# Patient Record
Sex: MALE | Race: ASIAN | Employment: OTHER
[De-identification: names, ages, dates, MRNs, and addresses within clinical notes are randomized per-mention and may not be internally consistent; named-entity substitution may affect disease eponyms.]

---

## 2017-01-31 ENCOUNTER — OFFICE VISIT (OUTPATIENT)
Dept: INTERNAL MEDICINE | Facility: CLINIC | Age: 72
End: 2017-01-31

## 2017-01-31 VITALS
HEIGHT: 66 IN | HEART RATE: 64 BPM | TEMPERATURE: 98.5 F | WEIGHT: 235 LBS | BODY MASS INDEX: 37.77 KG/M2 | DIASTOLIC BLOOD PRESSURE: 70 MMHG | SYSTOLIC BLOOD PRESSURE: 140 MMHG | RESPIRATION RATE: 16 BRPM

## 2017-01-31 DIAGNOSIS — D64.9 ANEMIA, UNSPECIFIED TYPE: ICD-10-CM

## 2017-01-31 DIAGNOSIS — E11.9 TYPE 2 DIABETES MELLITUS WITHOUT COMPLICATION, WITH LONG-TERM CURRENT USE OF INSULIN (HCC): Primary | ICD-10-CM

## 2017-01-31 DIAGNOSIS — K21.9 GASTROESOPHAGEAL REFLUX DISEASE WITHOUT ESOPHAGITIS: ICD-10-CM

## 2017-01-31 DIAGNOSIS — Z79.4 TYPE 2 DIABETES MELLITUS WITHOUT COMPLICATION, WITH LONG-TERM CURRENT USE OF INSULIN (HCC): Primary | ICD-10-CM

## 2017-01-31 DIAGNOSIS — I10 ESSENTIAL HYPERTENSION: ICD-10-CM

## 2017-01-31 PROCEDURE — 99214 OFFICE O/P EST MOD 30 MIN: CPT | Performed by: INTERNAL MEDICINE

## 2017-01-31 PROCEDURE — 3017F COLORECTAL CA SCREEN DOC REV: CPT | Performed by: INTERNAL MEDICINE

## 2017-01-31 PROCEDURE — G8484 FLU IMMUNIZE NO ADMIN: HCPCS | Performed by: INTERNAL MEDICINE

## 2017-01-31 PROCEDURE — 1036F TOBACCO NON-USER: CPT | Performed by: INTERNAL MEDICINE

## 2017-01-31 PROCEDURE — 1123F ACP DISCUSS/DSCN MKR DOCD: CPT | Performed by: INTERNAL MEDICINE

## 2017-01-31 PROCEDURE — 3044F HG A1C LEVEL LT 7.0%: CPT | Performed by: INTERNAL MEDICINE

## 2017-01-31 PROCEDURE — 4040F PNEUMOC VAC/ADMIN/RCVD: CPT | Performed by: INTERNAL MEDICINE

## 2017-01-31 PROCEDURE — G8427 DOCREV CUR MEDS BY ELIG CLIN: HCPCS | Performed by: INTERNAL MEDICINE

## 2017-01-31 PROCEDURE — G8419 CALC BMI OUT NRM PARAM NOF/U: HCPCS | Performed by: INTERNAL MEDICINE

## 2017-01-31 RX ORDER — OMEPRAZOLE 40 MG/1
40 CAPSULE, DELAYED RELEASE ORAL DAILY
Qty: 30 CAPSULE | Refills: 0 | Status: SHIPPED | OUTPATIENT
Start: 2017-01-31 | End: 2017-04-21 | Stop reason: SDUPTHER

## 2017-01-31 RX ORDER — FERROUS SULFATE 325(65) MG
325 TABLET ORAL
Qty: 270 TABLET | Refills: 1 | Status: SHIPPED | OUTPATIENT
Start: 2017-01-31 | End: 2017-05-30 | Stop reason: ALTCHOICE

## 2017-01-31 RX ORDER — DICLOFENAC SODIUM 75 MG/1
75 TABLET, DELAYED RELEASE ORAL 2 TIMES DAILY
Qty: 60 TABLET | Refills: 0 | Status: SHIPPED | OUTPATIENT
Start: 2017-01-31 | End: 2017-04-21 | Stop reason: SDUPTHER

## 2017-01-31 RX ORDER — FERROUS SULFATE 325(65) MG
325 TABLET ORAL
COMMUNITY
End: 2017-01-31 | Stop reason: SDUPTHER

## 2017-01-31 RX ORDER — RANITIDINE 150 MG/1
150 TABLET ORAL NIGHTLY
Qty: 30 TABLET | Refills: 0 | Status: SHIPPED | OUTPATIENT
Start: 2017-01-31 | End: 2018-01-29 | Stop reason: CLARIF

## 2017-02-01 RX ORDER — ATORVASTATIN CALCIUM 40 MG/1
TABLET, FILM COATED ORAL
Qty: 90 TABLET | Refills: 1 | Status: SHIPPED | OUTPATIENT
Start: 2017-02-01 | End: 2017-08-28 | Stop reason: SDUPTHER

## 2017-02-14 ENCOUNTER — HOSPITAL ENCOUNTER (OUTPATIENT)
Age: 72
Setting detail: SPECIMEN
Discharge: HOME | End: 2017-02-14
Payer: MEDICARE

## 2017-02-14 DIAGNOSIS — K21.9 GASTROESOPHAGEAL REFLUX DISEASE WITHOUT ESOPHAGITIS: ICD-10-CM

## 2017-02-14 DIAGNOSIS — Z79.4 TYPE 2 DIABETES MELLITUS WITHOUT COMPLICATION, WITH LONG-TERM CURRENT USE OF INSULIN (HCC): ICD-10-CM

## 2017-02-14 DIAGNOSIS — E11.9 TYPE 2 DIABETES MELLITUS WITHOUT COMPLICATION, WITH LONG-TERM CURRENT USE OF INSULIN (HCC): ICD-10-CM

## 2017-02-14 DIAGNOSIS — I10 ESSENTIAL HYPERTENSION: ICD-10-CM

## 2017-02-14 DIAGNOSIS — D64.9 ANEMIA, UNSPECIFIED TYPE: ICD-10-CM

## 2017-02-14 LAB
ANION GAP SERPL CALCULATED.3IONS-SCNC: 17 MMOL/L (ref 9–17)
BUN BLDV-MCNC: 30 MG/DL (ref 8–23)
BUN/CREAT BLD: ABNORMAL (ref 9–20)
CALCIUM SERPL-MCNC: 9.4 MG/DL (ref 8.6–10.4)
CHLORIDE BLD-SCNC: 108 MMOL/L (ref 98–107)
CO2: 19 MMOL/L (ref 20–31)
CREAT SERPL-MCNC: 1.56 MG/DL (ref 0.7–1.2)
FERRITIN: 166 UG/L (ref 30–400)
GFR AFRICAN AMERICAN: 53 ML/MIN
GFR NON-AFRICAN AMERICAN: 44 ML/MIN
GFR SERPL CREATININE-BSD FRML MDRD: ABNORMAL ML/MIN/{1.73_M2}
GFR SERPL CREATININE-BSD FRML MDRD: ABNORMAL ML/MIN/{1.73_M2}
GLUCOSE BLD-MCNC: 52 MG/DL (ref 70–99)
HCT VFR BLD CALC: 36.9 % (ref 41–53)
HEMOGLOBIN: 12.5 G/DL (ref 13.5–17.5)
IRON SATURATION: 35 % (ref 20–55)
IRON: 99 UG/DL (ref 59–158)
MCH RBC QN AUTO: 30.6 PG (ref 26–34)
MCHC RBC AUTO-ENTMCNC: 33.9 G/DL (ref 31–37)
MCV RBC AUTO: 90.3 FL (ref 80–100)
PDW BLD-RTO: 14.8 % (ref 12.5–15.4)
PLATELET # BLD: 177 K/UL (ref 140–450)
PMV BLD AUTO: 9.1 FL (ref 6–12)
POTASSIUM SERPL-SCNC: 4.8 MMOL/L (ref 3.7–5.3)
RBC # BLD: 4.09 M/UL (ref 4.5–5.9)
SODIUM BLD-SCNC: 144 MMOL/L (ref 135–144)
TOTAL IRON BINDING CAPACITY: 282 UG/DL (ref 250–450)
UNSATURATED IRON BINDING CAPACITY: 183 UG/DL (ref 112–347)
WBC # BLD: 8.7 K/UL (ref 3.5–11)

## 2017-02-28 RX ORDER — MONTELUKAST SODIUM 10 MG/1
TABLET ORAL
Qty: 90 TABLET | Refills: 1 | Status: SHIPPED | OUTPATIENT
Start: 2017-02-28 | End: 2017-08-28 | Stop reason: SDUPTHER

## 2017-04-21 RX ORDER — DICLOFENAC SODIUM 75 MG/1
75 TABLET, DELAYED RELEASE ORAL 2 TIMES DAILY
Qty: 180 TABLET | Refills: 0 | Status: SHIPPED | OUTPATIENT
Start: 2017-04-21 | End: 2017-05-30 | Stop reason: ALTCHOICE

## 2017-04-21 RX ORDER — OMEPRAZOLE 40 MG/1
40 CAPSULE, DELAYED RELEASE ORAL DAILY
Qty: 90 CAPSULE | Refills: 0 | Status: SHIPPED | OUTPATIENT
Start: 2017-04-21 | End: 2017-05-30 | Stop reason: ALTCHOICE

## 2017-05-30 ENCOUNTER — OFFICE VISIT (OUTPATIENT)
Dept: INTERNAL MEDICINE CLINIC | Age: 72
End: 2017-05-30
Payer: MEDICARE

## 2017-05-30 VITALS
HEIGHT: 66 IN | TEMPERATURE: 97.5 F | WEIGHT: 232 LBS | HEART RATE: 72 BPM | SYSTOLIC BLOOD PRESSURE: 130 MMHG | BODY MASS INDEX: 37.28 KG/M2 | DIASTOLIC BLOOD PRESSURE: 60 MMHG | RESPIRATION RATE: 16 BRPM

## 2017-05-30 DIAGNOSIS — G89.29 CHRONIC BILATERAL LOW BACK PAIN WITHOUT SCIATICA: ICD-10-CM

## 2017-05-30 DIAGNOSIS — E11.9 TYPE 2 DIABETES MELLITUS WITHOUT COMPLICATION, WITH LONG-TERM CURRENT USE OF INSULIN (HCC): Primary | ICD-10-CM

## 2017-05-30 DIAGNOSIS — K21.9 GASTROESOPHAGEAL REFLUX DISEASE WITHOUT ESOPHAGITIS: ICD-10-CM

## 2017-05-30 DIAGNOSIS — M54.50 CHRONIC BILATERAL LOW BACK PAIN WITHOUT SCIATICA: ICD-10-CM

## 2017-05-30 DIAGNOSIS — Z79.4 TYPE 2 DIABETES MELLITUS WITHOUT COMPLICATION, WITH LONG-TERM CURRENT USE OF INSULIN (HCC): Primary | ICD-10-CM

## 2017-05-30 DIAGNOSIS — I10 ESSENTIAL HYPERTENSION: ICD-10-CM

## 2017-05-30 PROCEDURE — 3017F COLORECTAL CA SCREEN DOC REV: CPT | Performed by: INTERNAL MEDICINE

## 2017-05-30 PROCEDURE — G8427 DOCREV CUR MEDS BY ELIG CLIN: HCPCS | Performed by: INTERNAL MEDICINE

## 2017-05-30 PROCEDURE — 1123F ACP DISCUSS/DSCN MKR DOCD: CPT | Performed by: INTERNAL MEDICINE

## 2017-05-30 PROCEDURE — 99214 OFFICE O/P EST MOD 30 MIN: CPT | Performed by: INTERNAL MEDICINE

## 2017-05-30 PROCEDURE — 1036F TOBACCO NON-USER: CPT | Performed by: INTERNAL MEDICINE

## 2017-05-30 PROCEDURE — 4040F PNEUMOC VAC/ADMIN/RCVD: CPT | Performed by: INTERNAL MEDICINE

## 2017-05-30 PROCEDURE — G8417 CALC BMI ABV UP PARAM F/U: HCPCS | Performed by: INTERNAL MEDICINE

## 2017-05-30 PROCEDURE — 3046F HEMOGLOBIN A1C LEVEL >9.0%: CPT | Performed by: INTERNAL MEDICINE

## 2017-05-30 RX ORDER — METOPROLOL SUCCINATE 50 MG/1
1 TABLET, EXTENDED RELEASE ORAL DAILY
COMMUNITY
Start: 2017-04-19 | End: 2018-01-29 | Stop reason: SDUPTHER

## 2017-05-30 ASSESSMENT — PATIENT HEALTH QUESTIONNAIRE - PHQ9
1. LITTLE INTEREST OR PLEASURE IN DOING THINGS: 0
SUM OF ALL RESPONSES TO PHQ QUESTIONS 1-9: 0
2. FEELING DOWN, DEPRESSED OR HOPELESS: 0
SUM OF ALL RESPONSES TO PHQ9 QUESTIONS 1 & 2: 0

## 2017-05-31 RX ORDER — SILDENAFIL 100 MG/1
100 TABLET, FILM COATED ORAL PRN
Qty: 2 TABLET | Refills: 0 | COMMUNITY
Start: 2017-05-31 | End: 2018-10-09

## 2017-06-01 PROCEDURE — G0009 ADMIN PNEUMOCOCCAL VACCINE: HCPCS | Performed by: INTERNAL MEDICINE

## 2017-06-01 PROCEDURE — 90732 PPSV23 VACC 2 YRS+ SUBQ/IM: CPT | Performed by: INTERNAL MEDICINE

## 2017-06-19 ENCOUNTER — OFFICE VISIT (OUTPATIENT)
Dept: INTERNAL MEDICINE CLINIC | Age: 72
End: 2017-06-19
Payer: MEDICARE

## 2017-06-19 VITALS
HEART RATE: 68 BPM | SYSTOLIC BLOOD PRESSURE: 118 MMHG | DIASTOLIC BLOOD PRESSURE: 70 MMHG | RESPIRATION RATE: 24 BRPM | BODY MASS INDEX: 36.96 KG/M2 | HEIGHT: 66 IN | TEMPERATURE: 97.9 F | WEIGHT: 230 LBS

## 2017-06-19 DIAGNOSIS — B30.9 ACUTE VIRAL CONJUNCTIVITIS OF BOTH EYES: Primary | ICD-10-CM

## 2017-06-19 PROCEDURE — 3017F COLORECTAL CA SCREEN DOC REV: CPT | Performed by: INTERNAL MEDICINE

## 2017-06-19 PROCEDURE — 1123F ACP DISCUSS/DSCN MKR DOCD: CPT | Performed by: INTERNAL MEDICINE

## 2017-06-19 PROCEDURE — 4040F PNEUMOC VAC/ADMIN/RCVD: CPT | Performed by: INTERNAL MEDICINE

## 2017-06-19 PROCEDURE — 99213 OFFICE O/P EST LOW 20 MIN: CPT | Performed by: INTERNAL MEDICINE

## 2017-06-19 PROCEDURE — G8427 DOCREV CUR MEDS BY ELIG CLIN: HCPCS | Performed by: INTERNAL MEDICINE

## 2017-06-19 PROCEDURE — 1036F TOBACCO NON-USER: CPT | Performed by: INTERNAL MEDICINE

## 2017-06-19 PROCEDURE — G8417 CALC BMI ABV UP PARAM F/U: HCPCS | Performed by: INTERNAL MEDICINE

## 2017-06-19 RX ORDER — TOBRAMYCIN 3 MG/ML
1 SOLUTION/ DROPS OPHTHALMIC EVERY 4 HOURS
Qty: 1 BOTTLE | Refills: 0 | Status: SHIPPED | OUTPATIENT
Start: 2017-06-19 | End: 2017-06-29

## 2017-06-19 RX ORDER — FERROUS SULFATE 325(65) MG
325 TABLET ORAL
COMMUNITY
End: 2018-01-29 | Stop reason: CLARIF

## 2017-06-19 RX ORDER — OMEPRAZOLE 40 MG/1
40 CAPSULE, DELAYED RELEASE ORAL 2 TIMES DAILY
COMMUNITY
End: 2017-09-29 | Stop reason: SDUPTHER

## 2017-06-19 RX ORDER — LORATADINE 10 MG/1
10 TABLET ORAL DAILY
Qty: 30 TABLET | Refills: 0 | Status: SHIPPED | OUTPATIENT
Start: 2017-06-19 | End: 2018-01-29 | Stop reason: CLARIF

## 2017-06-22 ENCOUNTER — TELEPHONE (OUTPATIENT)
Dept: INTERNAL MEDICINE CLINIC | Age: 72
End: 2017-06-22

## 2017-06-22 DIAGNOSIS — H57.89 EYE REDNESS: Primary | ICD-10-CM

## 2017-06-22 RX ORDER — PREDNISONE 10 MG/1
10 TABLET ORAL 2 TIMES DAILY
Qty: 6 TABLET | Refills: 0 | Status: SHIPPED | OUTPATIENT
Start: 2017-06-22 | End: 2017-07-02

## 2017-08-28 RX ORDER — OMEPRAZOLE 40 MG/1
CAPSULE, DELAYED RELEASE ORAL
Qty: 90 CAPSULE | Refills: 1 | Status: SHIPPED | OUTPATIENT
Start: 2017-08-28 | End: 2018-01-29 | Stop reason: SDUPTHER

## 2017-08-28 RX ORDER — ATORVASTATIN CALCIUM 40 MG/1
TABLET, FILM COATED ORAL
Qty: 90 TABLET | Refills: 1 | Status: SHIPPED | OUTPATIENT
Start: 2017-08-28 | End: 2018-01-29 | Stop reason: SDUPTHER

## 2017-08-28 RX ORDER — DICLOFENAC SODIUM 75 MG/1
TABLET, DELAYED RELEASE ORAL
Qty: 180 TABLET | Refills: 1 | Status: SHIPPED | OUTPATIENT
Start: 2017-08-28 | End: 2018-01-29 | Stop reason: SDUPTHER

## 2017-08-28 RX ORDER — MONTELUKAST SODIUM 10 MG/1
TABLET ORAL
Qty: 90 TABLET | Refills: 1 | Status: SHIPPED | OUTPATIENT
Start: 2017-08-28 | End: 2018-01-29 | Stop reason: SDUPTHER

## 2017-09-29 ENCOUNTER — OFFICE VISIT (OUTPATIENT)
Dept: INTERNAL MEDICINE CLINIC | Age: 72
End: 2017-09-29
Payer: MEDICARE

## 2017-09-29 VITALS
HEIGHT: 66 IN | HEART RATE: 66 BPM | WEIGHT: 233.4 LBS | OXYGEN SATURATION: 98 % | DIASTOLIC BLOOD PRESSURE: 74 MMHG | SYSTOLIC BLOOD PRESSURE: 158 MMHG | BODY MASS INDEX: 37.51 KG/M2 | TEMPERATURE: 97.9 F

## 2017-09-29 DIAGNOSIS — G89.29 CHRONIC BILATERAL LOW BACK PAIN WITHOUT SCIATICA: ICD-10-CM

## 2017-09-29 DIAGNOSIS — M54.5 CHRONIC MIDLINE LOW BACK PAIN, WITH SCIATICA PRESENCE UNSPECIFIED: ICD-10-CM

## 2017-09-29 DIAGNOSIS — L60.0 INGROWN NAIL OF GREAT TOE OF RIGHT FOOT: ICD-10-CM

## 2017-09-29 DIAGNOSIS — M79.605 LEFT LEG PAIN: ICD-10-CM

## 2017-09-29 DIAGNOSIS — I10 ESSENTIAL HYPERTENSION: ICD-10-CM

## 2017-09-29 DIAGNOSIS — M54.50 CHRONIC BILATERAL LOW BACK PAIN WITHOUT SCIATICA: ICD-10-CM

## 2017-09-29 DIAGNOSIS — E11.9 TYPE 2 DIABETES MELLITUS WITHOUT COMPLICATION, WITH LONG-TERM CURRENT USE OF INSULIN (HCC): Primary | ICD-10-CM

## 2017-09-29 DIAGNOSIS — L81.9 DISCOLORATION OF SKIN: ICD-10-CM

## 2017-09-29 DIAGNOSIS — L98.9 ARM LESION: ICD-10-CM

## 2017-09-29 DIAGNOSIS — M33.90 DERMATOMYOSITIS (HCC): ICD-10-CM

## 2017-09-29 DIAGNOSIS — Z79.4 TYPE 2 DIABETES MELLITUS WITHOUT COMPLICATION, WITH LONG-TERM CURRENT USE OF INSULIN (HCC): Primary | ICD-10-CM

## 2017-09-29 DIAGNOSIS — G89.29 CHRONIC MIDLINE LOW BACK PAIN, WITH SCIATICA PRESENCE UNSPECIFIED: ICD-10-CM

## 2017-09-29 PROCEDURE — G0008 ADMIN INFLUENZA VIRUS VAC: HCPCS | Performed by: INTERNAL MEDICINE

## 2017-09-29 PROCEDURE — 1123F ACP DISCUSS/DSCN MKR DOCD: CPT | Performed by: INTERNAL MEDICINE

## 2017-09-29 PROCEDURE — G8427 DOCREV CUR MEDS BY ELIG CLIN: HCPCS | Performed by: INTERNAL MEDICINE

## 2017-09-29 PROCEDURE — 4040F PNEUMOC VAC/ADMIN/RCVD: CPT | Performed by: INTERNAL MEDICINE

## 2017-09-29 PROCEDURE — 3046F HEMOGLOBIN A1C LEVEL >9.0%: CPT | Performed by: INTERNAL MEDICINE

## 2017-09-29 PROCEDURE — 1036F TOBACCO NON-USER: CPT | Performed by: INTERNAL MEDICINE

## 2017-09-29 PROCEDURE — 99214 OFFICE O/P EST MOD 30 MIN: CPT | Performed by: INTERNAL MEDICINE

## 2017-09-29 PROCEDURE — 90688 IIV4 VACCINE SPLT 0.5 ML IM: CPT | Performed by: INTERNAL MEDICINE

## 2017-09-29 PROCEDURE — G8417 CALC BMI ABV UP PARAM F/U: HCPCS | Performed by: INTERNAL MEDICINE

## 2017-09-29 PROCEDURE — 3017F COLORECTAL CA SCREEN DOC REV: CPT | Performed by: INTERNAL MEDICINE

## 2017-09-29 RX ORDER — GABAPENTIN 300 MG/1
300 CAPSULE ORAL 3 TIMES DAILY
Qty: 90 CAPSULE | Refills: 1 | Status: SHIPPED | OUTPATIENT
Start: 2017-09-29 | End: 2018-01-29 | Stop reason: CLARIF

## 2017-09-29 RX ORDER — SILDENAFIL 100 MG/1
100 TABLET, FILM COATED ORAL PRN
Qty: 1 TABLET | Refills: 0 | COMMUNITY
Start: 2017-09-29 | End: 2018-01-29 | Stop reason: CLARIF

## 2017-09-29 NOTE — MR AVS SNAPSHOT
After Visit Summary             [address]   2017 10:15 AM   Office Visit    Description:  Male : 1945   Provider:  Zeynep Jacobo MD   Department:  [facility] [id number] [place] [address] and Future Appointments         Below is a list of your follow-up and future appointments. This may not be a complete list as you may have made appointments directly with providers that we are not aware of or your providers may have made some for you. Please call your providers to confirm appointments. It is important to keep your appointments. Please bring your current insurance card, photo ID, co-pay, and all medication bottles to your appointment. If self-pay, payment is expected at the time of service. Your To-Do List     Future Appointments Provider Department Dept Phone    2018 10:00 AM Zeynep aJcobo MD [facility] [phone]    Please arrive 15 minutes prior to appointment, bring photo ID and insurance card. Future Orders Complete By Expires    Comprehensive Metabolic Panel [NPQ37 Custom]  2017    Hemoglobin A1C [LAB90 Custom]  2017    Microalbumin / Creatinine Urine Ratio [XVF735 Custom]  2017    Lipid Panel [LAB18 Custom]  10/10/2017 2018    Follow-Up    Return in about 4 months (around 2018).          Information from Your Visit        Department     Name Address Phone Fax    [facility] [address] [facility] [phone]      You Were Seen for:         Comments    Type 2 diabetes mellitus without complication, with long-term current use of insulin ([address]   [7490023]         Vital Signs     Blood Pressure Pulse Temperature Height Weight Oxygen Saturation    158/74 (Site: Right Arm, Position: Sitting, Cuff Size: Large Adult) 66 97.9 °F (36.6 °C) (Oral) 5' 6.14\" (1.68 m) 233 lb 6.4 oz (105.9 kg) 98%    Body Mass Index Smoking Status * Notice: This list has 2 medication(s) that are the same as other medications prescribed for you. Read the directions carefully, and ask your doctor or other care provider to review them with you. Where to Get Your Medications      These medications were sent to [facility] [address] [place] [address] [address], [facility] [place] [address]     Phone:  [phone]     gabapentin 300 MG capsule         Information about where to get these medications is not yet available     !  Ask your nurse or doctor about these medications     sildenafil 100 MG tablet               Your Current Medications Are              gabapentin (NEURONTIN) 300 MG capsule Take 1 capsule by mouth 3 times daily    sildenafil (VIAGRA) 100 MG tablet Take 1 tablet by mouth as needed for Erectile Dysfunction    omeprazole (PRILOSEC) 40 MG delayed release capsule Take 1 capsule by mouth  daily    atorvastatin (LIPITOR) 40 MG tablet Take 1 tablet by mouth  daily    diclofenac (VOLTAREN) 75 MG EC tablet Take 1 tablet by mouth two  times daily    montelukast (SINGULAIR) 10 MG tablet Take 1 tablet by mouth once daily    DICLOFENAC PO Take 1 tablet by mouth daily    sildenafil (VIAGRA) 100 MG tablet Take 1 tablet by mouth as needed for Erectile Dysfunction    metFORMIN (GLUCOPHAGE) 500 MG tablet Take 2 tablets by mouth 2 times daily    metoprolol succinate (TOPROL XL) 50 MG extended release tablet Take 1 tablet by mouth daily    ranitidine (ZANTAC) 150 MG tablet Take 1 tablet by mouth nightly    losartan (COZAAR) 100 MG tablet Take 1 tablet by mouth daily    amLODIPine (NORVASC) 5 MG tablet Take 5 mg by mouth daily    spironolactone (ALDACTONE) 25 MG tablet Take 25 mg by mouth daily    acetaminophen (TYLENOL) 650 MG CR tablet Take 650 mg by mouth every 8 hours as needed for Pain    HUMALOG MIX 50/50 KWIKPEN (50-50) 100 UNIT/ML SUPN injection pen INJECT 90 UNITS UNDER THE SKIN TWICE A DAY WITH MEALS    aspirin 81 MG tablet Take 81 mg by mouth daily    ferrous sulfate 325 (65 Fe) MG tablet Take 325 mg by mouth 3 times daily (with meals)    loratadine (CLARITIN) 10 MG tablet Take 1 tablet by mouth daily      Allergies           No Known Allergies      We Ordered/Performed the following           RUBINA Johnston MD     Scheduling Instructions:    RUBINA Johnston MD   [address], [address] [place] [address]   [phone]    Comments: The patient can be scheduled with any member of the group, including the provider with the first available appointments.      INFLUENZA, QUADV, 3 YRS AND OLDER, IM, MDV, 0.5ML ([address])          Additional Information        Basic Information     Date Of Birth Sex Race Ethnicity Preferred Language    1945 Male Other Non-/Non  English      Problem List as of 9/29/2017  Date Reviewed: 6/3/2017                Epidural hematoma (HCC)    Dermatomyositis (HCC) (Chronic)    HIGH CHOLESTEROL    HTN (hypertension)    Obesity    GUTIERREZ (obstructive sleep apnea)    Hay fever    Lumbar herniated disc    Colon cancer ([facility] [address]      Immunizations as of 9/29/2017     Name Date    Influenza Virus Vaccine 10/15/2015, 10/24/2014, 10/28/2013, 2/12/2013, 9/20/2011, 9/22/2009, 12/9/2005    Influenza, Cailin Au, 3 Years and older, IM 9/29/2016    Pneumococcal 13-valent Conjugate ([id number]) 2/25/2016    Pneumococcal Conjugate 7-valent 9/20/2011    Pneumococcal Polysaccharide ([id number]) 6/1/2017    Tetanus 5/5/2006      Preventive Care        Date Due    Eye Exam By An Eye Doctor 2/20/2017    Hemoglobin A1C (Test For Long-Term Glucose Control) 5/23/2017    Urine Check For Kidney Problems 5/23/2017    Yearly Flu Vaccine (1) 9/1/2017    Tetanus Combination Vaccine (1 - Tdap) 12/12/2017 (Originally 5/6/2006)    Zoster Vaccine 5/30/2018 (Originally 6/25/2004) Cholesterol Screening 10/25/2017    Diabetic Foot Exam 5/31/2018    Colonoscopy 10/20/2026            MyChart Signup           Our records indicate that you have an active [employer] account. You can view your After Visit Summary by going to [email or web address] and logging in with your [employer] username and password. If you don't have a [employer] username and password but a parent or guardian has access to your record, the parent or guardian should login with their own [employer] username and password and access your record to view the After Visit Summary. Additional Information  If you have questions, please contact the physician practice where you receive care. Remember, [employer] is NOT to be used for urgent needs. For medical emergencies, dial 911. For questions regarding your [employer] account call [phone]. If you have a clinical question, please call your doctor's office.

## 2017-09-29 NOTE — PROGRESS NOTES
 Heart Disease Maternal Grandfather      ROS   Constitutional:  Negative for fatigue, loss of appetite and unexpected weight change   HEENT            : Negative for neck stiffness and pain, no congestion or sinus pressure   Eyes                : No visual disturbance or pain   Cardiovascular: No chest pain or palpitations or leg swelling   Respiratory      : Negative for cough, shortness of breath or wheezing   Gastrointestinal: Negative for abdominal pain, constipation or diarrhea and bloating No nausea or vomiting   Genitourinary:     No urgency or frequency, no burning or hematuria   Musculoskeletal: No arthralgias, back pain or myalgias   Skin                  : Negative for rash or erythema   Neurological    : Negative for dizziness, weakness, tremors ,light headedness or syncope   Psychiatric       : Negative for dysphoric mood, sleep disturbances, nervous or anxious, or decreased concentration   All other review of systems was negative    Objective  Physical Examination:    Nursing note reviewed    BP (!) 158/74 (Site: Right Arm, Position: Sitting, Cuff Size: Large Adult)  Pulse 66  Temp 97.9 °F (36.6 °C) (Oral)   Ht 5' 6.14\" (1.68 m)  Wt 233 lb 6.4 oz (105.9 kg)  SpO2 98%  BMI 37.51 kg/m2  BP Readings from Last 3 Encounters:   09/29/17 (!) 158/74   06/19/17 118/70   05/30/17 130/60         Constitutional:  Leeann Castellanos is oriented to place, person and time ,appears well-developed and well-nourished  HEENT:  Atraumatic and normocephalic, external ears normal bilaterally, nose normal no oropharyngeal exudate and is clear and moist  Eyes:  EOCM normal; conjunctivae normal; PERRLA bilaterally  Neck:  Normal range of motion, neck supple, no JVD and no thyromegaly  Cardiovascular:  RRR, normal heart sounds and intact distal pulses  Pulmonary:  effort normal and breath sounds normal bilaterally,no wheezes or rales, no respiratory distress  Abdominal:  Soft, non-tender; normal bowel sounds, no masses  Musculoskeletal:  Normal range of motion and no edema or tenderness bilaterally  No lymphadenopathy  Neurological:  alert, oriented, and normal reflexes bilaterally  Skin: warm and dry  Psychiatric:  normal mood and effect; behavior normal.    Labs:   Lab Results   Component Value Date    LABA1C 6.0 05/23/2016     Lab Results   Component Value Date    CHOL 114 10/25/2016     Lab Results   Component Value Date    HDL 37 (L) 10/25/2016     No results found for: [facility]  Lab Results   Component Value Date    TRIG 218 (H) 10/25/2016     No components found for: [place], [place]  Lab Results   Component Value Date    WBC 8.7 02/14/2017    HGB 12.5 (L) 02/14/2017    HCT 36.9 (L) 02/14/2017    MCV 90.3 02/14/2017     02/14/2017     Lab Results   Component Value Date    INR 1.0 09/04/2015    PROTIME 10.0 09/04/2015     Lab Results   Component Value Date    GLUCOSE 52 (L) 02/14/2017    CREATININE 1.56 (H) 02/14/2017    BUN 30 (H) 02/14/2017     02/14/2017    K 4.8 02/14/2017     (H) 02/14/2017    CO2 19 (L) 02/14/2017     Lab Results   Component Value Date    ALT 25 10/25/2016    AST 18 10/25/2016    ALKPHOS 120 05/23/2016    BILITOT 0.35 05/23/2016     Lab Results   Component Value Date    LABPROT 7.0 02/08/2013    LABALBU 4.1 05/23/2016     Lab Results   Component Value Date    TSH 3.67 10/06/2011     Assessment:  1. Type 2 diabetes mellitus without complication, with long-term current use of insulin ([address]    2. Essential hypertension    3. Chronic bilateral low back pain without sciatica    4. Ingrown nail of great toe of right foot    5. Arm lesion    6. Left leg pain    7.  Discoloration of skin        Plan:  Patient's hemoglobin A1c was 6.2 and endocrine the office last week and office notes reviewed and continue current medications including 50/50 insulin 90 units in the morning and 30 at noon and 60 at night and other medications as per endocrinology   Blood pressure is stable on current Prescriptions     Signed Prescriptions Disp Refills    gabapentin (NEURONTIN) 300 MG capsule 90 capsule 1     Sig: Take 1 capsule by mouth 3 times daily    sildenafil (VIAGRA) 100 MG tablet 1 tablet 0     Sig: Take 1 tablet by mouth as needed for Erectile Dysfunction     4. Patient given educational materials - see patient instructions    5. Was a self-tracking handout given in paper form or via MyChart? NO    Orders Placed This Encounter   Procedures    INFLUENZA, QUADV, 3 YRS AND OLDER, IM, MDV, 0.5ML (FLUZONE QUADV)    Microalbumin / Creatinine Urine Ratio     Standing Status:   Future     Standing Expiration Date:   9/29/2018    Comprehensive Metabolic Panel     Standing Status:   Future     Standing Expiration Date:   9/29/2018    Lipid Panel     Standing Status:   Future     Standing Expiration Date:   9/29/2018     Order Specific Question:   Is Patient Fasting?/# of Hours     Answer:   12    Hemoglobin A1C     Standing Status:   Future     Standing Expiration Date:   9/29/2018   RUBINA Avilez MD     Referral Priority:   Routine     Referral Type:   Consult for Advice and Opinion     Referral Reason:   Specialty Services Required     Referred to Provider:   Ronnell Rasheed MD     Requested Specialty:   Dermatology     Number of Visits Requested:   1     Return in about 4 months (around 1/29/2018). Patient voiced understanding and agreed to treatment plan. Electronically signed by Dyllan Bullock MD on 9/29/2017 at 11:17 AM    This note is created with a voice recognition program and while intend to generate a document that accurately reflects the content of the visit, no guarantee can be provided that every mistake has been identified and corrected by editing.

## 2017-09-29 NOTE — PROGRESS NOTES
Chronic Disease Visit Information    BP Readings from Last 3 Encounters:   06/19/17 118/70   05/30/17 130/60   01/31/17 140/70          Hemoglobin A1C (%)   Date Value   05/23/2016 6.0   02/20/2015 6.9 (H)   03/02/2014 7.5 (H)     Microalb/Crt. Ratio (mcg/mg creat)   Date Value   05/23/2016 340 (H)     LDL Cholesterol (mg/dL)   Date Value   10/25/2016 33     HDL (mg/dL)   Date Value   10/25/2016 37 (L)     BUN (mg/dL)   Date Value   02/14/2017 30 (H)     CREATININE (mg/dL)   Date Value   02/14/2017 1.56 (H)     Glucose (mg/dL)   Date Value   02/14/2017 52 (L)   11/20/2011 208 (H)            Have you changed or started any medications since your last visit including any over-the-counter medicines, vitamins, or herbal medicines? no   Are you having any side effects from any of your medications? -  no  Have you stopped taking any of your medications? Is so, why? -  no    Have you seen any other physician or provider since your last visit? No  Have you had any other diagnostic tests since your last visit? Yes - Records Obtained  Have you been seen in the emergency room and/or had an admission to a hospital since we last saw you? No  Have you had your annual diabetic retinal (eye) exam? No  Have you had your routine dental cleaning in the past 6 months? no    Have you activated your [employer] account? If not, what are your barriers?  Yes     Patient Care Team:  Tapan Beyer MD as PCP - Светлана Christensen MD as PCP - [facility] Attributed Provider  Nori Hickman (Endocrinology)         Medical History Review  Past Medical, Family, and Social History reviewed and does contribute to the patient presenting condition    Health Maintenance   Topic Date Due    Diabetic retinal exam  02/20/2017    Diabetic hemoglobin A1C test  05/23/2017    Diabetic microalbuminuria test  05/23/2017    Flu vaccine (1) 09/01/2017    DTaP/Tdap/Td vaccine (1 - Tdap) 12/12/2017 (Originally 5/6/2006)    Zostavax vaccine 05/30/2018 (Originally 6/25/2004)    Lipid screen  10/25/2017    Diabetic foot exam  05/31/2018    Colon cancer screen colonoscopy  10/20/2026    Pneumococcal low/med risk  Completed

## 2017-10-03 ENCOUNTER — HOSPITAL ENCOUNTER (OUTPATIENT)
Age: 72
Setting detail: SPECIMEN
Discharge: HOME | End: 2017-10-03
Payer: MEDICARE

## 2017-10-05 ENCOUNTER — TELEPHONE (OUTPATIENT)
Dept: INTERNAL MEDICINE CLINIC | Age: 72
End: 2017-10-05

## 2017-10-05 LAB — DERMATOLOGY PATHOLOGY REPORT: NORMAL

## 2017-10-17 LAB
ALBUMIN SERPL-MCNC: 4.2 G/DL
ALP BLD-CCNC: 113 U/L
ALT SERPL-CCNC: 20 U/L
ANION GAP SERPL CALCULATED.3IONS-SCNC: NORMAL MMOL/L
AST SERPL-CCNC: 21 U/L
AVERAGE GLUCOSE: 134
BILIRUB SERPL-MCNC: 0.5 MG/DL (ref 0.1–1.4)
BUN BLDV-MCNC: 34 MG/DL
CALCIUM SERPL-MCNC: 9.7 MG/DL
CHLORIDE BLD-SCNC: 111 MMOL/L
CHOLESTEROL, TOTAL: 102 MG/DL
CHOLESTEROL/HDL RATIO: 3.5
CO2: 24 MMOL/L
CREAT SERPL-MCNC: 1.72 MG/DL
CREATININE, URINE: 164.06
GFR CALCULATED: NORMAL
GLUCOSE BLD-MCNC: 50 MG/DL
HBA1C MFR BLD: 6.3 %
HDLC SERPL-MCNC: 29 MG/DL (ref 35–70)
LDL CHOLESTEROL CALCULATED: 35 MG/DL (ref 0–160)
MICROALBUMIN/CREAT 24H UR: 40.1 MG/G{CREAT}
MICROALBUMIN/CREAT UR-RTO: 244.4
POTASSIUM SERPL-SCNC: 4.6 MMOL/L
SODIUM BLD-SCNC: 144 MMOL/L
TOTAL PROTEIN: 7.2
TRIGL SERPL-MCNC: 191 MG/DL
VLDLC SERPL CALC-MCNC: ABNORMAL MG/DL

## 2017-10-18 ENCOUNTER — HOSPITAL ENCOUNTER (OUTPATIENT)
Dept: VASCULAR LAB | Age: 72
Discharge: HOME | End: 2017-10-18
Payer: MEDICARE

## 2017-10-18 PROCEDURE — 93924 LWR XTR VASC STDY BILAT: CPT

## 2017-11-08 DIAGNOSIS — M54.50 CHRONIC BILATERAL LOW BACK PAIN WITHOUT SCIATICA: ICD-10-CM

## 2017-11-08 DIAGNOSIS — G89.29 CHRONIC BILATERAL LOW BACK PAIN WITHOUT SCIATICA: ICD-10-CM

## 2017-11-08 DIAGNOSIS — L60.0 INGROWN NAIL OF GREAT TOE OF RIGHT FOOT: ICD-10-CM

## 2017-11-08 DIAGNOSIS — E11.9 TYPE 2 DIABETES MELLITUS WITHOUT COMPLICATION, WITH LONG-TERM CURRENT USE OF INSULIN (HCC): ICD-10-CM

## 2017-11-08 DIAGNOSIS — I10 ESSENTIAL HYPERTENSION: ICD-10-CM

## 2017-11-08 DIAGNOSIS — Z79.4 TYPE 2 DIABETES MELLITUS WITHOUT COMPLICATION, WITH LONG-TERM CURRENT USE OF INSULIN (HCC): ICD-10-CM

## 2017-11-10 ENCOUNTER — TELEPHONE (OUTPATIENT)
Dept: INTERNAL MEDICINE CLINIC | Age: 72
End: 2017-11-10

## 2017-11-10 NOTE — TELEPHONE ENCOUNTER
Cardiology, Dr Kaushik Hurt, stopped Ibuprofen in response to this lab test.  Pt due to repeat bun cr in a week or so

## 2017-11-10 NOTE — TELEPHONE ENCOUNTER
----- Message from Tapan Beyer MD sent at 11/9/2017  9:40 AM EST -----  Check with patient if he is taking the diclofenac every day if so I ask him to stop it and repeat the BUN/creatinine in 3 weeks

## 2018-01-29 ENCOUNTER — OFFICE VISIT (OUTPATIENT)
Dept: INTERNAL MEDICINE CLINIC | Age: 73
End: 2018-01-29
Payer: MEDICARE

## 2018-01-29 VITALS
RESPIRATION RATE: 16 BRPM | HEART RATE: 64 BPM | TEMPERATURE: 97.9 F | DIASTOLIC BLOOD PRESSURE: 66 MMHG | SYSTOLIC BLOOD PRESSURE: 134 MMHG | BODY MASS INDEX: 37.8 KG/M2 | WEIGHT: 235.2 LBS | HEIGHT: 66 IN

## 2018-01-29 DIAGNOSIS — Z79.4 TYPE 2 DIABETES MELLITUS WITHOUT COMPLICATION, WITH LONG-TERM CURRENT USE OF INSULIN (HCC): Primary | ICD-10-CM

## 2018-01-29 DIAGNOSIS — E11.9 TYPE 2 DIABETES MELLITUS WITHOUT COMPLICATION, WITH LONG-TERM CURRENT USE OF INSULIN (HCC): Primary | ICD-10-CM

## 2018-01-29 DIAGNOSIS — I10 ESSENTIAL HYPERTENSION: ICD-10-CM

## 2018-01-29 DIAGNOSIS — G47.30 SLEEP APNEA, UNSPECIFIED TYPE: ICD-10-CM

## 2018-01-29 DIAGNOSIS — G89.29 CHRONIC BILATERAL LOW BACK PAIN WITHOUT SCIATICA: ICD-10-CM

## 2018-01-29 DIAGNOSIS — M54.50 CHRONIC BILATERAL LOW BACK PAIN WITHOUT SCIATICA: ICD-10-CM

## 2018-01-29 DIAGNOSIS — G56.03 BILATERAL CARPAL TUNNEL SYNDROME: ICD-10-CM

## 2018-01-29 LAB — HBA1C MFR BLD: 6.4 %

## 2018-01-29 PROCEDURE — 3044F HG A1C LEVEL LT 7.0%: CPT | Performed by: INTERNAL MEDICINE

## 2018-01-29 PROCEDURE — 1123F ACP DISCUSS/DSCN MKR DOCD: CPT | Performed by: INTERNAL MEDICINE

## 2018-01-29 PROCEDURE — 83036 HEMOGLOBIN GLYCOSYLATED A1C: CPT | Performed by: INTERNAL MEDICINE

## 2018-01-29 PROCEDURE — 3017F COLORECTAL CA SCREEN DOC REV: CPT | Performed by: INTERNAL MEDICINE

## 2018-01-29 PROCEDURE — 4040F PNEUMOC VAC/ADMIN/RCVD: CPT | Performed by: INTERNAL MEDICINE

## 2018-01-29 PROCEDURE — 1036F TOBACCO NON-USER: CPT | Performed by: INTERNAL MEDICINE

## 2018-01-29 PROCEDURE — G8427 DOCREV CUR MEDS BY ELIG CLIN: HCPCS | Performed by: INTERNAL MEDICINE

## 2018-01-29 PROCEDURE — G8484 FLU IMMUNIZE NO ADMIN: HCPCS | Performed by: INTERNAL MEDICINE

## 2018-01-29 PROCEDURE — 99214 OFFICE O/P EST MOD 30 MIN: CPT | Performed by: INTERNAL MEDICINE

## 2018-01-29 PROCEDURE — G8417 CALC BMI ABV UP PARAM F/U: HCPCS | Performed by: INTERNAL MEDICINE

## 2018-01-29 RX ORDER — MONTELUKAST SODIUM 10 MG/1
TABLET ORAL
Qty: 90 TABLET | Refills: 1 | Status: SHIPPED | OUTPATIENT
Start: 2018-01-29 | End: 2018-07-21 | Stop reason: SDUPTHER

## 2018-01-29 RX ORDER — METOPROLOL SUCCINATE 50 MG/1
50 TABLET, EXTENDED RELEASE ORAL DAILY
Qty: 90 TABLET | Refills: 1 | Status: SHIPPED | OUTPATIENT
Start: 2018-01-29 | End: 2018-07-21 | Stop reason: SDUPTHER

## 2018-01-29 RX ORDER — ATORVASTATIN CALCIUM 40 MG/1
TABLET, FILM COATED ORAL
Qty: 90 TABLET | Refills: 1 | Status: SHIPPED | OUTPATIENT
Start: 2018-01-29 | End: 2018-07-21 | Stop reason: SDUPTHER

## 2018-01-29 RX ORDER — SPIRONOLACTONE 25 MG/1
25 TABLET ORAL DAILY
Qty: 90 TABLET | Refills: 1 | Status: SHIPPED | OUTPATIENT
Start: 2018-01-29 | End: 2018-07-21 | Stop reason: SDUPTHER

## 2018-01-29 RX ORDER — DICLOFENAC SODIUM 75 MG/1
TABLET, DELAYED RELEASE ORAL
Qty: 180 TABLET | Refills: 1 | Status: SHIPPED | OUTPATIENT
Start: 2018-01-29 | End: 2018-07-21 | Stop reason: SDUPTHER

## 2018-01-29 RX ORDER — LOSARTAN POTASSIUM 100 MG/1
100 TABLET ORAL DAILY
Qty: 90 TABLET | Refills: 1 | Status: SHIPPED | OUTPATIENT
Start: 2018-01-29 | End: 2018-07-21 | Stop reason: SDUPTHER

## 2018-01-29 RX ORDER — AMLODIPINE BESYLATE 5 MG/1
5 TABLET ORAL DAILY
Qty: 90 TABLET | Refills: 1 | Status: SHIPPED | OUTPATIENT
Start: 2018-01-29 | End: 2018-07-21 | Stop reason: SDUPTHER

## 2018-01-29 RX ORDER — OMEPRAZOLE 40 MG/1
CAPSULE, DELAYED RELEASE ORAL
Qty: 90 CAPSULE | Refills: 1 | Status: SHIPPED | OUTPATIENT
Start: 2018-01-29 | End: 2018-07-21 | Stop reason: SDUPTHER

## 2018-01-29 NOTE — PROGRESS NOTES
frequency, no burning or hematuria   Musculoskeletal: Positive for arthralgias, back pain or myalgias   Skin                  : Negative for rash or erythema   Neurological    : Negative for dizziness, weakness, tremors ,light headedness or syncope   Psychiatric       : Negative for dysphoric mood, sleep disturbances, nervous or anxious, or decreased concentration   All other review of systems was negative    Objective  Physical Examination:    Nursing note reviewed    /66 (Site: Right Arm, Position: Sitting, Cuff Size: Large Adult)   Pulse 64   Temp 97.9 °F (36.6 °C) (Oral)   Resp 16   Ht 5' 6\" (1.676 m)   Wt 235 lb 3.2 oz (106.7 kg)   BMI 37.96 kg/m²   BP Readings from Last 3 Encounters:   01/29/18 134/66   09/29/17 (!) 158/74   06/19/17 118/70         Constitutional:  Rodriguez Gong is oriented to place, person and time ,appears well-developed and well-nourished  HEENT:  Atraumatic and normocephalic, external ears normal bilaterally, nose normal no oropharyngeal exudate and is clear and moist  Eyes:  EOCM normal; conjunctivae normal; PERRLA bilaterally  Neck:  Normal range of motion, neck supple, no JVD and no thyromegaly  Cardiovascular:  RRR, normal heart sounds and intact distal pulses  Pulmonary:  effort normal and breath sounds normal bilaterally,no wheezes or rales, no respiratory distress  Abdominal:  Soft, non-tender; normal bowel sounds, no masses  Musculoskeletal:  Very limited range of motion and no edema or tenderness bilaterally  No lymphadenopathy  Neurological:  alert, oriented, and normal reflexes bilaterally  Skin: warm and dry  Psychiatric:  normal mood and effect; behavior normal.    Labs:   Lab Results   Component Value Date    LABA1C 6.3 10/17/2017     Lab Results   Component Value Date    CHOL 102 10/17/2017     Lab Results   Component Value Date    HDL 29 (A) 10/17/2017     Lab Results   Component Value Date    LDLCALC 35 10/17/2017     Lab Results   Component Value Date    TRIG morning but resolves afterwards and so prescription was given for bilateral wrist braces and advised patient to call me back in 2-3 weeks if no improvement will refer to orthopedics  Patient is seeing podiatrist for ingrown toenails and is doing his feet examination and patient had an arterial Doppler done recently which was negative  Review in 4 months           1. Felix Yarbrough received counseling on the following healthy behaviors: nutrition and exercise    2. Prior labs and health maintenance reviewed. 3.  Discussed use, benefit, and side effects of prescribed medications. Barriers to medication compliance addressed. All his questions were answered. Pt voiced understanding. Felix Yarbrough will continue current medications, diet and exercise.               Orders Placed This Encounter   Medications    atorvastatin (LIPITOR) 40 MG tablet     Sig: Take 1 tablet by mouth  daily     Dispense:  90 tablet     Refill:  1    diclofenac (VOLTAREN) 75 MG EC tablet     Sig: Take 1 tablet by mouth two  times daily     Dispense:  180 tablet     Refill:  1    montelukast (SINGULAIR) 10 MG tablet     Sig: Take 1 tablet by mouth once daily     Dispense:  90 tablet     Refill:  1    omeprazole (PRILOSEC) 40 MG delayed release capsule     Sig: Take 1 capsule by mouth  daily     Dispense:  90 capsule     Refill:  1    amLODIPine (NORVASC) 5 MG tablet     Sig: Take 1 tablet by mouth daily     Dispense:  90 tablet     Refill:  1    spironolactone (ALDACTONE) 25 MG tablet     Sig: Take 1 tablet by mouth daily     Dispense:  90 tablet     Refill:  1    metFORMIN (GLUCOPHAGE) 500 MG tablet     Sig: Take 2 tablets by mouth 2 times daily     Dispense:  360 tablet     Refill:  1    losartan (COZAAR) 100 MG tablet     Sig: Take 1 tablet by mouth daily     Dispense:  90 tablet     Refill:  1    insulin lispro prot & lispro (HUMALOG MIX 50/50 KWIKPEN) (50-50) 100 UNIT per ML SUPN injection pen     Si u in am,60 at 6pm, 30 night Dispense:  11 pen     Refill:  1    metoprolol succinate (TOPROL XL) 50 MG extended release tablet     Sig: Take 1 tablet by mouth daily     Dispense:  90 tablet     Refill:  1    Respiratory Therapy Supplies MISC     Sig: Cpap supplies- tubing, mask, filter     Dispense:  1 each     Refill:  0    DISCONTD: Elastic Bandages & Supports (WRIST BRACE ULTRA-LITE) MISC     Sig: Use as directed     Dispense:  1 each     Refill:  1    Elastic Bandages & Supports (WRIST BRACE ULTRA-LITE) MISC     Sig: Use as directed     Dispense:  1 each     Refill:  1          Completed Refills               Requested Prescriptions     Signed Prescriptions Disp Refills    atorvastatin (LIPITOR) 40 MG tablet 90 tablet 1     Sig: Take 1 tablet by mouth  daily    diclofenac (VOLTAREN) 75 MG EC tablet 180 tablet 1     Sig: Take 1 tablet by mouth two  times daily    montelukast (SINGULAIR) 10 MG tablet 90 tablet 1     Sig: Take 1 tablet by mouth once daily    omeprazole (PRILOSEC) 40 MG delayed release capsule 90 capsule 1     Sig: Take 1 capsule by mouth  daily    amLODIPine (NORVASC) 5 MG tablet 90 tablet 1     Sig: Take 1 tablet by mouth daily    spironolactone (ALDACTONE) 25 MG tablet 90 tablet 1     Sig: Take 1 tablet by mouth daily    metFORMIN (GLUCOPHAGE) 500 MG tablet 360 tablet 1     Sig: Take 2 tablets by mouth 2 times daily    losartan (COZAAR) 100 MG tablet 90 tablet 1     Sig: Take 1 tablet by mouth daily    insulin lispro prot & lispro (HUMALOG MIX 50/50 KWIKPEN) (50-50) 100 UNIT per ML SUPN injection pen 11 pen 1     Si u in am,60 at 6pm, 30 night    metoprolol succinate (TOPROL XL) 50 MG extended release tablet 90 tablet 1     Sig: Take 1 tablet by mouth daily    Respiratory Therapy Supplies MISC 1 each 0     Sig: Cpap supplies- tubing, mask, filter    Elastic Bandages & Supports (WRIST BRACE ULTRA-LITE) MISC 1 each 1     Sig: Use as directed     4.  Patient given educational materials - see patient

## 2018-01-29 NOTE — PROGRESS NOTES
Chronic Disease Visit Information    BP Readings from Last 3 Encounters:   09/29/17 (!) 158/74   06/19/17 118/70   05/30/17 130/60          Hemoglobin A1C (%)   Date Value   10/17/2017 6.3   05/23/2016 6.0   02/20/2015 6.9 (H)     Microalb/Crt. Ratio (mcg/mg creat)   Date Value   05/23/2016 340 (H)     LDL Cholesterol (mg/dL)   Date Value   10/25/2016 33     LDL Calculated (mg/dL)   Date Value   10/17/2017 35     HDL (mg/dL)   Date Value   10/17/2017 29 (A)     BUN (mg/dL)   Date Value   10/17/2017 34     CREATININE (no units)   Date Value   10/17/2017 1.72     Glucose (mg/dL)   Date Value   10/17/2017 50   11/20/2011 208 (H)            Have you changed or started any medications since your last visit including any over-the-counter medicines, vitamins, or herbal medicines? no   Are you having any side effects from any of your medications? -  no  Have you stopped taking any of your medications? Is so, why? -  no    Have you seen any other physician or provider since your last visit? Yes - Records Obtained  Have you had any other diagnostic tests since your last visit? Yes - Records Obtained  Have you been seen in the emergency room and/or had an admission to a hospital since we last saw you? No  Have you had your annual diabetic retinal (eye) exam? Yes - Records Obtained  Have you had your routine dental cleaning in the past 6 months? yes -     Have you activated your [employer] account? If not, what are your barriers?  Yes     Patient Care Team:  Domi Mendez MD as PCP - Sorin Reyes MD as PCP - [facility] Attributed Provider  Randy Butt (Endocrinology)         Medical History Review  Past Medical, Family, and Social History reviewed and does contribute to the patient presenting condition    Health Maintenance   Topic Date Due    DTaP/Tdap/Td vaccine (1 - Tdap) 05/06/2006    Diabetic retinal exam  02/20/2017    Zostavax vaccine  05/30/2018 (Originally 6/25/2004)    Diabetic foot exam

## 2018-05-25 ENCOUNTER — OFFICE VISIT (OUTPATIENT)
Dept: INTERNAL MEDICINE CLINIC | Age: 73
End: 2018-05-25
Payer: MEDICARE

## 2018-05-25 VITALS
OXYGEN SATURATION: 92 % | WEIGHT: 236.2 LBS | TEMPERATURE: 97.2 F | SYSTOLIC BLOOD PRESSURE: 130 MMHG | BODY MASS INDEX: 37.96 KG/M2 | HEIGHT: 66 IN | HEART RATE: 70 BPM | DIASTOLIC BLOOD PRESSURE: 78 MMHG

## 2018-05-25 DIAGNOSIS — I10 ESSENTIAL HYPERTENSION: ICD-10-CM

## 2018-05-25 DIAGNOSIS — E11.9 TYPE 2 DIABETES MELLITUS WITHOUT COMPLICATION, WITH LONG-TERM CURRENT USE OF INSULIN (HCC): Primary | ICD-10-CM

## 2018-05-25 DIAGNOSIS — Z79.4 TYPE 2 DIABETES MELLITUS WITHOUT COMPLICATION, WITH LONG-TERM CURRENT USE OF INSULIN (HCC): Primary | ICD-10-CM

## 2018-05-25 DIAGNOSIS — G89.29 CHRONIC BILATERAL LOW BACK PAIN WITHOUT SCIATICA: ICD-10-CM

## 2018-05-25 DIAGNOSIS — M54.50 CHRONIC BILATERAL LOW BACK PAIN WITHOUT SCIATICA: ICD-10-CM

## 2018-05-25 DIAGNOSIS — R82.998 DARK URINE: ICD-10-CM

## 2018-05-25 PROCEDURE — 99214 OFFICE O/P EST MOD 30 MIN: CPT | Performed by: INTERNAL MEDICINE

## 2018-05-25 PROCEDURE — 1036F TOBACCO NON-USER: CPT | Performed by: INTERNAL MEDICINE

## 2018-05-25 PROCEDURE — 3017F COLORECTAL CA SCREEN DOC REV: CPT | Performed by: INTERNAL MEDICINE

## 2018-05-25 PROCEDURE — 2022F DILAT RTA XM EVC RTNOPTHY: CPT | Performed by: INTERNAL MEDICINE

## 2018-05-25 PROCEDURE — G8417 CALC BMI ABV UP PARAM F/U: HCPCS | Performed by: INTERNAL MEDICINE

## 2018-05-25 PROCEDURE — 4040F PNEUMOC VAC/ADMIN/RCVD: CPT | Performed by: INTERNAL MEDICINE

## 2018-05-25 PROCEDURE — 1123F ACP DISCUSS/DSCN MKR DOCD: CPT | Performed by: INTERNAL MEDICINE

## 2018-05-25 PROCEDURE — G8427 DOCREV CUR MEDS BY ELIG CLIN: HCPCS | Performed by: INTERNAL MEDICINE

## 2018-05-25 PROCEDURE — 3044F HG A1C LEVEL LT 7.0%: CPT | Performed by: INTERNAL MEDICINE

## 2018-06-01 ENCOUNTER — HOSPITAL ENCOUNTER (OUTPATIENT)
Age: 73
Discharge: HOME | End: 2018-06-01

## 2018-06-01 PROCEDURE — 9900000065 HC CARDIAC REHAB PHASE 3 - 1 VISIT

## 2018-06-05 ENCOUNTER — HOSPITAL ENCOUNTER (OUTPATIENT)
Age: 73
Setting detail: SPECIMEN
Discharge: HOME | End: 2018-06-05
Payer: MEDICARE

## 2018-06-05 DIAGNOSIS — I10 ESSENTIAL HYPERTENSION: ICD-10-CM

## 2018-06-05 DIAGNOSIS — E11.9 TYPE 2 DIABETES MELLITUS WITHOUT COMPLICATION, WITH LONG-TERM CURRENT USE OF INSULIN (HCC): ICD-10-CM

## 2018-06-05 DIAGNOSIS — Z79.4 TYPE 2 DIABETES MELLITUS WITHOUT COMPLICATION, WITH LONG-TERM CURRENT USE OF INSULIN (HCC): ICD-10-CM

## 2018-06-05 LAB
-: ABNORMAL
ALBUMIN SERPL-MCNC: 4.1 G/DL (ref 3.5–5.2)
ALBUMIN/GLOBULIN RATIO: 1.2 (ref 1–2.5)
ALP BLD-CCNC: 144 U/L (ref 40–129)
ALT SERPL-CCNC: 34 U/L (ref 5–41)
AMORPHOUS: ABNORMAL
ANION GAP SERPL CALCULATED.3IONS-SCNC: 23 MMOL/L (ref 9–17)
AST SERPL-CCNC: 23 U/L
BACTERIA: ABNORMAL
BILIRUB SERPL-MCNC: 0.36 MG/DL (ref 0.3–1.2)
BILIRUBIN URINE: NEGATIVE
BUN BLDV-MCNC: 27 MG/DL (ref 8–23)
BUN/CREAT BLD: ABNORMAL (ref 9–20)
CALCIUM SERPL-MCNC: 9.4 MG/DL (ref 8.6–10.4)
CASTS UA: ABNORMAL /LPF (ref 0–2)
CHLORIDE BLD-SCNC: 118 MMOL/L (ref 98–107)
CO2: 13 MMOL/L (ref 20–31)
COLOR: ABNORMAL
COMMENT UA: ABNORMAL
CREAT SERPL-MCNC: 1.42 MG/DL (ref 0.7–1.2)
CRYSTALS, UA: ABNORMAL /HPF
EPITHELIAL CELLS UA: ABNORMAL /HPF (ref 0–5)
GFR AFRICAN AMERICAN: 59 ML/MIN
GFR NON-AFRICAN AMERICAN: 49 ML/MIN
GFR SERPL CREATININE-BSD FRML MDRD: ABNORMAL ML/MIN/{1.73_M2}
GFR SERPL CREATININE-BSD FRML MDRD: ABNORMAL ML/MIN/{1.73_M2}
GLUCOSE BLD-MCNC: 55 MG/DL (ref 70–99)
GLUCOSE URINE: NEGATIVE
HCT VFR BLD CALC: 39.8 % (ref 40.7–50.3)
HEMOGLOBIN: 12.4 G/DL (ref 13–17)
KETONES, URINE: NEGATIVE
LEUKOCYTE ESTERASE, URINE: ABNORMAL
MCH RBC QN AUTO: 29.8 PG (ref 25.2–33.5)
MCHC RBC AUTO-ENTMCNC: 31.2 G/DL (ref 28.4–34.8)
MCV RBC AUTO: 95.7 FL (ref 82.6–102.9)
MUCUS: ABNORMAL
NITRITE, URINE: NEGATIVE
NRBC AUTOMATED: 0 PER 100 WBC
OTHER OBSERVATIONS UA: ABNORMAL
PDW BLD-RTO: 13.8 % (ref 11.8–14.4)
PH UA: 5 (ref 5–8)
PLATELET # BLD: 176 K/UL (ref 138–453)
PMV BLD AUTO: 11.3 FL (ref 8.1–13.5)
POTASSIUM SERPL-SCNC: 5.1 MMOL/L (ref 3.7–5.3)
PROTEIN UA: ABNORMAL
RBC # BLD: 4.16 M/UL (ref 4.21–5.77)
RBC UA: ABNORMAL /HPF (ref 0–2)
RENAL EPITHELIAL, UA: ABNORMAL /HPF
SODIUM BLD-SCNC: 154 MMOL/L (ref 135–144)
SPECIFIC GRAVITY UA: 1.03 (ref 1–1.03)
TOTAL PROTEIN: 7.5 G/DL (ref 6.4–8.3)
TRICHOMONAS: ABNORMAL
TURBIDITY: ABNORMAL
URINE HGB: ABNORMAL
UROBILINOGEN, URINE: NORMAL
WBC # BLD: 8.2 K/UL (ref 3.5–11.3)
WBC UA: ABNORMAL /HPF (ref 0–5)
YEAST: ABNORMAL

## 2018-06-07 ENCOUNTER — TELEPHONE (OUTPATIENT)
Dept: INTERNAL MEDICINE CLINIC | Age: 73
End: 2018-06-07

## 2018-06-07 DIAGNOSIS — R31.9 HEMATURIA, UNSPECIFIED TYPE: Primary | ICD-10-CM

## 2018-06-12 ENCOUNTER — TELEPHONE (OUTPATIENT)
Dept: INTERNAL MEDICINE CLINIC | Age: 73
End: 2018-06-12

## 2018-06-12 DIAGNOSIS — M54.5 LOW BACK PAIN, UNSPECIFIED BACK PAIN LATERALITY, UNSPECIFIED CHRONICITY, WITH SCIATICA PRESENCE UNSPECIFIED: ICD-10-CM

## 2018-06-12 DIAGNOSIS — M51.26 LUMBAR HERNIATED DISC: Primary | ICD-10-CM

## 2018-06-15 ENCOUNTER — HOSPITAL ENCOUNTER (OUTPATIENT)
Dept: PHYSICAL THERAPY | Facility: CLINIC | Age: 73
Setting detail: THERAPIES SERIES
Discharge: HOME | End: 2018-06-15
Payer: MEDICARE

## 2018-06-15 PROCEDURE — 97163 PT EVAL HIGH COMPLEX 45 MIN: CPT

## 2018-06-15 PROCEDURE — G8979 MOBILITY GOAL STATUS: HCPCS

## 2018-06-15 PROCEDURE — 97162 PT EVAL MOD COMPLEX 30 MIN: CPT

## 2018-06-15 PROCEDURE — G8978 MOBILITY CURRENT STATUS: HCPCS

## 2018-06-19 ENCOUNTER — HOSPITAL ENCOUNTER (OUTPATIENT)
Dept: PHYSICAL THERAPY | Facility: CLINIC | Age: 73
Setting detail: THERAPIES SERIES
Discharge: HOME | End: 2018-06-19
Payer: MEDICARE

## 2018-06-19 PROCEDURE — 97110 THERAPEUTIC EXERCISES: CPT

## 2018-06-22 ENCOUNTER — HOSPITAL ENCOUNTER (OUTPATIENT)
Dept: PHYSICAL THERAPY | Facility: CLINIC | Age: 73
Setting detail: THERAPIES SERIES
Discharge: HOME | End: 2018-06-22
Payer: MEDICARE

## 2018-06-22 PROCEDURE — 97110 THERAPEUTIC EXERCISES: CPT

## 2018-06-26 ENCOUNTER — HOSPITAL ENCOUNTER (OUTPATIENT)
Dept: PHYSICAL THERAPY | Facility: CLINIC | Age: 73
Setting detail: THERAPIES SERIES
Discharge: HOME | End: 2018-06-26
Payer: MEDICARE

## 2018-06-26 PROCEDURE — 97110 THERAPEUTIC EXERCISES: CPT

## 2018-06-28 ENCOUNTER — HOSPITAL ENCOUNTER (OUTPATIENT)
Dept: PHYSICAL THERAPY | Facility: CLINIC | Age: 73
Setting detail: THERAPIES SERIES
Discharge: HOME | End: 2018-06-28
Payer: MEDICARE

## 2018-06-28 PROCEDURE — 97110 THERAPEUTIC EXERCISES: CPT

## 2018-07-03 ENCOUNTER — HOSPITAL ENCOUNTER (OUTPATIENT)
Dept: PHYSICAL THERAPY | Facility: CLINIC | Age: 73
Setting detail: THERAPIES SERIES
Discharge: HOME | End: 2018-07-03
Payer: MEDICARE

## 2018-07-03 PROCEDURE — 97110 THERAPEUTIC EXERCISES: CPT

## 2018-07-06 ENCOUNTER — HOSPITAL ENCOUNTER (OUTPATIENT)
Dept: PHYSICAL THERAPY | Facility: CLINIC | Age: 73
Setting detail: THERAPIES SERIES
Discharge: HOME | End: 2018-07-06
Payer: MEDICARE

## 2018-07-06 PROCEDURE — 97110 THERAPEUTIC EXERCISES: CPT

## 2018-07-06 NOTE — FLOWSHEET NOTE
promote improved energy conservation and decreased the risk of falls. 5. Independent with Home Exercise Programs  6. Demonstrate Knowledge of fall prevention  LTG: (to be met in 16 treatments)  1. Patient will demonstrate 50% improvement in forward head, rounded shoulders, thoracic kyphosis and anterior trunk lean in standing to decrease stress on spine and spinal musculature. 2. Patient will report score of 25% or less perceived deficit on the Oswestry to indicate improved ability to participate in ADL's.  3. Patient will demonstrate 5/5 hip and abdominal strength to promote improved standing and walking tolerance. 4. Patient will report a 75% decreased in the frequency, intensity, and duration of symptoms in LE's to promote increased functional mobility. 5. Patient will be able to sleep through the night, waking up 2 or less times due to low back pain to increase quality of sleep. 6. Patient will report low back pain at or below 2/10 pain when ambulating >300 ft to promote decreased pain with community ambulation.      Patient goals: [facility] able to walk without pain, lift arms and more flex\"     G-CODE     Functional Limitation: walking, mobility  Functional Assessment Used: Oswestry, 56% perceived functional deficit   Current Status Modifier: CK  Goal Status Modifier: CJ    Pt. Education:  [x] Yes Issued handout for ITB stretch [] No  [x] Reviewed Prior HEP/Ed  Method of Education: [x] Verbal  [x] Demo  [x] Written  6/26 - lumbar stab exercises  Comprehension of Education:  [x] Verbalizes understanding. [x] Demonstrates understanding. [x] Needs review. [x] Demonstrates/verbalizes HEP/Ed previously given. Plan: [x] Continue per plan of care.    [] Other:      Time In: 11:00 am            Time Out: 11:47am    Electronically signed by:  Flori Viramontes, PT

## 2018-07-10 ENCOUNTER — HOSPITAL ENCOUNTER (OUTPATIENT)
Dept: PHYSICAL THERAPY | Facility: CLINIC | Age: 73
Setting detail: THERAPIES SERIES
Discharge: HOME | End: 2018-07-10
Payer: MEDICARE

## 2018-07-10 PROCEDURE — 97110 THERAPEUTIC EXERCISES: CPT

## 2018-07-10 NOTE — FLOWSHEET NOTE
Added resistance 7/6   Clamshells  15x ea Red tband Increased resistance 7/3   Reverse clamshells  15x ea  Red tband Increased resistance 7/6 but unable to achieve good isolation of R hip IR's. Regressed to no resistance for R hip. PRONE   Difficult position for the pt   Alt hip ext    Unable   Alt UE   Unable   Alt UE/LE opposite    Unable   Prop on elbows  2 min   Requires hip flexion and pillow underneath stomach to complete; reports is tolerable with no peripheralization noted. Press ups   Unable          STANDING      3 way hip  20xea A With abduction, pt reports that L side feels \"tighter\" and can feel this more   Heel raises  20x     Toe raises  20x  Continued difficulty and asymmetry with L toe raise as compared to R   Calf stretch on slant board  3x20\"     Standing hip flexor stretch 3 x 30\"  Added 6/22   Standing wall ITB stretch 2 X 30\"  Added 7/6: tactile cueing needed for proper execution    Step ups  10 x ea  4 inch  Added 7/6   Step downs  10 x ea  4 inch  Added 7/6: tactile reinforcement at anterior knee to control eccentric lowering    Other: issued handout for IT band stretch, encouraged pt to add to HEP routine.     - Attempted prone positioning again to introduce extension into neutral to promote improved posture. Tolerates prone lying (one pillow underneath stomach), however pt unable to achieve full contact of hips or stomach on table when in this position. Therapist provided verbal and tactile cues to encourage bilateral hip contact with mat with no improvement/sucess.  7/6/18      Specific Instructions for next treatment:posture education and training, quad/hamstring and heel cord stretching, core stabilization/strengthening, ankle DF and great toe strengthening    Treatment Charges: Mins Units   []  Modalities     [x]  Ther Exercise 45 3   []  Manual Therapy     []  Ther Activities     []  Aquatics     []  Vasocompression     []  Other     Total Treatment time 45 3   (Estimated cost pm    Electronically signed by:  Steven Carson PTA

## 2018-07-12 ENCOUNTER — HOSPITAL ENCOUNTER (OUTPATIENT)
Dept: PHYSICAL THERAPY | Facility: CLINIC | Age: 73
Setting detail: THERAPIES SERIES
Discharge: HOME | End: 2018-07-12
Payer: MEDICARE

## 2018-07-12 PROCEDURE — 97110 THERAPEUTIC EXERCISES: CPT

## 2018-07-12 NOTE — FLOWSHEET NOTE
[] [facility]       Outpatient Physical        Therapy       [address]       Phone: [phone]       Fax: [phone] [x] [facility] at [address]       Phone: [phone]       Fax: [phone] [] Darryl. [employer]   Phone: [phone]   Fax:  [phone]     Physical Therapy Daily Treatment Note    Date:  2018  Patient Name:  Madeline De Leon    :  1945  MRN: [id number]  Physician: Vicky Alonso MD                   Insurance: [facility]   Medical Diagnosis: Lumbar herniated disc, low back pain laterally with sciatica  Rehab Codes: M51.26, M54.5, M79.651, R51, R26.89, Z91.81, M54.40  Onset Date: 2013                Next 's appt.: 18  Visit# / total visits:   Cancels/No Shows: 0/0    Subjective:    Pain:  [x] Yes  [] No Location: LB  Pain Rating: (0-10 scale) 1-2/10  Pain altered Tx:  [x] No  [] Yes  Action:    Comments: Pt states he is alright today and felt good after last therapy session.       Objective:  Modalities:    Precautions:   Exercises: bolded performed 7/10  Exercise Reps/Time Weight/Level Comments   Scifit/Nustep 5 min L4          SUPINE      SKTC 5x5\"ea  Manual today   LTR 5x5\"ea  With over pressure to increase stretch   HS stretch 3x20\"ea  Manual today   Piriformis stretch  3x15\"ea  Manual today   Hip flexor off table 3x20\"  Added 7/10 Manual over pressure   Ankle DF 15x Green Increased resistance 7/3          LUMBAR STAB   With abdominal bracing    Pelvic tilts 15x5\"     March  15xea     Alt UE/LE opposite  15x  Added    SLR  15xea     Bridge  15x     Hip add 20x2\" ball Added    Hip abd 15x  Added     hip flexor stretch 2x 30\" ea   Added : Supine, leg off table w/ passive overpressure          SIDE LYING      Hip abduction 15x ea  2 lbs Added resistance 7/6   Clamshells  15x ea Red tband Increased resistance head rounded shoulders and trunk flexed. 7/10   5. Independent with Home Exercise Programs Met 7/10  6. Demonstrate Knowledge of fall prevention Met 7/10  LTG: (to be met in 16 treatments)  1. Patient will demonstrate 50% improvement in forward head, rounded shoulders, thoracic kyphosis and anterior trunk lean in standing to decrease stress on spine and spinal musculature. 2. Patient will report score of 25% or less perceived deficit on the Oswestry to indicate improved ability to participate in ADL's.  3. Patient will demonstrate 5/5 hip and abdominal strength to promote improved standing and walking tolerance. 4. Patient will report a 75% decreased in the frequency, intensity, and duration of symptoms in LE's to promote increased functional mobility. 5. Patient will be able to sleep through the night, waking up 2 or less times due to low back pain to increase quality of sleep. 6. Patient will report low back pain at or below 2/10 pain when ambulating >300 ft to promote decreased pain with community ambulation.      Patient goals: Michel Escobedo able to walk without pain, lift arms and more flex\"     G-CODE     Functional Limitation: walking, mobility  Functional Assessment Used: Oswestry, 56% perceived functional deficit   Current Status Modifier: CK  Goal Status Modifier: CJ    Pt. Education:  [x] Yes [] No  [x] Reviewed Prior HEP/Ed  Method of Education: [x] Verbal  [x] Demo  [] Written  6/26 - lumbar stab exercises  7/10 - hip flexor stretches off bed. Comprehension of Education:  [x] Verbalizes understanding. [x] Demonstrates understanding. [x] Needs review. [x] Demonstrates/verbalizes HEP/Ed previously given. Plan: [x] Continue per plan of care.    [] Other:      Time In: 10:51 pm            Time Out: 11:50 pm    Electronically signed by:  Kerrie Adan PTA

## 2018-07-13 PROBLEM — R31.29 MICROHEMATURIA: Status: ACTIVE | Noted: 2018-07-13

## 2018-07-18 ENCOUNTER — HOSPITAL ENCOUNTER (OUTPATIENT)
Dept: PHYSICAL THERAPY | Facility: CLINIC | Age: 73
Setting detail: THERAPIES SERIES
Discharge: HOME | End: 2018-07-18
Payer: MEDICARE

## 2018-07-18 PROCEDURE — G8978 MOBILITY CURRENT STATUS: HCPCS

## 2018-07-18 PROCEDURE — 97110 THERAPEUTIC EXERCISES: CPT

## 2018-07-18 PROCEDURE — G8979 MOBILITY GOAL STATUS: HCPCS

## 2018-07-18 NOTE — FLOWSHEET NOTE
[] [facility]       Outpatient Physical        Therapy       [address]       Phone: [phone]       Fax: [phone] [x] [facility] at [address]       Phone: [phone]       Fax: [phone] [] Darryl. [address] [place] [address] [facility]  [address] [place] [address]   Phone: [phone]   Fax:  [phone]     Physical Therapy Daily Treatment Note    Date:  2018  Patient Name:  Rebeca Dhaliwal    :  1945  MRN: [id number]  Physician: Corinne Carrington MD                   Insurance: [facility]   Medical Diagnosis: Lumbar herniated disc, low back pain laterally with sciatica  Rehab Codes: M51.26, M54.5, M79.651, R51, R26.89, Z91.81, M54.40  Onset Date: 2013                 Next 's appt.: 18  Visit# / total visits: 10/16  Cancels/No Shows: 0/0    Subjective:    Pain:  [x] Yes  [] No Location: LB and hips Pain Rating: (0-10 scale) 2-3/10  Pain altered Tx:  [x] No  [] Yes  Action:    Comments: Pt states he is alright today. Patient reports he had CT scan since he was having blood in his urine. Patient continues to rate pain in LB as 8-9/10 when walking greater than 30 yards; pain resides in short time after sitting down.         Objective:  Modalities:    Precautions:   Exercises: bolded performed 7/10  Exercise Reps/Time Weight/Level Comments   Scifit/Nustep 5 min L4          SUPINE      SKTC 5x5\"ea  Manual today   LTR 5x5\"ea  With over pressure to increase stretch   HS stretch 3x20\"ea  Manual today   Piriformis stretch  3x15\"ea  Manual today   Hip flexor off table 3x20\"  Added 7/10 Manual over pressure   Ankle DF 15x AA Performed active-assisted with stretch at end range          LUMBAR STAB   With abdominal bracing    Pelvic tilts 15x5\"     Marches 15xea     Alt UE/LE opposite  15x  Added    SLR  15xea  Focused on TA contraction before lifts on    Bridge  15x     Hip add 20x2\" ball Added

## 2018-07-20 ENCOUNTER — HOSPITAL ENCOUNTER (OUTPATIENT)
Dept: PHYSICAL THERAPY | Facility: CLINIC | Age: 73
Setting detail: THERAPIES SERIES
Discharge: HOME | End: 2018-07-20
Payer: MEDICARE

## 2018-07-20 PROCEDURE — 97110 THERAPEUTIC EXERCISES: CPT

## 2018-07-20 NOTE — FLOWSHEET NOTE
pain with prolonged walking/standing 7/10/18  2. ? ROM: Patient will demonstrate improved heel cord flexibility by 5 degrees on each LE to promote normalized gait pattern. Ongoing Lacking 6 degrees 7/10  3. ? Strength: Patient will demonstrate 4/5 strength in erector spinae and abdominal musculature to promote increased core stabilization of lumbar spine. Ongoing 4-/5 7/10  4. ? Function: Patient will demonstrate normalized gait pattern with 50 ft ambulation to promote improved energy conservation and decreased the risk of falls. Ongoing Continues to ambulate with forward head rounded shoulders and trunk flexed. 7/10   5. Independent with Home Exercise Programs Met 7/10  6. Demonstrate Knowledge of fall prevention Met 7/10  LTG: (to be met in 16 treatments)  1. Patient will demonstrate 50% improvement in forward head, rounded shoulders, thoracic kyphosis and anterior trunk lean in standing to decrease stress on spine and spinal musculature. 2. Patient will report score of 25% or less perceived deficit on the Oswestry to indicate improved ability to participate in ADL's.  3. Patient will demonstrate 5/5 hip and abdominal strength to promote improved standing and walking tolerance. 4. Patient will report a 75% decreased in the frequency, intensity, and duration of symptoms in LE's to promote increased functional mobility. 5. Patient will be able to sleep through the night, waking up 2 or less times due to low back pain to increase quality of sleep. 6. Patient will report low back pain at or below 2/10 pain when ambulating >300 ft to promote decreased pain with community ambulation.      Patient goals: Lucas Zhao able to walk without pain, lift arms and more flex\"     G-CODEs, updated 7/18/18     Functional Limitation: walking, mobility  Functional Assessment Used: Oswestry, 48% perceived functional deficit   Current Status Modifier: CK  Goal Status Modifier: CJ    Pt.  Education:  [x] Yes [] No  [x] Reviewed Prior HEP/Ed  Method of Education: [x] Verbal  [x] Demo  [] Written:   6/26 - lumbar stab exercises  7/10 - hip flexor stretches off bed.   7/18 - shoulder rolls, scapular retraction, standing hip flexor stretch, standing trunk extension  Comprehension of Education:  [x] Verbalizes understanding. [x] Demonstrates understanding. [x] Needs review. [x] Demonstrates/verbalizes HEP/Ed previously given. Plan: [x] Continue per plan of care.    [] Other:      Time In:  12:53 pm            Time Out: 1:45 pm    Electronically signed by:  Syed Conn PT

## 2018-07-24 ENCOUNTER — HOSPITAL ENCOUNTER (OUTPATIENT)
Dept: PHYSICAL THERAPY | Facility: CLINIC | Age: 73
Setting detail: THERAPIES SERIES
Discharge: HOME | End: 2018-07-24
Payer: MEDICARE

## 2018-07-24 PROCEDURE — 97110 THERAPEUTIC EXERCISES: CPT

## 2018-07-24 RX ORDER — ATORVASTATIN CALCIUM 40 MG/1
TABLET, FILM COATED ORAL
Qty: 90 TABLET | Refills: 1 | Status: SHIPPED | OUTPATIENT
Start: 2018-07-24 | End: 2018-12-28 | Stop reason: SDUPTHER

## 2018-07-24 RX ORDER — DICLOFENAC SODIUM 75 MG/1
TABLET, DELAYED RELEASE ORAL
Qty: 180 TABLET | Refills: 1 | Status: ON HOLD | OUTPATIENT
Start: 2018-07-24 | End: 2018-08-25 | Stop reason: HOSPADM

## 2018-07-24 RX ORDER — SPIRONOLACTONE 25 MG/1
TABLET ORAL
Qty: 90 TABLET | Refills: 1 | Status: ON HOLD | OUTPATIENT
Start: 2018-07-24 | End: 2018-08-25 | Stop reason: HOSPADM

## 2018-07-24 RX ORDER — OMEPRAZOLE 40 MG/1
CAPSULE, DELAYED RELEASE ORAL
Qty: 90 CAPSULE | Refills: 1 | Status: SHIPPED | OUTPATIENT
Start: 2018-07-24 | End: 2018-12-28 | Stop reason: SDUPTHER

## 2018-07-24 RX ORDER — LOSARTAN POTASSIUM 100 MG/1
TABLET ORAL
Qty: 90 TABLET | Refills: 1 | Status: ON HOLD | OUTPATIENT
Start: 2018-07-24 | End: 2018-08-25 | Stop reason: HOSPADM

## 2018-07-24 RX ORDER — AMLODIPINE BESYLATE 5 MG/1
TABLET ORAL
Qty: 90 TABLET | Refills: 1 | Status: SHIPPED | OUTPATIENT
Start: 2018-07-24 | End: 2018-12-28 | Stop reason: SDUPTHER

## 2018-07-24 RX ORDER — MONTELUKAST SODIUM 10 MG/1
TABLET ORAL
Qty: 90 TABLET | Refills: 1 | Status: SHIPPED | OUTPATIENT
Start: 2018-07-24 | End: 2018-12-28 | Stop reason: SDUPTHER

## 2018-07-24 RX ORDER — METOPROLOL SUCCINATE 50 MG/1
TABLET, EXTENDED RELEASE ORAL
Qty: 90 TABLET | Refills: 1 | Status: SHIPPED | OUTPATIENT
Start: 2018-07-24 | End: 2018-12-28 | Stop reason: SDUPTHER

## 2018-07-24 NOTE — FLOWSHEET NOTE
Manual Therapy     []  Ther Activities     []  Aquatics     []  Vasocompression     []  Other     Total Treatment time 40 3   (Estimated cost 7/24/18: $943.33)  3 JUAN JOSE = $75.47    Assessment: [x] Progressing toward goals: pt did well with exercises, difficulty with standing hip flexor stretch and standing back ext. Pt did not feel a stretch in the hip flexor only pain in the LB, also discomfort in the LB with standing back ext. Continued with the Alter G today as charted. Pt reports slight pain in the LB at 60% WB, but did not want to go back down to 50%. [] No change. [] Other:    Problems:    [x] ? Back Pain: 2-6/10 pain in low back and anterior thighs                               [x] ? ROM: decreased hamstring, hip flexor, and heel cord flexibility bilaterally. [x] ? Strength: mild hip flexor/abductor weakness, 3+/5 strength in L DF, 2-/5 strength in great toe extension; generalized core and erector spinae weakness   [x] ? Function: 56% perceived deficit per Oswestry; unable to walk greater than >30 yards at a time due to pain  [x] Postural Deviations: forward head, rounded shoulder, thoracic kyphosis, lacks terminal knee extension bilaterally in standing  [x] Gait Deviations: moderate forward trunk lean, decreased arm swing on L, decreased step length bilaterally, mild drop foot on LLE but able to clear floor with LE, decreased heel strike bilaterally; decreased eccentric control when descending stairs                            STG: (to be met in 8 treatments)  1. ? Pain: Patient will report low back pain at or below 4/10 when walking 150 ft with minimal symptoms into bilateral thighs to promote increased functional mobility. Ongoing 8/10 pain with prolonged walking/standing 7/10/18  2. ? ROM: Patient will demonstrate improved heel cord flexibility by 5 degrees on each LE to promote normalized gait pattern.  Ongoing Lacking 6 degrees 7/10  3. ? Strength: Patient will demonstrate 4/5 strength in erector spinae and abdominal musculature to promote increased core stabilization of lumbar spine. Ongoing 4-/5 7/10  4. ? Function: Patient will demonstrate normalized gait pattern with 50 ft ambulation to promote improved energy conservation and decreased the risk of falls. Ongoing Continues to ambulate with forward head rounded shoulders and trunk flexed. 7/10   5. Independent with Home Exercise Programs Met 7/10  6. Demonstrate Knowledge of fall prevention Met 7/10  LTG: (to be met in 16 treatments)  1. Patient will demonstrate 50% improvement in forward head, rounded shoulders, thoracic kyphosis and anterior trunk lean in standing to decrease stress on spine and spinal musculature. 2. Patient will report score of 25% or less perceived deficit on the Oswestry to indicate improved ability to participate in ADL's.  3. Patient will demonstrate 5/5 hip and abdominal strength to promote improved standing and walking tolerance. 4. Patient will report a 75% decreased in the frequency, intensity, and duration of symptoms in LE's to promote increased functional mobility. 5. Patient will be able to sleep through the night, waking up 2 or less times due to low back pain to increase quality of sleep. 6. Patient will report low back pain at or below 2/10 pain when ambulating >300 ft to promote decreased pain with community ambulation.      Patient goals: Orvil Patella able to walk without pain, lift arms and more flex\"     G-CODEs, updated 7/18/18     Functional Limitation: walking, mobility  Functional Assessment Used: Oswestry, 48% perceived functional deficit   Current Status Modifier: CK  Goal Status Modifier: CJ    Pt. Education:  [x] Yes [] No  [] Reviewed Prior HEP/Ed  Method of Education: [x] Verbal for charted exercises.  [] Demo  [] Written:   6/26 - lumbar stab exercises  7/10 - hip flexor stretches off bed.   7/18 - shoulder rolls, scapular retraction, standing hip flexor stretch, standing trunk extension  Comprehension of Education:  [x] Verbalizes understanding. [x] Demonstrates understanding. [x] Needs review. [x] Demonstrates/verbalizes HEP/Ed previously given. Plan: [x] Continue per plan of care.    [] Other:      Time In:  11:00 am            Time Out: 11:50 am    Electronically signed by:  Zahraa Pak PTA

## 2018-07-26 ENCOUNTER — HOSPITAL ENCOUNTER (OUTPATIENT)
Dept: PHYSICAL THERAPY | Facility: CLINIC | Age: 73
Setting detail: THERAPIES SERIES
Discharge: HOME | End: 2018-07-26
Payer: MEDICARE

## 2018-07-26 PROCEDURE — 97110 THERAPEUTIC EXERCISES: CPT

## 2018-07-26 NOTE — FLOWSHEET NOTE
[] [facility]       Outpatient Physical        Therapy       [address]       Phone: [phone]       Fax: [phone] [x] [facility] at [address]       Phone: [phone]       Fax: [phone] [] Darryl. [address] [place] [address] Health Promotion  [address] [place] [address]   Phone: [phone]   Fax:  [phone]     Physical Therapy Daily Treatment Note    Date:  2018  Patient Name:  Yesenia Baca    :  1945  MRN: [id number]  Physician: Rey Murguia MD                   Insurance: THE [facility]   Medical Diagnosis: Lumbar herniated disc, low back pain laterally with sciatica  Rehab Codes: M51.26, M54.5, M79.651, R51, R26.89, Z91.81, M54.40  Onset Date: 2013                 Next 's appt.: 18  Visit# / total visits:   Cancels/No Shows: 0/0  Update G-codes every 10 visits    Subjective:    Pain:  [x] Yes  [] No Location: LB and hips Pain Rating: (0-10 scale) 2/10  Pain altered Tx:  [x] No  [] Yes  Action:    Comments: Patient states he felt good after using the alter G last session.  No new complaints       Objective:  Modalities:    Precautions:   Exercises: bolded performed 7/10  Exercise Reps/Time Weight/Level Comments   Scifit/Nustep 5 min L4          SUPINE      SKTC 5x5\"ea  Manual today   LTR 5x5\"ea  With over pressure to increase stretch   HS stretch 3x20\"ea  Manual today   Piriformis stretch  3x15\"ea  Manual today   Hip flexor off table 3x20\"  Performed CR with improved stretch bilaterally    ER stretch 3x20\" ea  Added and performed CR    Ankle DF 15x AA Performed active-assisted with stretch at end range          Seated      Lateral trunk flexion 3\" x10 ea  Added           LUMBAR STAB   With abdominal bracing    Pelvic tilts 15x5\"     Marches 15xea     Alt UE/LE opposite  15x  Added    SLR  15xea  Focused on TA contraction before lifts on    Bridge  15x     Hip add 20x2\" ball Added 6/28   Hip abd 15 x2 Red TB Added resistance 7/20   Hip flexor stretch 4x 30\" ea   Verbal cueing for correct technique  7/18               SIDE LYING      Hip abduction 15x ea  2 lbs Added resistance 7/6   Clamshells  15x ea Red tband Increased resistance 7/3   Reverse clamshells  15x ea  Red tband Increased resistance 7/6 but unable to achieve good isolation of R hip IR's. Regressed to no resistance for R hip. PRONE   Difficult position for the pt   Alt hip ext    Unable   Alt UE   Unable   Alt UE/LE opposite    Unable   Prop on elbows  2 min   Requires hip flexion and pillow underneath stomach to complete; reports is tolerable with no peripheralization noted. Press ups   Unable          STANDING      3 way hip  15x ea A With abduction, pt reports that L side feels \"tighter\" and can feel this more   Heel raises  20x     Toe raises  20x  Continued difficulty and asymmetry with L toe raise as compared to R   Calf stretch on slant board  2x30\"     Standing hip flexor stretch 3 x 30\"  Added 6/22   Standing wall ITB stretch 2 X 30\"  Added 7/6: tactile cueing needed for proper execution    Step ups  10x ea  4 inch  Added 7/6   Step downs  10x ea  4 inch  VC for posture 7/18         Backwards shoulder rolls 15x  Initiated 7/18    Scapular retraction  3\" 10 x2  \"   Trunk stretch over 1/2 foam roll  5\"   \"  Patient required 4 pillows under head in order to be able to relax upper body with stretch         BWSTT (Alter G) 10'    (7' set-up)  Initiated 7/20: size XL pants; 60% WB (increased 7/24) at 1.2 mph with 0% incline    Some forward lean though fixed with verbal cues. Vcs for Pelvic neutral and to \"tuck glutes under\". Other:     Specific Instructions for next treatment:posture education and training, quad and heel cord stretching, core stabilization/strengthening, ankle DF and great toe strengthening ER/IR stretching, MFR? Muscle rolling for tight musculature.      Treatment Charges: Mins Units   []  Modalities     [x]  Ther Exercise 40 3   []  Manual Therapy     []  Ther Activities     []  Aquatics     []  Vasocompression     []  Other     Total Treatment time 40 3   (Estimated cost 7/26/18: $1018.08)  3 JUAN JOSE = $75.47    Assessment: [x] Progressing toward goals: Initiated treatment by continuing with the merrill BARNES Patient was able to complete at a higher speed this date. Patient consistently favors his right side and takes longer steps with his RLE. [] No change. [] Other:    Problems:    [x] ? Back Pain: 2-6/10 pain in low back and anterior thighs                               [x] ? ROM: decreased hamstring, hip flexor, and heel cord flexibility bilaterally. [x] ? Strength: mild hip flexor/abductor weakness, 3+/5 strength in L DF, 2-/5 strength in great toe extension; generalized core and erector spinae weakness   [x] ? Function: 56% perceived deficit per Oswestry; unable to walk greater than >30 yards at a time due to pain  [x] Postural Deviations: forward head, rounded shoulder, thoracic kyphosis, lacks terminal knee extension bilaterally in standing  [x] Gait Deviations: moderate forward trunk lean, decreased arm swing on L, decreased step length bilaterally, mild drop foot on LLE but able to clear floor with LE, decreased heel strike bilaterally; decreased eccentric control when descending stairs                            STG: (to be met in 8 treatments)  1. ? Pain: Patient will report low back pain at or below 4/10 when walking 150 ft with minimal symptoms into bilateral thighs to promote increased functional mobility. Ongoing 8/10 pain with prolonged walking/standing 7/10/18  2. ? ROM: Patient will demonstrate improved heel cord flexibility by 5 degrees on each LE to promote normalized gait pattern.  Ongoing Lacking 6 degrees 7/10  3. ? Strength: Patient will demonstrate 4/5 strength in erector spinae and abdominal musculature to promote increased core stabilization of lumbar spine. Ongoing 4-/5 7/10  4. ? Function: Patient will demonstrate normalized gait pattern with 50 ft ambulation to promote improved energy conservation and decreased the risk of falls. Ongoing Continues to ambulate with forward head rounded shoulders and trunk flexed. 7/10   5. Independent with Home Exercise Programs Met 7/10  6. Demonstrate Knowledge of fall prevention Met 7/10  LTG: (to be met in 16 treatments)  1. Patient will demonstrate 50% improvement in forward head, rounded shoulders, thoracic kyphosis and anterior trunk lean in standing to decrease stress on spine and spinal musculature. 2. Patient will report score of 25% or less perceived deficit on the Oswestry to indicate improved ability to participate in ADL's.  3. Patient will demonstrate 5/5 hip and abdominal strength to promote improved standing and walking tolerance. 4. Patient will report a 75% decreased in the frequency, intensity, and duration of symptoms in LE's to promote increased functional mobility. 5. Patient will be able to sleep through the night, waking up 2 or less times due to low back pain to increase quality of sleep. 6. Patient will report low back pain at or below 2/10 pain when ambulating >300 ft to promote decreased pain with community ambulation.      Patient goals: Orie Anger able to walk without pain, lift arms and more flex\"     G-CODEs, updated 7/18/18     Functional Limitation: walking, mobility  Functional Assessment Used: Oswestry, 48% perceived functional deficit   Current Status Modifier: CK  Goal Status Modifier: CJ    Pt. Education:  [x] Yes [] No  [] Reviewed Prior HEP/Ed  Method of Education: [x] Verbal for charted exercises. [] Demo  [] Written:   6/26 - lumbar stab exercises  7/10 - hip flexor stretches off bed.   7/18 - shoulder rolls, scapular retraction, standing hip flexor stretch, standing trunk extension  Comprehension of Education:  [x] Verbalizes understanding.   [x] Demonstrates

## 2018-07-31 ENCOUNTER — HOSPITAL ENCOUNTER (OUTPATIENT)
Dept: PHYSICAL THERAPY | Facility: CLINIC | Age: 73
Setting detail: THERAPIES SERIES
Discharge: HOME | End: 2018-07-31
Payer: MEDICARE

## 2018-07-31 PROCEDURE — 97110 THERAPEUTIC EXERCISES: CPT

## 2018-07-31 NOTE — FLOWSHEET NOTE
stabilization of lumbar spine. Ongoing 4-/5 7/10  4. ? Function: Patient will demonstrate normalized gait pattern with 50 ft ambulation to promote improved energy conservation and decreased the risk of falls. Ongoing Continues to ambulate with forward head rounded shoulders and trunk flexed. 7/10   5. Independent with Home Exercise Programs Met 7/10  6. Demonstrate Knowledge of fall prevention Met 7/10  LTG: (to be met in 16 treatments)  1. Patient will demonstrate 50% improvement in forward head, rounded shoulders, thoracic kyphosis and anterior trunk lean in standing to decrease stress on spine and spinal musculature. 2. Patient will report score of 25% or less perceived deficit on the Oswestry to indicate improved ability to participate in ADL's.  3. Patient will demonstrate 5/5 hip and abdominal strength to promote improved standing and walking tolerance. 4. Patient will report a 75% decreased in the frequency, intensity, and duration of symptoms in LE's to promote increased functional mobility. 5. Patient will be able to sleep through the night, waking up 2 or less times due to low back pain to increase quality of sleep. 6. Patient will report low back pain at or below 2/10 pain when ambulating >300 ft to promote decreased pain with community ambulation.      Patient goals: Leellen Books able to walk without pain, lift arms and more flex\"     G-CODEs, updated 7/18/18     Functional Limitation: walking, mobility  Functional Assessment Used: Oswestry, 48% perceived functional deficit   Current Status Modifier: CK  Goal Status Modifier: CJ    Pt. Education:  [x] Yes [] No  [] Reviewed Prior HEP/Ed  Method of Education: [x] Verbal for charted exercises. [] Demo  [] Written:   6/26 - lumbar stab exercises  7/10 - hip flexor stretches off bed.   7/18 - shoulder rolls, scapular retraction, standing hip flexor stretch, standing trunk extension  Comprehension of Education:  [x] Verbalizes understanding.   [x] Demonstrates

## 2018-08-01 PROBLEM — N20.0 RENAL CALCULUS, RIGHT: Status: ACTIVE | Noted: 2018-08-01

## 2018-08-03 ENCOUNTER — HOSPITAL ENCOUNTER (OUTPATIENT)
Dept: PHYSICAL THERAPY | Facility: CLINIC | Age: 73
Setting detail: THERAPIES SERIES
Discharge: HOME | End: 2018-08-03
Payer: MEDICARE

## 2018-08-03 PROCEDURE — 97110 THERAPEUTIC EXERCISES: CPT

## 2018-08-07 ENCOUNTER — APPOINTMENT (OUTPATIENT)
Dept: PHYSICAL THERAPY | Facility: CLINIC | Age: 73
End: 2018-08-07
Payer: MEDICARE

## 2018-08-08 ENCOUNTER — HOSPITAL ENCOUNTER (OUTPATIENT)
Dept: PHYSICAL THERAPY | Facility: CLINIC | Age: 73
Setting detail: THERAPIES SERIES
Discharge: HOME | End: 2018-08-08
Payer: MEDICARE

## 2018-08-08 PROCEDURE — 97110 THERAPEUTIC EXERCISES: CPT

## 2018-08-08 NOTE — FLOWSHEET NOTE
upper body with stretch         Unable to do 7/31 due to machine being down. BWSTT (Alter G) 10'    (7' set-up)  Initiated 7/20: size XL pants; 60% WB (increased 7/24) at 1.2 mph with 0% incline    Some forward lean though fixed with verbal cues. Vcs for Pelvic neutral and to \"tuck glutes under\". Other:     Re-assessment on 8/8/18    STRENGTH   ROM     Left Right Cervical     L1-2 Hip Flex 4+ 4+ Flexion     Hip Abd 4+ 4+ Extension     L3-4 Knee Ext/Flex 5/5 5/5 Rotation L R   L4 Ankle DF 5 3+ Sidebend L R   L5 EHL 5 2- Retraction     S1 Plant. Flex 5 5 Lumbar     Abdominals     Flexion 55 deg   Erector Spinae     Extension Lacks 20 deg to neutral   Hip ext * 4 4 Rotation L ~5-8 deg R ~5-8 deg         Sidebend L10 deg R 8 deg    Hamstrings Lacks 29 deg Lacks 15 deg  UE/LE       Patient rests in 35 degrees of trunk flexion and continues to lack terminal knee extension in all positions. *Hip extension tested in side-lying. Patient demonstrated minimal lumbar rotation in standing, with most rotation coming from thoracic spine at this date. Gait analysis: increased pain in lower lumbar spine and lateral hips, likely related to bilateral hip OA when patient in WB positions. Patient able to ambulate ~200 feet in gym at this date but reported increase in pain as noted above. Patient continues to demonstrate anterior trunk flexion during stance and ambulation, with slight improvement since initial evaluation. Patient demonstrated more fluid gait pattern with less stiff appearance and improved bilateral heel strike, but still with greater difficulty with LLE vs RLE. Patient reported increased concentration required to maintain balance during second half of ambulation, thought no balance difficulties directly observed and mild to moderate SOB.     Manual: Performed muscle rolling over bilateral quads and hip flexors for 10' with patient in supine with wedge underneath LE's since patient with difficulty relaxing

## 2018-08-08 NOTE — PROGRESS NOTES
[] Kelli Brunson       Outpatient Physical                Therapy         [address]       Phone: [phone]       Fax: [phone] [x] [facility] at [address] [facility]       Phone: [phone]       Fax: [phone] [] [facility]. [address] [place] [address] Promotion     [address] [facility]     Phone: [phone]     Fax:  [phone]     Physical Therapy Progress Note    Date: 2018      Patient: Whitney Zarate  : 1945  MRN: [id number]    Physician: Angelica Shaw MD                   Insurance: THE [facility]   Medical Diagnosis: Lumbar herniated disc, low back pain laterally with sciatica  Rehab Codes: M51.26, M54.5, M79.651, R51, R26.89, Z91.81, M54.40  Onset Date: 2013                                Next 's appt.: 18  Visit# / total visits: ; asking for 10 additional visits                 Cancels/No Shows: 0/0  Date of initial visit: 6/15/18              Date of final visit: 18    Subjective:    Pain:  [x] Yes  [] No          Location: LB and lateral hips  Pain Rating: (0-10 scale) 3-4/10 when standing or walking   Pain altered Tx:  [x] No  [] Yes  Action:     Comments: Patient reports improved sleeping and is waking up at night due to pain less frequently. Reports less stiffness in hips and low back with walking, but still unable to walk greater than 30 yards before experiencing onset of pain and needed to sit down. Pain dissipates when patient sits is not in WB position. Patient is scheduled for outpatient kidney stone removal on 18.     Objective:  Test Measurements:  Re-assessment on 18            STRENGTH   ROM     Left Right Cervical     L1-2 Hip Flex 4+ 4+ Flexion     Hip Abd 4+ 4+ Extension     L3-4 Knee Ext/Flex 5/5 5/5 Rotation L R   L4 Ankle DF 5 3+ Sidebend L R   L5 EHL 5 2- Retraction     S1 Plant.  Flex 5 5 Lumbar     Abdominals     Flexion 55 deg   Erector Spinae   Vasocompression/   [] Other:  [] Dry Needling                                           Game Ready      New plan of care to include dry needling to low back to assist in pain control if patient in agreement and consents to treatment. Patient Status:     [] Continue per initial plan of care. [x] Additional visits necessary. [] Other:     Requested Frequency/Duration: 2 times per week for 10 treatments. Electronically signed by Tomasa Franco PT on 8/8/2018 at 2:03 PM      If you have any questions or concerns, please don't hesitate to call. Thank you for your referral.    Physician Signature:________________________________Date:__________________  By signing above or cosigning this note, I have reviewed this plan of care and certify a need for medically necessary rehabilitation services.      *PLEASE SIGN ABOVE AND FAX BACK ALL PAGES*

## 2018-08-14 ENCOUNTER — HOSPITAL ENCOUNTER (OUTPATIENT)
Age: 73
Discharge: HOME | End: 2018-08-14

## 2018-08-14 ENCOUNTER — HOSPITAL ENCOUNTER (OUTPATIENT)
Dept: PREADMISSION TESTING | Age: 73
Discharge: HOME | End: 2018-08-18
Payer: MEDICARE

## 2018-08-14 VITALS
TEMPERATURE: 98.1 F | HEIGHT: 67 IN | OXYGEN SATURATION: 96 % | DIASTOLIC BLOOD PRESSURE: 74 MMHG | BODY MASS INDEX: 37.37 KG/M2 | SYSTOLIC BLOOD PRESSURE: 153 MMHG | HEART RATE: 61 BPM | WEIGHT: 238.1 LBS | RESPIRATION RATE: 16 BRPM

## 2018-08-14 LAB
ANION GAP SERPL CALCULATED.3IONS-SCNC: 15 MMOL/L (ref 9–17)
BUN BLDV-MCNC: 26 MG/DL (ref 8–23)
CHLORIDE BLD-SCNC: 110 MMOL/L (ref 98–107)
CO2: 19 MMOL/L (ref 20–31)
CREAT SERPL-MCNC: 1.46 MG/DL (ref 0.7–1.2)
EKG ATRIAL RATE: 58 BPM
EKG P AXIS: 34 DEGREES
EKG P-R INTERVAL: 186 MS
EKG Q-T INTERVAL: 446 MS
EKG QRS DURATION: 110 MS
EKG QTC CALCULATION (BAZETT): 437 MS
EKG R AXIS: -44 DEGREES
EKG T AXIS: 53 DEGREES
EKG VENTRICULAR RATE: 58 BPM
GFR AFRICAN AMERICAN: 57 ML/MIN
GFR NON-AFRICAN AMERICAN: 47 ML/MIN
GFR SERPL CREATININE-BSD FRML MDRD: ABNORMAL ML/MIN/{1.73_M2}
GFR SERPL CREATININE-BSD FRML MDRD: ABNORMAL ML/MIN/{1.73_M2}
GLUCOSE BLD-MCNC: 131 MG/DL (ref 70–99)
HCT VFR BLD CALC: 40.2 % (ref 40.7–50.3)
HEMOGLOBIN: 12.4 G/DL (ref 13–17)
INR BLD: 0.9
MCH RBC QN AUTO: 29.9 PG (ref 25.2–33.5)
MCHC RBC AUTO-ENTMCNC: 30.8 G/DL (ref 28.4–34.8)
MCV RBC AUTO: 96.9 FL (ref 82.6–102.9)
NRBC AUTOMATED: 0 PER 100 WBC
PARTIAL THROMBOPLASTIN TIME: 24.2 SEC (ref 20.5–30.5)
PDW BLD-RTO: 14.2 % (ref 11.8–14.4)
PLATELET # BLD: 185 K/UL (ref 138–453)
PMV BLD AUTO: 10.8 FL (ref 8.1–13.5)
POTASSIUM SERPL-SCNC: 5.5 MMOL/L (ref 3.7–5.3)
PROTHROMBIN TIME: 10.2 SEC (ref 9–12)
RBC # BLD: 4.15 M/UL (ref 4.21–5.77)
SODIUM BLD-SCNC: 144 MMOL/L (ref 135–144)
WBC # BLD: 9.4 K/UL (ref 3.5–11.3)

## 2018-08-14 PROCEDURE — 85610 PROTHROMBIN TIME: CPT

## 2018-08-14 PROCEDURE — 85027 COMPLETE CBC AUTOMATED: CPT

## 2018-08-14 PROCEDURE — 93005 ELECTROCARDIOGRAM TRACING: CPT

## 2018-08-14 PROCEDURE — 36415 COLL VENOUS BLD VENIPUNCTURE: CPT

## 2018-08-14 PROCEDURE — 80051 ELECTROLYTE PANEL: CPT

## 2018-08-14 PROCEDURE — 85730 THROMBOPLASTIN TIME PARTIAL: CPT

## 2018-08-14 PROCEDURE — 84520 ASSAY OF UREA NITROGEN: CPT

## 2018-08-14 PROCEDURE — 9900000065 HC CARDIAC REHAB PHASE 3 - 1 VISIT

## 2018-08-14 PROCEDURE — 82565 ASSAY OF CREATININE: CPT

## 2018-08-14 PROCEDURE — 82947 ASSAY GLUCOSE BLOOD QUANT: CPT

## 2018-08-14 RX ORDER — SODIUM CHLORIDE, SODIUM LACTATE, POTASSIUM CHLORIDE, CALCIUM CHLORIDE 600; 310; 30; 20 MG/100ML; MG/100ML; MG/100ML; MG/100ML
1000 INJECTION, SOLUTION INTRAVENOUS CONTINUOUS
Status: CANCELLED | OUTPATIENT
Start: 2018-08-14

## 2018-08-15 ENCOUNTER — HOSPITAL ENCOUNTER (OUTPATIENT)
Dept: PHYSICAL THERAPY | Facility: CLINIC | Age: 73
Setting detail: THERAPIES SERIES
Discharge: HOME | End: 2018-08-15
Payer: MEDICARE

## 2018-08-15 PROCEDURE — 97110 THERAPEUTIC EXERCISES: CPT

## 2018-08-15 RX ORDER — CIPROFLOXACIN 2 MG/ML
400 INJECTION, SOLUTION INTRAVENOUS ONCE
Status: CANCELLED | OUTPATIENT
Start: 2018-08-15

## 2018-08-15 RX ORDER — SODIUM CHLORIDE 9 MG/ML
INJECTION, SOLUTION INTRAVENOUS CONTINUOUS
Status: CANCELLED | OUTPATIENT
Start: 2018-08-15

## 2018-08-15 NOTE — FLOWSHEET NOTE
[] [facility]       Outpatient Physical        Therapy       [address]       Phone: [phone]       Fax: [phone] [x] [facility] at [address]       Phone: [phone]       Fax: [phone] [] Darryl. [address] [place] [address] Health Promotion  [address] [place] [address]   Phone: [phone]   Fax:  [phone]     Physical Therapy Daily Treatment Note    Date:  8/15/2018  Patient Name:  Tc Lopez    :  1945  MRN: [id number]  Physician: Carol Bautista MD                   Insurance: [facility]   Medical Diagnosis: Lumbar herniated disc, low back pain laterally with sciatica  Rehab Codes: M51.26, M54.5, M79.651, R51, R26.89, Z91.81, M54.40  Onset Date: 2013                  Next 's appt.: 18  Visit# / total visits:    Cancels/No Shows: 0/0  Update G-codes at visit #20    Subjective:    Pain:  [x] Yes  [] No Location: LB and lateral hips Pain Rating: (0-10 scale) 3-4/10 when standing or walking   Pain altered Tx:  [x] No  [] Yes  Action:    Comments: Patient states he is feeling a little better, some more ease in walking. Still not able to walk far without pain.      Objective:  Modalities:    Precautions:   Exercises: Bolded exercises performed today  Exercise Reps/Time Weight/Level Comments   Scifit/Nustep 5 min L4          SUPINE      SKTC 5x 5\"     LTR 8x8\"ea  With manual overpressure; less motion with L vs R LTR   HS stretch 3x20\"ea     Piriformis stretch  3x20\"ea     Hip flexor stretch 3x20\"  Performed in side-lying with good tolerance and greater stretch felt   IR/ER stretch 3x20\" ea CR    Hip abd stretch 3x20\" ea CR Added 8/8   ITB stretch 3x20\" ea CR \"         Ankle DF 15x AA Performed active-assisted with stretch at end range          Seated      Lateral trunk flexion 3\" x10 ea  Added           LUMBAR STAB   With abdominal bracing    Pelvic tilts 20x     Marches 15xea 3# Added # 8/3   Alt UE/LE opposite  15x  Added 6/22   SLR  15xea  Focused on TA contraction before lifts on 7/18   Bridge  15x  Unable to clear LB from mat table    Hip add 10x10\" Towel Added 6/28   Hip abd 20x Green TB Increased resistance 8/3   Hip flexor stretch 4x 30\" ea   Verbal cueing for correct technique  7/18         SIDE LYING      Hip abduction 15x ea Green  Added resistance 7/6   Clamshells  15x ea Green  Increased resistance 7/3   Reverse clamshells  15x ea  Green  Increased resistance 7/6 but unable to achieve good isolation of R hip IR's. Regressed to no resistance for R hip. PRONE   Difficult position for the pt   Hip ext  x2 ea  8/8 Increased difficulty to perform   Alt UE   Unable   Alt UE/LE opposite    Unable   Prop on elbows  2 min   Requires hip flexion and pillow underneath stomach to complete; reports is tolerable with no peripheralization noted. Press ups   Unable          STANDING      3 way hip  15x ea A With abduction, pt reports that L side feels \"tighter\" and can feel this more   Heel raises  20x     Toe raises  20x  Continued difficulty and asymmetry with L toe raise as compared to R   Calf stretch on slant board  2x30\"     Standing hip flexor stretch 3 x 30\"  Added 6/22   Standing wall ITB stretch 2 X 30\"  Added 7/6: tactile cueing needed for proper execution    Step ups  15x ea  4 inch  Added 7/6   Lateral step ups 10x ea 4 inch Added 8/3   Step downs  10x ea  4 inch  VC for posture 7/18         Backwards shoulder rolls 15x  Initiated 7/18    Scapular retraction  3\" 10 x2  \"   Trunk stretch over 1/2 foam roll  3\"   \"  Patient required 4 pillows under head in order to be able to relax upper body with stretch         BWSTT (Alter G) 10'    (7' set-up)  Initiated 7/20: size XL pants; 60% WB (increased 7/24) at 1.6 mph with 0% incline    Some forward lean though fixed with verbal cues. Vcs for Pelvic neutral and to \"tuck glutes under\".     Other:     Re-assessment on 8/8/18    STRENGTH   ROM     Left Right Cervical     L1-2 Hip Flex 4+ 4+ Flexion     Hip Abd 4+ 4+ Extension     L3-4 Knee Ext/Flex 5/5 5/5 Rotation L R   L4 Ankle DF 5 3+ Sidebend L R   L5 EHL 5 2- Retraction     S1 Plant. Flex 5 5 Lumbar     Abdominals     Flexion 55 deg   Erector Spinae     Extension Lacks 20 deg to neutral   Hip ext * 4 4 Rotation L ~5-8 deg R ~5-8 deg         Sidebend L10 deg R 8 deg    Hamstrings Lacks 29 deg Lacks 15 deg  UE/LE       Patient rests in 35 degrees of trunk flexion and continues to lack terminal knee extension in all positions. *Hip extension tested in side-lying. Patient demonstrated minimal lumbar rotation in standing, with most rotation coming from thoracic spine at this date. Gait analysis: increased pain in lower lumbar spine and lateral hips, likely related to bilateral hip OA when patient in WB positions. Patient able to ambulate ~200 feet in gym at this date but reported increase in pain as noted above. Patient continues to demonstrate anterior trunk flexion during stance and ambulation, with slight improvement since initial evaluation. Patient demonstrated more fluid gait pattern with less stiff appearance and improved bilateral heel strike, but still with greater difficulty with LLE vs RLE. Patient reported increased concentration required to maintain balance during second half of ambulation, thought no balance difficulties directly observed and mild to moderate SOB. Manual: Performed muscle rolling over bilateral quads and hip flexors for 10' with patient in supine with wedge underneath LE's since patient with difficulty relaxing musculature at this date. 8/8/18: 46% perceived deficit per Oswestry    Specific Instructions for next treatment: posture education and training, quad and heel cord stretching, core stabilization/strengthening, ankle DF and great toe strengthening ER/IR stretching, MFR? Muscle rolling for tight musculature.      Treatment Charges: Mins Units   [] Modalities     [x]  Ther Exercise 45 3   []  Manual Therapy     []  Ther Activities     []  Aquatics     []  Vasocompression     []  Other     Total Treatment time 45 3   Estimated cost 8/8/18: $1319.96  Add KX modifier at $2010  3 JUAN JOSE = $75.47    Assessment: [x] Progressing toward goals: pt did well with all charted exercises. Continued with the alter g treadmill with increased speed today. He did start to feel the LB and L hip at about 6 min in, he states the pain did not increase after that. [] No change. [] Other:    Problems:    [x] ? Back Pain: 2-6/10 pain in low back and anterior thighs                               [x] ? ROM: decreased hamstring, hip flexor, and heel cord flexibility bilaterally. [x] ? Strength: mild hip flexor/abductor weakness, 3+/5 strength in L DF, 2-/5 strength in great toe extension; generalized core and erector spinae weakness   [x] ? Function: 56% perceived deficit per Oswestry; unable to walk greater than >30 yards at a time due to pain  [x] Postural Deviations: forward head, rounded shoulder, thoracic kyphosis, lacks terminal knee extension bilaterally in standing  [x] Gait Deviations: moderate forward trunk lean, decreased arm swing on L, decreased step length bilaterally, mild drop foot on LLE but able to clear floor with LE, decreased heel strike bilaterally; decreased eccentric control when descending stairs                            STG: (to be met in 8 treatments) 4/6 MET, 2/6 ONGOING  1. ? Pain: Patient will report low back pain at or below 4/10 when walking 150 ft with minimal symptoms into bilateral thighs to promote increased functional mobility. 8/8/18: ONGOING: Pt reports 8-9/10 pain when walking greater than 30 yards with SOB   2. ? ROM: Patient will demonstrate improved heel cord flexibility by 5 degrees on each LE to promote normalized gait pattern.  8/8/18: MET  3. ? Strength: Patient will demonstrate 4/5 strength in erector spinae

## 2018-08-17 ENCOUNTER — HOSPITAL ENCOUNTER (OUTPATIENT)
Dept: PHYSICAL THERAPY | Facility: CLINIC | Age: 73
Setting detail: THERAPIES SERIES
Discharge: HOME | End: 2018-08-17
Payer: MEDICARE

## 2018-08-17 PROCEDURE — 97110 THERAPEUTIC EXERCISES: CPT

## 2018-08-17 NOTE — FLOWSHEET NOTE
[] [facility]       Outpatient Physical        Therapy       [address]       Phone: [phone]       Fax: [phone] [x] [facility] at [address] [facility]       Phone: [phone]       Fax: [phone] [] Darryl. [address] [place] [address] [facility]  [address] [place] [address]   Phone: [phone]   Fax:  [phone]     Physical Therapy Daily Treatment Note    Date:  2018  Patient Name:  Rebeca Dhaliwal    :  1945  MRN: [id number]  Physician: Corinne Carrington MD                   Insurance: [facility]   Medical Diagnosis: Lumbar herniated disc, low back pain laterally with sciatica  Rehab Codes: M51.26, M54.5, M79.651, R51, R26.89, Z91.81, M54.40  Onset Date: 2013                  Next 's appt.: 18  Visit# / total visits:    Cancels/No Shows: 0/0  Update G-codes at visit #20    Subjective:    Pain:  [x] Yes  [] No Location: LB and lateral hips Pain Rating: (0-10 scale) 3/10 when standing or walking   Pain altered Tx:  [x] No  [] Yes  Action:    Comments: Patient states he has been slightly more active and is feeling a little more sore today. Reports disturbed sleeping last night due to pain in lateral L hip, in addition to LB, that began bothering him yesterday.      Objective:  Modalities:    Precautions:   Exercises: Bolded exercises performed today  Exercise Reps/Time Weight/Level Comments   Scifit/Nustep 5 min L4          SUPINE      SKTC 5x 5\"     LTR 15x5\"ea  With manual overpressure   HS stretch 3x20\"ea     Piriformis stretch  3x20\"ea     Hip flexor stretch 3x20\"  Performed in side-lying with good tolerance and greater stretch felt   IR/ER stretch 3x20\" ea CR    Hip abd stretch 3x20\" ea CR Added 8/8   ITB stretch 3x20\" ea CR \"         Ankle DF 15x AA Performed active-assisted with stretch at end range          Seated      Lateral trunk flexion 3\" x10 ea  Added           LUMBAR STAB   With

## 2018-08-20 ENCOUNTER — HOSPITAL ENCOUNTER (OUTPATIENT)
Dept: PHYSICAL THERAPY | Facility: CLINIC | Age: 73
Setting detail: THERAPIES SERIES
Discharge: HOME | End: 2018-08-20
Payer: MEDICARE

## 2018-08-20 PROCEDURE — 97110 THERAPEUTIC EXERCISES: CPT

## 2018-08-20 NOTE — FLOWSHEET NOTE
Added 6/22   SLR  15xea 4# Added # 8/17   Bridges 15x  Unable to clear LB from mat table    Hip add 10x10\" Ball Added 6/28   Hip abd 20x Blue TB Increased resistance 8/17         SIDE LYING      Hip abduction 15x ea Green  Added resistance 7/6   Clamshells  15x ea Green  Increased resistance 7/3   Reverse clamshells  15x ea  Green  Increased resistance 7/6 but unable to achieve good isolation of R hip IR's. Regressed to no resistance for R hip. PRONE   Difficult position for the pt   Hip ext  x2 ea  8/8 Increased difficulty to perform   Alt UE   Unable   Alt UE/LE opposite    Unable   Prop on elbows  2 min   Requires hip flexion and pillow underneath stomach to complete; reports is tolerable with no peripheralization noted.    Press ups   Unable          STANDING      3 way hip  15x ea A With abduction, pt reports that L side feels \"tighter\" and can feel this more   Heel raises  20x     Toe raises  20x  Continued difficulty and asymmetry with L toe raise as compared to R   Calf stretch on slant board  2 x30\"      Standing hip flexor stretch 3 x30\" ea  Added 6/22   Standing wall ITB stretch 2 X 30\"  Added 7/6: tactile cueing needed for proper execution    Step ups  12x ea  6 inch     Lateral step ups 12x ea 6 inch    Step downs  12x ea  6 inch           Backwards shoulder rolls 15x  Initiated 7/18    Scapular retraction  3\" 10 x2  \"   Trunk stretch over 1/2 foam roll  3\"   \"  Patient required 4 pillows under head in order to be able to relax upper body with stretch         BWSTT (Alter G) 10'    (7' set-up)  Initiated 7/20: size XL pants; 60% WB Walked 5 mins at 1.4 mph with 0% incline, then 5 mins at 1.0 mph with 2% incline     Patient demo'd shorted step length and less WB through LLE throughout ambulation 8/17   2 way hip (abd/ext) 10x ea  Initiated at 60% WB in Alter G 8/17 Marches 12x ea  \"   Toe raises 10x4  10x at ever 10% increase in WB   Heel raises 10x4  \"         BALANCE       Normal GLORIA on foam standing  [x] Gait Deviations: moderate forward trunk lean, decreased arm swing on L, decreased step length bilaterally, mild drop foot on LLE but able to clear floor with LE, decreased heel strike bilaterally; decreased eccentric control when descending stairs                            STG: (to be met in 8 treatments) 4/6 MET, 2/6 ONGOING  1. ? Pain: Patient will report low back pain at or below 4/10 when walking 150 ft with minimal symptoms into bilateral thighs to promote increased functional mobility. 8/8/18: ONGOING: Pt reports 8-9/10 pain when walking greater than 30 yards with SOB   2. ? ROM: Patient will demonstrate improved heel cord flexibility by 5 degrees on each LE to promote normalized gait pattern. 8/8/18: MET  3. ? Strength: Patient will demonstrate 4/5 strength in erector spinae and abdominal musculature to promote increased core stabilization of lumbar spine. 7/10/18: MET  4. ? Function: Patient will demonstrate normalized gait pattern with 50 ft ambulation to promote improved energy conservation and decreased the risk of falls. 8/8/18: ONGOING, see gait analysis above  5. Independent with Home Exercise Programs 7/10/18: MET  6. Demonstrate Knowledge of fall prevention 7/10/18: MET  7. LTG: (to be met in 16 treatments)  1/6 MET, 3/6 ONGOING, 2/6 NOT MET  1. Patient will demonstrate 50% improvement in forward head, rounded shoulders, thoracic kyphosis and anterior trunk lean in standing to decrease stress on spine and spinal musculature. 8/18/18: ONGOING: 10% improvement  2. Patient will report score of 25% or less perceived deficit on the Oswestry to indicate improved ability to participate in ADL's. 8/8/18: NOT MET: 46% perceived deficit per Oswestry  3. Patient will demonstrate 5/5 hip and abdominal strength to promote improved standing and walking tolerance. 8/8/18: ONGOING: see measurements above  4.  Patient will report a 75% decreased in the frequency, intensity, and duration of symptoms in

## 2018-08-22 ENCOUNTER — HOSPITAL ENCOUNTER (OUTPATIENT)
Dept: PHYSICAL THERAPY | Facility: CLINIC | Age: 73
Setting detail: THERAPIES SERIES
Discharge: HOME | End: 2018-08-22
Payer: MEDICARE

## 2018-08-22 PROCEDURE — G8979 MOBILITY GOAL STATUS: HCPCS

## 2018-08-22 PROCEDURE — 97110 THERAPEUTIC EXERCISES: CPT

## 2018-08-22 PROCEDURE — G8978 MOBILITY CURRENT STATUS: HCPCS

## 2018-08-22 NOTE — FLOWSHEET NOTE
forward head, rounded shoulder, thoracic kyphosis, lacks terminal knee extension bilaterally in standing  [x] Gait Deviations: moderate forward trunk lean, decreased arm swing on L, decreased step length bilaterally, mild drop foot on LLE but able to clear floor with LE, decreased heel strike bilaterally; decreased eccentric control when descending stairs                            STG: (to be met in 8 treatments) 4/6 MET, 2/6 ONGOING  1. ? Pain: Patient will report low back pain at or below 4/10 when walking 150 ft with minimal symptoms into bilateral thighs to promote increased functional mobility. 8/8/18: ONGOING: Pt reports 8-9/10 pain when walking greater than 30 yards with SOB   2. ? ROM: Patient will demonstrate improved heel cord flexibility by 5 degrees on each LE to promote normalized gait pattern. 8/8/18: MET  3. ? Strength: Patient will demonstrate 4/5 strength in erector spinae and abdominal musculature to promote increased core stabilization of lumbar spine. 7/10/18: MET  4. ? Function: Patient will demonstrate normalized gait pattern with 50 ft ambulation to promote improved energy conservation and decreased the risk of falls. 8/8/18: ONGOING, see gait analysis above  5. Independent with Home Exercise Programs 7/10/18: MET  6. Demonstrate Knowledge of fall prevention 7/10/18: MET  7. LTG: (to be met in 16 treatments)  1/6 MET, 3/6 ONGOING, 2/6 NOT MET  1. Patient will demonstrate 50% improvement in forward head, rounded shoulders, thoracic kyphosis and anterior trunk lean in standing to decrease stress on spine and spinal musculature. 8/18/18: ONGOING: 10% improvement  2. Patient will report score of 25% or less perceived deficit on the Oswestry to indicate improved ability to participate in ADL's. 8/8/18: NOT MET: 46% perceived deficit per Oswestry  3. Patient will demonstrate 5/5 hip and abdominal strength to promote improved standing and walking tolerance.  8/8/18: ONGOING: see measurements

## 2018-08-23 ENCOUNTER — ANESTHESIA (OUTPATIENT)
Dept: OPERATING ROOM | Age: 73
DRG: 660 | End: 2018-08-23
Payer: MEDICARE

## 2018-08-23 ENCOUNTER — APPOINTMENT (OUTPATIENT)
Dept: GENERAL RADIOLOGY | Age: 73
DRG: 660 | End: 2018-08-23
Attending: SPECIALIST
Payer: MEDICARE

## 2018-08-23 ENCOUNTER — HOSPITAL ENCOUNTER (INPATIENT)
Age: 73
LOS: 1 days | Discharge: HOME | DRG: 660 | End: 2018-08-25
Attending: SPECIALIST | Admitting: SPECIALIST
Payer: MEDICARE

## 2018-08-23 ENCOUNTER — ANESTHESIA EVENT (OUTPATIENT)
Dept: OPERATING ROOM | Age: 73
DRG: 660 | End: 2018-08-23
Payer: MEDICARE

## 2018-08-23 ENCOUNTER — HOSPITAL ENCOUNTER (OUTPATIENT)
Dept: INTERVENTIONAL RADIOLOGY/VASCULAR | Age: 73
Discharge: HOME | DRG: 660 | End: 2018-08-25
Payer: MEDICARE

## 2018-08-23 ENCOUNTER — ANESTHESIA EVENT (OUTPATIENT)
Dept: INTERVENTIONAL RADIOLOGY/VASCULAR | Age: 73
DRG: 660 | End: 2018-08-23
Payer: MEDICARE

## 2018-08-23 ENCOUNTER — ANESTHESIA (OUTPATIENT)
Dept: INTERVENTIONAL RADIOLOGY/VASCULAR | Age: 73
DRG: 660 | End: 2018-08-23
Payer: MEDICARE

## 2018-08-23 VITALS — OXYGEN SATURATION: 100 % | DIASTOLIC BLOOD PRESSURE: 61 MMHG | SYSTOLIC BLOOD PRESSURE: 125 MMHG

## 2018-08-23 VITALS — SYSTOLIC BLOOD PRESSURE: 129 MMHG | DIASTOLIC BLOOD PRESSURE: 84 MMHG | OXYGEN SATURATION: 100 % | TEMPERATURE: 96.4 F

## 2018-08-23 VITALS
SYSTOLIC BLOOD PRESSURE: 164 MMHG | DIASTOLIC BLOOD PRESSURE: 72 MMHG | OXYGEN SATURATION: 97 % | RESPIRATION RATE: 15 BRPM

## 2018-08-23 DIAGNOSIS — N18.2 CKD (CHRONIC KIDNEY DISEASE), STAGE II: Primary | ICD-10-CM

## 2018-08-23 DIAGNOSIS — N20.0 RENAL CALCULUS, RIGHT: ICD-10-CM

## 2018-08-23 PROBLEM — N20.9 UROLITHIASIS: Status: ACTIVE | Noted: 2018-08-23

## 2018-08-23 LAB
ANION GAP SERPL CALCULATED.3IONS-SCNC: 13 MMOL/L (ref 9–17)
BUN BLDV-MCNC: 28 MG/DL (ref 8–23)
BUN/CREAT BLD: ABNORMAL (ref 9–20)
CALCIUM SERPL-MCNC: 8.7 MG/DL (ref 8.6–10.4)
CHLORIDE BLD-SCNC: 104 MMOL/L (ref 98–107)
CO2: 18 MMOL/L (ref 20–31)
CREAT SERPL-MCNC: 1.35 MG/DL (ref 0.7–1.2)
GFR AFRICAN AMERICAN: >60 ML/MIN
GFR NON-AFRICAN AMERICAN: 52 ML/MIN
GFR SERPL CREATININE-BSD FRML MDRD: ABNORMAL ML/MIN/{1.73_M2}
GFR SERPL CREATININE-BSD FRML MDRD: ABNORMAL ML/MIN/{1.73_M2}
GLUCOSE BLD-MCNC: 109 MG/DL (ref 74–100)
GLUCOSE BLD-MCNC: 134 MG/DL (ref 75–110)
GLUCOSE BLD-MCNC: 170 MG/DL (ref 75–110)
GLUCOSE BLD-MCNC: 191 MG/DL (ref 75–110)
GLUCOSE BLD-MCNC: 224 MG/DL (ref 70–99)
GLUCOSE BLD-MCNC: 228 MG/DL (ref 75–110)
GLUCOSE BLD-MCNC: 269 MG/DL (ref 75–110)
HCT VFR BLD CALC: 41.9 % (ref 40.7–50.3)
HEMOGLOBIN: 12.8 G/DL (ref 13–17)
MCH RBC QN AUTO: 29.9 PG (ref 25.2–33.5)
MCHC RBC AUTO-ENTMCNC: 30.5 G/DL (ref 28.4–34.8)
MCV RBC AUTO: 97.9 FL (ref 82.6–102.9)
NRBC AUTOMATED: 0 PER 100 WBC
PDW BLD-RTO: 13.7 % (ref 11.8–14.4)
PLATELET # BLD: 173 K/UL (ref 138–453)
PMV BLD AUTO: 10.7 FL (ref 8.1–13.5)
POC POTASSIUM: 4.5 MMOL/L (ref 3.5–4.5)
POTASSIUM SERPL-SCNC: 5.4 MMOL/L (ref 3.7–5.3)
RBC # BLD: 4.28 M/UL (ref 4.21–5.77)
SODIUM BLD-SCNC: 135 MMOL/L (ref 135–144)
WBC # BLD: 15.9 K/UL (ref 3.5–11.3)

## 2018-08-23 PROCEDURE — 6360000004 HC RX CONTRAST MEDICATION: Performed by: SPECIALIST

## 2018-08-23 PROCEDURE — 6360000004 HC RX CONTRAST MEDICATION: Performed by: RADIOLOGY

## 2018-08-23 PROCEDURE — 3700000001 HC ADD 15 MINUTES (ANESTHESIA): Performed by: SPECIALIST

## 2018-08-23 PROCEDURE — 2720000010 HC SURG SUPPLY STERILE

## 2018-08-23 PROCEDURE — 7100000041 HC SPAR PHASE II RECOVERY - ADDTL 15 MIN: Performed by: SPECIALIST

## 2018-08-23 PROCEDURE — C1725 CATH, TRANSLUMIN NON-LASER: HCPCS

## 2018-08-23 PROCEDURE — G0378 HOSPITAL OBSERVATION PER HR: HCPCS

## 2018-08-23 PROCEDURE — C1769 GUIDE WIRE: HCPCS | Performed by: SPECIALIST

## 2018-08-23 PROCEDURE — 2580000003 HC RX 258: Performed by: SPECIALIST

## 2018-08-23 PROCEDURE — 6370000000 HC RX 637 (ALT 250 FOR IP): Performed by: SPECIALIST

## 2018-08-23 PROCEDURE — BT111ZZ FLUOROSCOPY OF RIGHT KIDNEY USING LOW OSMOLAR CONTRAST: ICD-10-PCS | Performed by: SPECIALIST

## 2018-08-23 PROCEDURE — 3700000000 HC ANESTHESIA ATTENDED CARE

## 2018-08-23 PROCEDURE — 6360000002 HC RX W HCPCS: Performed by: NURSE ANESTHETIST, CERTIFIED REGISTERED

## 2018-08-23 PROCEDURE — 2580000003 HC RX 258: Performed by: NURSE ANESTHETIST, CERTIFIED REGISTERED

## 2018-08-23 PROCEDURE — 3700000000 HC ANESTHESIA ATTENDED CARE: Performed by: SPECIALIST

## 2018-08-23 PROCEDURE — 50430 NJX PX NFROSGRM &/URTRGRM: CPT

## 2018-08-23 PROCEDURE — 2580000003 HC RX 258: Performed by: STUDENT IN AN ORGANIZED HEALTH CARE EDUCATION/TRAINING PROGRAM

## 2018-08-23 PROCEDURE — 6360000002 HC RX W HCPCS: Performed by: STUDENT IN AN ORGANIZED HEALTH CARE EDUCATION/TRAINING PROGRAM

## 2018-08-23 PROCEDURE — 3600000012 HC SURGERY LEVEL 2 ADDTL 15MIN: Performed by: SPECIALIST

## 2018-08-23 PROCEDURE — 3600000002 HC SURGERY LEVEL 2 BASE: Performed by: SPECIALIST

## 2018-08-23 PROCEDURE — C1894 INTRO/SHEATH, NON-LASER: HCPCS | Performed by: SPECIALIST

## 2018-08-23 PROCEDURE — 74420 UROGRAPHY RTRGR +-KUB: CPT

## 2018-08-23 PROCEDURE — 74018 RADEX ABDOMEN 1 VIEW: CPT

## 2018-08-23 PROCEDURE — 6360000002 HC RX W HCPCS: Performed by: SPECIALIST

## 2018-08-23 PROCEDURE — 2709999900 HC NON-CHARGEABLE SUPPLY: Performed by: SPECIALIST

## 2018-08-23 PROCEDURE — 0TC03ZZ EXTIRPATION OF MATTER FROM RIGHT KIDNEY, PERCUTANEOUS APPROACH: ICD-10-PCS | Performed by: SPECIALIST

## 2018-08-23 PROCEDURE — C1726 CATH, BAL DIL, NON-VASCULAR: HCPCS | Performed by: SPECIALIST

## 2018-08-23 PROCEDURE — 50433 PLMT NEPHROURETERAL CATHETER: CPT

## 2018-08-23 PROCEDURE — 7100000000 HC PACU RECOVERY - FIRST 15 MIN: Performed by: SPECIALIST

## 2018-08-23 PROCEDURE — 82947 ASSAY GLUCOSE BLOOD QUANT: CPT

## 2018-08-23 PROCEDURE — 0TJB8ZZ INSPECTION OF BLADDER, VIA NATURAL OR ARTIFICIAL OPENING ENDOSCOPIC: ICD-10-PCS | Performed by: SPECIALIST

## 2018-08-23 PROCEDURE — 2500000003 HC RX 250 WO HCPCS: Performed by: SPECIALIST

## 2018-08-23 PROCEDURE — 2500000003 HC RX 250 WO HCPCS: Performed by: NURSE ANESTHETIST, CERTIFIED REGISTERED

## 2018-08-23 PROCEDURE — 36415 COLL VENOUS BLD VENIPUNCTURE: CPT

## 2018-08-23 PROCEDURE — 2709999900 HC NON-CHARGEABLE SUPPLY

## 2018-08-23 PROCEDURE — 7100000041 HC SPAR PHASE II RECOVERY - ADDTL 15 MIN

## 2018-08-23 PROCEDURE — 0TC08ZZ EXTIRPATION OF MATTER FROM RIGHT KIDNEY, VIA NATURAL OR ARTIFICIAL OPENING ENDOSCOPIC: ICD-10-PCS | Performed by: SPECIALIST

## 2018-08-23 PROCEDURE — C1769 GUIDE WIRE: HCPCS

## 2018-08-23 PROCEDURE — C1758 CATHETER, URETERAL: HCPCS | Performed by: SPECIALIST

## 2018-08-23 PROCEDURE — 85027 COMPLETE CBC AUTOMATED: CPT

## 2018-08-23 PROCEDURE — 84132 ASSAY OF SERUM POTASSIUM: CPT

## 2018-08-23 PROCEDURE — 0T9030Z DRAINAGE OF RIGHT KIDNEY WITH DRAINAGE DEVICE, PERCUTANEOUS APPROACH: ICD-10-PCS | Performed by: RADIOLOGY

## 2018-08-23 PROCEDURE — 3700000001 HC ADD 15 MINUTES (ANESTHESIA)

## 2018-08-23 PROCEDURE — C1887 CATHETER, GUIDING: HCPCS

## 2018-08-23 PROCEDURE — 6370000000 HC RX 637 (ALT 250 FOR IP): Performed by: STUDENT IN AN ORGANIZED HEALTH CARE EDUCATION/TRAINING PROGRAM

## 2018-08-23 PROCEDURE — C2628 CATHETER, OCCLUSION: HCPCS | Performed by: SPECIALIST

## 2018-08-23 PROCEDURE — 7100000040 HC SPAR PHASE II RECOVERY - FIRST 15 MIN

## 2018-08-23 PROCEDURE — 80048 BASIC METABOLIC PNL TOTAL CA: CPT

## 2018-08-23 PROCEDURE — 2580000003 HC RX 258: Performed by: ANESTHESIOLOGY

## 2018-08-23 PROCEDURE — 7100000001 HC PACU RECOVERY - ADDTL 15 MIN: Performed by: SPECIALIST

## 2018-08-23 PROCEDURE — 7100000040 HC SPAR PHASE II RECOVERY - FIRST 15 MIN: Performed by: SPECIALIST

## 2018-08-23 PROCEDURE — 87086 URINE CULTURE/COLONY COUNT: CPT

## 2018-08-23 PROCEDURE — 2720000010 HC SURG SUPPLY STERILE: Performed by: SPECIALIST

## 2018-08-23 PROCEDURE — 82365 CALCULUS SPECTROSCOPY: CPT

## 2018-08-23 PROCEDURE — 3600000014 HC SURGERY LEVEL 4 ADDTL 15MIN: Performed by: SPECIALIST

## 2018-08-23 PROCEDURE — C1773 RET DEV, INSERTABLE: HCPCS | Performed by: SPECIALIST

## 2018-08-23 PROCEDURE — 3600000004 HC SURGERY LEVEL 4 BASE: Performed by: SPECIALIST

## 2018-08-23 DEVICE — STENT URET 6FR L26CM PERCFLX HYDR+ TAPR TIP GRAD
Type: IMPLANTABLE DEVICE | Status: NON-FUNCTIONAL
Removed: 2018-09-04

## 2018-08-23 RX ORDER — OXYCODONE HYDROCHLORIDE AND ACETAMINOPHEN 5; 325 MG/1; MG/1
2 TABLET ORAL PRN
Status: DISCONTINUED | OUTPATIENT
Start: 2018-08-23 | End: 2018-08-23 | Stop reason: HOSPADM

## 2018-08-23 RX ORDER — DIPHENHYDRAMINE HYDROCHLORIDE 50 MG/ML
12.5 INJECTION INTRAMUSCULAR; INTRAVENOUS
Status: DISCONTINUED | OUTPATIENT
Start: 2018-08-23 | End: 2018-08-23 | Stop reason: HOSPADM

## 2018-08-23 RX ORDER — MEPERIDINE HYDROCHLORIDE 50 MG/ML
12.5 INJECTION INTRAMUSCULAR; INTRAVENOUS; SUBCUTANEOUS EVERY 5 MIN PRN
Status: DISCONTINUED | OUTPATIENT
Start: 2018-08-23 | End: 2018-08-26 | Stop reason: HOSPADM

## 2018-08-23 RX ORDER — PROPOFOL 10 MG/ML
INJECTION, EMULSION INTRAVENOUS PRN
Status: DISCONTINUED | OUTPATIENT
Start: 2018-08-23 | End: 2018-08-23 | Stop reason: SDUPTHER

## 2018-08-23 RX ORDER — DEXTROSE MONOHYDRATE 25 G/50ML
12.5 INJECTION, SOLUTION INTRAVENOUS PRN
Status: DISCONTINUED | OUTPATIENT
Start: 2018-08-23 | End: 2018-08-25 | Stop reason: HOSPADM

## 2018-08-23 RX ORDER — SODIUM CHLORIDE 0.9 % (FLUSH) 0.9 %
10 SYRINGE (ML) INJECTION EVERY 12 HOURS SCHEDULED
Status: DISCONTINUED | OUTPATIENT
Start: 2018-08-23 | End: 2018-08-25 | Stop reason: HOSPADM

## 2018-08-23 RX ORDER — CIPROFLOXACIN 2 MG/ML
INJECTION, SOLUTION INTRAVENOUS PRN
Status: DISCONTINUED | OUTPATIENT
Start: 2018-08-23 | End: 2018-08-23 | Stop reason: SDUPTHER

## 2018-08-23 RX ORDER — SPIRONOLACTONE 25 MG/1
25 TABLET ORAL DAILY
Status: DISCONTINUED | OUTPATIENT
Start: 2018-08-23 | End: 2018-08-24

## 2018-08-23 RX ORDER — WATER 1000 ML/1000ML
INJECTION, SOLUTION INTRAVENOUS PRN
Status: DISCONTINUED | OUTPATIENT
Start: 2018-08-23 | End: 2018-08-23 | Stop reason: HOSPADM

## 2018-08-23 RX ORDER — CIPROFLOXACIN 2 MG/ML
400 INJECTION, SOLUTION INTRAVENOUS
Status: DISCONTINUED | OUTPATIENT
Start: 2018-08-23 | End: 2018-08-23 | Stop reason: HOSPADM

## 2018-08-23 RX ORDER — MIDAZOLAM HYDROCHLORIDE 1 MG/ML
INJECTION INTRAMUSCULAR; INTRAVENOUS PRN
Status: DISCONTINUED | OUTPATIENT
Start: 2018-08-23 | End: 2018-08-23 | Stop reason: SDUPTHER

## 2018-08-23 RX ORDER — LIDOCAINE HYDROCHLORIDE 10 MG/ML
INJECTION, SOLUTION EPIDURAL; INFILTRATION; INTRACAUDAL; PERINEURAL PRN
Status: DISCONTINUED | OUTPATIENT
Start: 2018-08-23 | End: 2018-08-23 | Stop reason: SDUPTHER

## 2018-08-23 RX ORDER — MORPHINE SULFATE 2 MG/ML
2 INJECTION, SOLUTION INTRAMUSCULAR; INTRAVENOUS EVERY 5 MIN PRN
Status: DISCONTINUED | OUTPATIENT
Start: 2018-08-23 | End: 2018-08-23 | Stop reason: HOSPADM

## 2018-08-23 RX ORDER — ONDANSETRON 2 MG/ML
4 INJECTION INTRAMUSCULAR; INTRAVENOUS EVERY 6 HOURS PRN
Status: DISCONTINUED | OUTPATIENT
Start: 2018-08-23 | End: 2018-08-25 | Stop reason: HOSPADM

## 2018-08-23 RX ORDER — MONTELUKAST SODIUM 10 MG/1
10 TABLET ORAL DAILY
Status: DISCONTINUED | OUTPATIENT
Start: 2018-08-23 | End: 2018-08-25 | Stop reason: HOSPADM

## 2018-08-23 RX ORDER — SODIUM CHLORIDE 9 MG/ML
INJECTION, SOLUTION INTRAVENOUS CONTINUOUS
Status: DISCONTINUED | OUTPATIENT
Start: 2018-08-23 | End: 2018-08-25

## 2018-08-23 RX ORDER — ROCURONIUM BROMIDE 10 MG/ML
INJECTION, SOLUTION INTRAVENOUS PRN
Status: DISCONTINUED | OUTPATIENT
Start: 2018-08-23 | End: 2018-08-23 | Stop reason: SDUPTHER

## 2018-08-23 RX ORDER — DOCUSATE SODIUM 100 MG/1
100 CAPSULE, LIQUID FILLED ORAL 2 TIMES DAILY
Status: DISCONTINUED | OUTPATIENT
Start: 2018-08-23 | End: 2018-08-25 | Stop reason: HOSPADM

## 2018-08-23 RX ORDER — FENTANYL CITRATE 50 UG/ML
25 INJECTION, SOLUTION INTRAMUSCULAR; INTRAVENOUS EVERY 5 MIN PRN
Status: DISCONTINUED | OUTPATIENT
Start: 2018-08-23 | End: 2018-08-23 | Stop reason: HOSPADM

## 2018-08-23 RX ORDER — 0.9 % SODIUM CHLORIDE 0.9 %
VIAL (ML) INJECTION PRN
Status: DISCONTINUED | OUTPATIENT
Start: 2018-08-23 | End: 2018-08-23 | Stop reason: HOSPADM

## 2018-08-23 RX ORDER — OXYCODONE HYDROCHLORIDE AND ACETAMINOPHEN 5; 325 MG/1; MG/1
1 TABLET ORAL EVERY 4 HOURS PRN
Status: DISCONTINUED | OUTPATIENT
Start: 2018-08-23 | End: 2018-08-25 | Stop reason: HOSPADM

## 2018-08-23 RX ORDER — LIDOCAINE HYDROCHLORIDE 10 MG/ML
INJECTION, SOLUTION INFILTRATION; PERINEURAL PRN
Status: DISCONTINUED | OUTPATIENT
Start: 2018-08-23 | End: 2018-08-23 | Stop reason: SDUPTHER

## 2018-08-23 RX ORDER — METOPROLOL SUCCINATE 50 MG/1
50 TABLET, EXTENDED RELEASE ORAL DAILY
Status: DISCONTINUED | OUTPATIENT
Start: 2018-08-23 | End: 2018-08-25 | Stop reason: HOSPADM

## 2018-08-23 RX ORDER — CEFAZOLIN SODIUM 1 G/50ML
1 INJECTION, SOLUTION INTRAVENOUS EVERY 8 HOURS
Status: COMPLETED | OUTPATIENT
Start: 2018-08-23 | End: 2018-08-24

## 2018-08-23 RX ORDER — OXYCODONE HYDROCHLORIDE AND ACETAMINOPHEN 5; 325 MG/1; MG/1
1 TABLET ORAL PRN
Status: DISCONTINUED | OUTPATIENT
Start: 2018-08-23 | End: 2018-08-23 | Stop reason: HOSPADM

## 2018-08-23 RX ORDER — FENTANYL CITRATE 50 UG/ML
INJECTION, SOLUTION INTRAMUSCULAR; INTRAVENOUS PRN
Status: DISCONTINUED | OUTPATIENT
Start: 2018-08-23 | End: 2018-08-23 | Stop reason: SDUPTHER

## 2018-08-23 RX ORDER — MORPHINE SULFATE 2 MG/ML
2 INJECTION, SOLUTION INTRAMUSCULAR; INTRAVENOUS EVERY 4 HOURS PRN
Status: DISCONTINUED | OUTPATIENT
Start: 2018-08-23 | End: 2018-08-25 | Stop reason: HOSPADM

## 2018-08-23 RX ORDER — ONDANSETRON 2 MG/ML
4 INJECTION INTRAMUSCULAR; INTRAVENOUS
Status: DISCONTINUED | OUTPATIENT
Start: 2018-08-23 | End: 2018-08-23 | Stop reason: HOSPADM

## 2018-08-23 RX ORDER — DEXTROSE MONOHYDRATE 50 MG/ML
100 INJECTION, SOLUTION INTRAVENOUS PRN
Status: DISCONTINUED | OUTPATIENT
Start: 2018-08-23 | End: 2018-08-25 | Stop reason: HOSPADM

## 2018-08-23 RX ORDER — MAGNESIUM HYDROXIDE 1200 MG/15ML
LIQUID ORAL CONTINUOUS PRN
Status: COMPLETED | OUTPATIENT
Start: 2018-08-23 | End: 2018-08-23

## 2018-08-23 RX ORDER — DIPHENHYDRAMINE HYDROCHLORIDE 50 MG/ML
25 INJECTION INTRAMUSCULAR; INTRAVENOUS EVERY 6 HOURS PRN
Status: DISCONTINUED | OUTPATIENT
Start: 2018-08-23 | End: 2018-08-25 | Stop reason: HOSPADM

## 2018-08-23 RX ORDER — SODIUM CHLORIDE, SODIUM LACTATE, POTASSIUM CHLORIDE, CALCIUM CHLORIDE 600; 310; 30; 20 MG/100ML; MG/100ML; MG/100ML; MG/100ML
INJECTION, SOLUTION INTRAVENOUS CONTINUOUS PRN
Status: DISCONTINUED | OUTPATIENT
Start: 2018-08-23 | End: 2018-08-23 | Stop reason: SDUPTHER

## 2018-08-23 RX ORDER — SODIUM CHLORIDE, SODIUM LACTATE, POTASSIUM CHLORIDE, CALCIUM CHLORIDE 600; 310; 30; 20 MG/100ML; MG/100ML; MG/100ML; MG/100ML
1000 INJECTION, SOLUTION INTRAVENOUS CONTINUOUS
Status: DISCONTINUED | OUTPATIENT
Start: 2018-08-23 | End: 2018-08-25

## 2018-08-23 RX ORDER — FENTANYL CITRATE 50 UG/ML
50 INJECTION, SOLUTION INTRAMUSCULAR; INTRAVENOUS EVERY 5 MIN PRN
Status: DISCONTINUED | OUTPATIENT
Start: 2018-08-23 | End: 2018-08-26 | Stop reason: HOSPADM

## 2018-08-23 RX ORDER — ATORVASTATIN CALCIUM 40 MG/1
40 TABLET, FILM COATED ORAL DAILY
Status: DISCONTINUED | OUTPATIENT
Start: 2018-08-23 | End: 2018-08-25 | Stop reason: HOSPADM

## 2018-08-23 RX ORDER — LABETALOL HYDROCHLORIDE 5 MG/ML
5 INJECTION, SOLUTION INTRAVENOUS EVERY 10 MIN PRN
Status: DISCONTINUED | OUTPATIENT
Start: 2018-08-23 | End: 2018-08-23 | Stop reason: HOSPADM

## 2018-08-23 RX ORDER — FENTANYL CITRATE 50 UG/ML
25 INJECTION, SOLUTION INTRAMUSCULAR; INTRAVENOUS EVERY 5 MIN PRN
Status: DISCONTINUED | OUTPATIENT
Start: 2018-08-23 | End: 2018-08-26 | Stop reason: HOSPADM

## 2018-08-23 RX ORDER — SODIUM CHLORIDE 0.9 % (FLUSH) 0.9 %
10 SYRINGE (ML) INJECTION PRN
Status: DISCONTINUED | OUTPATIENT
Start: 2018-08-23 | End: 2018-08-25 | Stop reason: HOSPADM

## 2018-08-23 RX ORDER — PANTOPRAZOLE SODIUM 40 MG/1
40 TABLET, DELAYED RELEASE ORAL
Status: DISCONTINUED | OUTPATIENT
Start: 2018-08-24 | End: 2018-08-25 | Stop reason: HOSPADM

## 2018-08-23 RX ORDER — AMLODIPINE BESYLATE 5 MG/1
5 TABLET ORAL DAILY
Status: DISCONTINUED | OUTPATIENT
Start: 2018-08-23 | End: 2018-08-25 | Stop reason: HOSPADM

## 2018-08-23 RX ORDER — LOSARTAN POTASSIUM 50 MG/1
100 TABLET ORAL DAILY
Status: DISCONTINUED | OUTPATIENT
Start: 2018-08-23 | End: 2018-08-24

## 2018-08-23 RX ORDER — GLYCOPYRROLATE 1 MG/5 ML
SYRINGE (ML) INTRAVENOUS PRN
Status: DISCONTINUED | OUTPATIENT
Start: 2018-08-23 | End: 2018-08-23 | Stop reason: SDUPTHER

## 2018-08-23 RX ORDER — ONDANSETRON 2 MG/ML
4 INJECTION INTRAMUSCULAR; INTRAVENOUS
Status: ACTIVE | OUTPATIENT
Start: 2018-08-23 | End: 2018-08-23

## 2018-08-23 RX ORDER — NICOTINE POLACRILEX 4 MG
15 LOZENGE BUCCAL PRN
Status: DISCONTINUED | OUTPATIENT
Start: 2018-08-23 | End: 2018-08-25 | Stop reason: HOSPADM

## 2018-08-23 RX ADMIN — PHENYLEPHRINE HYDROCHLORIDE 100 MCG: 10 INJECTION INTRAVENOUS at 13:07

## 2018-08-23 RX ADMIN — FENTANYL CITRATE 50 MCG: 50 INJECTION, SOLUTION INTRAMUSCULAR; INTRAVENOUS at 10:05

## 2018-08-23 RX ADMIN — FENTANYL CITRATE 50 MCG: 50 INJECTION, SOLUTION INTRAMUSCULAR; INTRAVENOUS at 10:25

## 2018-08-23 RX ADMIN — INSULIN LISPRO 2 UNITS: 100 INJECTION, SOLUTION INTRAVENOUS; SUBCUTANEOUS at 17:27

## 2018-08-23 RX ADMIN — Medication 0.4 MG: at 13:02

## 2018-08-23 RX ADMIN — MIDAZOLAM HYDROCHLORIDE 2 MG: 1 INJECTION, SOLUTION INTRAMUSCULAR; INTRAVENOUS at 09:53

## 2018-08-23 RX ADMIN — OXYCODONE HYDROCHLORIDE AND ACETAMINOPHEN 1 TABLET: 5; 325 TABLET ORAL at 19:25

## 2018-08-23 RX ADMIN — IOTHALAMATE MEGLUMINE 30 ML: 430 INJECTION INTRAVASCULAR at 10:35

## 2018-08-23 RX ADMIN — CEFAZOLIN SODIUM 1 G: 1 INJECTION, SOLUTION INTRAVENOUS at 17:28

## 2018-08-23 RX ADMIN — PROPOFOL 200 MG: 10 INJECTION, EMULSION INTRAVENOUS at 07:43

## 2018-08-23 RX ADMIN — FENTANYL CITRATE 50 MCG: 50 INJECTION INTRAMUSCULAR; INTRAVENOUS at 07:40

## 2018-08-23 RX ADMIN — SODIUM CHLORIDE: 9 INJECTION, SOLUTION INTRAVENOUS at 22:44

## 2018-08-23 RX ADMIN — PROPOFOL 50 MG: 10 INJECTION, EMULSION INTRAVENOUS at 12:12

## 2018-08-23 RX ADMIN — ROCURONIUM BROMIDE 50 MG: 10 INJECTION INTRAVENOUS at 12:09

## 2018-08-23 RX ADMIN — CIPROFLOXACIN 400 MG: 2 INJECTION, SOLUTION INTRAVENOUS at 07:49

## 2018-08-23 RX ADMIN — SODIUM CHLORIDE: 9 INJECTION, SOLUTION INTRAVENOUS at 15:08

## 2018-08-23 RX ADMIN — PHENYLEPHRINE HYDROCHLORIDE 100 MCG: 10 INJECTION INTRAVENOUS at 13:15

## 2018-08-23 RX ADMIN — PHENYLEPHRINE HYDROCHLORIDE 100 MCG: 10 INJECTION INTRAVENOUS at 13:04

## 2018-08-23 RX ADMIN — PROPOFOL 150 MG: 10 INJECTION, EMULSION INTRAVENOUS at 12:07

## 2018-08-23 RX ADMIN — PHENYLEPHRINE HYDROCHLORIDE 100 MCG: 10 INJECTION INTRAVENOUS at 13:02

## 2018-08-23 RX ADMIN — INSULIN LISPRO 2 UNITS: 100 INJECTION, SOLUTION INTRAVENOUS; SUBCUTANEOUS at 22:43

## 2018-08-23 RX ADMIN — DOCUSATE SODIUM 100 MG: 100 CAPSULE ORAL at 22:14

## 2018-08-23 RX ADMIN — SODIUM CHLORIDE, POTASSIUM CHLORIDE, SODIUM LACTATE AND CALCIUM CHLORIDE: 600; 310; 30; 20 INJECTION, SOLUTION INTRAVENOUS at 12:01

## 2018-08-23 RX ADMIN — SODIUM CHLORIDE, POTASSIUM CHLORIDE, SODIUM LACTATE AND CALCIUM CHLORIDE 1000 ML: 600; 310; 30; 20 INJECTION, SOLUTION INTRAVENOUS at 07:35

## 2018-08-23 RX ADMIN — LIDOCAINE HYDROCHLORIDE 50 MG: 10 INJECTION, SOLUTION INFILTRATION; PERINEURAL at 12:06

## 2018-08-23 RX ADMIN — MONTELUKAST SODIUM 10 MG: 10 TABLET, FILM COATED ORAL at 17:27

## 2018-08-23 RX ADMIN — FENTANYL CITRATE 50 MCG: 50 INJECTION, SOLUTION INTRAMUSCULAR; INTRAVENOUS at 10:35

## 2018-08-23 RX ADMIN — FENTANYL CITRATE 50 MCG: 50 INJECTION, SOLUTION INTRAMUSCULAR; INTRAVENOUS at 10:40

## 2018-08-23 RX ADMIN — FENTANYL CITRATE 100 MCG: 50 INJECTION INTRAMUSCULAR; INTRAVENOUS at 12:06

## 2018-08-23 RX ADMIN — SODIUM CHLORIDE: 9 INJECTION, SOLUTION INTRAVENOUS at 15:18

## 2018-08-23 RX ADMIN — MORPHINE SULFATE 2 MG: 2 INJECTION, SOLUTION INTRAMUSCULAR; INTRAVENOUS at 15:18

## 2018-08-23 RX ADMIN — FENTANYL CITRATE 50 MCG: 50 INJECTION, SOLUTION INTRAMUSCULAR; INTRAVENOUS at 10:00

## 2018-08-23 RX ADMIN — LIDOCAINE HYDROCHLORIDE 50 MG: 10 INJECTION, SOLUTION EPIDURAL; INFILTRATION; INTRACAUDAL; PERINEURAL at 07:43

## 2018-08-23 ASSESSMENT — PULMONARY FUNCTION TESTS
PIF_VALUE: 0
PIF_VALUE: 0
PIF_VALUE: 25
PIF_VALUE: 25
PIF_VALUE: 0
PIF_VALUE: 26
PIF_VALUE: 24
PIF_VALUE: 18
PIF_VALUE: 25
PIF_VALUE: 22
PIF_VALUE: 0
PIF_VALUE: 25
PIF_VALUE: 0
PIF_VALUE: 25
PIF_VALUE: 25
PIF_VALUE: 0
PIF_VALUE: 25
PIF_VALUE: 0
PIF_VALUE: 24
PIF_VALUE: 24
PIF_VALUE: 25
PIF_VALUE: 23
PIF_VALUE: 25
PIF_VALUE: 25
PIF_VALUE: 24
PIF_VALUE: 25
PIF_VALUE: 0
PIF_VALUE: 0
PIF_VALUE: 25
PIF_VALUE: 0
PIF_VALUE: 21
PIF_VALUE: 27
PIF_VALUE: 0
PIF_VALUE: 25
PIF_VALUE: 0
PIF_VALUE: 24
PIF_VALUE: 26
PIF_VALUE: 25
PIF_VALUE: 25
PIF_VALUE: 21
PIF_VALUE: 19
PIF_VALUE: 0
PIF_VALUE: 24
PIF_VALUE: 0
PIF_VALUE: 25
PIF_VALUE: 0
PIF_VALUE: 27
PIF_VALUE: 0
PIF_VALUE: 25
PIF_VALUE: 23
PIF_VALUE: 24
PIF_VALUE: 7
PIF_VALUE: 1
PIF_VALUE: 6
PIF_VALUE: 0
PIF_VALUE: 24
PIF_VALUE: 0
PIF_VALUE: 25
PIF_VALUE: 26
PIF_VALUE: 29
PIF_VALUE: 0
PIF_VALUE: 24
PIF_VALUE: 25
PIF_VALUE: 0
PIF_VALUE: 26
PIF_VALUE: 2
PIF_VALUE: 24
PIF_VALUE: 0
PIF_VALUE: 20
PIF_VALUE: 0
PIF_VALUE: 25
PIF_VALUE: 1
PIF_VALUE: 25
PIF_VALUE: 0
PIF_VALUE: 0
PIF_VALUE: 25
PIF_VALUE: 25
PIF_VALUE: 0
PIF_VALUE: 25
PIF_VALUE: 0
PIF_VALUE: 27
PIF_VALUE: 26
PIF_VALUE: 1
PIF_VALUE: 28
PIF_VALUE: 25
PIF_VALUE: 0
PIF_VALUE: 0
PIF_VALUE: 25
PIF_VALUE: 0
PIF_VALUE: 24
PIF_VALUE: 24
PIF_VALUE: 0
PIF_VALUE: 1
PIF_VALUE: 0
PIF_VALUE: 22
PIF_VALUE: 25
PIF_VALUE: 0
PIF_VALUE: 5
PIF_VALUE: 26
PIF_VALUE: 20
PIF_VALUE: 24
PIF_VALUE: 25
PIF_VALUE: 0
PIF_VALUE: 24
PIF_VALUE: 0
PIF_VALUE: 2
PIF_VALUE: 0
PIF_VALUE: 0
PIF_VALUE: 19
PIF_VALUE: 0
PIF_VALUE: 0
PIF_VALUE: 1
PIF_VALUE: 3
PIF_VALUE: 0
PIF_VALUE: 0
PIF_VALUE: 24
PIF_VALUE: 0
PIF_VALUE: 24
PIF_VALUE: 0
PIF_VALUE: 0
PIF_VALUE: 7
PIF_VALUE: 0
PIF_VALUE: 25
PIF_VALUE: 0
PIF_VALUE: 0
PIF_VALUE: 26
PIF_VALUE: 0
PIF_VALUE: 24
PIF_VALUE: 0
PIF_VALUE: 25
PIF_VALUE: 24
PIF_VALUE: 27
PIF_VALUE: 19
PIF_VALUE: 0
PIF_VALUE: 24
PIF_VALUE: 0
PIF_VALUE: 2
PIF_VALUE: 0
PIF_VALUE: 26
PIF_VALUE: 0
PIF_VALUE: 0
PIF_VALUE: 28
PIF_VALUE: 0
PIF_VALUE: 0
PIF_VALUE: 25
PIF_VALUE: 0
PIF_VALUE: 0
PIF_VALUE: 25
PIF_VALUE: 0
PIF_VALUE: 1
PIF_VALUE: 24
PIF_VALUE: 0
PIF_VALUE: 0
PIF_VALUE: 24
PIF_VALUE: 0
PIF_VALUE: 29
PIF_VALUE: 0
PIF_VALUE: 0
PIF_VALUE: 26
PIF_VALUE: 0
PIF_VALUE: 0

## 2018-08-23 ASSESSMENT — PAIN SCALES - GENERAL
PAINLEVEL_OUTOF10: 0
PAINLEVEL_OUTOF10: 6
PAINLEVEL_OUTOF10: 0
PAINLEVEL_OUTOF10: 4
PAINLEVEL_OUTOF10: 7
PAINLEVEL_OUTOF10: 3
PAINLEVEL_OUTOF10: 3
PAINLEVEL_OUTOF10: 4
PAINLEVEL_OUTOF10: 7
PAINLEVEL_OUTOF10: 4
PAINLEVEL_OUTOF10: 0

## 2018-08-23 ASSESSMENT — PAIN DESCRIPTION - PAIN TYPE
TYPE: CHRONIC PAIN
TYPE: SURGICAL PAIN

## 2018-08-23 ASSESSMENT — PAIN DESCRIPTION - LOCATION
LOCATION: FLANK
LOCATION: BACK;SHOULDER
LOCATION: FLANK
LOCATION: FLANK

## 2018-08-23 ASSESSMENT — PAIN DESCRIPTION - DESCRIPTORS
DESCRIPTORS: ACHING
DESCRIPTORS: ACHING;BURNING
DESCRIPTORS: ACHING;BURNING

## 2018-08-23 ASSESSMENT — PAIN - FUNCTIONAL ASSESSMENT: PAIN_FUNCTIONAL_ASSESSMENT: 0-10

## 2018-08-23 ASSESSMENT — LIFESTYLE VARIABLES
SMOKING_STATUS: 0

## 2018-08-23 ASSESSMENT — PAIN DESCRIPTION - ORIENTATION
ORIENTATION: RIGHT
ORIENTATION: RIGHT;LEFT;LOWER
ORIENTATION: RIGHT
ORIENTATION: RIGHT

## 2018-08-23 NOTE — ANESTHESIA PRE PROCEDURE
at 08/23/18 0752    iothalamate (CONRAY) 60 % injection    PRN Modesto Chester MD   50 mL at 08/23/18 0752    ondansetron (ZOFRAN) injection 4 mg  4 mg Intravenous Once PRN Jacqui Roy MD        meperidine (DEMEROL) injection 12.5 mg  12.5 mg Intravenous Q5 Min PRN Jacqui Roy MD        fentaNYL (SUBLIMAZE) injection 25 mcg  25 mcg Intravenous Q5 Min PRN Jacqui Roy MD        fentaNYL (SUBLIMAZE) injection 50 mcg  50 mcg Intravenous Q5 Min PRN Jacqui Roy MD           No Known Allergies  Patient Active Problem List   Diagnosis    HIGH CHOLESTEROL    HTN (hypertension)    Obesity    GUTIERREZ (obstructive sleep apnea)    Colon cancer (HCC)    Hay fever    Lumbar herniated disc    Epidural hematoma (HCC)    Microscopic hematuria    Renal calculus, right     Past Medical History:   Diagnosis Date    Arthritis     BACK, BILATERAL SHOULDERS    Asthma     Back pain     CAD (coronary artery disease) 09/28/2015    CABG BYPASS X 3    Caffeine use     2 cans pop/day    Carpal tunnel syndrome     LEFT HAND    Cataracts, bilateral     Colon cancer ([address] 11/17/2011    A LYMPH NODE IN COLON WAS CANCEROUS    Cyst of pancreas     GETS THIS CHECKED ANNUALLY    Diabetes mellitus ([address]     Difficult intravenous access     PATIENT STATES USUALLY REQUIRES PICC TEAM.    GERD (gastroesophageal reflux disease)     Hay fever     HIGH CHOLESTEROL     History of blood transfusion     NO REACTION    History of chemotherapy 05/2013    Hodgkin's lymphoma ([address]     HTN (hypertension)     Hx of blood clots     AFTER BACK SURGERY, PATIENT HAD BLOOD CLOTS IN SURGICAL AREA AND NEEDED MORE SURGERY TO GET THEM REMOVED    Kidney stone     RIGHT KIDNEY    Lumbar herniated disc 1989    Microhematuria     Obesity     GUTIERREZ on CPAP     Pancreatitis     Pneumonia     Tinnitus     Use of cane as ambulatory aid     Uses brace     BACK BRACE AND LEFT HAND    Wears glasses     READING     Past Surgical History:   Procedure Laterality Date    ABDOMEN SURGERY  2011    RIGHT COLECTOMY    APPENDECTOMY      with colectomy    BACK SURGERY  2014    LUMBAR LAMINECTOMY    BACK SURGERY  2014    EVACUATION OF SPINAL HEMATOMA    CATARACT REMOVAL Bilateral 2018    CATARACTS WITH IOL PLACED    COLONOSCOPY      Removal of colon polyp    CORONARY ARTERY BYPASS GRAFT  2015    CABG BYPASS X 3    CYSTOSCOPY Right 2018    CYSTOSCOPY, INSERTION OCCLUSIVE BALLOON, RIGHT RETROGRADE PYELOGRAM     KNEE SURGERY Right     TENDON REPAIR    NERVE BLOCK      STEROID INJECTIONS STARTING  - 2018    TONSILLECTOMY AND ADENOIDECTOMY      AS A CHILD     Social History   Substance Use Topics    Smoking status: Never Smoker    Smokeless tobacco: Never Used    Alcohol use No         Vital Signs (Current)   There were no vitals filed for this visit. Vital Signs Statistics (for past 48 hrs)     Temp  Av.9 °F (36.1 °C)  Min: 96.8 °F (36 °C)   Min taken time: 18 1055  Max: 97 °F (36.1 °C)   Max taken time: 18 0811  Pulse  Av.1  Min: 62   Min taken time: 18 1055  Max: 61   Max taken time: 18 0716  Resp  Av  Min: 0   Min taken time: 18 0948  Max: 21   Max taken time: 18 0743  BP  Min: 116/54   Min taken time: 18 0753  Max: 181/76   Max taken time: 18 0716  SpO2  Av.7 %  Min: 95 %   Min taken time: 18 1044  Max: 100 %   Max taken time: 18 1040  BP Readings from Last 3 Encounters:   18 (!) 165/72   18 125/61   18 (!) 164/72       BMI  There is no height or weight on file to calculate BMI.     CBC   Lab Results   Component Value Date    WBC 9.4 2018    RBC 4.15 2018    RBC 2.43 2011    HGB 12.4 2018    HCT 40.2 2018    MCV 96.9 2018    RDW 14.2 2018     2018     2011       CMP    Lab Results   Component Value Date     2018 BP Readings from Last 3 Encounters:   08/23/18 (!) 165/72   08/23/18 125/61   08/23/18 (!) 164/72       NPO Status:                                                                                 BMI:   Wt Readings from Last 3 Encounters:   08/23/18 231 lb 7.7 oz (105 kg)   08/14/18 238 lb 1.6 oz (108 kg)   08/01/18 236 lb (107 kg)     There is no height or weight on file to calculate BMI.    CBC:   Lab Results   Component Value Date    WBC 9.4 08/14/2018    RBC 4.15 08/14/2018    RBC 2.43 11/20/2011    HGB 12.4 08/14/2018    HCT 40.2 08/14/2018    MCV 96.9 08/14/2018    RDW 14.2 08/14/2018     08/14/2018     11/20/2011       CMP:   Lab Results   Component Value Date     08/14/2018    K 5.5 08/14/2018     08/14/2018    CO2 19 08/14/2018    BUN 26 08/14/2018    CREATININE 1.46 08/14/2018    GFRAA 57 08/14/2018    LABGLOM 47 08/14/2018    GLUCOSE 131 08/14/2018    GLUCOSE 208 11/20/2011    PROT 7.5 06/05/2018    CALCIUM 9.4 06/05/2018    BILITOT 0.36 06/05/2018    ALKPHOS 144 06/05/2018    AST 23 06/05/2018    ALT 34 06/05/2018       POC Tests:   Recent Labs      08/23/18   0731   08/23/18   1117   POCGLU  109*   < >  191*   POCK  4.5   --    --     < > = values in this interval not displayed. Coags:   Lab Results   Component Value Date    PROTIME 10.2 08/14/2018    INR 0.9 08/14/2018    APTT 24.2 08/14/2018       HCG (If Applicable): No results found for: PREGTESTUR, PREGSERUM, HCG, HCGQUANT     ABGs: No results found for: PHART, PO2ART, YQY8NBX, VFI6WZK, BEART, M7GLYWKL     Type & Screen (If Applicable):  No results found for: LABABO, LABRH    Anesthesia Evaluation     history of anesthetic complications: difficult airway.   Airway: Mallampati: III     Neck ROM: full   Dental:          Pulmonary:   (+) sleep apnea: on CPAP,  asthma:     (-) recent URI and not a current smoker                           Cardiovascular:    (+) hypertension:, CAD:, CABG/stent:,                ROS comment: -cp,syn,pnd,palp     Neuro/Psych:   (+) neuromuscular disease:,    (-) seizures and CVA           GI/Hepatic/Renal:   (+) GERD:,      Morbid obesity: Hodgkins. Endo/Other:    (+) Diabetes, . Abdominal:           Vascular:                                          Anesthesia Plan      general and MAC     ASA 4     (Asa 4 cad)  Induction: intravenous.                           Neelima Monae MD   8/23/2018

## 2018-08-23 NOTE — INTERVAL H&P NOTE
Pt Name: Kristin Andrea  MRN: [id number]  YOB: 1945  Date of evaluation: 8/23/2018    I have reviewed the patient's history and physical examination completed in pre-admission testing.     Changes to history or on examination, if any, are as follows:  none    SETH ZIMMER PA-C  8/23/18  7:24 AM

## 2018-08-23 NOTE — BRIEF OP NOTE
Brief Postoperative Note    Jayme Romero  YOB: 1945  [id number]    Pre-operative Diagnosis: Right Renal calculus    Post-operative Diagnosis: Same    Procedure: Nephroureteral wire-catheter placement    Anesthesia: Moderate Sedation monitored by anesthesiology team    Surgeons/Assistants: Kuldip    Estimated Blood Loss: less than 50     Complications: None    Specimens: Was Not Obtained    Findings: Upper pole calyceal access obtained.     Electronically signed by James Burnham MD on 8/23/2018 at 2:09 PM

## 2018-08-23 NOTE — ANESTHESIA PRE PROCEDURE
Department of Anesthesiology  Preprocedure Note       Name:  Lashay Escamilla   Age:  75 y.o.  :  1945                                          MRN:  [id number]         Date:  2018      Surgeon: Darrion Santos):  Kirsten Raymond MD    Procedure: Procedure(s):  CYSTOSCOPY, INSERTION OCCLUSIVE BALLOON - PT TO 2 [facility]    Department of Anesthesiology  Pre-Anesthesia Evaluation/Consultation         Name:  Lashay Escamilla                                         Age:  75 y.o.   MRN:  [id number]             Medications  Current Facility-Administered Medications   Medication Dose Route Frequency Provider Last Rate Last Dose    lactated ringers infusion 1,000 mL  1,000 mL Intravenous Continuous Dulce Maria Wen MD           No Known Allergies  Patient Active Problem List   Diagnosis    HIGH CHOLESTEROL    HTN (hypertension)    Obesity    GUTIERREZ (obstructive sleep apnea)    Colon cancer (HCC)    Hay fever    Lumbar herniated disc    Epidural hematoma (HCC)    Microscopic hematuria    Renal calculus, right     Past Medical History:   Diagnosis Date    Arthritis     BACK, BILATERAL SHOULDERS    Asthma     Back pain     CAD (coronary artery disease) 2015    CABG BYPASS X 3    Caffeine use     2 cans pop/day    Carpal tunnel syndrome     LEFT HAND    Cataracts, bilateral     Colon cancer ([address] 2011    A LYMPH NODE IN COLON WAS CANCEROUS    Cyst of pancreas     GETS THIS CHECKED ANNUALLY    Diabetes mellitus ([address]     Difficult intravenous access     PATIENT STATES USUALLY REQUIRES PICC TEAM.    GERD (gastroesophageal reflux disease)     Hay fever     HIGH CHOLESTEROL     History of blood transfusion     NO REACTION    History of chemotherapy 2013    Hodgkin's lymphoma ([address]     HTN (hypertension)     Hx of blood clots     AFTER BACK SURGERY, PATIENT HAD BLOOD CLOTS IN SURGICAL AREA AND NEEDED MORE SURGERY TO GET THEM REMOVED    Kidney stone     RIGHT KIDNEY    40.2 08/14/2018    MCV 96.9 08/14/2018    RDW 14.2 08/14/2018     08/14/2018     11/20/2011       CMP    Lab Results   Component Value Date     08/14/2018    K 5.5 08/14/2018     08/14/2018    CO2 19 08/14/2018    BUN 26 08/14/2018    CREATININE 1.46 08/14/2018    GFRAA 57 08/14/2018    LABGLOM 47 08/14/2018    GLUCOSE 131 08/14/2018    GLUCOSE 208 11/20/2011    PROT 7.5 06/05/2018    CALCIUM 9.4 06/05/2018    BILITOT 0.36 06/05/2018    ALKPHOS 144 06/05/2018    AST 23 06/05/2018    ALT 34 06/05/2018       BMP    Lab Results   Component Value Date     08/14/2018    K 5.5 08/14/2018     08/14/2018    CO2 19 08/14/2018    BUN 26 08/14/2018    CREATININE 1.46 08/14/2018    CALCIUM 9.4 06/05/2018    GFRAA 57 08/14/2018    LABGLOM 47 08/14/2018    GLUCOSE 131 08/14/2018    GLUCOSE 208 11/20/2011       POC Testing  No results for input(s): POCGLU, POCNA, POCK, POCCL, POCBUN, POCHEMO, POCHCT in the last 72 hours. Coags    Lab Results   Component Value Date    PROTIME 10.2 08/14/2018    INR 0.9 08/14/2018    APTT 24.2 08/14/2018       HCG (If Applicable) No results found for: PREGTESTUR, PREGSERUM, HCG, HCGQUANT     ABGs No results found for: PHART, PO2ART, XGN0JLI, UAK7FSH, BEART, N4FCFAUM     Type & Screen (If Applicable)  No results found for: [facility]    Radiology (If Applicable)    Cardiac Testing (If Applicable) nl EF,intermediate risk    EKG (If Applicable) LAD          Medications prior to admission:   Prior to Admission medications    Medication Sig Start Date End Date Taking?  Authorizing Provider   amLODIPine (NORVASC) 5 MG tablet TAKE 1 TABLET DAILY 7/24/18  Yes Jessie Renteria MD   metoprolol succinate (TOPROL XL) 50 MG extended release tablet TAKE 1 TABLET DAILY 7/24/18  Yes Jessie Renteria MD   spironolactone (ALDACTONE) 25 MG tablet TAKE 1 TABLET DAILY 7/24/18  Yes Jessie Renteria MD   montelukast (SINGULAIR) 10 MG tablet TAKE 1 TABLET DAILY 7/24/18  Yes Renee Brown MD   atorvastatin (LIPITOR) 40 MG tablet TAKE 1 TABLET DAILY 7/24/18  Yes Renee Brown MD   omeprazole (PRILOSEC) 40 MG delayed release capsule TAKE 1 CAPSULE DAILY 7/24/18  Yes Renee Brown MD   losartan (COZAAR) 100 MG tablet TAKE 1 TABLET DAILY 7/24/18  Yes Renee Brown MD   metFORMIN (GLUCOPHAGE) 500 MG tablet Take 2 tablets by mouth 2 times daily 1/29/18  Yes Renee Brown MD   insulin lispro prot & lispro (HUMALOG MIX 50/50 KWIKPEN) (50-50) 100 UNIT per ML SUPN injection pen 90 u in am,60 at 6pm, 30 night  Patient taking differently: Inject into the skin 3 times daily 90 u in am,60u at 6pm, 30u night 1/29/18  Yes Renee Brown MD   Respiratory Therapy Supplies MISC Cpap supplies- tubing, mask, filter 1/29/18  Yes Renee Brown MD   Elastic Bandages & Supports (WRIST BRACE ULTRA-LITE) MISC Use as directed 1/29/18  Yes Renee Brown MD   sildenafil (VIAGRA) 100 MG tablet Take 1 tablet by mouth as needed for Erectile Dysfunction 5/31/17  Yes Renee Brown MD   diclofenac (VOLTAREN) 75 MG EC tablet TAKE 1 TABLET TWICE A DAY  Patient taking differently: TAKE 1 TABLET TWICE A DAY. PATIENT TO STOP TAKING 08/17/2018 FOR SURGERY. 7/24/18   Renee Brown MD   Naproxen Sodium (ALEVE) 220 MG CAPS Take 2 capsules by mouth daily PATIENT TO STOP TAKING 08/17/2018 FOR SURGERY. Historical Provider, MD   aspirin 81 MG tablet Take 81 mg by mouth daily PATIENT TO STOP TAKING 08/17/2018 FOR SURGERY.     Historical Provider, MD       Current medications:    Current Facility-Administered Medications   Medication Dose Route Frequency Provider Last Rate Last Dose    lactated ringers infusion 1,000 mL  1,000 mL Intravenous Continuous Dulce Maria Miner MD           Allergies:  No Known Allergies    Problem List:    Patient Active Problem List   Diagnosis Code    HIGH CHOLESTEROL     HTN (hypertension) I10    Obesity E66.9    GUTIERREZ (obstructive sleep apnea) G47.33    airway. Airway: Mallampati: III     Neck ROM: full   Dental:          Pulmonary:   (+) sleep apnea: on CPAP,  asthma:     (-) recent URI and not a current smoker                           Cardiovascular:    (+) hypertension:, CAD:, CABG/stent:,                ROS comment: -cp,syn,pnd,palp     Neuro/Psych:   (+) neuromuscular disease:,    (-) seizures and CVA           GI/Hepatic/Renal:   (+) GERD:,      Morbid obesity: Hodgkins. Endo/Other:    (+) Diabetes, . Abdominal:           Vascular:                                        Anesthesia Plan      general and MAC     ASA 4     (Asa 4 cad)  Induction: intravenous.                           Cade Amezcua MD   8/23/2018

## 2018-08-23 NOTE — ANESTHESIA POSTPROCEDURE EVALUATION
Department of Anesthesiology  Postprocedure Note    Patient: Shashi Childers  MRN: [id number]  YOB: 1945  Date of evaluation: 8/23/2018  Time:  10:31 AM     Procedure Summary     Date:  08/23/18 Room / Location:  [address] OR    Anesthesia Start:  3720 Anesthesia Stop:  2409    Procedure:  CYSTOSCOPY, INSERTION OCCLUSIVE BALLOON, RIGHT RETROGRADE PYELOGRAM (Right ) Diagnosis:  (RIGHT STAGHORN CALCULUS )    Surgeon:  Meghan Dong MD Responsible Provider:  Yahir Messer MD    Anesthesia Type:  general, MAC ASA Status:  4          Anesthesia Type: general, MAC    Primitivo Phase I:      Primitivo Phase II: Primitivo Score: 9    Last vitals: Reviewed and per EMR flowsheets.        Anesthesia Post Evaluation    Patient location during evaluation: PACU  Patient participation: complete - patient participated  Level of consciousness: awake and alert  Pain score: 4  Nausea & Vomiting: no nausea  Cardiovascular status: hemodynamically stable  Respiratory status: nasal cannula  Hydration status: euvolemic

## 2018-08-23 NOTE — PROGRESS NOTES
Smoking Cessation - topics covered   []  Health Risks  []  Benefits of Quitting   []  Smoking Cessation  [x]  Patient has no history of tobacco use  []  Patient is former smoker. [x]  No need for tobacco cessation education. []  Booklet given  []  Patient verbalizes understanding. []  Patient denies need for tobacco cessation education.   Tish Martinez  1:47 PM

## 2018-08-23 NOTE — LETTER
Michelle Myles MD [address] [address]  [employer], [address] [facility]  P: [phone] / F: [phone]    Brief Postoperative Note  Surgical Facility: [address] [place] [address]   8/23/18    Farrukh Loaiza  [id number]  1945    Dear Bonna Harada, MD,    I've enclosed a Brief Op note on a procedure performed on your patient, Farrukh Loaiza today. Pre-operative Diagnosis: Right renal calculi  Post-operative Diagnosis: Same    Procedure: Cystoscopy, Right ureteral occlusion balloon insertion, right retrograde, Right Percutaneous Nephrolithotomy (>2cm), Right nephrostomy tube insertion with nephrostogram    Anesthesia: General    Surgeon: Micha Colon; Resident: Karoline Free    Findings: All visible stone removed. Plan: Observe overnight for possible discharge tomorrow. Thank you for allowing me to participate in the care of this patient. I will keep you updated on this patient's follow up and I look forward to serving you and your patients again in the future.         Electronically signed by Nolberto Diaz MD, FACS

## 2018-08-23 NOTE — OP NOTE
FACILITY:  [address] [place] [address]    PATIENT:  Karin Maxwell  MRN: [id number]  DATE OF BIRTH: [birth year]   DATE: 8/23/2018     SURGEON: Yaw Duran MD, FACS  Asst: None     PREOPERATIVE DIAGNOSIS:  Right renal calculus/calculi.     POSTOPERATIVE DIAGNOSIS:  Right renal calculus/calculi.     PROCEDURE:  Cystoscopy with Right ureteral occlusion balloon catheter placement and retrograde pyelogram.     ANESTHESIA: Monitor Anesthesia Care     INDICATIONS:  This is a 75 y.o. male patient who has a large Right renal calculus/cakculi. Patient is scheduled for percutaneous nephrolithotomy. Patient is scheduled to have this procedure. The risks, benefits and alternatives were explained  and informed consent was signed. The patient was given Cipro 400 mg IV on call to OR for antibiotic prophylaxis. Patient had EPC cuffs placed for VTE prophylaxis.     OPERATIVE NARRATIVE:  The patient was brought to the operating room. Patient was properly  identified. The procedure was confirmed verbally. The patient was  administered an anesthetic. Patient was placed in modified dorsal  lithotomy position and prepped and draped in sterile fashion. A rigid  cystoscope was introduced into the bladder and a wire was passed into the  Right kidney, then the cystoscope was removed and a ureteral occlusion  balloon catheter up to the Right kidney. The wire was pulled out  and a Acevedo catheter was placed into the bladder and the ureteral occlusion  balloon catheter was secured to the Acevedo catheter with pink anesthesia tape. A  retrograde pyelogram was then shot, which did demonstrate opacification of  the stone and the collecting system/calyces. This was used to help us determine where to ask Interventional Radiology to place access. Then the procedure was  terminated.     The patient tolerated the procedure well. The patient was taken to recovery in good condition.   The plan is for the patient to  undergo percutaneous nephrostomy access by Interventional Radiology and then a percutaneous nephrolithotomy later today.     COMPLICATIONS: None

## 2018-08-23 NOTE — ANESTHESIA PRE PROCEDURE
Department of Anesthesiology  Preprocedure Note       Name:  Yesenia Baca   Age:  75 y.o.  :  1945                                          MRN:  [id number]         Date:  2018      Surgeon: * No surgeons listed *    Procedure: Procedure(s):  CYSTOSCOPY, INSERTION OCCLUSIVE BALLOON - PT TO [facility]    Department of Anesthesiology  Pre-Anesthesia Evaluation/Consultation         Name:  Yesenia Baca                                         Age:  75 y.o. MRN:  [id number]             Medications  No current facility-administered medications for this visit. No current outpatient prescriptions on file.      Facility-Administered Medications Ordered in Other Visits   Medication Dose Route Frequency Provider Last Rate Last Dose    lactated ringers infusion 1,000 mL  1,000 mL Intravenous Continuous Dulce Maria Hargrove MD   Stopped at 18 8433    fentaNYL (SUBLIMAZE) injection 25 mcg  25 mcg Intravenous Q5 Min PRN Jackie Feliz MD        morphine injection 2 mg  2 mg Intravenous Q5 Min PRN Jackie Feliz MD        oxyCODONE-acetaminophen (PERCOCET) 5-325 MG per tablet 1 tablet  1 tablet Oral PRN Jackie Feliz MD        Or    oxyCODONE-acetaminophen (PERCOCET) 5-325 MG per tablet 2 tablet  2 tablet Oral PRN Jackie Feliz MD        diphenhydrAMINE (BENADRYL) injection 12.5 mg  12.5 mg Intravenous Once PRN Jackie Feliz MD        on[facility]ansetron [facility] injection 4 mg  4 mg Intravenous Once PRN Jackie Feliz MD        labetalol (NORMODYNE;TRANDATE) injection 5 mg  5 mg Intravenous Q10 Min PRN Jackie Feliz MD        lidocaine (XYLOCAINE) 2% uro-jet    PRN Lilian Horton MD   200 mcg at 18 1405    sterile water injection    PRN Lilian Horton MD   1,300 mL at 18 7200       No Known Allergies  Patient Active Problem List   Diagnosis    HIGH CHOLESTEROL    HTN (hypertension)    Obesity    GUTIERREZ (obstructive sleep apnea)    Colon cancer (HCC)    Hay fever  Lumbar herniated disc    Epidural hematoma (HCC)    Microscopic hematuria    Renal calculus, right     Past Medical History:   Diagnosis Date    Arthritis     BACK, BILATERAL SHOULDERS    Asthma     Back pain     CAD (coronary artery disease) 09/28/2015    CABG BYPASS X 3    Caffeine use     2 cans pop/day    Carpal tunnel syndrome     LEFT HAND    Cataracts, bilateral     Colon cancer ([address] 11/17/2011    A LYMPH NODE IN COLON WAS CANCEROUS    Cyst of pancreas     GETS THIS CHECKED ANNUALLY    Diabetes mellitus ([address]     Difficult intravenous access     PATIENT STATES USUALLY REQUIRES PICC TEAM.    GERD (gastroesophageal reflux disease)     Hay fever     HIGH CHOLESTEROL     History of blood transfusion     NO REACTION    History of chemotherapy 05/2013    Hodgkin's lymphoma ([address]     HTN (hypertension)     Hx of blood clots     AFTER BACK SURGERY, PATIENT HAD BLOOD CLOTS IN SURGICAL AREA AND NEEDED MORE SURGERY TO GET THEM REMOVED    Kidney stone     RIGHT KIDNEY    Lumbar herniated disc 1989    Microhematuria     Obesity     GUTIERREZ on CPAP     Pancreatitis     Pneumonia     Tinnitus     Use of cane as ambulatory aid     Uses brace     BACK BRACE AND LEFT HAND    Wears glasses     READING     Past Surgical History:   Procedure Laterality Date    ABDOMEN SURGERY  11/18/2011    RIGHT COLECTOMY    APPENDECTOMY  2011    with colectomy    BACK SURGERY  2014    LUMBAR LAMINECTOMY    BACK SURGERY  03/01/2014    EVACUATION OF SPINAL HEMATOMA    CATARACT REMOVAL Bilateral 01/2018    CATARACTS WITH IOL PLACED    COLONOSCOPY      Removal of colon polyp    CORONARY ARTERY BYPASS GRAFT  09/28/2015    CABG BYPASS X 3    KNEE SURGERY Right 2007    TENDON REPAIR    NERVE BLOCK      STEROID INJECTIONS STARTING 2015 - 04/2018    TONSILLECTOMY AND ADENOIDECTOMY      AS A CHILD     Social History   Substance Use Topics    Smoking status: Never Smoker    Smokeless tobacco: Never Used   [place] Alcohol use No         Vital Signs (Current)   There were no vitals filed for this visit. Vital Signs Statistics (for past 48 hrs)     Temp  Av.8 °F (36 °C)  Min: 96.8 °F (36 °C)   Min taken time: 18 0716  Max: 96.8 °F (36 °C)   Max taken time: 18 0716  Pulse  Av  Min: 61   Min taken time: 18 0716  Max: 61   Max taken time: 18 0716  Resp  Av.2  Min: 0   Min taken time: 18 0807  Max: 21   Max taken time: 18 0743  BP  Min: 116/54   Min taken time: 18 0753  Max: 181/76   Max taken time: 18 0716  SpO2  Av.5 %  Min: 96 %   Min taken time: 18 0745  Max: 100 %   Max taken time: 18 0806  BP Readings from Last 3 Encounters:   18 (!) 181/76   18 125/61   18 (!) 153/74       BMI  There is no height or weight on file to calculate BMI.     CBC   Lab Results   Component Value Date    WBC 9.4 2018    RBC 4.15 2018    RBC 2.43 2011    HGB 12.4 2018    HCT 40.2 2018    MCV 96.9 2018    RDW 14.2 2018     2018     2011       CMP    Lab Results   Component Value Date     2018    K 5.5 2018     2018    CO2 19 2018    BUN 26 2018    CREATININE 1.46 2018    GFRAA 57 2018    LABGLOM 47 2018    GLUCOSE 131 2018    GLUCOSE 208 2011    PROT 7.5 2018    CALCIUM 9.4 2018    BILITOT 0.36 2018    ALKPHOS 144 2018    AST 23 2018    ALT 34 2018       BMP    Lab Results   Component Value Date     2018    K 5.5 2018     2018    CO2 19 2018    BUN 26 2018    CREATININE 1.46 2018    CALCIUM 9.4 2018    GFRAA 57 2018    LABGLOM 47 2018    GLUCOSE 131 2018    GLUCOSE 208 2011       POC Testing  Recent Labs      18   0731   POCGLU  109*   POCK  4.5       Coags    Lab Results   Component Value Date PROTIME 10.2 08/14/2018    INR 0.9 08/14/2018    APTT 24.2 08/14/2018       HCG (If Applicable) No results found for: PREGTESTUR, PREGSERUM, HCG, HCGQUANT     ABGs No results found for: PHART, PO2ART, BJH1IDV, LZK0DJB, BEART, Q4YNMMCI     Type & Screen (If Applicable)  No results found for: LABABO, [address]    Radiology (If Applicable)    Cardiac Testing (If Applicable) nl EF,intermediate risk    EKG (If Applicable) LAD          Medications prior to admission:   Prior to Admission medications    Medication Sig Start Date End Date Taking? Authorizing Provider   amLODIPine (NORVASC) 5 MG tablet TAKE 1 TABLET DAILY 7/24/18   Fredy Eaton MD   metoprolol succinate (TOPROL XL) 50 MG extended release tablet TAKE 1 TABLET DAILY 7/24/18   Fredy Eaton MD   spironolactone (ALDACTONE) 25 MG tablet TAKE 1 TABLET DAILY 7/24/18   Fredy Eaton MD   montelukast (SINGULAIR) 10 MG tablet TAKE 1 TABLET DAILY 7/24/18   Fredy Eaton MD   atorvastatin (LIPITOR) 40 MG tablet TAKE 1 TABLET DAILY 7/24/18   Fredy Eaton MD   omeprazole (PRILOSEC) 40 MG delayed release capsule TAKE 1 CAPSULE DAILY 7/24/18   Fredy Eaton MD   diclofenac (VOLTAREN) 75 MG EC tablet TAKE 1 TABLET TWICE A DAY  Patient taking differently: TAKE 1 TABLET TWICE A DAY. PATIENT TO STOP TAKING 08/17/2018 FOR SURGERY. 7/24/18   Fredy Eaton MD   losartan (COZAAR) 100 MG tablet TAKE 1 TABLET DAILY 7/24/18   Fredy Eaton MD   Naproxen Sodium (ALEVE) 220 MG CAPS Take 2 capsules by mouth daily PATIENT TO STOP TAKING 08/17/2018 FOR SURGERY.     Historical Provider, MD   metFORMIN (GLUCOPHAGE) 500 MG tablet Take 2 tablets by mouth 2 times daily 1/29/18   Fredy Eaton MD   insulin lispro prot & lispro (HUMALOG MIX 50/50 KWIKPEN) (50-50) 100 UNIT per ML SUPN injection pen 90 u in am,60 at 6pm, 30 night  Patient taking differently: Inject into the skin 3 times daily 90 u in am,60u at 6pm, 30u night 1/29/18   Fredy Eaton MD

## 2018-08-24 ENCOUNTER — APPOINTMENT (OUTPATIENT)
Dept: INTERVENTIONAL RADIOLOGY/VASCULAR | Age: 73
DRG: 660 | End: 2018-08-24
Attending: SPECIALIST
Payer: MEDICARE

## 2018-08-24 ENCOUNTER — APPOINTMENT (OUTPATIENT)
Dept: GENERAL RADIOLOGY | Age: 73
DRG: 660 | End: 2018-08-24
Attending: SPECIALIST
Payer: MEDICARE

## 2018-08-24 LAB
ANION GAP SERPL CALCULATED.3IONS-SCNC: 13 MMOL/L (ref 9–17)
BUN BLDV-MCNC: 33 MG/DL (ref 8–23)
BUN/CREAT BLD: ABNORMAL (ref 9–20)
CALCIUM SERPL-MCNC: 8.4 MG/DL (ref 8.6–10.4)
CHLORIDE BLD-SCNC: 103 MMOL/L (ref 98–107)
CO2: 17 MMOL/L (ref 20–31)
CREAT SERPL-MCNC: 1.48 MG/DL (ref 0.7–1.2)
CULTURE: NO GROWTH
EKG ATRIAL RATE: 73 BPM
EKG P AXIS: 27 DEGREES
EKG P-R INTERVAL: 184 MS
EKG Q-T INTERVAL: 418 MS
EKG QRS DURATION: 126 MS
EKG QTC CALCULATION (BAZETT): 460 MS
EKG R AXIS: -38 DEGREES
EKG T AXIS: 65 DEGREES
EKG VENTRICULAR RATE: 73 BPM
GFR AFRICAN AMERICAN: 56 ML/MIN
GFR NON-AFRICAN AMERICAN: 46 ML/MIN
GFR SERPL CREATININE-BSD FRML MDRD: ABNORMAL ML/MIN/{1.73_M2}
GFR SERPL CREATININE-BSD FRML MDRD: ABNORMAL ML/MIN/{1.73_M2}
GLUCOSE BLD-MCNC: 289 MG/DL (ref 75–110)
GLUCOSE BLD-MCNC: 291 MG/DL (ref 75–110)
GLUCOSE BLD-MCNC: 298 MG/DL (ref 75–110)
GLUCOSE BLD-MCNC: 300 MG/DL (ref 70–99)
GLUCOSE BLD-MCNC: 310 MG/DL (ref 75–110)
GLUCOSE BLD-MCNC: 318 MG/DL (ref 75–110)
HCT VFR BLD CALC: 37.2 % (ref 40.7–50.3)
HEMOGLOBIN: 11.8 G/DL (ref 13–17)
Lab: NORMAL
MCH RBC QN AUTO: 29.9 PG (ref 25.2–33.5)
MCHC RBC AUTO-ENTMCNC: 31.7 G/DL (ref 28.4–34.8)
MCV RBC AUTO: 94.2 FL (ref 82.6–102.9)
NRBC AUTOMATED: 0 PER 100 WBC
PDW BLD-RTO: 13.7 % (ref 11.8–14.4)
PLATELET # BLD: 177 K/UL (ref 138–453)
PMV BLD AUTO: 10.6 FL (ref 8.1–13.5)
POTASSIUM SERPL-SCNC: 5.7 MMOL/L (ref 3.7–5.3)
POTASSIUM SERPL-SCNC: 5.7 MMOL/L (ref 3.7–5.3)
RBC # BLD: 3.95 M/UL (ref 4.21–5.77)
SODIUM BLD-SCNC: 133 MMOL/L (ref 135–144)
SPECIMEN DESCRIPTION: NORMAL
STATUS: NORMAL
TROPONIN INTERP: NORMAL
TROPONIN T: <0.03 NG/ML
WBC # BLD: 11.5 K/UL (ref 3.5–11.3)

## 2018-08-24 PROCEDURE — 50693 PLMT URETERAL STENT PRQ: CPT

## 2018-08-24 PROCEDURE — 6360000002 HC RX W HCPCS: Performed by: STUDENT IN AN ORGANIZED HEALTH CARE EDUCATION/TRAINING PROGRAM

## 2018-08-24 PROCEDURE — 85027 COMPLETE CBC AUTOMATED: CPT

## 2018-08-24 PROCEDURE — 71045 X-RAY EXAM CHEST 1 VIEW: CPT

## 2018-08-24 PROCEDURE — 1200000000 HC SEMI PRIVATE

## 2018-08-24 PROCEDURE — 96376 TX/PRO/DX INJ SAME DRUG ADON: CPT

## 2018-08-24 PROCEDURE — 51702 INSERT TEMP BLADDER CATH: CPT

## 2018-08-24 PROCEDURE — 93005 ELECTROCARDIOGRAM TRACING: CPT

## 2018-08-24 PROCEDURE — 6370000000 HC RX 637 (ALT 250 FOR IP): Performed by: INTERNAL MEDICINE

## 2018-08-24 PROCEDURE — 84132 ASSAY OF SERUM POTASSIUM: CPT

## 2018-08-24 PROCEDURE — 2709999900 HC NON-CHARGEABLE SUPPLY

## 2018-08-24 PROCEDURE — 6360000002 HC RX W HCPCS: Performed by: RADIOLOGY

## 2018-08-24 PROCEDURE — C1887 CATHETER, GUIDING: HCPCS

## 2018-08-24 PROCEDURE — 6360000004 HC RX CONTRAST MEDICATION: Performed by: SPECIALIST

## 2018-08-24 PROCEDURE — 36415 COLL VENOUS BLD VENIPUNCTURE: CPT

## 2018-08-24 PROCEDURE — 2580000003 HC RX 258: Performed by: STUDENT IN AN ORGANIZED HEALTH CARE EDUCATION/TRAINING PROGRAM

## 2018-08-24 PROCEDURE — C1725 CATH, TRANSLUMIN NON-LASER: HCPCS

## 2018-08-24 PROCEDURE — 6370000000 HC RX 637 (ALT 250 FOR IP): Performed by: STUDENT IN AN ORGANIZED HEALTH CARE EDUCATION/TRAINING PROGRAM

## 2018-08-24 PROCEDURE — 80048 BASIC METABOLIC PNL TOTAL CA: CPT

## 2018-08-24 PROCEDURE — 82947 ASSAY GLUCOSE BLOOD QUANT: CPT

## 2018-08-24 PROCEDURE — C1769 GUIDE WIRE: HCPCS

## 2018-08-24 PROCEDURE — 94762 N-INVAS EAR/PLS OXIMTRY CONT: CPT

## 2018-08-24 PROCEDURE — 96374 THER/PROPH/DIAG INJ IV PUSH: CPT

## 2018-08-24 PROCEDURE — 84484 ASSAY OF TROPONIN QUANT: CPT

## 2018-08-24 PROCEDURE — C2617 STENT, NON-COR, TEM W/O DEL: HCPCS

## 2018-08-24 PROCEDURE — 96375 TX/PRO/DX INJ NEW DRUG ADDON: CPT

## 2018-08-24 PROCEDURE — C1894 INTRO/SHEATH, NON-LASER: HCPCS

## 2018-08-24 PROCEDURE — 0T29XYZ CHANGE OTHER DEVICE IN URETER, EXTERNAL APPROACH: ICD-10-PCS | Performed by: RADIOLOGY

## 2018-08-24 RX ORDER — FENTANYL CITRATE 50 UG/ML
INJECTION, SOLUTION INTRAMUSCULAR; INTRAVENOUS
Status: COMPLETED | OUTPATIENT
Start: 2018-08-24 | End: 2018-08-24

## 2018-08-24 RX ORDER — MIDAZOLAM HYDROCHLORIDE 1 MG/ML
INJECTION INTRAMUSCULAR; INTRAVENOUS
Status: COMPLETED | OUTPATIENT
Start: 2018-08-24 | End: 2018-08-24

## 2018-08-24 RX ORDER — INSULIN GLARGINE 100 [IU]/ML
60 INJECTION, SOLUTION SUBCUTANEOUS ONCE
Status: COMPLETED | OUTPATIENT
Start: 2018-08-24 | End: 2018-08-25

## 2018-08-24 RX ADMIN — DOCUSATE SODIUM 100 MG: 100 CAPSULE ORAL at 10:31

## 2018-08-24 RX ADMIN — IOTHALAMATE MEGLUMINE 36 ML: 430 INJECTION INTRAVASCULAR at 09:19

## 2018-08-24 RX ADMIN — INSULIN LISPRO 3 UNITS: 100 INJECTION, SOLUTION INTRAVENOUS; SUBCUTANEOUS at 12:36

## 2018-08-24 RX ADMIN — MORPHINE SULFATE 2 MG: 2 INJECTION, SOLUTION INTRAMUSCULAR; INTRAVENOUS at 21:19

## 2018-08-24 RX ADMIN — MIDAZOLAM HYDROCHLORIDE 1 MG: 1 INJECTION, SOLUTION INTRAMUSCULAR; INTRAVENOUS at 08:39

## 2018-08-24 RX ADMIN — LOSARTAN POTASSIUM 100 MG: 50 TABLET, FILM COATED ORAL at 10:31

## 2018-08-24 RX ADMIN — FENTANYL CITRATE 50 MCG: 50 INJECTION INTRAMUSCULAR; INTRAVENOUS at 08:39

## 2018-08-24 RX ADMIN — CEFAZOLIN SODIUM 1 G: 1 INJECTION, SOLUTION INTRAVENOUS at 00:23

## 2018-08-24 RX ADMIN — MONTELUKAST SODIUM 10 MG: 10 TABLET, FILM COATED ORAL at 10:31

## 2018-08-24 RX ADMIN — SODIUM CHLORIDE: 9 INJECTION, SOLUTION INTRAVENOUS at 06:22

## 2018-08-24 RX ADMIN — METOPROLOL SUCCINATE 50 MG: 50 TABLET, FILM COATED, EXTENDED RELEASE ORAL at 10:31

## 2018-08-24 RX ADMIN — MORPHINE SULFATE 2 MG: 2 INJECTION, SOLUTION INTRAMUSCULAR; INTRAVENOUS at 17:13

## 2018-08-24 RX ADMIN — CEFAZOLIN SODIUM 1 G: 1 INJECTION, SOLUTION INTRAVENOUS at 10:34

## 2018-08-24 RX ADMIN — AMLODIPINE BESYLATE 5 MG: 5 TABLET ORAL at 10:34

## 2018-08-24 RX ADMIN — INSULIN LISPRO 4 UNITS: 100 INJECTION, SOLUTION INTRAVENOUS; SUBCUTANEOUS at 10:39

## 2018-08-24 RX ADMIN — SPIRONOLACTONE 25 MG: 25 TABLET ORAL at 10:31

## 2018-08-24 RX ADMIN — FENTANYL CITRATE 50 MCG: 50 INJECTION INTRAMUSCULAR; INTRAVENOUS at 08:31

## 2018-08-24 RX ADMIN — DIPHENHYDRAMINE HYDROCHLORIDE 25 MG: 50 INJECTION, SOLUTION INTRAMUSCULAR; INTRAVENOUS at 00:41

## 2018-08-24 RX ADMIN — PANTOPRAZOLE SODIUM 40 MG: 40 TABLET, DELAYED RELEASE ORAL at 07:02

## 2018-08-24 RX ADMIN — INSULIN LISPRO 6 UNITS: 100 INJECTION, SOLUTION INTRAVENOUS; SUBCUTANEOUS at 19:10

## 2018-08-24 RX ADMIN — INSULIN HUMAN 10 UNITS: 100 INJECTION, SOLUTION PARENTERAL at 22:30

## 2018-08-24 RX ADMIN — OXYCODONE HYDROCHLORIDE AND ACETAMINOPHEN 1 TABLET: 5; 325 TABLET ORAL at 00:23

## 2018-08-24 RX ADMIN — OXYCODONE HYDROCHLORIDE AND ACETAMINOPHEN 1 TABLET: 5; 325 TABLET ORAL at 06:25

## 2018-08-24 RX ADMIN — OXYCODONE HYDROCHLORIDE AND ACETAMINOPHEN 1 TABLET: 5; 325 TABLET ORAL at 19:12

## 2018-08-24 RX ADMIN — MIDAZOLAM HYDROCHLORIDE 1 MG: 1 INJECTION, SOLUTION INTRAMUSCULAR; INTRAVENOUS at 08:31

## 2018-08-24 RX ADMIN — OXYCODONE HYDROCHLORIDE AND ACETAMINOPHEN 1 TABLET: 5; 325 TABLET ORAL at 14:54

## 2018-08-24 ASSESSMENT — PAIN DESCRIPTION - LOCATION
LOCATION: BACK
LOCATION: BACK;FLANK;CHEST
LOCATION: BACK
LOCATION: CHEST
LOCATION: BACK
LOCATION: BACK
LOCATION: BACK;SHOULDER
LOCATION: BACK

## 2018-08-24 ASSESSMENT — PAIN DESCRIPTION - ORIENTATION
ORIENTATION: RIGHT;LEFT;LOWER
ORIENTATION: RIGHT
ORIENTATION: RIGHT

## 2018-08-24 ASSESSMENT — PAIN SCALES - GENERAL
PAINLEVEL_OUTOF10: 5
PAINLEVEL_OUTOF10: 3
PAINLEVEL_OUTOF10: 6
PAINLEVEL_OUTOF10: 6
PAINLEVEL_OUTOF10: 5
PAINLEVEL_OUTOF10: 6
PAINLEVEL_OUTOF10: 3
PAINLEVEL_OUTOF10: 4
PAINLEVEL_OUTOF10: 2
PAINLEVEL_OUTOF10: 7
PAINLEVEL_OUTOF10: 0
PAINLEVEL_OUTOF10: 2
PAINLEVEL_OUTOF10: 6
PAINLEVEL_OUTOF10: 5
PAINLEVEL_OUTOF10: 3
PAINLEVEL_OUTOF10: 4
PAINLEVEL_OUTOF10: 3
PAINLEVEL_OUTOF10: 10

## 2018-08-24 ASSESSMENT — PAIN DESCRIPTION - DESCRIPTORS
DESCRIPTORS: ACHING;DULL
DESCRIPTORS: ACHING;DISCOMFORT

## 2018-08-24 ASSESSMENT — PAIN DESCRIPTION - PROGRESSION
CLINICAL_PROGRESSION: GRADUALLY WORSENING

## 2018-08-24 ASSESSMENT — PAIN DESCRIPTION - PAIN TYPE
TYPE: ACUTE PAIN
TYPE: ACUTE PAIN
TYPE: CHRONIC PAIN

## 2018-08-24 ASSESSMENT — PAIN DESCRIPTION - ONSET: ONSET: ON-GOING

## 2018-08-24 ASSESSMENT — PAIN DESCRIPTION - FREQUENCY: FREQUENCY: CONTINUOUS

## 2018-08-24 NOTE — SEDATION DOCUMENTATION
[place] scientific Percufles ureter stent placed 8 fr x 24 cm Tube pulled and area sutured.  Lot [id number] exp 2021-03-26

## 2018-08-24 NOTE — POST SEDATION
Sedation Post Procedure Note    Patient Name: Adriana Saxena   YOB: 1945  Room/Bed: 5383/7479-46  Medical Record Number: [id number]  Date: 8/24/2018   Time: 9:43 AM         Physicians/Assistants: Loulou Iniguez MD    Procedure Performed:  Right antegrade nephrostogram and antegrade placement of right ureteral stent     Post-Sedation Vital Signs:  Vitals:    08/24/18 0910   BP: (!) 208/91   Pulse: 79   Resp: 25   Temp:    SpO2: 93%      Vital signs were reviewed and were stable after the procedure (see flow sheet for vitals)            Post-Sedation Exam: Stable            Complications: none    Electronically signed by Loulou Iniguez MD on 8/24/2018 at 9:43 AM

## 2018-08-24 NOTE — CARE COORDINATION
Case Management Initial Discharge Plan  Cecilia Ayala,         Readmission Risk              Risk of Unplanned Readmission:        10               Met with:patient to discuss discharge plans.    Information verified: address, contacts, phone number, , insurance Yes  PCP: Britton Good MD  Date of last visit: past year     Insurance Provider: Medicare medical [place]    Discharge Planning    Living Arrangements:  Spouse/Significant Other   Support Systems:  Spouse/Significant Other    Home has 2 stories  few stairs to climb to get into front door,  Flight stairs to climb to reach second floor  Location of bedroom/bathroom in home second story    Patient able to perform ADL's:Independent    Current Services (outpatient & in home) none   DME equipment:  None   DME provider: n/a    Pharmacy: [facility]   Potential Assistance Purchasing Medications:  No  Does patient want to participate in local refill/ meds to beds program?  No    Potential Assistance Needed:  N/A    Patient agreeable to home care: No  [place] of choice provided:  n/a    Prior SNF/Rehab Placement and Facility:   Agreeable to SNF/Rehab: No  [place] of choice provided: n/a   Evaluation: no    Expected Discharge date:     Patient expects to be discharged to:  home  Follow Up Appointment: Best Day/ Time:      Transportation provider: self/family  Transportation arrangements needed for discharge: Yes    Discharge Plan: home independently        Electronically signed by Davidson RN on 18 at 4:18 PM

## 2018-08-24 NOTE — PRE SEDATION
 sodium chloride flush  10 mL Intravenous 2 times per day    ceFAZolin  1 g Intravenous Q8H    docusate sodium  100 mg Oral BID    insulin lispro  0-6 Units Subcutaneous TID WC    insulin lispro  0-3 Units Subcutaneous Nightly     Continuous Infusions:    lactated ringers 0 mL/hr at 08/23/18 0808    sodium chloride 125 mL/hr at 08/24/18 0622    dextrose       PRN Meds: sodium chloride flush, ondansetron, oxyCODONE-acetaminophen, morphine, glucose, dextrose, glucagon (rDNA), dextrose, diphenhydrAMINE  Home Meds:   Prior to Admission medications    Medication Sig Start Date End Date Taking?  Authorizing Provider   amLODIPine (NORVASC) 5 MG tablet TAKE 1 TABLET DAILY 7/24/18  Yes Zuleika Ford MD   metoprolol succinate (TOPROL XL) 50 MG extended release tablet TAKE 1 TABLET DAILY 7/24/18  Yes Zuleika Ford MD   spironolactone (ALDACTONE) 25 MG tablet TAKE 1 TABLET DAILY 7/24/18  Yes Zuleika Ford MD   montelukast (SINGULAIR) 10 MG tablet TAKE 1 TABLET DAILY 7/24/18  Yes Zuleika Ford MD   atorvastatin (LIPITOR) 40 MG tablet TAKE 1 TABLET DAILY 7/24/18  Yes Zuleika Ford MD   omeprazole (PRILOSEC) 40 MG delayed release capsule TAKE 1 CAPSULE DAILY 7/24/18  Yes Zuleika Ford MD   losartan (COZAAR) 100 MG tablet TAKE 1 TABLET DAILY 7/24/18  Yes Zuleika Ford MD   metFORMIN (GLUCOPHAGE) 500 MG tablet Take 2 tablets by mouth 2 times daily 1/29/18  Yes Zuleika Ford MD   insulin lispro prot & lispro (HUMALOG MIX 50/50 KWIKPEN) (50-50) 100 UNIT per ML SUPN injection pen 90 u in am,60 at 6pm, 30 night  Patient taking differently: Inject into the skin 3 times daily 90 u in am,60u at 6pm, 30u night 1/29/18  Yes Zuleika Ford MD   Respiratory Therapy Supplies MISC Cpap supplies- tubing, mask, filter 1/29/18  Yes Zuleika Ford MD   Elastic Bandages & Supports (WRIST BRACE ULTRA-LITE) MISC Use as directed 1/29/18  Yes Zuleika Ford MD   sildenafil (VIAGRA) 100 MG tablet Take 1 tablet by mouth as needed for Erectile Dysfunction 5/31/17  Yes Roberto Rae MD   diclofenac (VOLTAREN) 75 MG EC tablet TAKE 1 TABLET TWICE A DAY  Patient taking differently: TAKE 1 TABLET TWICE A DAY. PATIENT TO STOP TAKING 08/17/2018 FOR SURGERY. 7/24/18   Roberto Rae MD   Naproxen Sodium (ALEVE) 220 MG CAPS Take 2 capsules by mouth daily PATIENT TO STOP TAKING 08/17/2018 FOR SURGERY. Historical Provider, MD   aspirin 81 MG tablet Take 81 mg by mouth daily PATIENT TO STOP TAKING 08/17/2018 FOR SURGERY. Historical Provider, MD     Coumadin Use Last 7 Days:  no  Antiplatelet drug therapy use last 7 days: no  Other anticoagulant use last 7 days: no  Additional Medication Information:        Pre-Sedation Documentation and Exam:   I have reviewed the patient's history and review of systems.     Mallampati Airway Assessment:  Mallampati Class III - (soft palate & base of uvula are visible)    Prior History of Anesthesia Complications:   none    ASA Classification:  Class 2 - A normal healthy patient with mild systemic disease    Sedation/ Anesthesia Plan:   intravenous sedation    Medications Planned:   midazolam (Versed) intravenously and fentanyl intravenously    Patient is an appropriate candidate for plan of sedation: yes    Electronically signed by Karma Dandy, MD on 8/24/2018 at 9:39 AM

## 2018-08-25 VITALS
RESPIRATION RATE: 14 BRPM | OXYGEN SATURATION: 95 % | DIASTOLIC BLOOD PRESSURE: 68 MMHG | HEART RATE: 72 BPM | HEIGHT: 67 IN | SYSTOLIC BLOOD PRESSURE: 148 MMHG | WEIGHT: 231.48 LBS | TEMPERATURE: 98.1 F | BODY MASS INDEX: 36.33 KG/M2

## 2018-08-25 PROBLEM — E87.5 HYPERKALEMIA: Status: ACTIVE | Noted: 2018-08-25

## 2018-08-25 PROBLEM — C83.33 DIFFUSE LARGE B-CELL LYMPHOMA OF INTRA-ABDOMINAL LYMPH NODES (HCC): Status: ACTIVE | Noted: 2018-08-25

## 2018-08-25 PROBLEM — E87.5 HYPERKALEMIA: Status: RESOLVED | Noted: 2018-08-25 | Resolved: 2018-08-25

## 2018-08-25 PROBLEM — N18.2 CKD (CHRONIC KIDNEY DISEASE), STAGE II: Status: ACTIVE | Noted: 2018-08-25

## 2018-08-25 LAB
ANION GAP SERPL CALCULATED.3IONS-SCNC: 15 MMOL/L (ref 9–17)
BUN BLDV-MCNC: 37 MG/DL (ref 8–23)
BUN/CREAT BLD: ABNORMAL (ref 9–20)
CALCIUM SERPL-MCNC: 8.3 MG/DL (ref 8.6–10.4)
CHLORIDE BLD-SCNC: 101 MMOL/L (ref 98–107)
CO2: 15 MMOL/L (ref 20–31)
CREAT SERPL-MCNC: 1.64 MG/DL (ref 0.7–1.2)
GFR AFRICAN AMERICAN: 50 ML/MIN
GFR NON-AFRICAN AMERICAN: 41 ML/MIN
GFR SERPL CREATININE-BSD FRML MDRD: ABNORMAL ML/MIN/{1.73_M2}
GFR SERPL CREATININE-BSD FRML MDRD: ABNORMAL ML/MIN/{1.73_M2}
GLUCOSE BLD-MCNC: 291 MG/DL (ref 75–110)
GLUCOSE BLD-MCNC: 332 MG/DL (ref 75–110)
GLUCOSE BLD-MCNC: 354 MG/DL (ref 75–110)
GLUCOSE BLD-MCNC: 363 MG/DL (ref 70–99)
HCT VFR BLD CALC: 38 % (ref 40.7–50.3)
HEMOGLOBIN: 11.9 G/DL (ref 13–17)
MCH RBC QN AUTO: 29.9 PG (ref 25.2–33.5)
MCHC RBC AUTO-ENTMCNC: 31.3 G/DL (ref 28.4–34.8)
MCV RBC AUTO: 95.5 FL (ref 82.6–102.9)
NRBC AUTOMATED: 0 PER 100 WBC
PDW BLD-RTO: 14 % (ref 11.8–14.4)
PLATELET # BLD: 196 K/UL (ref 138–453)
PMV BLD AUTO: 10.8 FL (ref 8.1–13.5)
POTASSIUM SERPL-SCNC: 4.8 MMOL/L (ref 3.7–5.3)
POTASSIUM SERPL-SCNC: 5.6 MMOL/L (ref 3.7–5.3)
POTASSIUM SERPL-SCNC: 5.8 MMOL/L (ref 3.7–5.3)
RBC # BLD: 3.98 M/UL (ref 4.21–5.77)
SODIUM BLD-SCNC: 131 MMOL/L (ref 135–144)
WBC # BLD: 14.5 K/UL (ref 3.5–11.3)

## 2018-08-25 PROCEDURE — 85027 COMPLETE CBC AUTOMATED: CPT

## 2018-08-25 PROCEDURE — 99221 1ST HOSP IP/OBS SF/LOW 40: CPT | Performed by: INTERNAL MEDICINE

## 2018-08-25 PROCEDURE — 84132 ASSAY OF SERUM POTASSIUM: CPT

## 2018-08-25 PROCEDURE — 6370000000 HC RX 637 (ALT 250 FOR IP): Performed by: STUDENT IN AN ORGANIZED HEALTH CARE EDUCATION/TRAINING PROGRAM

## 2018-08-25 PROCEDURE — 94762 N-INVAS EAR/PLS OXIMTRY CONT: CPT

## 2018-08-25 PROCEDURE — 6360000002 HC RX W HCPCS: Performed by: STUDENT IN AN ORGANIZED HEALTH CARE EDUCATION/TRAINING PROGRAM

## 2018-08-25 PROCEDURE — 6370000000 HC RX 637 (ALT 250 FOR IP): Performed by: INTERNAL MEDICINE

## 2018-08-25 PROCEDURE — 80048 BASIC METABOLIC PNL TOTAL CA: CPT

## 2018-08-25 PROCEDURE — 6370000000 HC RX 637 (ALT 250 FOR IP): Performed by: NURSE PRACTITIONER

## 2018-08-25 PROCEDURE — 82947 ASSAY GLUCOSE BLOOD QUANT: CPT

## 2018-08-25 PROCEDURE — 36415 COLL VENOUS BLD VENIPUNCTURE: CPT

## 2018-08-25 RX ORDER — INSULIN GLARGINE 100 [IU]/ML
80 INJECTION, SOLUTION SUBCUTANEOUS ONCE
Status: DISCONTINUED | OUTPATIENT
Start: 2018-08-25 | End: 2018-08-25 | Stop reason: HOSPADM

## 2018-08-25 RX ORDER — OXYCODONE HYDROCHLORIDE AND ACETAMINOPHEN 5; 325 MG/1; MG/1
1-2 TABLET ORAL EVERY 4 HOURS PRN
Qty: 30 TABLET | Refills: 0 | Status: SHIPPED | OUTPATIENT
Start: 2018-08-25 | End: 2018-09-01

## 2018-08-25 RX ORDER — SODIUM POLYSTYRENE SULFONATE 15 G/60ML
15 SUSPENSION ORAL; RECTAL ONCE
Status: COMPLETED | OUTPATIENT
Start: 2018-08-25 | End: 2018-08-25

## 2018-08-25 RX ORDER — DOCUSATE SODIUM 100 MG/1
100 CAPSULE, LIQUID FILLED ORAL 2 TIMES DAILY
Qty: 30 CAPSULE | Refills: 1 | Status: SHIPPED | OUTPATIENT
Start: 2018-08-25 | End: 2019-12-09 | Stop reason: ALTCHOICE

## 2018-08-25 RX ADMIN — OXYCODONE HYDROCHLORIDE AND ACETAMINOPHEN 1 TABLET: 5; 325 TABLET ORAL at 10:10

## 2018-08-25 RX ADMIN — AMLODIPINE BESYLATE 5 MG: 5 TABLET ORAL at 09:28

## 2018-08-25 RX ADMIN — INSULIN GLARGINE 60 UNITS: 100 INJECTION, SOLUTION SUBCUTANEOUS at 00:12

## 2018-08-25 RX ADMIN — MORPHINE SULFATE 2 MG: 2 INJECTION, SOLUTION INTRAMUSCULAR; INTRAVENOUS at 02:50

## 2018-08-25 RX ADMIN — PANTOPRAZOLE SODIUM 40 MG: 40 TABLET, DELAYED RELEASE ORAL at 06:52

## 2018-08-25 RX ADMIN — MONTELUKAST SODIUM 10 MG: 10 TABLET, FILM COATED ORAL at 09:27

## 2018-08-25 RX ADMIN — OXYCODONE HYDROCHLORIDE AND ACETAMINOPHEN 1 TABLET: 5; 325 TABLET ORAL at 04:41

## 2018-08-25 RX ADMIN — DESMOPRESSIN ACETATE 40 MG: 0.2 TABLET ORAL at 00:10

## 2018-08-25 RX ADMIN — DOCUSATE SODIUM 100 MG: 100 CAPSULE ORAL at 00:10

## 2018-08-25 RX ADMIN — INSULIN HUMAN 10 UNITS: 100 INJECTION, SOLUTION PARENTERAL at 09:30

## 2018-08-25 RX ADMIN — OXYCODONE HYDROCHLORIDE AND ACETAMINOPHEN 1 TABLET: 5; 325 TABLET ORAL at 00:33

## 2018-08-25 RX ADMIN — METOPROLOL SUCCINATE 50 MG: 50 TABLET, FILM COATED, EXTENDED RELEASE ORAL at 09:27

## 2018-08-25 RX ADMIN — INSULIN LISPRO 8 UNITS: 100 INJECTION, SOLUTION INTRAVENOUS; SUBCUTANEOUS at 10:07

## 2018-08-25 RX ADMIN — DOCUSATE SODIUM 100 MG: 100 CAPSULE ORAL at 09:27

## 2018-08-25 RX ADMIN — DIPHENHYDRAMINE HYDROCHLORIDE 25 MG: 50 INJECTION, SOLUTION INTRAMUSCULAR; INTRAVENOUS at 00:33

## 2018-08-25 RX ADMIN — SODIUM POLYSTYRENE SULFONATE 15 G: 15 SUSPENSION ORAL; RECTAL at 04:40

## 2018-08-25 RX ADMIN — MORPHINE SULFATE 2 MG: 2 INJECTION, SOLUTION INTRAMUSCULAR; INTRAVENOUS at 06:52

## 2018-08-25 RX ADMIN — INSULIN LISPRO 30 UNITS: 100 INJECTION, SOLUTION INTRAVENOUS; SUBCUTANEOUS at 13:46

## 2018-08-25 RX ADMIN — OXYCODONE HYDROCHLORIDE AND ACETAMINOPHEN 1 TABLET: 5; 325 TABLET ORAL at 14:04

## 2018-08-25 ASSESSMENT — PAIN SCALES - GENERAL
PAINLEVEL_OUTOF10: 3
PAINLEVEL_OUTOF10: 7
PAINLEVEL_OUTOF10: 3
PAINLEVEL_OUTOF10: 7
PAINLEVEL_OUTOF10: 7

## 2018-08-25 ASSESSMENT — PAIN DESCRIPTION - LOCATION: LOCATION: ABDOMEN

## 2018-08-25 ASSESSMENT — PAIN DESCRIPTION - PROGRESSION
CLINICAL_PROGRESSION: GRADUALLY WORSENING

## 2018-08-25 ASSESSMENT — PAIN DESCRIPTION - ORIENTATION: ORIENTATION: RIGHT

## 2018-08-25 NOTE — PROGRESS NOTES
Urology Progress Note    Subjective: Alejandro Severin is a 75 y.o. male. His/Her current Diet is: DIET CARB CONTROL; Carb Control: 4 carb choices (60 gms)/meal.  The patient is 2 Days Post-Op from Procedure(s):  HOLMIUM - NEPHROLITHOTOMY PERCUTANEOUS, C-ARM, LITHOCLAST,  PT COMING FROM INTERV RADIOLOGY  *SHORT STAY    No acute events overnight. Chest pain resolved, however still having appropriate right flank pain with movements  No chest pain, shortness of breath, nausea, vomiting, fevers, chills  Received Kayexalate 15g.    Spironolactone stopped due to hyperkalemia potential    Patient Vitals for the past 24 hrs:   BP Temp Temp src Pulse Resp SpO2   08/25/18 0431 (!) 154/74 - - - - -   08/25/18 0430 (!) 154/74 98 °F (36.7 °C) Axillary 70 18 96 %   08/25/18 0020 (!) 153/76 98.6 °F (37 °C) Axillary 72 18 95 %   08/24/18 2116 (!) 161/85 98.4 °F (36.9 °C) Oral 80 18 96 %   08/24/18 1808 (!) 181/93 98.4 °F (36.9 °C) - 74 20 98 %   08/24/18 1715 (!) 169/78 98 °F (36.7 °C) Oral 68 18 98 %   08/24/18 1007 (!) 155/74 - - - - -   08/24/18 0910 (!) 208/91 - - 79 25 93 %   08/24/18 0905 (!) 190/70 - - 82 21 99 %   08/24/18 0900 (!) 195/73 - - 72 21 99 %   08/24/18 0855 (!) 190/72 - - 73 17 99 %   08/24/18 0850 (!) 197/73 - - 80 14 96 %   08/24/18 0845 (!) 223/85 - - 71 17 96 %   08/24/18 0840 (!) 171/76 - - 74 21 98 %   08/24/18 0835 (!) 178/76 - - 71 25 97 %   08/24/18 0831 (!) 196/77 - - 71 20 98 %   08/24/18 0830 (!) 196/77 - - 70 25 98 %   08/24/18 0751 135/68 97.8 °F (36.6 °C) Oral 60 18 98 %       Intake/Output Summary (Last 24 hours) at 08/25/18 0622  Last data filed at 08/25/18 0431   Gross per 24 hour   Intake             2686 ml   Output             1350 ml   Net             1336 ml       Recent Labs      08/23/18   1430  08/24/18   0732   WBC  15.9*  11.5*   HGB  12.8*  11.8*   HCT  41.9  37.2*   MCV  97.9  94.2   PLT  173  177     Recent Labs      08/23/18   1834  08/24/18   0732  08/24/18   1857  08/25/18   0151 NA  135  133*   --    --    K  5.4*  5.7*  5.7*  5.6*   CL  104  103   --    --    CO2  18*  17*   --    --    BUN  28*  33*   --    --    CREATININE  1.35*  1.48*   --    --        No results for input(s): COLORU, PHUR, LABCAST, WBCUA, RBCUA, MUCUS, TRICHOMONAS, YEAST, BACTERIA, CLARITYU, SPECGRAV, LEUKOCYTESUR, UROBILINOGEN, BILIRUBINUR, BLOODU in the last 72 hours. Invalid input(s): NITRATE, GLUCOSEUKETONESUAMORPHOUS    Physical Exam:     NAD, AOx3  RRR. Peripheral pulses palpable  Respirations nonlabored, symmetric chest rise bilaterally  Abdomen: soft, nontender, nondistended  Lower extremities: No edema of calf tenderness bilaterally  R CVA tenderness      Impression:      75year old male POD 2 from right PCNL, POD 1 from right antegrade stent and removal of PCNT and Acevedo catheter. Had chest pain with negative troponins. Also has Hyperkalemia. Plan:     - Hyperkalemia: Received insulin. Kayexalate 15g. May need lasix. Will discharge when ok. - Chest pain: Troponins negative. EKG normal sinus with left axis deviations. Appears pleuritic in nature. No pneumothorax on CXR  - Continue IVF at 75 cc/hr. Wean off today  - Pain controlled  - Acevedo removed. Right antegrade stent placed due to poor contrast drainage in distal ureter.  May need ureteroscopy as outpatient to evaluate this area    [facility]  Urology Resident, PGY3  6:22 AM 8/25/2018

## 2018-08-25 NOTE — PLAN OF CARE
Problem: Pain:  Goal: Pain level will decrease  Pain level will decrease   Outcome: Ongoing  Pain assessed per protocol. PRN medication administered per scale. Reassessment of pain completed per protocol. Problem: Falls - Risk of:  Goal: Will remain free from falls  Will remain free from falls   Outcome: Met This Shift  Bed in lowest position. Wheels locked. Call light within reach. Hourly rounding. Safety maintained.

## 2018-08-26 LAB
STONE COMPOSITION: NORMAL
STONE DESCRIPTION: NORMAL
STONE MASS: NORMAL MG
STONE NUMBER: NORMAL
STONE SIZE: NORMAL MM

## 2018-08-29 ENCOUNTER — HOSPITAL ENCOUNTER (OUTPATIENT)
Dept: PHYSICAL THERAPY | Facility: CLINIC | Age: 73
Setting detail: THERAPIES SERIES
Discharge: HOME | End: 2018-08-29
Payer: MEDICARE

## 2018-08-29 NOTE — FLOWSHEET NOTE
[] Melisa Sanchez        Outpatient Physical                Therapy       [address]       Phone: [phone]       Fax: [phone] [x] [facility]       Promotion at [address] [place] [address]       Phone: [phone]       Fax: [phone] [] [place]      for [facility] Promotion     [address] [facility]      Phone: [phone]      Fax:  [phone] [address] [facility] [address]  Phone [phone]  Fax  [phone]     Physical Therapy Cancel/No Show note    Date: 2018  Patient: Louana Eisenmenger  : 1945  MRN: [id number]    Cancels/No Shows to date:     For today's appointment patient:  [x]  Cancelled  []  Rescheduled appointment  []  No-show     Reason given by patient:  []  Patient ill  []  Conflicting appointment  []  No transportation    []  Conflict with work  [x]  No reason given  []  Weather related  [x]  Other:      Comments:  Patient left voicemail cancelling today's appointment.      []  Next appointment was confirmed    Electronically signed by: Bernard Ernst PT

## 2018-08-30 ENCOUNTER — HOSPITAL ENCOUNTER (OUTPATIENT)
Age: 73
Setting detail: SPECIMEN
Discharge: HOME | End: 2018-08-30
Payer: MEDICARE

## 2018-08-30 DIAGNOSIS — N18.2 CKD (CHRONIC KIDNEY DISEASE), STAGE II: ICD-10-CM

## 2018-08-30 LAB
ANION GAP SERPL CALCULATED.3IONS-SCNC: 13 MMOL/L (ref 9–17)
BUN BLDV-MCNC: 29 MG/DL (ref 8–23)
BUN/CREAT BLD: ABNORMAL (ref 9–20)
CALCIUM SERPL-MCNC: 9 MG/DL (ref 8.6–10.4)
CHLORIDE BLD-SCNC: 109 MMOL/L (ref 98–107)
CO2: 18 MMOL/L (ref 20–31)
CREAT SERPL-MCNC: 1.75 MG/DL (ref 0.7–1.2)
GFR AFRICAN AMERICAN: 46 ML/MIN
GFR NON-AFRICAN AMERICAN: 38 ML/MIN
GFR SERPL CREATININE-BSD FRML MDRD: ABNORMAL ML/MIN/{1.73_M2}
GFR SERPL CREATININE-BSD FRML MDRD: ABNORMAL ML/MIN/{1.73_M2}
GLUCOSE BLD-MCNC: 70 MG/DL (ref 70–99)
POTASSIUM SERPL-SCNC: 4.3 MMOL/L (ref 3.7–5.3)
SODIUM BLD-SCNC: 140 MMOL/L (ref 135–144)

## 2018-08-31 ENCOUNTER — HOSPITAL ENCOUNTER (OUTPATIENT)
Dept: PHYSICAL THERAPY | Facility: CLINIC | Age: 73
Setting detail: THERAPIES SERIES
Discharge: HOME | End: 2018-08-31
Payer: MEDICARE

## 2018-08-31 PROCEDURE — 97110 THERAPEUTIC EXERCISES: CPT

## 2018-08-31 NOTE — FLOWSHEET NOTE
[] Kirk Riggins       Outpatient Physical        Therapy       [address]       Phone: [phone]       Fax: [phone] [x] [facility] at [address] [facility]       Phone: [phone]       Fax: [phone] [] [employer]  [address] [facility]   Phone: [phone]   Fax:  [phone]     Physical Therapy Daily Treatment Note    Date:  2018  Patient Name:  Suhas Neves    :  1945  MRN: [id number]  Physician: Becky Paredes MD                   Insurance: [facility]   Medical Diagnosis: Lumbar herniated disc, low back pain laterally with sciatica  Rehab Codes: M51.26, M54.5, M79.651, R51, R26.89, Z91.81, M54.40  Onset Date: 2013                  Next 's appt.: 18  Visit# / total visits:    Cancels/No Shows: 0/0  Update G-codes at visit #20    Subjective:    Pain:  [x] Yes  [] No Location: LB and lateral hips Pain Rating: (0-10 scale) 5/10 when standing or walking   Pain altered Tx:  [x] No  [] Yes  Action:     Comments: Patient he is having a 5/10 pain today. Pt reports having to stay at the hospital for 2 nights. Pt states he was having reflux problems however at the time they were not sure if it was his heart at the time. Pt reports having testing done to rule out his heart. Pt states he feels stiff as he was not able to stretch much over the last couple days.     Objective:  Modalities:    Precautions:   Exercises: Bolded exercises performed today  Exercise Reps/Time Weight/Level Comments   Scifit/Nustep 5 min L4          SUPINE      SKTC 5x 5\"     LTR 15x5\"ea  With manual overpressure   HS stretch 3x20\"ea     Piriformis stretch  3x20\"ea     Hip flexor stretch 3x20\"  Performed in side-lying with good tolerance and greater stretch felt   IR/ER stretch 3x20\" ea CR    Hip abd stretch 3x20\" ea CR Added 8/8   ITB stretch 3x20\" ea CR \"         Ankle DF 15x AA Performed 1.0 mph with 2% incline     Patient demo'd shortened step length and less WB through LLE throughout ambulation 8/31   2 way hip (abd/ext) 10x ea  Initiated at 60% WB in Alter G 8/17 Marches 12x ea  \"   Toe raises 10x4  10x at ever 10% increase in WB   Heel raises 10x4  \"         BALANCE       Normal GLORIA on foam 30\" x1  Added 8/17   Tandem stance on foam 30\" x2 ea  \"         Other:     Re-assessment on 8/8/18    STRENGTH   ROM     Left Right Cervical     L1-2 Hip Flex 4+ 4+ Flexion     Hip Abd 4+ 4+ Extension     L3-4 Knee Ext/Flex 5/5 5/5 Rotation L R   L4 Ankle DF 5 3+ Sidebend L R   L5 EHL 5 2- Retraction     S1 Plant. Flex 5 5 Lumbar     Abdominals     Flexion 55 deg   Erector Spinae     Extension Lacks 20 deg to neutral   Hip ext * 4 4 Rotation L ~5-8 deg R ~5-8 deg         Sidebend L10 deg R 8 deg    Hamstrings Lacks 29 deg Lacks 15 deg  UE/LE         Specific Instructions for next treatment: posture education and training, quad and heel cord stretching, core stabilization/strengthening, ankle DF and great toe strengthening ER/IR stretching, MFR? Muscle rolling for tight musculature. Treatment Charges: Mins Units   []  Modalities     [x]  Ther Exercise 40 3   []  Manual Therapy     []  Ther Activities     []  Aquatics     []  Vasocompression     []  Other     Total Treatment time 40 3   Estimated cost 8/23/18: $4699.27  Add KX modifier at $2010  3 JUAN JOSE = $75.47    Assessment: [] Progressing toward goals:      [x] No change. Primary focus on stretching this date. Pt notes a decrease in stiffness upon completion however noted \"pain \" remains 5/10. Continued with AlterG TM walking. Pt continues to demonstrate increased WB on R LE compared to L LE. Pt had significant difference in WB when initially walking on TM however after period of time patient demonstrated more equal WB through bilateral LE.      [] Other:   Problems:    [x] ?  Back Pain: 2-6/10 pain in low back and anterior thighs [x] ? ROM: decreased hamstring, hip flexor, and heel cord flexibility bilaterally. [x] ? Strength: mild hip flexor/abductor weakness, 3+/5 strength in L DF, 2-/5 strength in great toe extension; generalized core and erector spinae weakness   [x] ? Function: 56% perceived deficit per Oswestry; unable to walk greater than >30 yards at a time due to pain  [x] Postural Deviations: forward head, rounded shoulder, thoracic kyphosis, lacks terminal knee extension bilaterally in standing  [x] Gait Deviations: moderate forward trunk lean, decreased arm swing on L, decreased step length bilaterally, mild drop foot on LLE but able to clear floor with LE, decreased heel strike bilaterally; decreased eccentric control when descending stairs                            STG: (to be met in 8 treatments) 4/6 MET, 2/6 ONGOING  1. ? Pain: Patient will report low back pain at or below 4/10 when walking 150 ft with minimal symptoms into bilateral thighs to promote increased functional mobility. 8/8/18: ONGOING: Pt reports 8-9/10 pain when walking greater than 30 yards with SOB   2. ? ROM: Patient will demonstrate improved heel cord flexibility by 5 degrees on each LE to promote normalized gait pattern. 8/8/18: MET  3. ? Strength: Patient will demonstrate 4/5 strength in erector spinae and abdominal musculature to promote increased core stabilization of lumbar spine. 7/10/18: MET  4. ? Function: Patient will demonstrate normalized gait pattern with 50 ft ambulation to promote improved energy conservation and decreased the risk of falls. 8/8/18: ONGOING, see gait analysis above  5. Independent with Home Exercise Programs 7/10/18: MET  6. Demonstrate Knowledge of fall prevention 7/10/18: MET  7. LTG: (to be met in 16 treatments)  1/6 MET, 3/6 ONGOING, 2/6 NOT MET  1.  Patient will demonstrate 50% improvement in forward head, rounded shoulders, thoracic kyphosis and anterior trunk lean in standing to decrease stress on spine and spinal musculature. 8/18/18: ONGOING: 10% improvement  2. Patient will report score of 25% or less perceived deficit on the Oswestry to indicate improved ability to participate in ADL's. 8/8/18: NOT MET: 46% perceived deficit per Oswestry  3. Patient will demonstrate 5/5 hip and abdominal strength to promote improved standing and walking tolerance. 8/8/18: ONGOING: see measurements above  4. Patient will report a 75% decreased in the frequency, intensity, and duration of symptoms in LE's to promote increased functional mobility. 8/8/18: ONGOING: Patient experiences lateral hip pain with ambulation likely related to hip OA  5. Patient will be able to sleep through the night, waking up 2 or less times due to low back pain to increase quality of sleep. 8/18/18: MET per patient report  6. Patient will report low back pain at or below 2/10 pain when ambulating >300 ft to promote decreased pain with community ambulation. 8/18/18: NOT MET  Patient goals: Aroldo Yo able to walk without pain, lift arms and more flex\"     G-CODEs, updated 8/22/18 at visit 20  Patient reporting no functional changes from Oswestry performed on 8/8/18 at visit 16    Functional Limitation: walking, mobility  Functional Assessment Used: Oswestry, 46% perceived functional deficit   Current Status Modifier: CK  Goal Status Modifier: CJ    Pt. Education:  [x] Yes [] No  [x] Reviewed Prior HEP/Ed  Method of Education: [x] Verbal for charted exercises. [] Demo  [] Written:   6/26 - lumbar stab exercises  7/10 - hip flexor stretches off bed.   7/18 - shoulder rolls, scapular retraction, standing hip flexor stretch, standing trunk extension  Comprehension of Education:  [x] Verbalizes understanding. [x] Demonstrates understanding. [] Needs review. [x] Demonstrates/verbalizes HEP/Ed previously given. Plan: [x] Continue per plan of care.    [] Other:       Time In: 12:28 pm            Time Out: 1:20 pm    Electronically signed by: [place], [place]

## 2018-09-04 ENCOUNTER — HOSPITAL ENCOUNTER (OUTPATIENT)
Age: 73
Setting detail: OUTPATIENT SURGERY
Discharge: HOME | End: 2018-09-04
Attending: SPECIALIST | Admitting: SPECIALIST
Payer: MEDICARE

## 2018-09-04 ENCOUNTER — ANESTHESIA EVENT (OUTPATIENT)
Dept: OPERATING ROOM | Age: 73
End: 2018-09-04
Payer: MEDICARE

## 2018-09-04 ENCOUNTER — ANESTHESIA (OUTPATIENT)
Dept: OPERATING ROOM | Age: 73
End: 2018-09-04
Payer: MEDICARE

## 2018-09-04 VITALS
OXYGEN SATURATION: 97 % | TEMPERATURE: 97 F | RESPIRATION RATE: 16 BRPM | HEIGHT: 67 IN | HEART RATE: 55 BPM | DIASTOLIC BLOOD PRESSURE: 58 MMHG | SYSTOLIC BLOOD PRESSURE: 133 MMHG | BODY MASS INDEX: 36.26 KG/M2

## 2018-09-04 VITALS — SYSTOLIC BLOOD PRESSURE: 109 MMHG | DIASTOLIC BLOOD PRESSURE: 45 MMHG | OXYGEN SATURATION: 100 %

## 2018-09-04 LAB
GLUCOSE BLD-MCNC: 172 MG/DL (ref 75–110)
GLUCOSE BLD-MCNC: 85 MG/DL (ref 75–110)

## 2018-09-04 PROCEDURE — 3700000000 HC ANESTHESIA ATTENDED CARE: Performed by: SPECIALIST

## 2018-09-04 PROCEDURE — 2580000003 HC RX 258: Performed by: ANESTHESIOLOGY

## 2018-09-04 PROCEDURE — 7100000040 HC SPAR PHASE II RECOVERY - FIRST 15 MIN: Performed by: SPECIALIST

## 2018-09-04 PROCEDURE — 7100000041 HC SPAR PHASE II RECOVERY - ADDTL 15 MIN: Performed by: SPECIALIST

## 2018-09-04 PROCEDURE — 6370000000 HC RX 637 (ALT 250 FOR IP): Performed by: SPECIALIST

## 2018-09-04 PROCEDURE — 3600000003 HC SURGERY LEVEL 3 BASE: Performed by: SPECIALIST

## 2018-09-04 PROCEDURE — 6360000002 HC RX W HCPCS: Performed by: NURSE ANESTHETIST, CERTIFIED REGISTERED

## 2018-09-04 PROCEDURE — 6360000002 HC RX W HCPCS: Performed by: SPECIALIST

## 2018-09-04 PROCEDURE — 3700000001 HC ADD 15 MINUTES (ANESTHESIA): Performed by: SPECIALIST

## 2018-09-04 PROCEDURE — 82947 ASSAY GLUCOSE BLOOD QUANT: CPT

## 2018-09-04 PROCEDURE — 2580000003 HC RX 258: Performed by: NURSE ANESTHETIST, CERTIFIED REGISTERED

## 2018-09-04 PROCEDURE — 2580000003 HC RX 258: Performed by: SPECIALIST

## 2018-09-04 PROCEDURE — 2500000003 HC RX 250 WO HCPCS: Performed by: NURSE ANESTHETIST, CERTIFIED REGISTERED

## 2018-09-04 PROCEDURE — 3600000013 HC SURGERY LEVEL 3 ADDTL 15MIN: Performed by: SPECIALIST

## 2018-09-04 PROCEDURE — 2709999900 HC NON-CHARGEABLE SUPPLY: Performed by: SPECIALIST

## 2018-09-04 RX ORDER — SODIUM CHLORIDE, SODIUM LACTATE, POTASSIUM CHLORIDE, CALCIUM CHLORIDE 600; 310; 30; 20 MG/100ML; MG/100ML; MG/100ML; MG/100ML
INJECTION, SOLUTION INTRAVENOUS CONTINUOUS PRN
Status: DISCONTINUED | OUTPATIENT
Start: 2018-09-04 | End: 2018-09-04 | Stop reason: SDUPTHER

## 2018-09-04 RX ORDER — CIPROFLOXACIN 2 MG/ML
400 INJECTION, SOLUTION INTRAVENOUS ONCE
Status: COMPLETED | OUTPATIENT
Start: 2018-09-04 | End: 2018-09-04

## 2018-09-04 RX ORDER — LABETALOL HYDROCHLORIDE 5 MG/ML
5 INJECTION, SOLUTION INTRAVENOUS EVERY 10 MIN PRN
Status: DISCONTINUED | OUTPATIENT
Start: 2018-09-04 | End: 2018-09-04 | Stop reason: HOSPADM

## 2018-09-04 RX ORDER — ULTRASOUND COUPLING MEDIUM
GEL (GRAM) TOPICAL PRN
Status: DISCONTINUED | OUTPATIENT
Start: 2018-09-04 | End: 2018-09-04 | Stop reason: HOSPADM

## 2018-09-04 RX ORDER — DIPHENHYDRAMINE HYDROCHLORIDE 50 MG/ML
12.5 INJECTION INTRAMUSCULAR; INTRAVENOUS
Status: DISCONTINUED | OUTPATIENT
Start: 2018-09-04 | End: 2018-09-04 | Stop reason: HOSPADM

## 2018-09-04 RX ORDER — ONDANSETRON 2 MG/ML
4 INJECTION INTRAMUSCULAR; INTRAVENOUS
Status: DISCONTINUED | OUTPATIENT
Start: 2018-09-04 | End: 2018-09-04 | Stop reason: HOSPADM

## 2018-09-04 RX ORDER — DEXTROSE MONOHYDRATE 25 G/50ML
12.5 INJECTION, SOLUTION INTRAVENOUS PRN
Status: COMPLETED | OUTPATIENT
Start: 2018-09-04 | End: 2018-09-04

## 2018-09-04 RX ORDER — OXYCODONE HYDROCHLORIDE AND ACETAMINOPHEN 5; 325 MG/1; MG/1
2 TABLET ORAL PRN
Status: DISCONTINUED | OUTPATIENT
Start: 2018-09-04 | End: 2018-09-04 | Stop reason: HOSPADM

## 2018-09-04 RX ORDER — LIDOCAINE HYDROCHLORIDE 10 MG/ML
INJECTION, SOLUTION EPIDURAL; INFILTRATION; INTRACAUDAL; PERINEURAL PRN
Status: DISCONTINUED | OUTPATIENT
Start: 2018-09-04 | End: 2018-09-04 | Stop reason: SDUPTHER

## 2018-09-04 RX ORDER — MAGNESIUM HYDROXIDE 1200 MG/15ML
LIQUID ORAL CONTINUOUS PRN
Status: DISCONTINUED | OUTPATIENT
Start: 2018-09-04 | End: 2018-09-04 | Stop reason: HOSPADM

## 2018-09-04 RX ORDER — FENTANYL CITRATE 50 UG/ML
25 INJECTION, SOLUTION INTRAMUSCULAR; INTRAVENOUS EVERY 5 MIN PRN
Status: DISCONTINUED | OUTPATIENT
Start: 2018-09-04 | End: 2018-09-04 | Stop reason: HOSPADM

## 2018-09-04 RX ORDER — MORPHINE SULFATE 2 MG/ML
2 INJECTION, SOLUTION INTRAMUSCULAR; INTRAVENOUS EVERY 5 MIN PRN
Status: DISCONTINUED | OUTPATIENT
Start: 2018-09-04 | End: 2018-09-04 | Stop reason: HOSPADM

## 2018-09-04 RX ORDER — ONDANSETRON 2 MG/ML
INJECTION INTRAMUSCULAR; INTRAVENOUS PRN
Status: DISCONTINUED | OUTPATIENT
Start: 2018-09-04 | End: 2018-09-04 | Stop reason: SDUPTHER

## 2018-09-04 RX ORDER — MAGNESIUM HYDROXIDE 1200 MG/15ML
LIQUID ORAL PRN
Status: DISCONTINUED | OUTPATIENT
Start: 2018-09-04 | End: 2018-09-04 | Stop reason: HOSPADM

## 2018-09-04 RX ORDER — FENTANYL CITRATE 50 UG/ML
INJECTION, SOLUTION INTRAMUSCULAR; INTRAVENOUS PRN
Status: DISCONTINUED | OUTPATIENT
Start: 2018-09-04 | End: 2018-09-04 | Stop reason: SDUPTHER

## 2018-09-04 RX ORDER — OXYCODONE HYDROCHLORIDE AND ACETAMINOPHEN 5; 325 MG/1; MG/1
1 TABLET ORAL PRN
Status: DISCONTINUED | OUTPATIENT
Start: 2018-09-04 | End: 2018-09-04 | Stop reason: HOSPADM

## 2018-09-04 RX ORDER — PROPOFOL 10 MG/ML
INJECTION, EMULSION INTRAVENOUS CONTINUOUS PRN
Status: DISCONTINUED | OUTPATIENT
Start: 2018-09-04 | End: 2018-09-04 | Stop reason: SDUPTHER

## 2018-09-04 RX ADMIN — FENTANYL CITRATE 50 MCG: 50 INJECTION INTRAMUSCULAR; INTRAVENOUS at 10:22

## 2018-09-04 RX ADMIN — PROPOFOL 50 MCG/KG/MIN: 10 INJECTION, EMULSION INTRAVENOUS at 10:26

## 2018-09-04 RX ADMIN — CIPROFLOXACIN 400 MG: 2 INJECTION, SOLUTION INTRAVENOUS at 10:25

## 2018-09-04 RX ADMIN — LIDOCAINE HYDROCHLORIDE 50 MG: 10 INJECTION, SOLUTION EPIDURAL; INFILTRATION; INTRACAUDAL; PERINEURAL at 10:24

## 2018-09-04 RX ADMIN — FENTANYL CITRATE 25 MCG: 50 INJECTION INTRAMUSCULAR; INTRAVENOUS at 10:27

## 2018-09-04 RX ADMIN — FENTANYL CITRATE 25 MCG: 50 INJECTION INTRAMUSCULAR; INTRAVENOUS at 10:31

## 2018-09-04 RX ADMIN — DEXTROSE MONOHYDRATE 12.5 G: 25 INJECTION, SOLUTION INTRAVENOUS at 10:13

## 2018-09-04 RX ADMIN — SODIUM CHLORIDE, POTASSIUM CHLORIDE, SODIUM LACTATE AND CALCIUM CHLORIDE: 600; 310; 30; 20 INJECTION, SOLUTION INTRAVENOUS at 10:00

## 2018-09-04 RX ADMIN — ONDANSETRON 4 MG: 2 INJECTION INTRAMUSCULAR; INTRAVENOUS at 10:38

## 2018-09-04 ASSESSMENT — PULMONARY FUNCTION TESTS
PIF_VALUE: 1
PIF_VALUE: 0
PIF_VALUE: 1
PIF_VALUE: 0
PIF_VALUE: 1
PIF_VALUE: 3
PIF_VALUE: 1

## 2018-09-04 ASSESSMENT — PAIN - FUNCTIONAL ASSESSMENT: PAIN_FUNCTIONAL_ASSESSMENT: 0-10

## 2018-09-04 ASSESSMENT — PAIN SCALES - GENERAL
PAINLEVEL_OUTOF10: 0

## 2018-09-04 NOTE — OP NOTE
Operative Report    Location: [address]    Date: 9/4/2018     Jose C Landin  [id number]  1945     Surgeon: Sofya Perkins MD     Assistant: Jasper Templeton MD     Pre-operative diagnosis: right renal calculus     Post-operative diagnosis: right renal calculus     Procedure performed: Cytoscopy, right stent removal     Anesthesia: MAC     Estimated blood loss: Minimal     Complications: none     Specimens: None     Drains: None     Indications: Jose C Landin is a 75 y.o. male who underwent a right percutaneous nephrolithotomy on August 23. On postoperative day 1, and an antegrade nephrostogram was performed which showed poor flow of contrast through the ureter down to the bladder, likely secondary to ureteral edema and possibly a blood clot. An antegrade stent was placed while allowing inflammation to resolve. The patient now presents for cystoscopy with right ureteral stent removal.     Operative summary:   The risks and benefits of the procedure explained to the patient in the preoperative area and after informed consent was obtained the patient was taken back to the operative room. The patient transferred to the operating table and placed in the supine position. Monitored anesthesia care was induced and the patient was placed in the dorsal lithotomy position. He was prepped and draped in a sterile fashion and a timeout was performed to confirm patient identity, procedure, and laterality. The patient received 400 mg of intravenous ciprofloxacin. A 16 Guyanese flexible cystoscope was inserted through the patient's urethra and into the bladder. Upon entering the bladder, the right ureteral stent was identified, grasped with stent graspers, and removed. The patient tolerated the procedure well. He was then transferred to PACU.     Disposition: The patient was transferred to PACU in good position.      Follow-up: The patient will follow-up with Dr. Jacqueline Mott in 3 months. He will undergo a litholink 24 hour urine study in 2-3 weeks.     Jose Cruz Barlow MD  9/4/2018  10:48 AM

## 2018-09-04 NOTE — PLAN OF CARE
Patients blood sugar  Is 85. He states his blood sugar was 156 2 hours prior. Dr. Ai Lui notified. New order for iv dextrose.

## 2018-09-04 NOTE — ANESTHESIA PRE PROCEDURE
PATIENT HAD BLOOD CLOTS IN SURGICAL AREA AND NEEDED MORE SURGERY TO GET THEM REMOVED    Kidney stone     RIGHT KIDNEY    Lumbar herniated disc     Microhematuria     Obesity     GUTIERREZ on CPAP     Pancreatitis     Pneumonia     Tinnitus     Use of cane as ambulatory aid     Uses brace     BACK BRACE AND LEFT HAND    Wears glasses     READING     Past Surgical History:   Procedure Laterality Date    ABDOMEN SURGERY  2011    RIGHT COLECTOMY    APPENDECTOMY  2011    with colectomy    BACK SURGERY  2014    LUMBAR LAMINECTOMY    BACK SURGERY  2014    EVACUATION OF SPINAL HEMATOMA    CATARACT REMOVAL Bilateral 2018    CATARACTS WITH IOL PLACED    COLONOSCOPY      Removal of colon polyp    CORONARY ARTERY BYPASS GRAFT  2015    CABG BYPASS X 3    CYSTOSCOPY Right 2018    CYSTOSCOPY, INSERTION OCCLUSIVE BALLOON, RIGHT RETROGRADE PYELOGRAM     KNEE SURGERY Right     TENDON REPAIR    NEPHROSTOMY Right 2018    HOLMIUM - NEPHROLITHOTOMY PERCUTANEOUS    NERVE BLOCK      STEROID INJECTIONS STARTING  - 2018    MN CYSTOURETHROSCOPY Right 2018    CYSTOSCOPY, INSERTION OCCLUSIVE BALLOON, RIGHT RETROGRADE PYELOGRAM performed by Shae Loomis MD at [address] [place] [address] TO 2 CM Right 2018    HOLMIUM - NEPHROLITHOTOMY PERCUTANEOUS, C-ARM, LITHOCLAST,  PT COMING FROM INTERV RADIOLOGY  *SHORT STAY performed by Shae Loomis MD at [address] [place] [address] History   Substance Use Topics    Smoking status: Never Smoker    Smokeless tobacco: Never Used    Alcohol use No         Vital Signs (Current)   Vitals:    18   BP: (!) 160/68   Pulse: 59   Resp: 16   Temp: 96.8 °F (36 °C)   SpO2: 98%     Vital Signs Statistics (for past 48 hrs)     Temp  Av.8 °F (36 °C)  Min: 96.8 °F (36 °C)   Min taken time: 18  Max: 96.8 °F (36 °C)   Max taken time: 18  Pulse Av  Min: 61   Min taken time: 18  Max: 61   Max taken time: 18  Resp  Av  Min: 12   Min taken time: 18  Max: 16   Max taken time: 18  BP  Min: 160/68   Min taken time: 18  Max: 160/68   Max taken time: 18  SpO2  Av %  Min: 98 %   Min taken time: 18  Max: 98 %   Max taken time: 18  BP Readings from Last 3 Encounters:   18 (!) 160/68   18 (!) 148/68   18 129/84       BMI  Body mass index is 36.26 kg/m². CBC   Lab Results   Component Value Date    WBC 14.5 2018    RBC 3.98 2018    RBC 2.43 2011    HGB 11.9 2018    HCT 38.0 2018    MCV 95.5 2018    RDW 14.0 2018     2018     2011       CMP    Lab Results   Component Value Date     2018    K 4.3 2018     2018    CO2 18 2018    BUN 29 2018    CREATININE 1.75 2018    GFRAA 46 2018    LABGLOM 38 2018    GLUCOSE 70 2018    GLUCOSE 208 2011    PROT 7.5 2018    CALCIUM 9.0 2018    BILITOT 0.36 2018    ALKPHOS 144 2018    AST 23 2018    ALT 34 2018       BMP    Lab Results   Component Value Date     2018    K 4.3 2018     2018    CO2 18 2018    BUN 29 2018    CREATININE 1.75 2018    CALCIUM 9.0 2018    GFRAA 46 2018    LABGLOM 38 2018    GLUCOSE 70 2018    GLUCOSE 208 2011       POC Testing  No results for input(s): POCGLU, POCNA, POCK, POCCL, POCBUN, POCHEMO, POCHCT in the last 72 hours.     [facility]    Lab Results   Component Value Date    PROTIME 10.2 2018    INR 0.9 2018    APTT 24.2 2018       HCG (If Applicable) No results found for: PREGTESTUR, PREGSERUM, HCG, HCGQUANT     ABGs No results found for: PHART, PO2ART, GKL7OHW, GOM3NJA, BEART, I4UGMWOI     Type & Screen (If Applicable)  No results found for: LABABO, [address]    Radiology (If Applicable)    Cardiac Testing (If Applicable) nl EF    EKG (If Applicable) IVCD,LAD          Medications prior to admission:   Prior to Admission medications    Medication Sig Start Date End Date Taking? Authorizing Provider   sodium bicarbonate 650 MG tablet Take 1 tablet by mouth 3 times daily 8/27/18 8/27/19 Yes Terry Virgen MD   docusate sodium (COLACE) 100 MG capsule Take 1 capsule by mouth 2 times daily 8/25/18  Yes Abhay Santizo MD   amLODIPine (NORVASC) 5 MG tablet TAKE 1 TABLET DAILY 7/24/18  Yes Isela Abdullahi MD   metoprolol succinate (TOPROL XL) 50 MG extended release tablet TAKE 1 TABLET DAILY 7/24/18  Yes Isela Abdullahi MD   montelukast (SINGULAIR) 10 MG tablet TAKE 1 TABLET DAILY 7/24/18  Yes Isela Abdullahi MD   atorvastatin (LIPITOR) 40 MG tablet TAKE 1 TABLET DAILY 7/24/18  Yes Isela Abdullahi MD   omeprazole (PRILOSEC) 40 MG delayed release capsule TAKE 1 CAPSULE DAILY 7/24/18  Yes Isela Abdullahi MD   insulin lispro prot & lispro (HUMALOG MIX 50/50 KWIKPEN) (50-50) 100 UNIT per ML SUPN injection pen 90 u in am,60 at 6pm, 30 night  Patient taking differently: Inject into the skin 3 times daily 90 u in am,60u at 6pm, 30u night 1/29/18  Yes Isela Abdullahi MD   Respiratory Therapy Supplies MISC Cpap supplies- tubing, mask, filter 1/29/18  Yes Isela Abdullahi MD   Elastic Bandages & Supports (WRIST BRACE ULTRA-LITE) MISC Use as directed 1/29/18  Yes Isela Abdullahi MD   sildenafil (VIAGRA) 100 MG tablet Take 1 tablet by mouth as needed for Erectile Dysfunction 5/31/17  Yes Isela Abdullahi MD   aspirin 81 MG tablet Take 81 mg by mouth daily PATIENT TO STOP TAKING 08/17/2018 FOR SURGERY.    Yes Historical Provider, MD       Current medications:    Current Facility-Administered Medications   Medication Dose Route Frequency Provider Last Rate Last Dose    ciprofloxacin (CIPRO) IVPB 400 mg  400 mg Intravenous Once Kirsten Raymond MD           Allergies:  No Known Allergies    Problem List:    Patient Active Problem List   Diagnosis Code    HIGH CHOLESTEROL     HTN (hypertension) I10    Obesity E66.9    GUTIERREZ (obstructive sleep apnea) G47.33    Colon cancer (HCC) C18.9    Hay fever J30.1    Lumbar herniated disc M51.26    Epidural hematoma (HCC) S06. 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, with long-term current use of insulin (HCC) E11.610, Z79.4, E11.65    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 C83.33       Past Medical History:        Diagnosis Date    Arthritis     BACK, BILATERAL SHOULDERS    Asthma     Back pain     CAD (coronary artery disease) 09/28/2015    CABG BYPASS X 3    Caffeine use     2 cans pop/day    Carpal tunnel syndrome     LEFT HAND    Cataracts, bilateral     Colon cancer ([address] 11/17/2011    A LYMPH NODE IN COLON WAS CANCEROUS    Cyst of pancreas     GETS THIS CHECKED ANNUALLY    Diabetes mellitus ([address]     Difficult intravenous access     PATIENT STATES USUALLY REQUIRES PICC TEAM.    GERD (gastroesophageal reflux disease)     Hay fever     HIGH CHOLESTEROL     History of blood transfusion     NO REACTION    History of chemotherapy 05/2013    Hodgkin's lymphoma ([address]     HTN (hypertension)     Hx of blood clots     AFTER BACK SURGERY, PATIENT HAD BLOOD CLOTS IN SURGICAL AREA AND NEEDED MORE SURGERY TO GET THEM REMOVED    Kidney stone     RIGHT KIDNEY    Lumbar herniated disc 1989    Microhematuria     Obesity     GUTIERREZ on CPAP     Pancreatitis     Pneumonia     Tinnitus     Use of cane as ambulatory aid     Uses brace     BACK BRACE AND LEFT HAND    Wears glasses     READING       Past Surgical History:        Procedure Laterality Date    ABDOMEN SURGERY Results   Component Value Date    WBC 14.5 08/25/2018    RBC 3.98 08/25/2018    RBC 2.43 11/20/2011    HGB 11.9 08/25/2018    HCT 38.0 08/25/2018    MCV 95.5 08/25/2018    RDW 14.0 08/25/2018     08/25/2018     11/20/2011       CMP:   Lab Results   Component Value Date     08/30/2018    K 4.3 08/30/2018     08/30/2018    CO2 18 08/30/2018    BUN 29 08/30/2018    CREATININE 1.75 08/30/2018    GFRAA 46 08/30/2018    LABGLOM 38 08/30/2018    GLUCOSE 70 08/30/2018    GLUCOSE 208 11/20/2011    PROT 7.5 06/05/2018    CALCIUM 9.0 08/30/2018    BILITOT 0.36 06/05/2018    ALKPHOS 144 06/05/2018    AST 23 06/05/2018    ALT 34 06/05/2018       POC Tests: No results for input(s): POCGLU, POCNA, POCK, POCCL, POCBUN, POCHEMO, POCHCT in the last 72 hours. Coags:   Lab Results   Component Value Date    PROTIME 10.2 08/14/2018    INR 0.9 08/14/2018    APTT 24.2 08/14/2018       HCG (If Applicable): No results found for: PREGTESTUR, PREGSERUM, HCG, HCGQUANT     ABGs: No results found for: PHART, PO2ART, AZU2YJQ, AVC2YGU, BEART, N0NMTEWE     Type & Screen (If Applicable):  No results found for: LABABO, LABRH    Anesthesia Evaluation    Airway: Mallampati: III     Neck ROM: full   Dental:          Pulmonary:   (+) sleep apnea:  asthma:                            Cardiovascular:    (+) hypertension:, CAD:, CABG/stent:,     Valvular problems/murmurs: B cell lymphoma. Neuro/Psych:   (+) neuromuscular disease:,              ROS comment: neuropathy GI/Hepatic/Renal:   (+) GERD:,          ROS comment: Renal stones  CKD. Endo/Other:    (+) DiabetesType I DM, poorly controlled, , .                 Abdominal:           Vascular:                                      Anesthesia Plan      MAC     ASA 4     (Asa 4 CAD)  Induction: intravenous.                           Ana Gilbert MD   9/4/2018

## 2018-09-04 NOTE — H&P
Ruben [facility]ann[facility]     Urology Office Progress Note     Patient:  Carly Macario  YOB: 1945  Date: 9/4/2018      HISTORY OF PRESENT ILLNESS:   The patient is a 75 y.o. male who was last seen on 07/13/18. MicroHematuria:  Patient is here today with a history microscopic hematuria which was first noted several month(s) ago. On today's Urinalysis the blood is unchanged. Lower urinary tract symptoms: frequency and nocturia, 2 times per night     Associated Symptoms: No dysuria, gross hematuria. Pain Severity: Pain Score:   0 - No pain/10     Summary of old records:   Microhematuria wo smoking history; on baby ASA, 7/19/18 CT, 7/30/18 KUB: very large R renal calculus (lower pole/renal pelvis), 8/1/18 cysto: neg     Additional History: Patient has persisting large microhematuria on today's UA. Today's cystoscopy was negative for any intravesical pathology. His 7/19/18 CT and 7/30/18 KUB shows a large, partial staghorn kidney stone on the right. Patient had a right Percutaneous Nephrolithotomy on 8/23/18. He is here today for a cysto, right stent removal.     Procedures Today: Cystoscopy Operative Note (8/1/18)  Surgeon: Eliel Tse. Dairus Flannery MD, [facility]  Anesthesia: Urethral 2% Xylocaine   Indications: microhematuria   Position: Dorsal Lithotomy     Findings:   The patient was prepped and draped in the usual sterile fashion. The flexible cystoscope was advanced through the urethra and into the bladder. The bladder was thoroughly inspected and the following was noted:  Residual Urine: mild  Urethra: normal appearing urethra with no masses, tenderness or lesions  Prostate: partially obstructing lateral lobes of prostate; median lobe present? no   Bladder: No tumors or CIS noted. No bladder diverticulum. There was mild trabeculation noted. Ureters: Clear efflux from both ureters. Orifices with normal configuration and location. The cystoscope was removed.   The patient tolerated the calculus described on the previous CT examination.  No other pathologic ossifications are seen.  Multilevel disc disease is seen within the lumbar spine. IMPRESSION:  Stable right renal calculus.        CT abd / Pelvis W/W/Out Contrast 07/19/18 -   IMPRESSION:  Large nonobstructive right renal calculus. Small left renal cortical cysts. No other urinary tract abnormalities displayed. Fatty infiltration of the liver. Anterior abdominal wall hernia with the sac containing only fat.      (results were independently reviewed by physician and radiology report verified)     PAST MEDICAL, FAMILY AND SOCIAL HISTORY UPDATE:  Past Medical History        Past Medical History:   Diagnosis Date    Arthritis      Back pain      CAD (coronary artery disease)      Caffeine use       2 cans pop/day    Cataracts, bilateral      Colon cancer ([address] 11/17/2011    Diabetes ([address]      Diabetes mellitus ([address]      Hay fever      HIGH CHOLESTEROL      Hodgkin's lymphoma ([address]      HTN (hypertension)      Lumbar herniated disc 1989    Microhematuria      Obesity      GUTIERREZ (obstructive sleep apnea)       SEVERE    Pancreatitis      Pneumonia      Tinnitus      Type II or unspecified type diabetes mellitus without mention of complication, not stated as uncontrolled           Past Surgical History         Past Surgical History:   Procedure Laterality Date    APPENDECTOMY         with colectomy    BACK SURGERY   03/01 2014     EVACUATION OF SPINAL HEMATOMA    CATARACT REMOVAL        COLONOSCOPY         Removal of colon polyp    CORONARY ARTERY BYPASS GRAFT   9-28-15     3 vessel    KNEE SURGERY Right 2007    LUMBAR LAMINECTOMY   2-10-14    RIGHT COLECTOMY   11/18/2011    TONSILLECTOMY AND ADENOIDECTOMY             Family History         Family History   Problem Relation Age of Onset    Diabetes Father      Stroke Mother      Heart Failure Mother      Other Mother           pacemaker    Heart Disease Maternal Grandmother      Heart Disease Maternal Grandfather      Heart Disease Maternal Uncle           Current Facility-Administered Medications          Current Outpatient Prescriptions   Medication Sig Dispense Refill    amLODIPine (NORVASC) 5 MG tablet TAKE 1 TABLET DAILY 90 tablet 1    metoprolol succinate (TOPROL XL) 50 MG extended release tablet TAKE 1 TABLET DAILY 90 tablet 1    spironolactone (ALDACTONE) 25 MG tablet TAKE 1 TABLET DAILY 90 tablet 1    montelukast (SINGULAIR) 10 MG tablet TAKE 1 TABLET DAILY 90 tablet 1    atorvastatin (LIPITOR) 40 MG tablet TAKE 1 TABLET DAILY 90 tablet 1    omeprazole (PRILOSEC) 40 MG delayed release capsule TAKE 1 CAPSULE DAILY 90 capsule 1    diclofenac (VOLTAREN) 75 MG EC tablet TAKE 1 TABLET TWICE A  tablet 1    losartan (COZAAR) 100 MG tablet TAKE 1 TABLET DAILY 90 tablet 1    Naproxen Sodium (ALEVE) 220 MG CAPS Take by mouth        metFORMIN (GLUCOPHAGE) 500 MG tablet Take 2 tablets by mouth 2 times daily 360 tablet 1    insulin lispro prot & lispro (HUMALOG MIX 50/50 KWIKPEN) (50-50) 100 UNIT per ML SUPN injection pen 90 u in am,60 at 6pm, 30 night 11 pen 1    Respiratory Therapy Supplies MISC Cpap supplies- tubing, mask, filter 1 each 0    Elastic Bandages & Supports (WRIST BRACE ULTRA-LITE) MISC Use as directed 1 each 1    sildenafil (VIAGRA) 100 MG tablet Take 1 tablet by mouth as needed for Erectile Dysfunction 2 tablet 0    aspirin 81 MG tablet Take 81 mg by mouth daily          No current facility-administered medications for this visit. Patient has no known allergies.       History   Smoking Status    Never Smoker   Smokeless Tobacco    Never Used      (If patient a smoker, smoking cessation counseling offered)        History   Alcohol Use No         REVIEW OF SYSTEMS:  Constitutional: negative  Eyes: negative  Neurological: negative  Endocrine: negative  Gastrointestinal: negative  Cardiovascular: negative  Skin: negative

## 2018-09-05 ENCOUNTER — HOSPITAL ENCOUNTER (OUTPATIENT)
Dept: PHYSICAL THERAPY | Facility: CLINIC | Age: 73
Setting detail: THERAPIES SERIES
Discharge: HOME | End: 2018-09-05
Payer: MEDICARE

## 2018-09-05 PROCEDURE — 97110 THERAPEUTIC EXERCISES: CPT

## 2018-09-12 ENCOUNTER — HOSPITAL ENCOUNTER (OUTPATIENT)
Dept: PHYSICAL THERAPY | Facility: CLINIC | Age: 73
Setting detail: THERAPIES SERIES
Discharge: HOME | End: 2018-09-12
Payer: MEDICARE

## 2018-09-12 PROCEDURE — 97110 THERAPEUTIC EXERCISES: CPT

## 2018-09-12 NOTE — FLOWSHEET NOTE
Added 6/22   SLR  15xea  Added weight 9/12   Bridges 15x  Unable to clear LB from mat table    Hip add 10x10\" Ball Added 6/28   Hip abd 20x Blue TB Increased resistance 8/17         SIDE LYING      Hip abduction 15x ea Green  Added resistance 7/6   Clamshells  15x ea Green  Increased resistance 7/3   Reverse clamshells  15x ea  Green  Increased resistance 7/6 but unable to achieve good isolation of R hip IR's. Regressed to no resistance for R hip. PRONE   Difficult position for the pt   Hip ext  x2 ea  8/8 Increased difficulty to perform   Alt UE   Unable   Alt UE/LE opposite    Unable   Prop on elbows  2 min   Requires hip flexion and pillow underneath stomach to complete; reports is tolerable with no peripheralization noted.    Press ups   Unable          SEATED      Sciatic nerve glides  15xea           STANDING      3 way hip  15x ea A With abduction, pt reports that L side feels \"tighter\" and can feel this more   Heel raises  20x     Toe raises  20x  Continued difficulty and asymmetry with L toe raise as compared to R   Calf stretch on slant board  2 x30\"      Standing hip flexor stretch 3 x30\" ea  Added 6/22   Standing wall ITB stretch 2 X 30\"  Added 7/6: tactile cueing needed for proper execution    Step ups  12x ea  6 inch     Lateral step ups 12x ea 6 inch    Step downs  12x ea  6 inch           Backwards shoulder rolls 15x  Initiated 7/18    Scapular retraction  3\" 10 x2  \"   Trunk stretch over 1/2 foam roll  3\"   \"  Patient required 4 pillows under head in order to be able to relax upper body with stretch         BWSTT (Alter G) 10'    (7' set-up)  Initiated 7/20: size 2XL pants; 65% WB Walked 10 mins at 1.4 mph with 0% incline    Patient demo'd more equal step length, but continues to put 5-10% more WB through RLE vs LLE, most prominent as patient fatigues 9/12   2 way hip (abd/ext) 12x ea  Performed at 75% WB in Alter G 9/12 Marches 12x ea     Hamstring curls 12x ea  Added 9/12   Toe raises 20x weakness, 3+/5 strength in L DF, 2-/5 strength in great toe extension; generalized core and erector spinae weakness   [x] ? Function: 56% perceived deficit per Oswestry; unable to walk greater than >30 yards at a time due to pain  [x] Postural Deviations: forward head, rounded shoulder, thoracic kyphosis, lacks terminal knee extension bilaterally in standing  [x] Gait Deviations: moderate forward trunk lean, decreased arm swing on L, decreased step length bilaterally, mild drop foot on LLE but able to clear floor with LE, decreased heel strike bilaterally; decreased eccentric control when descending stairs                            STG: (to be met in 8 treatments) 4/6 MET, 2/6 ONGOING  1. ? Pain: Patient will report low back pain at or below 4/10 when walking 150 ft with minimal symptoms into bilateral thighs to promote increased functional mobility. 8/8/18: ONGOING: Pt reports 8-9/10 pain when walking greater than 30 yards with SOB   2. ? ROM: Patient will demonstrate improved heel cord flexibility by 5 degrees on each LE to promote normalized gait pattern. 8/8/18: MET  3. ? Strength: Patient will demonstrate 4/5 strength in erector spinae and abdominal musculature to promote increased core stabilization of lumbar spine. 7/10/18: MET  4. ? Function: Patient will demonstrate normalized gait pattern with 50 ft ambulation to promote improved energy conservation and decreased the risk of falls. 8/8/18: ONGOING, see gait analysis above  5. Independent with Home Exercise Programs 7/10/18: MET  6. Demonstrate Knowledge of fall prevention 7/10/18: MET  7. LTG: (to be met in 16 treatments)  1/6 MET, 3/6 ONGOING, 2/6 NOT MET  1. Patient will demonstrate 50% improvement in forward head, rounded shoulders, thoracic kyphosis and anterior trunk lean in standing to decrease stress on spine and spinal musculature. 8/18/18: ONGOING: 10% improvement  2.  Patient will report score of 25% or less perceived deficit on the Oswestry to indicate improved ability to participate in ADL's. 8/8/18: NOT MET: 46% perceived deficit per Oswestry  3. Patient will demonstrate 5/5 hip and abdominal strength to promote improved standing and walking tolerance. 8/8/18: ONGOING: see measurements above  4. Patient will report a 75% decreased in the frequency, intensity, and duration of symptoms in LE's to promote increased functional mobility. 8/8/18: ONGOING: Patient experiences lateral hip pain with ambulation likely related to hip OA  5. Patient will be able to sleep through the night, waking up 2 or less times due to low back pain to increase quality of sleep. 8/18/18: MET per patient report  6. Patient will report low back pain at or below 2/10 pain when ambulating >300 ft to promote decreased pain with community ambulation. 8/18/18: NOT MET  Patient goals: Mari Wesley able to walk without pain, lift arms and more flex\"     G-CODEs, updated 8/22/18 at visit 20  Patient reporting no functional changes from Oswestry performed on 8/8/18 at visit 16    Functional Limitation: walking, mobility  Functional Assessment Used: Oswestry, 46% perceived functional deficit   Current Status Modifier: CK  Goal Status Modifier: CJ    Pt. Education:  [x] Yes [] No  [x] Reviewed Prior HEP/Ed  Method of Education: [x] Verbal for charted exercises. [] Demo  [] Written:   6/26 - lumbar stab exercises  7/10 - hip flexor stretches off bed.   7/18 - shoulder rolls, scapular retraction, standing hip flexor stretch, standing trunk extension  Comprehension of Education:  [x] Verbalizes understanding. [x] Demonstrates understanding. [] Needs review. [x] Demonstrates/verbalizes HEP/Ed previously given. Plan: [x] Continue per plan of care.    [] Other:       Time In: 12:15m            Time Out: 1:05 pm    Electronically signed by:  Teresita Pierce, PT

## 2018-09-14 ENCOUNTER — HOSPITAL ENCOUNTER (OUTPATIENT)
Dept: PHYSICAL THERAPY | Facility: CLINIC | Age: 73
Setting detail: THERAPIES SERIES
Discharge: HOME | End: 2018-09-14
Payer: MEDICARE

## 2018-09-14 PROCEDURE — 97110 THERAPEUTIC EXERCISES: CPT

## 2018-09-14 NOTE — FLOWSHEET NOTE
7/18 - shoulder rolls, scapular retraction, standing hip flexor stretch, standing trunk extension  Comprehension of Education:  [] Verbalizes understanding. [] Demonstrates understanding. [] Needs review. [] Demonstrates/verbalizes HEP/Ed previously given. Plan: [x] Continue per plan of care.    [] Other:       Time In: 11:00 am           Time Out: 11:50 pm    Electronically signed by:  Christina Mcgee PT

## 2018-09-19 ENCOUNTER — HOSPITAL ENCOUNTER (OUTPATIENT)
Dept: PHYSICAL THERAPY | Facility: CLINIC | Age: 73
Setting detail: THERAPIES SERIES
Discharge: HOME | End: 2018-09-19
Payer: MEDICARE

## 2018-09-19 PROCEDURE — 97110 THERAPEUTIC EXERCISES: CPT

## 2018-09-19 PROCEDURE — 97140 MANUAL THERAPY 1/> REGIONS: CPT

## 2018-09-19 NOTE — FLOWSHEET NOTE
[] [facility]       Outpatient Physical        Therapy       [address]       Phone: [phone]       Fax: [phone] [x] [facility] at [address] [facility]       Phone: [phone]       Fax: [phone] [] Darryl. [address] [place] [address] [employer]  [address] [place] [address]   Phone: [phone]   Fax:  [phone]     Physical Therapy Daily Treatment Note    Date:  2018  Patient Name:  Rebeca Dhaliwal    :  1945  MRN: [id number]  Physician: Corinne Carrington MD                   Insurance: [email or web address]   Medical Diagnosis: Lumbar herniated disc, low back pain laterally with sciatica  Rehab Codes: M51.26, M54.5, M79.651, R51, R26.89, Z91.81, M54.40  Onset Date: 2013                  Next 's appt.: 18  Visit# / total visits:    Cancels/No Shows: 0/0  Update G-codes at last visit or #30    Subjective:    Pain:  [x] Yes  [] No Location: LB Pain Rating: (0-10 scale) 3/10 pain   Pain altered Tx:  [x] No  [] Yes  Action:     Comments: Patient reporting only pain in LB and intermittently in L hip. Patient has returned to working out at [facility] without focus on cardio and light strength training. Patient reported mild soreness but overall good tolerance to exercising. Patient also reporting greater walking tolerance stating he is able to walk farther distance without having to take a seated rest break to relieve symptoms.      Objective:  Modalities:    Precautions:   Exercises: Bolded exercises performed today  Exercise Reps/Time Weight/Level Comments   Scifit/Nustep 5 min L4          SUPINE      SKTC 5x 5\"     LTR 10x5\"ea  Performed manually over stability ball today   HS stretch 3x20\"ea     Piriformis stretch  3x20\"ea     Hip flexor stretch 3x20\"  Performed in side-lying with good tolerance and greater stretch felt   IR/ER stretch 3x20\" ea     Hip abd stretch 3x20\" ea CR Added    ITB stretch 3x20\" ea  \" Manual lumbar traction with LE's on ball 15\" x 5, 3 sets  Added 9/14               Ankle DF 15x AA Performed active-assisted with stretch at end range 7/18         Seated      Lateral trunk flexion 3\" x10 ea  Added 7/20    LAQ 10x  3# Added 9/12          LUMBAR STAB   With abdominal bracing    Pelvic tilts 20x     Marches 15xea  Added weight 9/12   Alt UE/LE opposite  15x  Added 6/22   SLR  15xea  Added weight 9/12   Bridges 15x  Unable to clear LB from mat table    Hip add 10x10\" Ball Added 6/28   Hip abd 20x Blue TB Increased resistance 8/17         SIDE LYING      Hip abduction 15x ea Green  Added resistance 7/6   Clamshells  15x ea Green  Increased resistance 7/3   Reverse clamshells  15x ea  Green  Increased resistance 7/6 but unable to achieve good isolation of R hip IR's. Regressed to no resistance for R hip. PRONE   Difficult position for the pt   Hip ext  x2 ea  8/8 Increased difficulty to perform   Alt UE   Unable   Alt UE/LE opposite    Unable   Prop on elbows  2 min   Requires hip flexion and pillow underneath stomach to complete; reports is tolerable with no peripheralization noted.    Press ups   Unable          SEATED      Sciatic nerve glides  15xea     Thoracic extension over 1/2 foam roll 20x   Added 9/14   Thoracic rotation over 1/2 foam foll 10x ea  \"         STANDING      3 way hip  15x ea A    Heel raises  20x     Hamstring curls 10x ea  Added 9/19   Toe raises  20x  Continued difficulty and asymmetry with L toe raise as compared to R   Calf stretch on slant board  2 x30\"      Standing hip flexor stretch 3 x30\" ea  Added 6/22   Standing wall ITB stretch 2 X 30\"  Added 7/6: tactile cueing needed for proper execution    Step ups  12x ea  6 inch     Lateral step ups 12x ea 6 inch    Step downs  12x ea  6 inch           Backwards shoulder rolls 15x  Initiated 7/18    Scapular retraction  3\" 10 x2  \"   Trunk stretch over 1/2 foam roll  3\"   \"  Patient required 4 pillows under head in order to look over at home. Patient reporting that he feels he may want to give it a try, but will further discuss with therapist at next visit. PT to submit progress note to PCP to ask for additional visits and add IDN to plan of care. [] No change. [] Other:   Problems:    [x] ? Back Pain: 2-6/10 pain in low back and anterior thighs                               [x] ? ROM: decreased hamstring, hip flexor, and heel cord flexibility bilaterally. [x] ? Strength: mild hip flexor/abductor weakness, 3+/5 strength in L DF, 2-/5 strength in great toe extension; generalized core and erector spinae weakness   [x] ? Function: 56% perceived deficit per Oswestry; unable to walk greater than >30 yards at a time due to pain  [x] Postural Deviations: forward head, rounded shoulder, thoracic kyphosis, lacks terminal knee extension bilaterally in standing  [x] Gait Deviations: moderate forward trunk lean, decreased arm swing on L, decreased step length bilaterally, mild drop foot on LLE but able to clear floor with LE, decreased heel strike bilaterally; decreased eccentric control when descending stairs                            STG: (to be met in 8 treatments) 4/6 MET, 2/6 ONGOING  1. ? Pain: Patient will report low back pain at or below 4/10 when walking 150 ft with minimal symptoms into bilateral thighs to promote increased functional mobility. 8/8/18: ONGOING: Pt reports 8-9/10 pain when walking greater than 30 yards with SOB   2. ? ROM: Patient will demonstrate improved heel cord flexibility by 5 degrees on each LE to promote normalized gait pattern. 8/8/18: MET  3. ? Strength: Patient will demonstrate 4/5 strength in erector spinae and abdominal musculature to promote increased core stabilization of lumbar spine. 7/10/18: MET  4. ? Function: Patient will demonstrate normalized gait pattern with 50 ft ambulation to promote improved energy conservation and decreased the risk of falls.  8/8/18: ONGOING, see gait analysis above  5. Independent with Home Exercise Programs 7/10/18: MET  6. Demonstrate Knowledge of fall prevention 7/10/18: MET  7. LTG: (to be met in 16 treatments)  1/6 MET, 3/6 ONGOING, 2/6 NOT MET  1. Patient will demonstrate 50% improvement in forward head, rounded shoulders, thoracic kyphosis and anterior trunk lean in standing to decrease stress on spine and spinal musculature. 8/18/18: ONGOING: 10% improvement  2. Patient will report score of 25% or less perceived deficit on the Oswestry to indicate improved ability to participate in ADL's. 8/8/18: NOT MET: 46% perceived deficit per Oswestry  3. Patient will demonstrate 5/5 hip and abdominal strength to promote improved standing and walking tolerance. 8/8/18: ONGOING: see measurements above  4. Patient will report a 75% decreased in the frequency, intensity, and duration of symptoms in LE's to promote increased functional mobility. 8/8/18: ONGOING: Patient experiences lateral hip pain with ambulation likely related to hip OA  5. Patient will be able to sleep through the night, waking up 2 or less times due to low back pain to increase quality of sleep. 8/18/18: MET per patient report  6. Patient will report low back pain at or below 2/10 pain when ambulating >300 ft to promote decreased pain with community ambulation. 8/18/18: NOT MET  Patient goals: Orie Anger able to walk without pain, lift arms and more flex\"     G-CODEs, updated 8/22/18 at visit 20  Patient reporting no functional changes from Oswestry performed on 8/8/18 at visit 16    Functional Limitation: walking, mobility  Functional Assessment Used: Oswestry, 46% perceived functional deficit   Current Status Modifier: CK  Goal Status Modifier: CJ    Pt. Education:  [x] Yes [] No  [] Reviewed Prior HEP/Ed  Method of Education: [x] Verbal for charted exercises.  [] Demo  [x] Written:   6/26 - lumbar stab exercises  7/10 - hip flexor stretches off bed.   7/18 - shoulder rolls, scapular retraction, standing hip flexor stretch, standing trunk extension  9/19- IDN consent form   Comprehension of Education:  [x] Verbalizes understanding. [x] Demonstrates understanding. [x] Needs review. [] Demonstrates/verbalizes HEP/Ed previously given. Plan: [x] Continue per plan of care.    [] Other:       Time In: 12:15 pm           Time Out: 1:05 pm    Electronically signed by:  Rita Luevano PT

## 2018-09-20 NOTE — PROGRESS NOTES
to make slow progress since beginning physical therapy but has noted improved flexibility in hips and lumbar spine with feelings of less stiffness with ambulation. Patient also has returned to performing light strength training and aerobic conditioning at local [facility] with moderate tolerance. He reports he is able to walk farther distances at home and within community before requiring a seated rest break due to increased LB pain caused from spinal stenosis. Low back pain resolves immediately upon sitting down. During most recent visit, patient reporting radicular symptoms into RLE>LLE elicited very briefly with bed mobility at home. Patient reporting intermittent LE pain during therapy, but often pain occurs in anterior or lateral thigh with patient reporting pain more as an \"ache\" vs sharp, stabbing pain as felt with bed mobility. Patient has demonstrated mild postural improvements and is much more aware of spinal posture both in static sitting and standing and when completing therapeutic exercises during therapy, requiring decreased verbal cueing to execute exercises. Alter G ambulation was initiated last POC and therapist has worked in building patient's walking tolerance, as well as tolerance to standing exercises including marches, 3-way hip, toe raises, etc. Patient also demonstrating increased strength in hip musculature since beginning PT as charted above. At last visit, patient agreed to trialing dry needling to assist in chronic pain control. Patient also had surgical procedure for kidney stone removal and was unable to take medication leading up to surgery, resulting in increased pain levels and decreased tolerance to therapeutic interventions during this time. Patient continues to report strict adherence to home exercise program and gradual progression of therapy likely related to significant posture deficits and chronicity of low back pain that began in 2013.  Patient would benefit from continued skilled and walking tolerance. 9/26/18: ONGOING: see measurements above  4. Patient will report a 75% decreased in the frequency, intensity, and duration of symptoms in LE's to promote increased functional mobility. 9/26/18: ONGOING: Patient experiencing increased radicular symptoms with bed mobility  5. Patient will be able to sleep through the night, waking up 2 or less times due to low back pain to increase quality of sleep. 9/26/18: MET per patient report  6. Patient will report low back pain at or below 2/10 pain when ambulating >300 ft to promote decreased pain with community ambulation. 9/26/18: NOT MET    Patient goals: See Campos able to walk without pain, lift arms and more flex\"     Treatment to Date:  [x] Therapeutic Exercise    [] Modalities:  [] Therapeutic Activity    [] Ultrasound  [] Electrical Stimulation  [] Gait Training     [] Massage       [] Lumbar/Cervical Traction  [] Neuromuscular Re-education [] Cold/hotpack [] Iontophoresis: 4 mg/mL  [x] Instruction in Home Exercise Program                     Dexamethasone Sodium  [x] Manual Therapy             Phosphate 40-80 mAmin  [] Aquatic Therapy                   [] Vasocompression/   [] Other:  [x] Dry Needling                                           Game Ready        Patient Status:     [] Continue per initial plan of care. [x] Additional visits necessary. [] Other:     Requested Frequency/Duration: 2 times per week for 12 treatments. Electronically signed by Dolly Tena PT on 9/19/2018 at 8:12 PM      If you have any questions or concerns, please don't hesitate to call. Thank you for your referral.    Physician Signature:________________________________Date:__________________  By signing above or cosigning this note, I have reviewed this plan of care and certify a need for medically necessary rehabilitation services.      *PLEASE SIGN ABOVE AND FAX BACK ALL PAGES*

## 2018-09-25 ENCOUNTER — OFFICE VISIT (OUTPATIENT)
Dept: INTERNAL MEDICINE CLINIC | Age: 73
End: 2018-09-25
Payer: MEDICARE

## 2018-09-25 VITALS
WEIGHT: 230 LBS | HEIGHT: 67 IN | BODY MASS INDEX: 36.1 KG/M2 | OXYGEN SATURATION: 97 % | RESPIRATION RATE: 16 BRPM | HEART RATE: 74 BPM | TEMPERATURE: 98.8 F | DIASTOLIC BLOOD PRESSURE: 62 MMHG | SYSTOLIC BLOOD PRESSURE: 134 MMHG

## 2018-09-25 DIAGNOSIS — Z15.01 BRCA2 POSITIVE: ICD-10-CM

## 2018-09-25 DIAGNOSIS — M54.5 LOW BACK PAIN, UNSPECIFIED BACK PAIN LATERALITY, UNSPECIFIED CHRONICITY, WITH SCIATICA PRESENCE UNSPECIFIED: ICD-10-CM

## 2018-09-25 DIAGNOSIS — Z15.09 BRCA2 POSITIVE: ICD-10-CM

## 2018-09-25 DIAGNOSIS — R31.9 HEMATURIA, UNSPECIFIED TYPE: ICD-10-CM

## 2018-09-25 DIAGNOSIS — G56.03 BILATERAL CARPAL TUNNEL SYNDROME: Primary | ICD-10-CM

## 2018-09-25 DIAGNOSIS — Z23 NEED FOR INFLUENZA VACCINATION: ICD-10-CM

## 2018-09-25 PROCEDURE — G8417 CALC BMI ABV UP PARAM F/U: HCPCS | Performed by: INTERNAL MEDICINE

## 2018-09-25 PROCEDURE — G8427 DOCREV CUR MEDS BY ELIG CLIN: HCPCS | Performed by: INTERNAL MEDICINE

## 2018-09-25 PROCEDURE — 1123F ACP DISCUSS/DSCN MKR DOCD: CPT | Performed by: INTERNAL MEDICINE

## 2018-09-25 PROCEDURE — 1036F TOBACCO NON-USER: CPT | Performed by: INTERNAL MEDICINE

## 2018-09-25 PROCEDURE — G0008 ADMIN INFLUENZA VIRUS VAC: HCPCS | Performed by: INTERNAL MEDICINE

## 2018-09-25 PROCEDURE — 1101F PT FALLS ASSESS-DOCD LE1/YR: CPT | Performed by: INTERNAL MEDICINE

## 2018-09-25 PROCEDURE — 99214 OFFICE O/P EST MOD 30 MIN: CPT | Performed by: INTERNAL MEDICINE

## 2018-09-25 PROCEDURE — 4040F PNEUMOC VAC/ADMIN/RCVD: CPT | Performed by: INTERNAL MEDICINE

## 2018-09-25 PROCEDURE — 90662 IIV NO PRSV INCREASED AG IM: CPT | Performed by: INTERNAL MEDICINE

## 2018-09-25 PROCEDURE — 3017F COLORECTAL CA SCREEN DOC REV: CPT | Performed by: INTERNAL MEDICINE

## 2018-09-25 RX ORDER — SPIRONOLACTONE 25 MG/1
25 TABLET ORAL
COMMUNITY
End: 2019-01-25 | Stop reason: CLARIF

## 2018-09-25 ASSESSMENT — PATIENT HEALTH QUESTIONNAIRE - PHQ9
2. FEELING DOWN, DEPRESSED OR HOPELESS: 0
SUM OF ALL RESPONSES TO PHQ QUESTIONS 1-9: 0
SUM OF ALL RESPONSES TO PHQ9 QUESTIONS 1 & 2: 0
SUM OF ALL RESPONSES TO PHQ QUESTIONS 1-9: 0
1. LITTLE INTEREST OR PLEASURE IN DOING THINGS: 0

## 2018-09-25 NOTE — PROGRESS NOTES
Gustabo Dejesus is a 75 y.o. male who presents for   Chief Complaint   Patient presents with   Jonn Babin     Pt is here for his 4 month DM check    Diabetes     see above    Discuss Labs     6/5/2018 in [facility] - Pt had kidney stones removed and discussed by Dr. Kirti Ordaz at [facility] said his fingers on left had is worse than the right hand with numbness and stiffness.  Health Maintenance     Pt would like his flu vaccine today    and follow up of chronic medical problems.   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     HPI  Here for follow-up on diabetes denies any new complaints other than having tingling and numbness more so in the left hand and brace is not helping    Current Outpatient Prescriptions   Medication Sig Dispense Refill    spironolactone (ALDACTONE) 25 MG tablet Take 25 mg by mouth      metFORMIN (GLUCOPHAGE) 500 MG tablet Take 1,000 mg by mouth       sodium bicarbonate 650 MG tablet Take 1 tablet by mouth 3 times daily 90 tablet 2    amLODIPine (NORVASC) 5 MG tablet TAKE 1 TABLET DAILY 90 tablet 1    metoprolol succinate (TOPROL XL) 50 MG extended release tablet TAKE 1 TABLET DAILY 90 tablet 1    montelukast (SINGULAIR) 10 MG tablet TAKE 1 TABLET DAILY 90 tablet 1    atorvastatin (LIPITOR) 40 MG tablet TAKE 1 TABLET DAILY 90 tablet 1    omeprazole (PRILOSEC) 40 MG delayed release capsule TAKE 1 CAPSULE DAILY 90 capsule 1    insulin lispro prot & lispro (HUMALOG MIX 50/50 KWIKPEN) (50-50) 11/18/2011    RIGHT COLECTOMY    APPENDECTOMY  2011    with colectomy    BACK SURGERY  2014    LUMBAR LAMINECTOMY    BACK SURGERY  03/01/2014    EVACUATION OF SPINAL HEMATOMA    CATARACT REMOVAL Bilateral 01/2018    CATARACTS WITH IOL PLACED    COLONOSCOPY      Removal of colon polyp    CORONARY ARTERY BYPASS GRAFT  09/28/2015    CABG BYPASS X 3    CYSTOSCOPY Right 08/23/2018    CYSTOSCOPY, INSERTION OCCLUSIVE BALLOON, RIGHT RETROGRADE PYELOGRAM     CYSTOSCOPY Right 09/04/2018    CYSTOSCOPY STENT REMOVAL (Right )    KNEE SURGERY Right 2007    TENDON REPAIR    NEPHROSTOMY Right 08/23/2018    HOLMIUM - NEPHROLITHOTOMY PERCUTANEOUS    NERVE BLOCK      STEROID INJECTIONS STARTING 2015 - 04/2018    MT CYSTOSCOPY,REMV CALCULUS,COMPLIC Right 4/9/1543    CYSTOSCOPY STENT REMOVAL performed by Nicky Isaac MD at [facility] Right 8/23/2018    CYSTOSCOPY, INSERTION OCCLUSIVE BALLOON, RIGHT RETROGRADE PYELOGRAM performed by Nicky Isaac MD at [address] [place] [address] TO 2 CM Right 8/23/2018    HOLMIUM - NEPHROLITHOTOMY PERCUTANEOUS, C-ARM, LITHOCLAST,  PT COMING FROM INTERV RADIOLOGY  *SHORT STAY performed by [facility] at [facility]      AS A CHILD       Family History   Problem Relation Age of Onset    Diabetes Father     High Blood Pressure Father     Stroke Mother     Heart Failure Mother     Other Mother         pacemaker    High Blood Pressure Mother     Heart Disease Maternal Grandmother     Heart Disease Maternal Grandfather     Heart Disease Maternal Uncle      ROS   Constitutional:  Negative for fatigue, loss of appetite and unexpected weight change   HEENT            : Negative for neck stiffness and pain, no congestion or sinus pressure   Eyes                : No visual disturbance or pain   Cardiovascular: No chest pain or palpitations or leg swelling   Respiratory      : Negative for cough, and make an appointment  Patient had hematuria for which she was referred to urology and found to have a kidney stone and patient had stent placed and subsequently was removed and also had a cystoscopy and everything cleared now and patient feeling better and urology notes reviewed  He had a diabetic foot exam today  Review in 4 months             1.  Bety Chou received counseling on the following healthy behaviors: nutrition and exercise    2. Prior labs and health maintenance reviewed. 3.  Discussed use, benefit, and side effects of prescribed medications. Barriers to medication compliance addressed. All his questions were answered. Pt voiced understanding. Bety Chou will continue current medications, diet and exercise. No orders of the defined types were placed in this encounter. Completed Refills               Requested Prescriptions      No prescriptions requested or ordered in this encounter     4. Patient given educational materials - see patient instructions    5. Was a self-tracking handout given in paper form or via [employer]? NO    Orders Placed This Encounter   Procedures    HM DIABETES FOOT EXAM    EMG     Standing Status:   Future     Standing Expiration Date:   11/24/2018     Order Specific Question:   Which body part? Answer:   Bilateral upper extremities     Return in about 4 months (around 1/25/2019). Patient voiced understanding and agreed to treatment plan. Electronically signed by Canelo Baca MD on 9/25/2018 at 11:22 AM    This note is created with a voice recognition program and while intend to generate a document that accurately reflects the content of the visit, no guarantee can be provided that every mistake has been identified and corrected by editing.

## 2018-09-26 ENCOUNTER — HOSPITAL ENCOUNTER (OUTPATIENT)
Dept: PHYSICAL THERAPY | Facility: CLINIC | Age: 73
Setting detail: THERAPIES SERIES
Discharge: HOME | End: 2018-09-26
Payer: MEDICARE

## 2018-09-26 PROCEDURE — 97110 THERAPEUTIC EXERCISES: CPT

## 2018-09-26 PROCEDURE — G8978 MOBILITY CURRENT STATUS: HCPCS

## 2018-09-26 PROCEDURE — G8979 MOBILITY GOAL STATUS: HCPCS

## 2018-09-26 PROCEDURE — 97140 MANUAL THERAPY 1/> REGIONS: CPT

## 2018-09-26 NOTE — FLOWSHEET NOTE
[] [facility]       Outpatient Physical        Therapy       [address]       Phone: [phone]       Fax: [phone] [x] [facility] at [address] [facility]       Phone: [phone]       Fax: [phone] [] Darryl. [address] [place] [address]   Phone: [phone]   Fax:  [phone]     Physical Therapy Daily Treatment Note    Date:  2018  Patient Name:  Gustabo Dejesus    :  1945  MRN: [id number]  Physician: Noe Ramirez MD                   Insurance: Medicare/[employer]   Medical Diagnosis: Lumbar herniated disc, low back pain laterally with sciatica  Rehab Codes: M51.26, M54.5, M79.651, R51, R26.89, Z91.81, M54.40  Onset Date: 2013                  Next 's appt.: 18  Visit# / total visits:  (asking for 12 additional visits)  Cancels/No Shows: 0/0  Update G-codes at #36    Subjective:    Pain:  [x] Yes  [] No Location: LB Pain Rating: (0-10 scale) 4/10 pain   Pain altered Tx:  [x] No  [] Yes  Action:     Comments: Patient reporting that he gets a sharp pain mostly in LLE and occasionally in RLE with bed mobility that only lasts briefly, radiating from back to knees. Patient reports he wants to trial dry needling at this date. Patient signed consent form and copy of form was placed in patient's soft chart. Objective:  18 STRENGTH     Left Right   L1-2 Hip Flex 5 5   Hip Abd 5 5   L3-4 Knee Ext/Flex 5/5 5/5   L4 Ankle DF 5 3+   L5 EHL 5 2-   S1 Plant. Flex 5 5   Hip ext * 4+ 4+    *Hip extension tested in side-lying.     Modalities:    Precautions:   Exercises: Bolded exercises performed today  Exercise Reps/Time Weight/Level Comments   Scifit/Nustep 5 min L4          SUPINE      SKTC 5x 5\"     LTR 10x5\"ea  Performed manually over stability ball today   HS stretch 3x20\"ea     Piriformis stretch  3x20\"ea     Hip flexor stretch 3x20\" CR    IR/ER stretch 3x20\" ea and decreased the risk of falls. 9/26/18: ONGOING: Pt continues to demonstrate moderate anterior trunk lean, but demonstrating more symmetrical step length with less stiffness  5. Independent with Home Exercise Programs 7/10/18: MET  6. Demonstrate Knowledge of fall prevention 7/10/18: MET  7. LTG: (to be met in 16 treatments)  1/6 MET, 3/6 ONGOING, 2/6 NOT MET  1. Patient will demonstrate 50% improvement in forward head, rounded shoulders, thoracic kyphosis and anterior trunk lean in standing to decrease stress on spine and spinal musculature. 9/26/18: ONGOING: 15% improvement per therapist discretion; patient demonstrating greater awareness of posture with therapeutic exercises with less VC required for corretion  2. Patient will report score of 25% or less perceived deficit on the Oswestry to indicate improved ability to participate in ADL's. 8/8/18: NOT MET: 46% perceived deficit per Oswestry  3. Patient will demonstrate 5/5 hip and abdominal strength to promote improved standing and walking tolerance. 9/26/18: ONGOING: see measurements above  4. Patient will report a 75% decreased in the frequency, intensity, and duration of symptoms in LE's to promote increased functional mobility. 9/26/18: ONGOING: Patient experiencing increased radicular symptoms with bed mobility  5. Patient will be able to sleep through the night, waking up 2 or less times due to low back pain to increase quality of sleep. 9/26/18: MET per patient report  6. Patient will report low back pain at or below 2/10 pain when ambulating >300 ft to promote decreased pain with community ambulation. 9/26/18: NOT MET  7. Patient goals: Orie Anger able to walk without pain, lift arms and more flex\"     G-CODEs, updated 9/26/18 at visit 26    Functional Limitation: walking, mobility  Functional Assessment Used: Oswestry, 44% perceived functional deficit   Current Status Modifier: CK  Goal Status Modifier: CJ    Pt.  Education:  [x] Yes [] No  [] Reviewed

## 2018-10-03 ENCOUNTER — HOSPITAL ENCOUNTER (OUTPATIENT)
Dept: PHYSICAL THERAPY | Facility: CLINIC | Age: 73
Setting detail: THERAPIES SERIES
Discharge: HOME | End: 2018-10-03
Payer: MEDICARE

## 2018-10-03 PROCEDURE — 97140 MANUAL THERAPY 1/> REGIONS: CPT

## 2018-10-03 PROCEDURE — 97110 THERAPEUTIC EXERCISES: CPT

## 2018-10-03 NOTE — FLOWSHEET NOTE
sets  Added 9/14               Ankle DF 15x AA Performed active-assisted with stretch at end range 7/18         Seated      Lateral trunk flexion 3\" x10 ea  Added 7/20    LAQ 10x  3# Added 9/12          LUMBAR STAB   With abdominal bracing    Pelvic tilts 20x     Marches 15xea  Added weight 9/12   Alt UE/LE opposite  15x  Added 6/22   SLR  15xea  Added weight 9/12   Bridges 15x  Unable to clear LB from mat table    Hip add 10x10\" Ball Added 6/28   Hip abd 20x Blue TB Increased resistance 8/17         SIDE LYING      Hip abduction 15x ea Green  Added resistance 7/6   Clamshells  15x ea Green  Increased resistance 7/3   Reverse clamshells  15x ea  Green  Increased resistance 7/6 but unable to achieve good isolation of R hip IR's. Regressed to no resistance for R hip. PRONE   Difficult position for the pt   Hip ext  x2 ea  8/8 Increased difficulty to perform   Alt UE   Unable   Alt UE/LE opposite    Unable   Prop on elbows  2 min   Requires hip flexion and pillow underneath stomach to complete; reports is tolerable with no peripheralization noted.    Press ups   Unable          SEATED      Sciatic nerve glides  15xea     Thoracic extension over 1/2 foam roll 20x   Added 9/14   Thoracic rotation over 1/2 foam foll 10x ea  \"         STANDING      3 way hip  15x ea A    Heel raises  20x     Hamstring curls 10x ea  Added 9/19   Toe raises  20x  Continued difficulty and asymmetry with L toe raise as compared to R   Calf stretch on slant board  2 x30\"      Standing hip flexor stretch 3 x30\" ea  Added 6/22   Standing wall ITB stretch 2 X 30\"  Added 7/6: tactile cueing needed for proper execution    Step ups  12x ea  6 inch     Lateral step ups 12x ea 6 inch    Step downs  12x ea  6 inch           Backwards shoulder rolls 15x  Initiated 7/18    Scapular retraction  3\" 10 x2  \"   Trunk stretch over 1/2 foam roll  3\"   Patient required 4 pillows under head in order to be able to relax upper body with stretch         BWSTT (Alter G) 10'    (7' set-up)  Initiated 7/20: size 2XL pants; 70% WB Walked 10 mins at 1.0 mph with 0% incline (increased WB, decreased speed 9/14)    Patient again demonstrated improved ability to perform equal step length and WB through LE's for >50% of ambulation 9/14   2 way hip (abd/ext) 12x ea  Performed at 75% WB in Alter G    Marches 12x ea     Hamstring curls 12x ea  Added 9/12   Toe raises 20x  Performed individually 9/14   Heel raises 20x      Squatting 10x  Added 9/14         BALANCE       Normal GLORIA on foam 30\" x1 EO; x1 EC  Added EC 9/12   Tandem stance on foam 30\" x1 ea EO; x1 ea EC  \"         Other:     IDN with 8 1\" needles to lumbar paravertebral musculature at L2-L5 with patient in prone and LE's elevated with pillow. Needles left in place for 15 minutes with good tolerance. Patient reporting increased L thigh pain during last 5 minutes which believed to be related to large hip flexor stretch from lying in prone due to patient's resting posture, trial with pillow under waist or in side-lying next visit. NOT TODAY: MFR x15 minutes to lumbar paraspinals and QL bilaterally with moderate pressure to tolerance with patient in prone with 3 pillows under chest and patient demonstrating limited trunk extension in this positioning due to hip flexor tightness. Specific Instructions for next treatment: posture education and training, quad and heel cord stretching, core stabilization/strengthening, ankle DF and great toe strengthening ER/IR stretching, MFR? Muscle rolling for tight musculature.      Treatment Charges: Mins Units   []  Modalities     [x]  Ther Exercise 25 2   [x]  Manual Therapy 30 2   []  Ther Activities     []  Aquatics     []  Vasocompression     []  Other     Total Treatment time 55 4   Estimated cost 10/3/18: $2092.04  Add KX modifier at $2010  3 JUAN JOSE = $75.47  2 JUAN JOSE, 2 man = $73.60    Assessment: [x] Progressing toward goals: did well with exercises and DN today, no complaints

## 2018-10-04 RX ORDER — INSULIN LISPRO 100 [IU]/ML
INJECTION, SUSPENSION SUBCUTANEOUS
Qty: 165 ML | Refills: 1 | Status: SHIPPED | OUTPATIENT
Start: 2018-10-04 | End: 2019-03-28 | Stop reason: SDUPTHER

## 2018-10-08 ENCOUNTER — HOSPITAL ENCOUNTER (OUTPATIENT)
Dept: PHYSICAL THERAPY | Facility: CLINIC | Age: 73
Setting detail: THERAPIES SERIES
Discharge: HOME | End: 2018-10-08
Payer: MEDICARE

## 2018-10-08 PROCEDURE — 97110 THERAPEUTIC EXERCISES: CPT

## 2018-10-09 ENCOUNTER — OFFICE VISIT (OUTPATIENT)
Dept: INTERNAL MEDICINE CLINIC | Age: 73
End: 2018-10-09
Payer: MEDICARE

## 2018-10-09 ENCOUNTER — HOSPITAL ENCOUNTER (OUTPATIENT)
Age: 73
Discharge: HOME | End: 2018-10-09

## 2018-10-09 VITALS
HEIGHT: 67 IN | OXYGEN SATURATION: 98 % | HEART RATE: 72 BPM | DIASTOLIC BLOOD PRESSURE: 68 MMHG | RESPIRATION RATE: 16 BRPM | TEMPERATURE: 98.7 F | WEIGHT: 234.2 LBS | BODY MASS INDEX: 36.76 KG/M2 | SYSTOLIC BLOOD PRESSURE: 136 MMHG

## 2018-10-09 DIAGNOSIS — G56.03 BILATERAL CARPAL TUNNEL SYNDROME: ICD-10-CM

## 2018-10-09 DIAGNOSIS — S81.801A OPEN WOUND OF RIGHT LOWER LEG, INITIAL ENCOUNTER: Primary | ICD-10-CM

## 2018-10-09 PROCEDURE — 1101F PT FALLS ASSESS-DOCD LE1/YR: CPT | Performed by: INTERNAL MEDICINE

## 2018-10-09 PROCEDURE — 4040F PNEUMOC VAC/ADMIN/RCVD: CPT | Performed by: INTERNAL MEDICINE

## 2018-10-09 PROCEDURE — 9900000065 HC CARDIAC REHAB PHASE 3 - 1 VISIT

## 2018-10-09 PROCEDURE — G8417 CALC BMI ABV UP PARAM F/U: HCPCS | Performed by: INTERNAL MEDICINE

## 2018-10-09 PROCEDURE — G8427 DOCREV CUR MEDS BY ELIG CLIN: HCPCS | Performed by: INTERNAL MEDICINE

## 2018-10-09 PROCEDURE — 3017F COLORECTAL CA SCREEN DOC REV: CPT | Performed by: INTERNAL MEDICINE

## 2018-10-09 PROCEDURE — 1123F ACP DISCUSS/DSCN MKR DOCD: CPT | Performed by: INTERNAL MEDICINE

## 2018-10-09 PROCEDURE — 1036F TOBACCO NON-USER: CPT | Performed by: INTERNAL MEDICINE

## 2018-10-09 PROCEDURE — G8482 FLU IMMUNIZE ORDER/ADMIN: HCPCS | Performed by: INTERNAL MEDICINE

## 2018-10-09 PROCEDURE — 99213 OFFICE O/P EST LOW 20 MIN: CPT | Performed by: INTERNAL MEDICINE

## 2018-10-09 RX ORDER — CEPHALEXIN 500 MG/1
500 CAPSULE ORAL 3 TIMES DAILY
Qty: 21 CAPSULE | Refills: 0 | Status: SHIPPED | OUTPATIENT
Start: 2018-10-09 | End: 2018-10-16

## 2018-10-09 RX ORDER — LOSARTAN POTASSIUM 100 MG/1
TABLET ORAL
COMMUNITY
Start: 2018-07-24 | End: 2019-01-09 | Stop reason: SDUPTHER

## 2018-10-09 NOTE — PROGRESS NOTES
Chronic Disease Visit Information    BP Readings from Last 3 Encounters:   09/25/18 134/62   09/04/18 (!) 133/58   09/04/18 (!) 109/45          Hemoglobin A1C (%)   Date Value   01/29/2018 6.4   10/17/2017 6.3   05/23/2016 6.0     Microalb/Crt. Ratio (mcg/mg creat)   Date Value   05/23/2016 340 (H)     LDL Cholesterol (mg/dL)   Date Value   10/25/2016 33     LDL Calculated (mg/dL)   Date Value   10/17/2017 35     HDL (mg/dL)   Date Value   10/17/2017 29 (A)     BUN (mg/dL)   Date Value   08/30/2018 29 (H)     CREATININE (mg/dL)   Date Value   08/30/2018 1.75 (H)     Glucose (mg/dL)   Date Value   08/30/2018 70   11/20/2011 208 (H)            Have you changed or started any medications since your last visit including any over-the-counter medicines, vitamins, or herbal medicines? no   Are you having any side effects from any of your medications? -  no  Have you stopped taking any of your medications? Is so, why? -  no    Have you seen any other physician or provider since your last visit? Yes - Records Requested  Have you had any other diagnostic tests since your last visit? Yes - Records Requested  Have you been seen in the emergency room and/or had an admission to a hospital since we last saw you? No  Have you had your annual diabetic retinal (eye) exam? No  Have you had your routine dental cleaning in the past 6 months? no    Have you activated your [employer] account? If not, what are your barriers?  Yes     Patient Care Team:  Marina Russell MD as PCP - General  Marina Russell MD as PCP - [facility] Attributed Provider  Enriqueta Clifton (Endocrinology)  Tyrone Martinez MD as Consulting Physician (Urology)         Medical History Review  Past Medical, Family, and Social History reviewed and does contribute to the patient presenting condition    Health Maintenance   Topic Date Due    Diabetic microalbuminuria test  10/17/2018    Lipid screen  10/17/2018    Diabetic retinal exam  09/13/2019

## 2018-10-09 NOTE — PROGRESS NOTES
nausea or vomiting   Genitourinary:     No urgency or frequency, no burning or hematuria   Musculoskeletal: No arthralgias, back pain or myalgias   Skin                  : Negative for rash or erythema   Neurological    : Negative for dizziness, weakness, tremors ,light headedness or syncope   Psychiatric       : Negative for dysphoric mood, sleep disturbances, nervous or anxious, or decreased concentration   All other review of systems was negative    Objective  Physical Examination:    Nursing note reviewed    /68 (Site: Left Upper Arm, Position: Sitting, Cuff Size: Large Adult)   Pulse 72   Temp 98.7 °F (37.1 °C) (Oral)   Resp 16   Ht 5' 7\" (1.702 m)   Wt 234 lb 3.2 oz (106.2 kg)   SpO2 98%   BMI 36.68 kg/m²   BP Readings from Last 3 Encounters:   10/09/18 136/68   09/25/18 134/62   09/04/18 (!) 133/58         Constitutional:  Jaylene Lax is oriented to place, person and time ,appears well-developed and well-nourished  HEENT:  Atraumatic and normocephalic, external ears normal bilaterally, nose normal no oropharyngeal exudate and is clear and moist  Eyes:  EOCM normal; conjunctivae normal; PERRLA bilaterally  Neck:  Normal range of motion, neck supple, no JVD and no thyromegaly  Cardiovascular:  RRR, normal heart sounds and intact distal pulses  Pulmonary:  effort normal and breath sounds normal bilaterally,no wheezes or rales, no respiratory distress  Abdominal:  Soft, non-tender; normal bowel sounds, no masses  Musculoskeletal:  Normal range of motion and no edema or tenderness bilaterally  No lymphadenopathy  Neurological:  alert, oriented, and normal reflexes bilaterally  Skin: warm and dry superficial open blister on the inner aspect of the right leg and no drainage present  Psychiatric:  normal mood and effect; behavior normal.    Labs:   Lab Results   Component Value Date    LABA1C 6.4 01/29/2018     Lab Results   Component Value Date    CHOL 102 10/17/2017     Lab Results   Component Value Date    HDL 29 (A) 10/17/2017     Lab Results   Component Value Date    LDLCALC 35 10/17/2017     Lab Results   Component Value Date    TRIG 191 10/17/2017     No components found for: [place], [place]  Lab Results   Component Value Date    WBC 14.5 (H) 08/25/2018    HGB 11.9 (L) 08/25/2018    HCT 38.0 (L) 08/25/2018    MCV 95.5 08/25/2018     08/25/2018     Lab Results   Component Value Date    INR 0.9 08/14/2018    PROTIME 10.2 08/14/2018     Lab Results   Component Value Date    GLUCOSE 70 08/30/2018    CREATININE 1.75 (H) 08/30/2018    BUN 29 (H) 08/30/2018     08/30/2018    K 4.3 08/30/2018     (H) 08/30/2018    CO2 18 (L) 08/30/2018     Lab Results   Component Value Date    ALT 34 06/05/2018    AST 23 06/05/2018    ALKPHOS 144 (H) 06/05/2018    BILITOT 0.36 06/05/2018     Lab Results   Component Value Date    LABPROT 7.0 02/08/2013    LABALBU 4.1 06/05/2018     Lab Results   Component Value Date    TSH 3.67 10/06/2011     Assessment:  1. Open wound of right lower leg, initial encounter    2. Bilateral carpal tunnel syndrome        Plan:  Keflex 500 mg 3 times a day for 7 days  Wound dressing with Neosporin  Patient to call me back if no improvement by next week  Patient had EMG done and according to the patient he was told that there is carpal tunnel bilaterally and also diabetic neuropathy and will get a copy of the report  Review as scheduled           1. Jean Robins received counseling on the following healthy behaviors: nutrition and exercise    2. Prior labs and health maintenance reviewed. 3.  Discussed use, benefit, and side effects of prescribed medications. Barriers to medication compliance addressed. All his questions were answered. Pt voiced understanding. Jean Robins will continue current medications, diet and exercise.               Orders Placed This Encounter   Medications    cephALEXin (KEFLEX) 500 MG capsule     Sig: Take 1 capsule by mouth 3 times daily for 7 days     Dispense:  21

## 2018-10-10 ENCOUNTER — HOSPITAL ENCOUNTER (OUTPATIENT)
Dept: PHYSICAL THERAPY | Facility: CLINIC | Age: 73
Setting detail: THERAPIES SERIES
Discharge: HOME | End: 2018-10-10
Payer: MEDICARE

## 2018-10-10 PROCEDURE — 97110 THERAPEUTIC EXERCISES: CPT

## 2018-10-10 PROCEDURE — 97140 MANUAL THERAPY 1/> REGIONS: CPT

## 2018-10-10 NOTE — FLOWSHEET NOTE
[] Rick Meo       Outpatient Physical        Therapy       [address]       Phone: [phone]       Fax: [phone] [x] [facility] at [address] [facility]       Phone: [phone]       Fax: [phone] [] Darryl. [address] [place] [address] [facility]  [address] [place] [address]   Phone: [phone]   Fax:  [phone]     Physical Therapy Daily Treatment Note    Date:  10/10/2018  Patient Name:  Lesley Haines    :  1945  MRN: [id number]  Physician: Donita Aponte MD                   Insurance: Medicare/[employer]   Medical Diagnosis: Lumbar herniated disc, low back pain laterally with sciatica  Rehab Codes: M51.26, M54.5, M79.651, R51, R26.89, Z91.81, M54.40  Onset Date: 2013                  Next 's appt.: 18  Visit# / total visits:  (12 additional visits added 10/3)  Cancels/No Shows: 0/0  Update G-codes at #36    Subjective:    Pain:  [x] Yes  [] No Location: LB Pain Rating: (0-10 scale) 3-4/10 pain   Pain altered Tx:  [x] No  [] Yes  Action:     Comments: Patient reports that he did not notice as large of difference with IDN last session, as he did before which he believes may be related to patient positioning. Patient reports that he has been working out at [employer] with no new complaints.      Objective:  Modalities:    Precautions:   Exercises: Bolded exercises performed today  Exercise Reps/Time Weight/Level Comments   Scifit/Nustep 5 min L4          SUPINE      SKTC 5x 5\"     LTR 10x5\"ea  Performed manually over stability ball today   HS stretch 3x20\"ea     Piriformis stretch  3x20\"ea     Hip flexor stretch 3x20\"     IR/ER stretch 3x20\" ea     Hip abd stretch 3x20\" ea CR    ITB stretch 3x20\" ea  \"   Manual lumbar traction with LE's on ball 15\" x 5, 3 sets  Added                Ankle DF 15x AA Performed active-assisted with stretch at end range          Seated      Lateral trunk flexion 3\" x10 ea under head in order to be able to relax upper body with stretch         BWSTT (Alter G) 10'    (7' set-up)  Initiated 7/20: size 2XL pants; 70% WB Walked 10 mins at 1.0 mph with 0% incline (increased WB, decreased speed 9/14)    Patient again demonstrated improved ability to perform equal step length and WB through LE's for >50% of ambulation 9/14   2 way hip (abd/ext) 15x ea  Performed at 75% WB in Alter G    Marches 15x ea     Hamstring curls 15x ea  Added 9/12   Toe raises 20x  Performed individually 9/14   Heel raises 20x      Squatting 10x  Added 9/14         BALANCE       Normal GLORIA on foam 30\" x1 EO; x1 EC  Added EC 9/12   Tandem stance on foam 30\" x1 ea EO; x1 ea EC  \"         Other:     IDN with 8 1\" needles to lumbar paravertebral musculature at L2-L5 with patient in sitting; also, 3 2\" in B superior cluneals with patient seated in anterior trunk flexion and resting head on mat table; needles left in for 10 minutes    NOT TODAY: MFR x15 minutes to lumbar paraspinals and QL bilaterally with moderate pressure to tolerance with patient in prone with 3 pillows under chest and patient demonstrating limited trunk extension in this positioning due to hip flexor tightness. Specific Instructions for next treatment: posture education and training, quad and heel cord stretching, core stabilization/strengthening, ankle DF and great toe strengthening ER/IR stretching, MFR? Muscle rolling for tight musculature. Treatment Charges: Mins Units   []  Modalities     [x]  Ther Exercise 35 2   [x]  Manual Therapy 20 2   []  Ther Activities     []  Aquatics     []  Vasocompression     []  Other     Total Treatment time 55 4   Estimated cost 10/8/18: $2241.11  Add KX modifier at $2010  3 JUAN JOSE = $75.47  2 JUAN JOSE, 2 man = $73.60    Assessment: [x] Progressing toward goals: Performed IDN to bilat superior cluneals at this date in addition to paravertrberal musculature. Trial IDN in prone again at next session.  Patient unable to perform bridges at this date due to increased pain through lateral thighs bilaterally when putting pressure through LE's when in prone. Initiated lumbar stabilization exercises on stability ball at this date with patient demonstrating mild fatigue and lateral hip pain with marches and alt. UE/LE's. Luke Ardon held at this date to focus on core stability exercises. [] No change. [] Other:   Problems:    [x] ? Back Pain: 2-6/10 pain in low back and anterior thighs                               [x] ? ROM: decreased hamstring, hip flexor, and heel cord flexibility bilaterally. [x] ? Strength: mild hip flexor/abductor weakness, 3+/5 strength in L DF, 2-/5 strength in great toe extension; generalized core and erector spinae weakness   [x] ? Function: 56% perceived deficit per Oswestry; unable to walk greater than >30 yards at a time due to pain  [x] Postural Deviations: forward head, rounded shoulder, thoracic kyphosis, lacks terminal knee extension bilaterally in standing  [x] Gait Deviations: moderate forward trunk lean, decreased arm swing on L, decreased step length bilaterally, mild drop foot on LLE but able to clear floor with LE, decreased heel strike bilaterally; decreased eccentric control when descending stairs                            STG: (to be met in 8 treatments) 4/6 MET, 2/6 ONGOING  1. ? Pain: Patient will report low back pain at or below 4/10 when walking 150 ft with minimal symptoms into bilateral thighs to promote increased functional mobility. 9/26/18: ONGOING: Pt reports 8-9/10 pain when walking greater than 40 yards with mildSOB   2. ? ROM: Patient will demonstrate improved heel cord flexibility by 5 degrees on each LE to promote normalized gait pattern. 8/8/18: MET  3. ? Strength: Patient will demonstrate 4/5 strength in erector spinae and abdominal musculature to promote increased core stabilization of lumbar spine.  9/26/18: MET  4. ? Function: Patient will

## 2018-10-15 ENCOUNTER — APPOINTMENT (OUTPATIENT)
Dept: PHYSICAL THERAPY | Facility: CLINIC | Age: 73
End: 2018-10-15
Payer: MEDICARE

## 2018-10-17 ENCOUNTER — HOSPITAL ENCOUNTER (OUTPATIENT)
Dept: PHYSICAL THERAPY | Facility: CLINIC | Age: 73
Setting detail: THERAPIES SERIES
Discharge: HOME | End: 2018-10-17
Payer: MEDICARE

## 2018-10-17 PROCEDURE — 97110 THERAPEUTIC EXERCISES: CPT

## 2018-10-17 PROCEDURE — 97140 MANUAL THERAPY 1/> REGIONS: CPT

## 2018-10-17 NOTE — FLOWSHEET NOTE
[] Tamar Choe       Outpatient Physical        Therapy       [address]       Phone: [phone]       Fax: [phone] [x] [facility] at [address] [facility]       Phone: [phone]       Fax: [phone] [] Darryl. [address] [place] [address] [facility]  [address] [place] [address]   Phone: [phone]   Fax:  [phone]     Physical Therapy Daily Treatment Note    Date:  10/17/2018  Patient Name:  Sd Cassidy    :  1945  MRN: [id number]  Physician: Alicia Bailey MD                   Insurance: THE [facility]   Medical Diagnosis: Lumbar herniated disc, low back pain laterally with sciatica  Rehab Codes: M51.26, M54.5, M79.651, R51, R26.89, Z91.81, M54.40  Onset Date: 2013                  Next 's appt.: 18  Visit# / total visits:  (12 additional visits added 10/3)  Cancels/No Shows: 0/0  Update G-codes at #36    Subjective:    Pain:  [x] Yes  [] No Location: LB Pain Rating: (0-10 scale) 3-4/10 pain   Pain altered Tx:  [x] No  [] Yes  Action:     Comments: Patient states he is doing good today, still with pain present in the LB. He would like to try the needling supine today due to having better results.       Objective:  Modalities:    Precautions:   Exercises: Bolded exercises performed today  Exercise Reps/Time Weight/Level Comments   Scifit/Nustep 5 min L4          SUPINE      SKTC 5x 5\"     LTR 10x5\"ea  Performed manually over stability ball today   HS stretch 3x20\"ea     Piriformis stretch  3x20\"ea     Hip flexor stretch 3x20\"     IR/ER stretch 3x20\" ea     Hip abd stretch 3x20\" ea CR    ITB stretch 3x20\" ea  \"   Manual lumbar traction with LE's on ball 15\" x 5, 3 sets  Added                Ankle DF 15x AA Performed active-assisted with stretch at end range          Seated      Lateral trunk flexion 3\" x10 ea  Added     LAQ 10x  3# Added           LUMBAR STAB   With abdominal bracing [x] ? ROM: decreased hamstring, hip flexor, and heel cord flexibility bilaterally. [x] ? Strength: mild hip flexor/abductor weakness, 3+/5 strength in L DF, 2-/5 strength in great toe extension; generalized core and erector spinae weakness   [x] ? Function: 56% perceived deficit per Oswestry; unable to walk greater than >30 yards at a time due to pain  [x] Postural Deviations: forward head, rounded shoulder, thoracic kyphosis, lacks terminal knee extension bilaterally in standing  [x] Gait Deviations: moderate forward trunk lean, decreased arm swing on L, decreased step length bilaterally, mild drop foot on LLE but able to clear floor with LE, decreased heel strike bilaterally; decreased eccentric control when descending stairs                            STG: (to be met in 8 treatments) 4/6 MET, 2/6 ONGOING  1. ? Pain: Patient will report low back pain at or below 4/10 when walking 150 ft with minimal symptoms into bilateral thighs to promote increased functional mobility. 9/26/18: ONGOING: Pt reports 8-9/10 pain when walking greater than 40 yards with mildSOB   2. ? ROM: Patient will demonstrate improved heel cord flexibility by 5 degrees on each LE to promote normalized gait pattern. 8/8/18: MET  3. ? Strength: Patient will demonstrate 4/5 strength in erector spinae and abdominal musculature to promote increased core stabilization of lumbar spine. 9/26/18: MET  4. ? Function: Patient will demonstrate normalized gait pattern with 50 ft ambulation to promote improved energy conservation and decreased the risk of falls. 9/26/18: ONGOING: Pt continues to demonstrate moderate anterior trunk lean, but demonstrating more symmetrical step length with less stiffness  5. Independent with Home Exercise Programs 7/10/18: MET  6. Demonstrate Knowledge of fall prevention 7/10/18: MET    LTG: (to be met in 16 treatments)  1/6 MET, 3/6 ONGOING, 2/6 NOT MET  1.  Patient will demonstrate 50% improvement in forward head, rounded shoulders, thoracic kyphosis and anterior trunk lean in standing to decrease stress on spine and spinal musculature. 9/26/18: ONGOING: 15% improvement per therapist discretion; patient demonstrating greater awareness of posture with therapeutic exercises with less VC required for corretion  2. Patient will report score of 25% or less perceived deficit on the Oswestry to indicate improved ability to participate in ADL's. 8/8/18: NOT MET: 46% perceived deficit per Oswestry  3. Patient will demonstrate 5/5 hip and abdominal strength to promote improved standing and walking tolerance. 9/26/18: ONGOING: see measurements above  4. Patient will report a 75% decreased in the frequency, intensity, and duration of symptoms in LE's to promote increased functional mobility. 9/26/18: ONGOING: Patient experiencing increased radicular symptoms with bed mobility  5. Patient will be able to sleep through the night, waking up 2 or less times due to low back pain to increase quality of sleep. 9/26/18: MET per patient report  6. Patient will report low back pain at or below 2/10 pain when ambulating >300 ft to promote decreased pain with community ambulation. 9/26/18: NOT MET    Patient goals: Eben Fabry able to walk without pain, lift arms and more flex\"     G-CODEs, updated 9/26/18 at visit 26    Functional Limitation: walking, mobility  Functional Assessment Used: Oswestry, 44% perceived functional deficit   Current Status Modifier: CK  Goal Status Modifier: CJ    Pt. Education:  [] Yes [x] No  [] Reviewed Prior HEP/Ed  Method of Education: [] Verbal  [] Demo  [] Written:   6/26 - lumbar stab exercises  7/10 - hip flexor stretches off bed.   7/18 - shoulder rolls, scapular retraction, standing hip flexor stretch, standing trunk extension  9/19- IDN consent form   9/26- Re-educated on contents of IDN consent form   Comprehension of Education:  [x] Verbalizes understanding. [x] Demonstrates understanding.   [x] Needs review. [] Demonstrates/verbalizes HEP/Ed previously given. Plan: [x] Continue per plan of care.    [] Other:       Time In: 10:50 pm           Time Out: 12:00 pm    Electronically signed by:  Florentino Silverman PTA

## 2018-10-22 ENCOUNTER — HOSPITAL ENCOUNTER (OUTPATIENT)
Dept: PHYSICAL THERAPY | Facility: CLINIC | Age: 73
Setting detail: THERAPIES SERIES
Discharge: HOME | End: 2018-10-22
Payer: MEDICARE

## 2018-10-22 PROCEDURE — G0283 ELEC STIM OTHER THAN WOUND: HCPCS

## 2018-10-22 PROCEDURE — 97140 MANUAL THERAPY 1/> REGIONS: CPT

## 2018-10-22 PROCEDURE — 97110 THERAPEUTIC EXERCISES: CPT

## 2018-10-22 NOTE — FLOWSHEET NOTE
[] Dane Prima       Outpatient Physical        Therapy       [address]       Phone: [phone]       Fax: [phone] [x] [facility] at [address]       Phone: [phone]       Fax: [phone] [] [facility]. [address] [place] [address] [facility]  [address] [facility]   Phone: [phone]   Fax:  [phone]     Physical Therapy Daily Treatment Note    Date:  10/22/2018  Patient Name:  Analy Medrano    :  1945  MRN: [id number]  Physician: Nav Talamantes MD                   Insurance: THE [facility]   Medical Diagnosis: Lumbar herniated disc, low back pain laterally with sciatica  Rehab Codes: M51.26, M54.5, M79.651, R51, R26.89, Z91.81, M54.40  Onset Date: 2013                  Next 's appt.: 18  Visit# / total visits:  (12 additional visits added 10/3)  Cancels/No Shows: 0/0  Update G-codes at #36    Subjective:    Pain:  [x] Yes  [] No Location: LB Pain Rating: (0-10 scale) 3-4/10 pain   Pain altered Tx:  [x] No  [] Yes  Action:     Comments: Pt reports still low back pain rating it a 4-5/10 and now is having radicular symptoms into bilateral lower legs. Pt notes mostly left however is now having pain into the right as well.      Objective:  Modalities:  STIM with DN as described below  Precautions:   Exercises: Bolded exercises performed today  Exercise Reps/Time Weight/Level Comments   Scifit/Nustep 5 min L4          SUPINE      SKTC 5x 5\"     LTR 10x5\"ea  Performed manually over stability ball today   HS stretch 3x20\"ea     Piriformis stretch  3x20\"ea     Hip flexor stretch 3x20\"     IR/ER stretch 3x20\" ea     Hip abd stretch 3x20\" ea CR    ITB stretch 3x20\" ea  \"   Manual lumbar traction with LE's on ball 15\" x 5, 3 sets  Added                Ankle DF 15x AA Performed active-assisted with stretch at end range          Seated      Lateral trunk flexion 3\" x10 ea  Added     LAQ 10x 3# Added 9/12          LUMBAR STAB   With abdominal bracing    Pelvic tilts 20x     Marches 15xea  With TrA contraction 10/10   Alt UE/LE opposite  16x  Added 6/22   SLR  15xea  With TrA contraction 10/10   Bridges 15x  Attempted but increased lateral thigh pain 10/10   Hip add 10x10\" Ball Added 6/28   Hip abd 20x Blue TB Increased resistance 8/17         SIDE LYING      Hip abduction 15x ea Green  Added resistance 7/6   Clamshells  15x ea Green  Increased resistance 7/3   Reverse clamshells  15x ea  Green  Increased resistance 7/6 but unable to achieve good isolation of R hip IR's. Regressed to no resistance for R hip. PRONE   Difficult position for the pt   Hip ext  x2 ea  8/8 Increased difficulty to perform   Alt UE   Unable   Alt UE/LE opposite    Unable   Prop on elbows  2 min   Requires hip flexion and pillow underneath stomach to complete; reports is tolerable with no peripheralization noted. Press ups   Unable          SEATED      Sciatic nerve glides  15xea     Thoracic extension over 1/2 foam roll 20x   Added 9/14   Thoracic rotation over 1/2 foam foll 10x ea  \"         STANDING      3 way hip  15x ea A    Heel raises  20x     Hamstring curls 10x ea  Added 9/19   Toe raises  20x  Continued difficulty and asymmetry with L toe raise as compared to R   Soleus stretch on slant board 2 x30\" ea  Added 10/10   Gastroc stretch on slant board  2 x30\"      Standing hip flexor stretch 3 x30\" ea  Added 6/22   Standing wall ITB stretch 2 X 30\"  Added 7/6: tactile cueing needed for proper execution    Step ups  12x ea  4 inch     Lateral step ups 12x ea 6 inch    Step downs  12x ea  4 inch           STABILITY BALL   Added 10/10; CGA   Pelvic tilts 20x     LAQ 15x ea     Marches 15x ea     Alt. UE's 10x ea     Alt.  UE/LE's 10x ea           Backwards shoulder rolls 15x  Initiated 7/18    Scapular retraction  3\" 10 x2  \"   Trunk stretch over 1/2 foam roll  3\"   Patient required 4 pillows under head in order to be able to relax upper body with stretch         BWSTT (Alter G) 10'    (7' set-up)  Initiated 7/20: size 2XL pants; 70% WB Walked 10 mins at 1.0 mph with 0% incline (increased WB, decreased speed 9/14)    Patient again demonstrated improved ability to perform equal step length and WB through LE's for >50% of ambulation 9/14   2 way hip (abd/ext) 15x ea  Performed at 75% WB in Alter G    Marches 15x ea     Hamstring curls 15x ea  Added 9/12   Toe raises 20x  Performed individually 9/14   Heel raises 20x      Squatting 10x  Added 9/14         BALANCE       Normal GLORIA on foam 30\" x1 EO; x1 EC  Added EC 9/12   Tandem stance on foam 30\" x1 ea EO; x1 ea EC  \"         Other:     DRY NEEDLING: consent obtained and skin prepped with alcohol; standard procedure followed:   patient in prone: 25 total: with 2\" needles to lumbar paravertebral musculature at L2-L5  And 1\" lower thoracic from T8; also, 3 3\" in B superior cluneals  And 3\" B inferior gluteals and 1\" B sural  needles left in situ 20 minutes with UES from L4/5 on R to inferior gluteal and L3/4 on L to inferior gluteal homeostatic points    NOT TODAY: MFR x15 minutes to lumbar paraspinals and QL bilaterally with moderate pressure to tolerance with patient in prone with 3 pillows under chest and patient demonstrating limited trunk extension in this positioning due to hip flexor tightness. Specific Instructions for next treatment: posture education and training, quad and heel cord stretching, core stabilization/strengthening, ankle DF and great toe strengthening ER/IR stretching, MFR? Muscle rolling for tight musculature.      Treatment Charges: Mins Units   [x]  Modalities- UES 10 1   [x]  Ther Exercise 35 2   [x]  Manual Therapy 20 1   []  Ther Activities     []  Aquatics     []  Vasocompression     []  Other     Total Treatment time 65 4   Estimated cost 10/22/18: $2400.47  Add KX modifier at $2010  3 JUAN JOSE = $75.47  2 JUAN JOSE, 2 man = $73.60    Assessment: [x] Progressing

## 2018-10-23 ENCOUNTER — HOSPITAL ENCOUNTER (OUTPATIENT)
Age: 73
Setting detail: SPECIMEN
Discharge: HOME | End: 2018-10-23
Payer: MEDICARE

## 2018-10-23 LAB
CHOLESTEROL/HDL RATIO: 3.5
CHOLESTEROL: 104 MG/DL
HDLC SERPL-MCNC: 30 MG/DL
LDL CHOLESTEROL: 36 MG/DL (ref 0–130)
TRIGL SERPL-MCNC: 192 MG/DL
VLDLC SERPL CALC-MCNC: ABNORMAL MG/DL (ref 1–30)

## 2018-10-24 ENCOUNTER — HOSPITAL ENCOUNTER (OUTPATIENT)
Dept: PHYSICAL THERAPY | Facility: CLINIC | Age: 73
Setting detail: THERAPIES SERIES
Discharge: HOME | End: 2018-10-24
Payer: MEDICARE

## 2018-10-24 PROCEDURE — 97140 MANUAL THERAPY 1/> REGIONS: CPT

## 2018-10-24 PROCEDURE — 97110 THERAPEUTIC EXERCISES: CPT

## 2018-10-24 PROCEDURE — G0283 ELEC STIM OTHER THAN WOUND: HCPCS

## 2018-10-24 NOTE — FLOWSHEET NOTE
[] Kaila Menchaca       Outpatient Physical        Therapy       [address]       Phone: [phone]       Fax: [phone] [x] [facility] at [address] [facility]       Phone: [phone]       Fax: [phone] [] Darryl. [address] [place] [address] [facility]  [address] [place] [address]   Phone: [phone]   Fax:  [phone]     Physical Therapy Daily Treatment Note    Date:  10/24/2018  Patient Name:  Holly Stephens    :  1945  MRN: [id number]  Physician: Tyra Bronson MD                   Insurance: Medicare/[employer]   Medical Diagnosis: Lumbar herniated disc, low back pain laterally with sciatica  Rehab Codes: M51.26, M54.5, M79.651, R51, R26.89, Z91.81, M54.40  Onset Date: 2013                  Next 's appt.: 18  Visit# / total visits:  (12 additional visits added 10/3)  Cancels/No Shows: 0/0  Update G-codes at #36    Subjective:    Pain:  [x] Yes  [] No Location: LB Pain Rating: (0-10 scale) stiff /10 pain   Pain altered Tx:  [x] No  [] Yes  Action:     Comments: Pt reports feeling stiff today.      Objective:  Modalities:  STIM with DN as described below  Precautions:   Exercises: Bolded exercises performed today  Exercise Reps/Time Weight/Level Comments   Scifit/Nustep 5 min L4          SUPINE      SKTC 5x 5\"     LTR 10x5\"ea  Performed manually over stability ball today   HS stretch 3x20\"ea     Piriformis stretch  3x20\"ea     Hip flexor stretch 3x20\"     IR/ER stretch 3x20\" ea     Hip abd stretch 3x20\" ea CR    ITB stretch 3x20\" ea  \"   Manual lumbar traction with LE's on ball 15\" x 5, 3 sets  Added                Ankle DF 15x AA Performed active-assisted with stretch at end range          Seated      Lateral trunk flexion 3\" x10 ea  Added     LAQ 10x  3# Added           LUMBAR STAB   With abdominal bracing    Pelvic tilts 20x     Marches 15xea  With TrA contraction 10/10   Alt UE/LE opposite  16x

## 2018-10-29 ENCOUNTER — APPOINTMENT (OUTPATIENT)
Dept: PHYSICAL THERAPY | Facility: CLINIC | Age: 73
End: 2018-10-29
Payer: MEDICARE

## 2018-10-31 ENCOUNTER — HOSPITAL ENCOUNTER (OUTPATIENT)
Dept: PHYSICAL THERAPY | Facility: CLINIC | Age: 73
Setting detail: THERAPIES SERIES
Discharge: HOME | End: 2018-10-31
Payer: MEDICARE

## 2018-10-31 PROCEDURE — 97140 MANUAL THERAPY 1/> REGIONS: CPT

## 2018-10-31 PROCEDURE — G0283 ELEC STIM OTHER THAN WOUND: HCPCS

## 2018-10-31 PROCEDURE — 97110 THERAPEUTIC EXERCISES: CPT

## 2018-10-31 NOTE — FLOWSHEET NOTE
[] Romana Gosling       Outpatient Physical        Therapy       [address]       Phone: [phone]       Fax: [phone] [x] [facility] at [address] [facility]       Phone: [phone]       Fax: [phone] [] [employer] [address] [place] [address]   Phone: [phone]   Fax:  [phone]     Physical Therapy Daily Treatment Note    Date:  10/31/2018  Patient Name:  Nasrin Ortiz    :  1945  MRN: [id number]  Physician: Lamine Garcia MD                   Insurance: [facility]   Medical Diagnosis: Lumbar herniated disc, low back pain laterally with sciatica  Rehab Codes: M51.26, M54.5, M79.651, R51, R26.89, Z91.81, M54.40  Onset Date: 2013                  Next 's appt.: 18  Visit# / total visits:  (12 additional visits added 10/3)  Cancels/No Shows: 0/0  Update G-codes at #36    Subjective:    Pain:  [x] Yes  [] No Location: [facility] Pain Rating: (0-10 scale) stiff /10 pain   Pain altered Tx:  [x] No  [] Yes  Action:     Comments: Pt reports same stiffness today, the needling is good for the rest of that day. He wakes up feeling the same.      Objective:  Modalities:  STIM with DN as described below  Precautions:   Exercises: Bolded exercises performed today  Exercise Reps/Time Weight/Level Comments   Scifit/Nustep 5 min L4          SUPINE      SKTC 5x 5\"     LTR 10x5\"ea  Performed manually over stability ball today   HS stretch 3x20\"ea     Piriformis stretch  3x20\"ea     Hip flexor stretch 3x20\"  Leg off mat   IR/ER stretch 3x20\" ea     Hip abd stretch 3x20\" ea CR    ITB stretch 3x20\" ea  \"   Manual lumbar traction with LE's on ball 15\" x 5, 3 sets  Added                Ankle DF 15x AA Performed active-assisted with stretch at end range          Seated      Lateral trunk flexion 3\" x10 ea  Added     LAQ 10x  3# Added           LUMBAR STAB   With abdominal bracing Pelvic tilts 20x     Marches 15xea  With TrA contraction 10/10   Alt UE/LE opposite  16x  Added 6/22   SLR  15xea  With TrA contraction 10/10   Bridges 15x  Attempted but increased lateral thigh pain 10/10   Hip add 10x10\" Ball Added 6/28   Hip abd 20x Blue TB Increased resistance 8/17         SIDE LYING      Hip abduction 15x ea Green  Added resistance 7/6   Clamshells  15x ea Green  Increased resistance 7/3   Reverse clamshells  15x ea  Green  Increased resistance 7/6 but unable to achieve good isolation of R hip IR's. Regressed to no resistance for R hip. PRONE   Difficult position for the pt   Hip ext  x2 ea  8/8 Increased difficulty to perform   Alt UE   Unable   Alt UE/LE opposite    Unable   Prop on elbows  2 min   Requires hip flexion and pillow underneath stomach to complete; reports is tolerable with no peripheralization noted. Press ups   Unable          SEATED      Sciatic nerve glides  15xea     Thoracic extension over 1/2 foam roll 20x   Added 9/14   Thoracic rotation over 1/2 foam foll 10x ea  \"         STANDING      3 way hip  15x ea A    Heel raises  20x     Hamstring curls 10x ea  Added 9/19   Toe raises  20x  Continued difficulty and asymmetry with L toe raise as compared to R   Soleus stretch on slant board 2 x30\" ea  Added 10/10. Pain in back increase with soleus stretch   Gastroc stretch on slant board  3 x30\"   Increased 10/31/18   Standing hip flexor stretch 3 x30\" ea  Added 6/22   Standing wall ITB stretch 2 X 30\"  Added 7/6: tactile cueing needed for proper execution    Step ups  15x ea  4 inch  Increased 10/31/18   Lateral step ups 12x ea 6 inch    Step downs  15x ea  4 inch  Increased 10/31/18         STABILITY BALL   Added 10/10; CGA   Pelvic tilts 20x     LAQ 15x ea     Marches 15x ea     Alt. UE's 10x ea     Alt.  UE/LE's 10x ea           Backwards shoulder rolls 15x  Initiated 7/18    Scapular retraction  3\" 10 x2  \"   Trunk stretch over 1/2 foam roll  3\"   Patient required 4 pillows under head in order to be able to relax upper body with stretch         BWSTT (Alter G) 10'    (7' set-up)  Initiated 7/20: size 2XL pants; 70% WB Walked 10 mins at 1.0 mph with 0% incline (increased WB, decreased speed 9/14)    Patient again demonstrated improved ability to perform equal step length and WB through LE's for >50% of ambulation 9/14   2 way hip (abd/ext) 15x ea  Performed at 75% WB in Alter G    Marches 15x ea     Hamstring curls 15x ea  Added 9/12   Toe raises 20x  Performed individually 9/14   Heel raises 20x      Squatting 10x  Added 9/14         BALANCE       Normal GLORIA on foam 30\" x1 EO; x1 EC  Added EC 9/12   Tandem stance on foam 30\" x1 ea EO; x1 ea EC  \"         Other:     DRY NEEDLING: consent obtained and skin prepped with alcohol; standard procedure followed:   patient in prone: 25 total: with 2\" needles to lumbar paravertebral musculature at L2-L5  And 1\" lower thoracic from T8; also, 3 3\" in B superior cluneals  And 3\" L inferior gluteals and 1\" B sural  needles left in situ 20 minutes with UES from L4/5 on R to inferior gluteal and L3/4 on L to inferior gluteal homeostatic points    NOT TODAY: MFR x15 minutes to lumbar paraspinals and QL bilaterally with moderate pressure to tolerance with patient in prone with 3 pillows under chest and patient demonstrating limited trunk extension in this positioning due to hip flexor tightness. Specific Instructions for next treatment: posture education and training, quad and heel cord stretching, core stabilization/strengthening, ankle DF and great toe strengthening ER/IR stretching, MFR? Muscle rolling for tight musculature.      Treatment Charges: Mins Units   [x]  Modalities- UES 15 1   [x]  Ther Exercise 35 2   [x]  Manual Therapy 15 1   []  Ther Activities     []  Aquatics     []  Vasocompression     []  Other     Total Treatment time 65 4   Estimated cost 10/31/18: $2568.47  Add KX modifier at $2010  3 JUAN JOSE = $75.47  2 JUAN JOSE, 2 man =

## 2018-11-05 ENCOUNTER — HOSPITAL ENCOUNTER (OUTPATIENT)
Dept: PHYSICAL THERAPY | Facility: CLINIC | Age: 73
Setting detail: THERAPIES SERIES
Discharge: HOME | End: 2018-11-05
Payer: MEDICARE

## 2018-11-05 PROCEDURE — 97032 APPL MODALITY 1+ESTIM EA 15: CPT

## 2018-11-05 PROCEDURE — 97140 MANUAL THERAPY 1/> REGIONS: CPT

## 2018-11-05 PROCEDURE — 97110 THERAPEUTIC EXERCISES: CPT

## 2018-11-05 NOTE — FLOWSHEET NOTE
7/18    Scapular retraction  3\" 10 x2  \"   Trunk stretch over 1/2 foam roll  3\"   Patient required 4 pillows under head in order to be able to relax upper body with stretch         BWSTT (Sisi RAVI) 10'    (7' set-up)  Initiated 7/20: size 2XL pants; 70% WB Walked 10 mins at 1.0 mph with 0% incline (increased WB, decreased speed 9/14)    Patient again demonstrated improved ability to perform equal step length and WB through LE's for >50% of ambulation 9/14   2 way hip (abd/ext) 15x ea  Performed at 75% WB in Sisi RAVI    Marches 15x ea     Hamstring curls 15x ea  Added 9/12   Toe raises 20x  Performed individually 9/14   Heel raises 20x      Squatting 10x  Added 9/14         BALANCE       Normal GLORIA on foam 30\" x1 EO; x1 EC  Added EC 9/12   Tandem stance on foam 30\" x1 ea EO; x1 ea EC  \"         Other:     DRY NEEDLING: consent obtained and skin prepped with alcohol; standard procedure followed:   patient in prone: 25 total: with 2\" needles to lumbar paravertebral musculature at L2-L5  And 1\" lower thoracic from T8; also, 3 3\" in B superior cluneals  And 3\" L inferior gluteals and 1\" B sural  needles left in situ 20 minutes with UES from L4/5 on R to inferior gluteal and L3/4 on L to inferior gluteal homeostatic points    NOT TODAY: MFR x15 minutes to lumbar paraspinals and QL bilaterally with moderate pressure to tolerance with patient in prone with 3 pillows under chest and patient demonstrating limited trunk extension in this positioning due to hip flexor tightness. Specific Instructions for next treatment: posture education and training, quad and heel cord stretching, core stabilization/strengthening, ankle DF and great toe strengthening ER/IR stretching, MFR? Muscle rolling for tight musculature.      Treatment Charges: Mins Units   [x]  Modalities- UES 15 1   [x]  Ther Exercise 30 2   [x]  Manual Therapy 15 1   []  Ther Activities     []  Aquatics     []  Vasocompression     []  Other     Total Treatment time 60 Current Status Modifier: CK  Goal Status Modifier: CJ    Pt. Education:  [] Yes [x] No  [] Reviewed Prior HEP/Ed  Method of Education: [] Verbal  [] Demo  [] Written:   6/26 - lumbar stab exercises  7/10 - hip flexor stretches off bed.   7/18 - shoulder rolls, scapular retraction, standing hip flexor stretch, standing trunk extension  9/19- IDN consent form   9/26- Re-educated on contents of IDN consent form   Comprehension of Education:  [x] Verbalizes understanding. [x] Demonstrates understanding. [x] Needs review. [] Demonstrates/verbalizes HEP/Ed previously given. Plan: [x] Continue per plan of care.    [] Other:       Time In: 1:30  pm           Time Out: 2:35 pm    Electronically signed by:  Faraz Hopson PTA

## 2018-11-07 ENCOUNTER — HOSPITAL ENCOUNTER (OUTPATIENT)
Dept: PHYSICAL THERAPY | Facility: CLINIC | Age: 73
Setting detail: THERAPIES SERIES
Discharge: HOME | End: 2018-11-07
Payer: MEDICARE

## 2018-11-07 PROCEDURE — G8979 MOBILITY GOAL STATUS: HCPCS

## 2018-11-07 PROCEDURE — G8978 MOBILITY CURRENT STATUS: HCPCS

## 2018-11-07 PROCEDURE — G8980 MOBILITY D/C STATUS: HCPCS

## 2018-11-07 PROCEDURE — 97110 THERAPEUTIC EXERCISES: CPT

## 2018-11-07 NOTE — FLOWSHEET NOTE
Added 9/14               Ankle DF 15x AA Performed active-assisted with stretch at end range 7/18         Seated      Lateral trunk flexion 3\" x10 ea  Added 7/20    LAQ 10x  3# Added 9/12          LUMBAR STAB   With abdominal bracing    Pelvic tilts 20x     Marches 15xea  With TrA contraction 10/10   Alt UE/LE opposite  16x  Added 6/22   SLR  15xea  With TrA contraction 10/10   Bridges 15x  Attempted but increased lateral thigh pain 10/10   Hip add 10x10\" Ball Added 6/28   Hip abd 20x Blue TB Increased resistance 8/17         SIDE LYING      Hip abduction 15x ea Green  Added resistance 7/6   Clamshells  15x ea Green  Increased resistance 7/3   Reverse clamshells  15x ea  Green  Increased resistance 7/6 but unable to achieve good isolation of R hip IR's. Regressed to no resistance for R hip. PRONE   Difficult position for the pt   Hip ext  x2 ea  8/8 Increased difficulty to perform   Alt UE   Unable   Alt UE/LE opposite    Unable   Prop on elbows  2 min   Requires hip flexion and pillow underneath stomach to complete; reports is tolerable with no peripheralization noted. Press ups   Unable          SEATED      Sciatic nerve glides  15xea     Thoracic extension over 1/2 foam roll 20x   Added 9/14   Thoracic rotation over 1/2 foam foll 10x ea  \"         STANDING      3 way hip  15x ea A    Heel raises  20x     Hamstring curls 10x ea  Added 9/19   Toe raises  20x  Continued difficulty and asymmetry with L toe raise as compared to R   Soleus stretch on slant board 2 x30\" ea  Added 10/10.  Pain in back increase with soleus stretch   Gastroc stretch on slant board  3 x30\"   Increased 10/31/18   Standing hip flexor stretch 3 x30\" ea  Added 6/22   Standing wall ITB stretch 2 X 30\"  Added 7/6: tactile cueing needed for proper execution    Step ups  15x ea  6 inch  Increased 11/7/18   Lateral step ups 12x ea 6 inch    Step downs  15x ea  6 inch  Increased 11/7/18         STABILITY BALL   Added 10/10; CGA   Pelvic mobility  Functional Assessment Used: Oswestry, 56% perceived functional deficit   Current Status Modifier: CK  Goal Status Modifier:   Discharge Status Modifier: CK    Pt. Education:  [] Yes [x] No  [] Reviewed Prior HEP/Ed  Method of Education: [] Verbal  [] Demo  [] Written:   6/26 - lumbar stab exercises  7/10 - hip flexor stretches off bed.   7/18 - shoulder rolls, scapular retraction, standing hip flexor stretch, standing trunk extension  9/19- IDN consent form   9/26- Re-educated on contents of IDN consent form   Comprehension of Education:  [x] Verbalizes understanding. [x] Demonstrates understanding. [x] Needs review. [] Demonstrates/verbalizes HEP/Ed previously given. Plan: [] Continue per plan of care.    [x] Other: D/C       Time In: 2:04  pm           Time Out: 3:02 pm    Electronically signed by:  Julien Solis, PT

## 2018-11-12 ENCOUNTER — APPOINTMENT (OUTPATIENT)
Dept: PHYSICAL THERAPY | Facility: CLINIC | Age: 73
End: 2018-11-12
Payer: MEDICARE

## 2018-11-14 ENCOUNTER — APPOINTMENT (OUTPATIENT)
Dept: PHYSICAL THERAPY | Facility: CLINIC | Age: 73
End: 2018-11-14
Payer: MEDICARE

## 2018-11-19 ENCOUNTER — APPOINTMENT (OUTPATIENT)
Dept: PHYSICAL THERAPY | Facility: CLINIC | Age: 73
End: 2018-11-19
Payer: MEDICARE

## 2018-11-21 ENCOUNTER — APPOINTMENT (OUTPATIENT)
Dept: PHYSICAL THERAPY | Facility: CLINIC | Age: 73
End: 2018-11-21
Payer: MEDICARE

## 2018-11-26 ENCOUNTER — APPOINTMENT (OUTPATIENT)
Dept: PHYSICAL THERAPY | Facility: CLINIC | Age: 73
End: 2018-11-26
Payer: MEDICARE

## 2018-11-28 ENCOUNTER — APPOINTMENT (OUTPATIENT)
Dept: PHYSICAL THERAPY | Facility: CLINIC | Age: 73
End: 2018-11-28
Payer: MEDICARE

## 2018-12-11 ENCOUNTER — HOSPITAL ENCOUNTER (OUTPATIENT)
Dept: GENERAL RADIOLOGY | Facility: CLINIC | Age: 73
Discharge: HOME | End: 2018-12-13
Payer: MEDICARE

## 2018-12-11 ENCOUNTER — HOSPITAL ENCOUNTER (OUTPATIENT)
Age: 73
Discharge: HOME | End: 2018-12-11

## 2018-12-11 DIAGNOSIS — N20.0 KIDNEY STONE: ICD-10-CM

## 2018-12-11 PROCEDURE — 9900000065 HC CARDIAC REHAB PHASE 3 - 1 VISIT

## 2018-12-11 PROCEDURE — 74018 RADEX ABDOMEN 1 VIEW: CPT

## 2019-01-09 DIAGNOSIS — I10 ESSENTIAL HYPERTENSION: Primary | ICD-10-CM

## 2019-01-09 RX ORDER — LOSARTAN POTASSIUM 100 MG/1
100 TABLET ORAL DAILY
Qty: 90 TABLET | Refills: 1 | Status: SHIPPED | OUTPATIENT
Start: 2019-01-09 | End: 2019-05-28 | Stop reason: SDUPTHER

## 2019-01-09 RX ORDER — SPIRONOLACTONE 25 MG/1
TABLET ORAL
Qty: 90 TABLET | Refills: 1 | Status: SHIPPED | OUTPATIENT
Start: 2019-01-09 | End: 2019-05-28 | Stop reason: SDUPTHER

## 2019-01-10 RX ORDER — DICLOFENAC SODIUM 75 MG/1
75 TABLET, DELAYED RELEASE ORAL 2 TIMES DAILY
Qty: 180 TABLET | Refills: 0 | OUTPATIENT
Start: 2019-01-10

## 2019-01-15 ENCOUNTER — HOSPITAL ENCOUNTER (OUTPATIENT)
Age: 74
Setting detail: SPECIMEN
Discharge: HOME | End: 2019-01-15
Payer: MEDICARE

## 2019-01-15 DIAGNOSIS — I10 ESSENTIAL HYPERTENSION: ICD-10-CM

## 2019-01-15 LAB
ANION GAP SERPL CALCULATED.3IONS-SCNC: 15 MMOL/L (ref 9–17)
BUN BLDV-MCNC: 32 MG/DL (ref 8–23)
BUN/CREAT BLD: ABNORMAL (ref 9–20)
CALCIUM SERPL-MCNC: 9.8 MG/DL (ref 8.6–10.4)
CHLORIDE BLD-SCNC: 108 MMOL/L (ref 98–107)
CO2: 19 MMOL/L (ref 20–31)
CREAT SERPL-MCNC: 1.55 MG/DL (ref 0.7–1.2)
GFR AFRICAN AMERICAN: 53 ML/MIN
GFR NON-AFRICAN AMERICAN: 44 ML/MIN
GFR SERPL CREATININE-BSD FRML MDRD: ABNORMAL ML/MIN/{1.73_M2}
GFR SERPL CREATININE-BSD FRML MDRD: ABNORMAL ML/MIN/{1.73_M2}
GLUCOSE BLD-MCNC: 63 MG/DL (ref 70–99)
POTASSIUM SERPL-SCNC: 5.1 MMOL/L (ref 3.7–5.3)
SODIUM BLD-SCNC: 142 MMOL/L (ref 135–144)

## 2019-01-25 ENCOUNTER — OFFICE VISIT (OUTPATIENT)
Dept: INTERNAL MEDICINE CLINIC | Age: 74
End: 2019-01-25
Payer: MEDICARE

## 2019-01-25 VITALS
TEMPERATURE: 98 F | HEART RATE: 64 BPM | SYSTOLIC BLOOD PRESSURE: 148 MMHG | HEIGHT: 66 IN | RESPIRATION RATE: 18 BRPM | BODY MASS INDEX: 38.22 KG/M2 | WEIGHT: 237.8 LBS | DIASTOLIC BLOOD PRESSURE: 80 MMHG

## 2019-01-25 DIAGNOSIS — N18.30 CHRONIC KIDNEY DISEASE, STAGE III (MODERATE) (HCC): ICD-10-CM

## 2019-01-25 DIAGNOSIS — I10 ESSENTIAL HYPERTENSION: ICD-10-CM

## 2019-01-25 DIAGNOSIS — Z79.4 TYPE 2 DIABETES MELLITUS WITH OTHER CIRCULATORY COMPLICATION, WITH LONG-TERM CURRENT USE OF INSULIN (HCC): ICD-10-CM

## 2019-01-25 DIAGNOSIS — M54.5 LOW BACK PAIN, UNSPECIFIED BACK PAIN LATERALITY, UNSPECIFIED CHRONICITY, WITH SCIATICA PRESENCE UNSPECIFIED: ICD-10-CM

## 2019-01-25 DIAGNOSIS — G56.03 BILATERAL CARPAL TUNNEL SYNDROME: Primary | ICD-10-CM

## 2019-01-25 DIAGNOSIS — E11.59 TYPE 2 DIABETES MELLITUS WITH OTHER CIRCULATORY COMPLICATION, WITH LONG-TERM CURRENT USE OF INSULIN (HCC): ICD-10-CM

## 2019-01-25 PROCEDURE — 3017F COLORECTAL CA SCREEN DOC REV: CPT | Performed by: INTERNAL MEDICINE

## 2019-01-25 PROCEDURE — 3046F HEMOGLOBIN A1C LEVEL >9.0%: CPT | Performed by: INTERNAL MEDICINE

## 2019-01-25 PROCEDURE — 4040F PNEUMOC VAC/ADMIN/RCVD: CPT | Performed by: INTERNAL MEDICINE

## 2019-01-25 PROCEDURE — G8417 CALC BMI ABV UP PARAM F/U: HCPCS | Performed by: INTERNAL MEDICINE

## 2019-01-25 PROCEDURE — 99214 OFFICE O/P EST MOD 30 MIN: CPT | Performed by: INTERNAL MEDICINE

## 2019-01-25 PROCEDURE — G8427 DOCREV CUR MEDS BY ELIG CLIN: HCPCS | Performed by: INTERNAL MEDICINE

## 2019-01-25 PROCEDURE — G8482 FLU IMMUNIZE ORDER/ADMIN: HCPCS | Performed by: INTERNAL MEDICINE

## 2019-01-25 PROCEDURE — 2022F DILAT RTA XM EVC RTNOPTHY: CPT | Performed by: INTERNAL MEDICINE

## 2019-01-25 PROCEDURE — 1036F TOBACCO NON-USER: CPT | Performed by: INTERNAL MEDICINE

## 2019-01-25 PROCEDURE — 1123F ACP DISCUSS/DSCN MKR DOCD: CPT | Performed by: INTERNAL MEDICINE

## 2019-01-25 PROCEDURE — 1101F PT FALLS ASSESS-DOCD LE1/YR: CPT | Performed by: INTERNAL MEDICINE

## 2019-01-25 RX ORDER — DICLOFENAC SODIUM 75 MG/1
75 TABLET, DELAYED RELEASE ORAL 2 TIMES DAILY
Qty: 60 TABLET | Refills: 1 | Status: SHIPPED | OUTPATIENT
Start: 2019-01-25 | End: 2019-05-28 | Stop reason: SDUPTHER

## 2019-01-25 ASSESSMENT — PATIENT HEALTH QUESTIONNAIRE - PHQ9
2. FEELING DOWN, DEPRESSED OR HOPELESS: 0
1. LITTLE INTEREST OR PLEASURE IN DOING THINGS: 0
SUM OF ALL RESPONSES TO PHQ QUESTIONS 1-9: 0
SUM OF ALL RESPONSES TO PHQ9 QUESTIONS 1 & 2: 0
SUM OF ALL RESPONSES TO PHQ QUESTIONS 1-9: 0

## 2019-03-12 ENCOUNTER — HOSPITAL ENCOUNTER (OUTPATIENT)
Age: 74
Setting detail: SPECIMEN
Discharge: HOME | End: 2019-03-12
Payer: MEDICARE

## 2019-03-12 ENCOUNTER — HOSPITAL ENCOUNTER (OUTPATIENT)
Age: 74
Discharge: HOME | End: 2019-03-12

## 2019-03-12 DIAGNOSIS — I10 ESSENTIAL HYPERTENSION: ICD-10-CM

## 2019-03-12 DIAGNOSIS — N18.30 CHRONIC KIDNEY DISEASE, STAGE III (MODERATE) (HCC): ICD-10-CM

## 2019-03-12 DIAGNOSIS — G56.03 BILATERAL CARPAL TUNNEL SYNDROME: ICD-10-CM

## 2019-03-12 DIAGNOSIS — E11.59 TYPE 2 DIABETES MELLITUS WITH OTHER CIRCULATORY COMPLICATION, WITH LONG-TERM CURRENT USE OF INSULIN (HCC): ICD-10-CM

## 2019-03-12 DIAGNOSIS — M54.5 LOW BACK PAIN, UNSPECIFIED BACK PAIN LATERALITY, UNSPECIFIED CHRONICITY, WITH SCIATICA PRESENCE UNSPECIFIED: ICD-10-CM

## 2019-03-12 DIAGNOSIS — Z79.4 TYPE 2 DIABETES MELLITUS WITH OTHER CIRCULATORY COMPLICATION, WITH LONG-TERM CURRENT USE OF INSULIN (HCC): ICD-10-CM

## 2019-03-12 LAB
ANION GAP SERPL CALCULATED.3IONS-SCNC: 15 MMOL/L (ref 9–17)
BUN BLDV-MCNC: 33 MG/DL (ref 8–23)
BUN/CREAT BLD: ABNORMAL (ref 9–20)
CALCIUM SERPL-MCNC: 9.6 MG/DL (ref 8.6–10.4)
CHLORIDE BLD-SCNC: 108 MMOL/L (ref 98–107)
CO2: 16 MMOL/L (ref 20–31)
CREAT SERPL-MCNC: 1.44 MG/DL (ref 0.7–1.2)
GFR AFRICAN AMERICAN: 58 ML/MIN
GFR NON-AFRICAN AMERICAN: 48 ML/MIN
GFR SERPL CREATININE-BSD FRML MDRD: ABNORMAL ML/MIN/{1.73_M2}
GFR SERPL CREATININE-BSD FRML MDRD: ABNORMAL ML/MIN/{1.73_M2}
GLUCOSE BLD-MCNC: 172 MG/DL (ref 70–99)
POTASSIUM SERPL-SCNC: 5.1 MMOL/L (ref 3.7–5.3)
SODIUM BLD-SCNC: 139 MMOL/L (ref 135–144)

## 2019-03-12 PROCEDURE — 9900000065 HC CARDIAC REHAB PHASE 3 - 1 VISIT

## 2019-03-29 RX ORDER — INSULIN LISPRO 100 [IU]/ML
INJECTION, SUSPENSION SUBCUTANEOUS
Qty: 165 ML | Refills: 1 | Status: SHIPPED | OUTPATIENT
Start: 2019-03-29 | End: 2019-09-08 | Stop reason: SDUPTHER

## 2019-05-21 ENCOUNTER — HOSPITAL ENCOUNTER (OUTPATIENT)
Age: 74
Discharge: HOME | End: 2019-05-21

## 2019-05-21 PROCEDURE — 9900000065 HC CARDIAC REHAB PHASE 3 - 1 VISIT

## 2019-05-28 ENCOUNTER — OFFICE VISIT (OUTPATIENT)
Dept: INTERNAL MEDICINE CLINIC | Age: 74
End: 2019-05-28
Payer: MEDICARE

## 2019-05-28 VITALS
BODY MASS INDEX: 38.18 KG/M2 | DIASTOLIC BLOOD PRESSURE: 66 MMHG | TEMPERATURE: 98 F | HEIGHT: 66 IN | SYSTOLIC BLOOD PRESSURE: 112 MMHG | RESPIRATION RATE: 16 BRPM | HEART RATE: 64 BPM | WEIGHT: 237.6 LBS

## 2019-05-28 DIAGNOSIS — Z79.4 TYPE 2 DIABETES MELLITUS WITH OTHER CIRCULATORY COMPLICATION, WITH LONG-TERM CURRENT USE OF INSULIN (HCC): ICD-10-CM

## 2019-05-28 DIAGNOSIS — G56.03 BILATERAL CARPAL TUNNEL SYNDROME: ICD-10-CM

## 2019-05-28 DIAGNOSIS — L81.9 DISCOLORATION OF SKIN OF LOWER LEG: Primary | ICD-10-CM

## 2019-05-28 DIAGNOSIS — M79.89 LEG SWELLING: ICD-10-CM

## 2019-05-28 DIAGNOSIS — E11.59 TYPE 2 DIABETES MELLITUS WITH OTHER CIRCULATORY COMPLICATION, WITH LONG-TERM CURRENT USE OF INSULIN (HCC): ICD-10-CM

## 2019-05-28 DIAGNOSIS — I10 ESSENTIAL HYPERTENSION: ICD-10-CM

## 2019-05-28 PROCEDURE — 4040F PNEUMOC VAC/ADMIN/RCVD: CPT | Performed by: INTERNAL MEDICINE

## 2019-05-28 PROCEDURE — 2022F DILAT RTA XM EVC RTNOPTHY: CPT | Performed by: INTERNAL MEDICINE

## 2019-05-28 PROCEDURE — G8427 DOCREV CUR MEDS BY ELIG CLIN: HCPCS | Performed by: INTERNAL MEDICINE

## 2019-05-28 PROCEDURE — 3046F HEMOGLOBIN A1C LEVEL >9.0%: CPT | Performed by: INTERNAL MEDICINE

## 2019-05-28 PROCEDURE — 3017F COLORECTAL CA SCREEN DOC REV: CPT | Performed by: INTERNAL MEDICINE

## 2019-05-28 PROCEDURE — G8417 CALC BMI ABV UP PARAM F/U: HCPCS | Performed by: INTERNAL MEDICINE

## 2019-05-28 PROCEDURE — 1036F TOBACCO NON-USER: CPT | Performed by: INTERNAL MEDICINE

## 2019-05-28 PROCEDURE — 99214 OFFICE O/P EST MOD 30 MIN: CPT | Performed by: INTERNAL MEDICINE

## 2019-05-28 PROCEDURE — 1123F ACP DISCUSS/DSCN MKR DOCD: CPT | Performed by: INTERNAL MEDICINE

## 2019-05-28 RX ORDER — AMLODIPINE BESYLATE 10 MG/1
10 TABLET ORAL DAILY
Qty: 30 TABLET | Refills: 0 | Status: SHIPPED | OUTPATIENT
Start: 2019-05-28 | End: 2019-09-30

## 2019-05-28 RX ORDER — BUMETANIDE 1 MG/1
1 TABLET ORAL DAILY
Qty: 10 TABLET | Refills: 0 | Status: SHIPPED | OUTPATIENT
Start: 2019-05-28 | End: 2019-09-30 | Stop reason: ALTCHOICE

## 2019-05-28 RX ORDER — LOSARTAN POTASSIUM 100 MG/1
100 TABLET ORAL DAILY
Qty: 90 TABLET | Refills: 1 | Status: SHIPPED | OUTPATIENT
Start: 2019-05-28 | End: 2019-12-02 | Stop reason: SDUPTHER

## 2019-05-28 RX ORDER — ATORVASTATIN CALCIUM 40 MG/1
TABLET, FILM COATED ORAL
Qty: 90 TABLET | Refills: 1 | Status: SHIPPED | OUTPATIENT
Start: 2019-05-28 | End: 2019-11-11 | Stop reason: SDUPTHER

## 2019-05-28 RX ORDER — BUMETANIDE 1 MG/1
1 TABLET ORAL DAILY
Qty: 30 TABLET | Refills: 3 | Status: SHIPPED | OUTPATIENT
Start: 2019-05-28 | End: 2019-05-28 | Stop reason: RX

## 2019-05-28 RX ORDER — SPIRONOLACTONE 25 MG/1
TABLET ORAL
Qty: 90 TABLET | Refills: 1 | Status: SHIPPED | OUTPATIENT
Start: 2019-05-28 | End: 2019-12-02 | Stop reason: SDUPTHER

## 2019-05-28 RX ORDER — DICLOFENAC SODIUM 75 MG/1
75 TABLET, DELAYED RELEASE ORAL 2 TIMES DAILY
Qty: 60 TABLET | Refills: 1 | Status: SHIPPED | OUTPATIENT
Start: 2019-05-28 | End: 2019-09-30 | Stop reason: ALTCHOICE

## 2019-05-28 RX ORDER — AMLODIPINE BESYLATE 10 MG/1
10 TABLET ORAL DAILY
COMMUNITY
End: 2019-05-28 | Stop reason: SDUPTHER

## 2019-05-28 RX ORDER — METOPROLOL SUCCINATE 50 MG/1
TABLET, EXTENDED RELEASE ORAL
Qty: 90 TABLET | Refills: 1 | Status: SHIPPED | OUTPATIENT
Start: 2019-05-28 | End: 2019-11-11 | Stop reason: SDUPTHER

## 2019-05-28 RX ORDER — MONTELUKAST SODIUM 10 MG/1
TABLET ORAL
Qty: 90 TABLET | Refills: 1 | Status: SHIPPED | OUTPATIENT
Start: 2019-05-28 | End: 2019-11-11 | Stop reason: SDUPTHER

## 2019-05-28 RX ORDER — AMLODIPINE BESYLATE 10 MG/1
10 TABLET ORAL DAILY
Qty: 90 TABLET | Refills: 1 | Status: SHIPPED | OUTPATIENT
Start: 2019-05-28 | End: 2019-11-11 | Stop reason: SDUPTHER

## 2019-05-28 RX ORDER — OMEPRAZOLE 40 MG/1
CAPSULE, DELAYED RELEASE ORAL
Qty: 90 CAPSULE | Refills: 1 | Status: SHIPPED | OUTPATIENT
Start: 2019-05-28 | End: 2019-11-11 | Stop reason: SDUPTHER

## 2019-05-28 ASSESSMENT — PATIENT HEALTH QUESTIONNAIRE - PHQ9
2. FEELING DOWN, DEPRESSED OR HOPELESS: 0
SUM OF ALL RESPONSES TO PHQ QUESTIONS 1-9: 0
1. LITTLE INTEREST OR PLEASURE IN DOING THINGS: 0
SUM OF ALL RESPONSES TO PHQ QUESTIONS 1-9: 0
SUM OF ALL RESPONSES TO PHQ9 QUESTIONS 1 & 2: 0

## 2019-05-28 NOTE — PROGRESS NOTES
Shashi Childers is a 75 y.o. male who presents for   Chief Complaint   Patient presents with    Diabetes     4 mo check up, BS was 114 this AM, was a bit higher but better now- labs after last visit. seeing Dr Merly Be in June- last A1c was 6.1 per pt    Hypertension     as above    Hyperlipidemia     as above    Wound Check     right lower leg wound    Swelling     feels retaining fluid in hands and feet    and follow up of chronic medical problems.   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     HPI  Here for follow-up on blood pressure and diabetes denies any new complaints other than chronic back pain and also the skin is discolored on the right lower leg where he had a blister and wants to get it checked and also complaining of swelling of both hands and legs    Current Outpatient Medications   Medication Sig Dispense Refill    amLODIPine (NORVASC) 10 MG tablet Take 1 tablet by mouth daily 90 tablet 1    diclofenac (VOLTAREN) 75 MG EC tablet Take 1 tablet by mouth 2 times daily 60 tablet 1    losartan (COZAAR) 100 MG tablet Take 1 tablet by mouth daily 90 tablet 1    spironolactone (ALDACTONE) 25 MG tablet TAKE 1 TABLET DAILY 90 tablet 1    atorvastatin (LIPITOR) 40 MG tablet TAKE 1 TABLET DAILY 90 tablet 1    omeprazole (PRILOSEC) 40 MG delayed release capsule TAKE 1 CAPSULE DAILY 90 capsule 1    montelukast (SINGULAIR) 10 MG tablet TAKE 1 TABLET DAILY 90 tablet 1    metoprolol succinate (TOPROL XL) 50 MG extended release tablet TAKE 1 TABLET DAILY 90 tablet 1    triamcinolone (KENALOG) 0.1 % ointment Apply topically 2 times daily for 7 days 45 g 0    bumetanide (BUMEX) 1 MG tablet Take 1 tablet by mouth daily 10 tablet 0    amLODIPine (NORVASC) 10 MG tablet Take 1 tablet by mouth daily 30 tablet 0    HUMALOG MIX 50/50 KWIKPEN (50-50) 100 UNIT/ML SUPN injection pen INJECT 90 UNITS IN THE MORNING, 60 UNITS AT 6 P.M. AND 30 UNITS AT NIGHT 165 mL 1    metFORMIN (GLUCOPHAGE) 500 MG tablet TAKE 2 TABLETS TWICE A  tablet 1    docusate sodium (COLACE) 100 MG capsule Take 1 capsule by mouth 2 times daily 30 capsule 1    Respiratory Therapy Supplies MISC Cpap supplies- tubing, mask, filter 1 each 0    aspirin 81 MG tablet Take 81 mg by mouth daily PATIENT TO STOP TAKING 08/17/2018 FOR SURGERY. No current facility-administered medications for this visit.         No Known Allergies    Past Medical History:   Diagnosis Date    Arthritis     BACK, BILATERAL SHOULDERS    Asthma     Back pain     BRCA2 positive 09/25/2018    CAD (coronary artery disease) 09/28/2015    CABG BYPASS X 3    Caffeine use     2 cans pop/day    Carpal tunnel syndrome     LEFT HAND    Cataracts, bilateral     Colon cancer ([address] 11/17/2011    A LYMPH NODE IN COLON WAS CANCEROUS    Cyst of pancreas     GETS THIS CHECKED ANNUALLY    Diabetes mellitus ([address]     Difficult intravenous access     PATIENT STATES USUALLY REQUIRES PICC TEAM.    GERD (gastroesophageal reflux disease)     Hay fever     HIGH CHOLESTEROL     History of blood transfusion     NO REACTION    History of chemotherapy 05/2013    Hodgkin's lymphoma ([address]     HTN (hypertension)     Hx of blood clots     AFTER BACK SURGERY, PATIENT HAD BLOOD CLOTS IN SURGICAL AREA AND NEEDED MORE SURGERY TO GET THEM REMOVED    Kidney stone     RIGHT KIDNEY    Lumbar herniated disc 1989    Microhematuria     Obesity     GUTIERREZ on CPAP     Pancreatitis     Pneumonia     Tinnitus     Use of cane as ambulatory aid     Uses brace     BACK BRACE AND LEFT HAND    Wears glasses     READING       Past Surgical History:   Procedure Laterality Date    ABDOMEN SURGERY  11/18/2011    RIGHT COLECTOMY    APPENDECTOMY  2011    with colectomy    BACK SURGERY  2014    LUMBAR LAMINECTOMY    BACK SURGERY  03/01/2014    EVACUATION OF SPINAL HEMATOMA    CATARACT REMOVAL Bilateral 01/2018    CATARACTS WITH IOL PLACED    COLONOSCOPY      Removal of colon polyp    CORONARY ARTERY BYPASS GRAFT  09/28/2015    CABG BYPASS X 3    CYSTOSCOPY Right 08/23/2018    CYSTOSCOPY, INSERTION OCCLUSIVE BALLOON, RIGHT RETROGRADE PYELOGRAM     CYSTOSCOPY Right 09/04/2018    CYSTOSCOPY STENT REMOVAL (Right )    KNEE SURGERY Right 2007    TENDON REPAIR    NEPHROSTOMY Right 08/23/2018    HOLMIUM - NEPHROLITHOTOMY PERCUTANEOUS    NERVE BLOCK      STEROID INJECTIONS STARTING 2015 - 04/2018    NE CYSTOSCOPY,REMV CALCULUS,COMPLIC Right 6/6/6084    CYSTOSCOPY STENT REMOVAL performed by Laura Pimentel MD at [facility] Right 8/23/2018    CYSTOSCOPY, INSERTION OCCLUSIVE BALLOON, RIGHT RETROGRADE PYELOGRAM performed by Laura Pimentel MD at [address] [place] [address] TO 2 CM Right 8/23/2018    HOLMIUM - NEPHROLITHOTOMY PERCUTANEOUS, C-ARM, LITHOCLAST,  PT COMING FROM INTERV RADIOLOGY  *SHORT STAY [facility] [place]       Family History   Problem Relation Age of Onset    Diabetes Father     High Blood Pressure Father     Stroke Mother     Heart Failure Mother     Other Mother         pacemaker    High Blood Pressure Mother     Heart Disease Maternal Grandmother     Heart Disease Maternal Grandfather     Heart Disease Maternal Uncle      ROS   Constitutional:  Negative for fatigue, loss of appetite and unexpected weight change   HEENT            : Negative for neck stiffness and pain, no warm and dry and there is circular discoloration in the inner aspect of the right leg where the blister was originally  Psychiatric:  normal mood and effect; behavior normal.    Labs:   Lab Results   Component Value Date    LABA1C 6.4 01/29/2018     Lab Results   Component Value Date    CHOL 104 10/23/2018     Lab Results   Component Value Date    HDL 30 (L) 10/23/2018     Lab Results   Component Value Date    LDLCALC 35 10/17/2017     Lab Results   Component Value Date    TRIG 192 (H) 10/23/2018     No components found for: [place], [place]  Lab Results   Component Value Date    WBC 14.5 (H) 08/25/2018    HGB 11.9 (L) 08/25/2018    HCT 38.0 (L) 08/25/2018    MCV 95.5 08/25/2018     08/25/2018     Lab Results   Component Value Date    INR 0.9 08/14/2018    PROTIME 10.2 08/14/2018     Lab Results   Component Value Date    GLUCOSE 172 (H) 03/12/2019    CREATININE 1.44 (H) 03/12/2019    BUN 33 (H) 03/12/2019     03/12/2019    K 5.1 03/12/2019     (H) 03/12/2019    CO2 16 (L) 03/12/2019     Lab Results   Component Value Date    ALT 34 06/05/2018    AST 23 06/05/2018    ALKPHOS 144 (H) 06/05/2018    BILITOT 0.36 06/05/2018     Lab Results   Component Value Date    LABPROT 7.0 02/08/2013    LABALBU 4.1 06/05/2018     Lab Results   Component Value Date    TSH 3.67 10/06/2011     Assessment:  1. Discoloration of skin of lower leg    2. Type 2 diabetes mellitus with other circulatory complication, with long-term current use of insulin ([address]    3. Bilateral carpal tunnel syndrome    4. Essential hypertension    5.  Leg swelling        Plan:  Patient's recent visit his endocrinologist reviewed and last hemoglobin A1c was 6.1 and will continue current treatment including insulin  Patient's EMG reviewed which showed moderate to severe carpal tunnel bilaterally and patient advised to see the orthopedic specialist and referral was made  Patient back pain is still bothering him and we did discuss about all the options and patient has gone through with surgery pain management and physical therapy which did not help him  Blood pressure stable and Norvasc is increased to 10 mg and will continue same and refills given  Patient has some leg swelling bilaterally and so advised patient to take Bumex 1 mg daily for 5 days and call me back and patient is already on Aldactone  Patient had a blister back in October last year for which he was treated with antibiotics and Neosporin and there is a discoloration of the skin in the same area where the blister was but there is no evidence of infection and I did advise patient to use Kenalog ointment twice a day for 1 week and call me back  He had a diabetic foot exam today  Review in 4 months             1.  Valerie Rivero received counseling on the following healthy behaviors: nutrition and exercise    2. Prior labs and health maintenance reviewed. 3.  Discussed use, benefit, and side effects of prescribed medications. Barriers to medication compliance addressed. All his questions were answered. Pt voiced understanding. Valerie Rivero will continue current medications, diet and exercise.               Orders Placed This Encounter   Medications    amLODIPine (NORVASC) 10 MG tablet     Sig: Take 1 tablet by mouth daily     Dispense:  90 tablet     Refill:  1    diclofenac (VOLTAREN) 75 MG EC tablet     Sig: Take 1 tablet by mouth 2 times daily     Dispense:  60 tablet     Refill:  1    losartan (COZAAR) 100 MG tablet     Sig: Take 1 tablet by mouth daily     Dispense:  90 tablet     Refill:  1    spironolactone (ALDACTONE) 25 MG tablet     Sig: TAKE 1 TABLET DAILY     Dispense:  90 tablet     Refill:  1    atorvastatin (LIPITOR) 40 MG tablet     Sig: TAKE 1 TABLET DAILY     Dispense:  90 tablet     Refill:  1    omeprazole (PRILOSEC) 40 MG delayed release capsule     Sig: TAKE 1 CAPSULE DAILY     Dispense:  90 capsule     Refill:  1    montelukast (SINGULAIR) 10 MG tablet     Sig: TAKE 1 TABLET DAILY Dispense:  90 tablet     Refill:  1    metoprolol succinate (TOPROL XL) 50 MG extended release tablet     Sig: TAKE 1 TABLET DAILY     Dispense:  90 tablet     Refill:  1    DISCONTD: bumetanide (BUMEX) 1 MG tablet     Sig: Take 1 tablet by mouth daily     Dispense:  30 tablet     Refill:  3    triamcinolone (KENALOG) 0.1 % ointment     Sig: Apply topically 2 times daily for 7 days     Dispense:  45 g     Refill:  0    bumetanide (BUMEX) 1 MG tablet     Sig: Take 1 tablet by mouth daily     Dispense:  10 tablet     Refill:  0    amLODIPine (NORVASC) 10 MG tablet     Sig: Take 1 tablet by mouth daily     Dispense:  30 tablet     Refill:  0          Completed Refills               Requested Prescriptions     Signed Prescriptions Disp Refills    amLODIPine (NORVASC) 10 MG tablet 90 tablet 1     Sig: Take 1 tablet by mouth daily    diclofenac (VOLTAREN) 75 MG EC tablet 60 tablet 1     Sig: Take 1 tablet by mouth 2 times daily    losartan (COZAAR) 100 MG tablet 90 tablet 1     Sig: Take 1 tablet by mouth daily    spironolactone (ALDACTONE) 25 MG tablet 90 tablet 1     Sig: TAKE 1 TABLET DAILY    atorvastatin (LIPITOR) 40 MG tablet 90 tablet 1     Sig: TAKE 1 TABLET DAILY    omeprazole (PRILOSEC) 40 MG delayed release capsule 90 capsule 1     Sig: TAKE 1 CAPSULE DAILY    montelukast (SINGULAIR) 10 MG tablet 90 tablet 1     Sig: TAKE 1 TABLET DAILY    metoprolol succinate (TOPROL XL) 50 MG extended release tablet 90 tablet 1     Sig: TAKE 1 TABLET DAILY    triamcinolone (KENALOG) 0.1 % ointment 45 g 0     Sig: Apply topically 2 times daily for 7 days    bumetanide (BUMEX) 1 MG tablet 10 tablet 0     Sig: Take 1 tablet by mouth daily    amLODIPine (NORVASC) 10 MG tablet 30 tablet 0     Sig: Take 1 tablet by mouth daily     4. Patient given educational materials - see patient instructions    5. Was a self-tracking handout given in paper form or via [email or web address]?   NO    No orders of the defined types were placed in this encounter. Return in about 4 months (around 9/28/2019). Patient voiced understanding and agreed to treatment plan. Electronically signed by Eladia Cherry MD on 5/28/2019 at 10:53 AM    This note is created with a voice recognition program and while intend to generate a document that accurately reflects the content of the visit, no guarantee can be provided that every mistake has been identified and corrected by editing.

## 2019-06-04 ENCOUNTER — TELEPHONE (OUTPATIENT)
Dept: INTERNAL MEDICINE CLINIC | Age: 74
End: 2019-06-04

## 2019-06-04 DIAGNOSIS — G56.03 BILATERAL CARPAL TUNNEL SYNDROME: Primary | ICD-10-CM

## 2019-06-04 NOTE — TELEPHONE ENCOUNTER
LMTCB to notify of Ortho appt    Dr Kb Schulz 6/13/19 at 18am  [address] [facility]    Already faxed referral and EMG report

## 2019-07-02 ENCOUNTER — HOSPITAL ENCOUNTER (OUTPATIENT)
Age: 74
Discharge: HOME | End: 2019-07-02

## 2019-07-02 PROCEDURE — 9900000065 HC CARDIAC REHAB PHASE 3 - 1 VISIT

## 2019-07-15 LAB
AVERAGE GLUCOSE: NORMAL
HBA1C MFR BLD: 6.4 %

## 2019-09-09 RX ORDER — INSULIN LISPRO 100 [IU]/ML
INJECTION, SUSPENSION SUBCUTANEOUS
Qty: 165 ML | Refills: 3 | Status: SHIPPED | OUTPATIENT
Start: 2019-09-09 | End: 2020-09-22

## 2019-09-24 PROCEDURE — 9900000065 HC CARDIAC REHAB PHASE 3 - 1 VISIT

## 2019-09-30 ENCOUNTER — HOSPITAL ENCOUNTER (OUTPATIENT)
Dept: CARDIAC REHAB | Age: 74
Discharge: HOME | End: 2019-09-30

## 2019-09-30 ENCOUNTER — OFFICE VISIT (OUTPATIENT)
Dept: INTERNAL MEDICINE CLINIC | Age: 74
End: 2019-09-30
Payer: MEDICARE

## 2019-09-30 VITALS
SYSTOLIC BLOOD PRESSURE: 126 MMHG | HEIGHT: 66 IN | RESPIRATION RATE: 16 BRPM | DIASTOLIC BLOOD PRESSURE: 64 MMHG | TEMPERATURE: 97.6 F | WEIGHT: 235 LBS | HEART RATE: 64 BPM | BODY MASS INDEX: 37.77 KG/M2

## 2019-09-30 DIAGNOSIS — I10 ESSENTIAL HYPERTENSION: ICD-10-CM

## 2019-09-30 DIAGNOSIS — Z79.4 TYPE 2 DIABETES MELLITUS WITH OTHER CIRCULATORY COMPLICATION, WITH LONG-TERM CURRENT USE OF INSULIN (HCC): Primary | ICD-10-CM

## 2019-09-30 DIAGNOSIS — R51.9 NONINTRACTABLE HEADACHE, UNSPECIFIED CHRONICITY PATTERN, UNSPECIFIED HEADACHE TYPE: ICD-10-CM

## 2019-09-30 DIAGNOSIS — R68.83 CHILLS: ICD-10-CM

## 2019-09-30 DIAGNOSIS — E11.59 TYPE 2 DIABETES MELLITUS WITH OTHER CIRCULATORY COMPLICATION, WITH LONG-TERM CURRENT USE OF INSULIN (HCC): Primary | ICD-10-CM

## 2019-09-30 DIAGNOSIS — N18.30 CHRONIC KIDNEY DISEASE, STAGE III (MODERATE) (HCC): ICD-10-CM

## 2019-09-30 PROCEDURE — 99214 OFFICE O/P EST MOD 30 MIN: CPT | Performed by: INTERNAL MEDICINE

## 2019-09-30 PROCEDURE — 3017F COLORECTAL CA SCREEN DOC REV: CPT | Performed by: INTERNAL MEDICINE

## 2019-09-30 PROCEDURE — G8427 DOCREV CUR MEDS BY ELIG CLIN: HCPCS | Performed by: INTERNAL MEDICINE

## 2019-09-30 PROCEDURE — G8417 CALC BMI ABV UP PARAM F/U: HCPCS | Performed by: INTERNAL MEDICINE

## 2019-09-30 PROCEDURE — 3044F HG A1C LEVEL LT 7.0%: CPT | Performed by: INTERNAL MEDICINE

## 2019-09-30 PROCEDURE — 1036F TOBACCO NON-USER: CPT | Performed by: INTERNAL MEDICINE

## 2019-09-30 PROCEDURE — 2022F DILAT RTA XM EVC RTNOPTHY: CPT | Performed by: INTERNAL MEDICINE

## 2019-09-30 PROCEDURE — 90653 IIV ADJUVANT VACCINE IM: CPT | Performed by: INTERNAL MEDICINE

## 2019-09-30 PROCEDURE — G0008 ADMIN INFLUENZA VIRUS VAC: HCPCS | Performed by: INTERNAL MEDICINE

## 2019-09-30 PROCEDURE — 1123F ACP DISCUSS/DSCN MKR DOCD: CPT | Performed by: INTERNAL MEDICINE

## 2019-09-30 PROCEDURE — 4040F PNEUMOC VAC/ADMIN/RCVD: CPT | Performed by: INTERNAL MEDICINE

## 2019-09-30 RX ORDER — MELOXICAM 15 MG/1
15 TABLET ORAL DAILY
COMMUNITY
End: 2019-12-23

## 2019-10-05 ENCOUNTER — HOSPITAL ENCOUNTER (OUTPATIENT)
Dept: CT IMAGING | Age: 74
Discharge: HOME | End: 2019-10-07
Payer: MEDICARE

## 2019-10-05 DIAGNOSIS — N18.30 CHRONIC KIDNEY DISEASE, STAGE III (MODERATE) (HCC): ICD-10-CM

## 2019-10-05 DIAGNOSIS — E11.59 TYPE 2 DIABETES MELLITUS WITH OTHER CIRCULATORY COMPLICATION, WITH LONG-TERM CURRENT USE OF INSULIN (HCC): ICD-10-CM

## 2019-10-05 DIAGNOSIS — I10 ESSENTIAL HYPERTENSION: ICD-10-CM

## 2019-10-05 DIAGNOSIS — R51.9 NONINTRACTABLE HEADACHE, UNSPECIFIED CHRONICITY PATTERN, UNSPECIFIED HEADACHE TYPE: ICD-10-CM

## 2019-10-05 DIAGNOSIS — R68.83 CHILLS: ICD-10-CM

## 2019-10-05 DIAGNOSIS — Z79.4 TYPE 2 DIABETES MELLITUS WITH OTHER CIRCULATORY COMPLICATION, WITH LONG-TERM CURRENT USE OF INSULIN (HCC): ICD-10-CM

## 2019-10-05 PROCEDURE — 70450 CT HEAD/BRAIN W/O DYE: CPT

## 2019-10-14 ENCOUNTER — HOSPITAL ENCOUNTER (OUTPATIENT)
Age: 74
Setting detail: SPECIMEN
Discharge: HOME | End: 2019-10-14
Payer: MEDICARE

## 2019-10-14 DIAGNOSIS — N18.30 CHRONIC KIDNEY DISEASE, STAGE III (MODERATE) (HCC): ICD-10-CM

## 2019-10-14 DIAGNOSIS — Z79.4 TYPE 2 DIABETES MELLITUS WITH OTHER CIRCULATORY COMPLICATION, WITH LONG-TERM CURRENT USE OF INSULIN (HCC): ICD-10-CM

## 2019-10-14 DIAGNOSIS — I10 ESSENTIAL HYPERTENSION: ICD-10-CM

## 2019-10-14 DIAGNOSIS — R51.9 NONINTRACTABLE HEADACHE, UNSPECIFIED CHRONICITY PATTERN, UNSPECIFIED HEADACHE TYPE: ICD-10-CM

## 2019-10-14 DIAGNOSIS — E11.59 TYPE 2 DIABETES MELLITUS WITH OTHER CIRCULATORY COMPLICATION, WITH LONG-TERM CURRENT USE OF INSULIN (HCC): ICD-10-CM

## 2019-10-14 DIAGNOSIS — R68.83 CHILLS: ICD-10-CM

## 2019-10-14 LAB
ALBUMIN SERPL-MCNC: 4.1 G/DL (ref 3.5–5.2)
ALBUMIN/GLOBULIN RATIO: 1.2 (ref 1–2.5)
ALP BLD-CCNC: 118 U/L (ref 40–129)
ALT SERPL-CCNC: 22 U/L (ref 5–41)
ANION GAP SERPL CALCULATED.3IONS-SCNC: 14 MMOL/L (ref 9–17)
AST SERPL-CCNC: 22 U/L
BILIRUB SERPL-MCNC: 0.41 MG/DL (ref 0.3–1.2)
BUN BLDV-MCNC: 29 MG/DL (ref 8–23)
BUN/CREAT BLD: ABNORMAL (ref 9–20)
CALCIUM SERPL-MCNC: 9.4 MG/DL (ref 8.6–10.4)
CHLORIDE BLD-SCNC: 110 MMOL/L (ref 98–107)
CHOLESTEROL/HDL RATIO: 3.1
CHOLESTEROL: 97 MG/DL
CO2: 17 MMOL/L (ref 20–31)
CREAT SERPL-MCNC: 1.49 MG/DL (ref 0.7–1.2)
ESTIMATED AVERAGE GLUCOSE: 134 MG/DL
GFR AFRICAN AMERICAN: 56 ML/MIN
GFR NON-AFRICAN AMERICAN: 46 ML/MIN
GFR SERPL CREATININE-BSD FRML MDRD: ABNORMAL ML/MIN/{1.73_M2}
GFR SERPL CREATININE-BSD FRML MDRD: ABNORMAL ML/MIN/{1.73_M2}
GLUCOSE BLD-MCNC: 98 MG/DL (ref 70–99)
HBA1C MFR BLD: 6.3 % (ref 4–6)
HCT VFR BLD CALC: 38.9 % (ref 40.7–50.3)
HDLC SERPL-MCNC: 31 MG/DL
HEMOGLOBIN: 12.5 G/DL (ref 13–17)
LDL CHOLESTEROL: 20 MG/DL (ref 0–130)
MCH RBC QN AUTO: 31 PG (ref 25.2–33.5)
MCHC RBC AUTO-ENTMCNC: 32.1 G/DL (ref 28.4–34.8)
MCV RBC AUTO: 96.5 FL (ref 82.6–102.9)
NRBC AUTOMATED: 0 PER 100 WBC
PDW BLD-RTO: 13.6 % (ref 11.8–14.4)
PLATELET # BLD: 190 K/UL (ref 138–453)
PMV BLD AUTO: 10.6 FL (ref 8.1–13.5)
POTASSIUM SERPL-SCNC: 4.8 MMOL/L (ref 3.7–5.3)
RBC # BLD: 4.03 M/UL (ref 4.21–5.77)
SODIUM BLD-SCNC: 141 MMOL/L (ref 135–144)
TOTAL PROTEIN: 7.6 G/DL (ref 6.4–8.3)
TRIGL SERPL-MCNC: 231 MG/DL
TSH SERPL DL<=0.05 MIU/L-ACNC: 5.97 MIU/L (ref 0.3–5)
VLDLC SERPL CALC-MCNC: ABNORMAL MG/DL (ref 1–30)
WBC # BLD: 7.6 K/UL (ref 3.5–11.3)

## 2019-10-16 ENCOUNTER — TELEPHONE (OUTPATIENT)
Dept: INTERNAL MEDICINE CLINIC | Age: 74
End: 2019-10-16

## 2019-10-16 DIAGNOSIS — R79.89 ABNORMAL THYROID BLOOD TEST: ICD-10-CM

## 2019-10-16 DIAGNOSIS — R79.89 ELEVATED TSH: Primary | ICD-10-CM

## 2019-10-16 DIAGNOSIS — E03.9 HYPOTHYROIDISM, UNSPECIFIED TYPE: ICD-10-CM

## 2019-10-20 LAB
CULTURE: NORMAL
Lab: NORMAL
SPECIMEN DESCRIPTION: NORMAL

## 2019-10-21 ENCOUNTER — OFFICE VISIT (OUTPATIENT)
Dept: INTERNAL MEDICINE CLINIC | Age: 74
End: 2019-10-21
Payer: MEDICARE

## 2019-10-21 VITALS
SYSTOLIC BLOOD PRESSURE: 130 MMHG | HEIGHT: 63 IN | BODY MASS INDEX: 41.39 KG/M2 | TEMPERATURE: 97 F | DIASTOLIC BLOOD PRESSURE: 70 MMHG | WEIGHT: 233.6 LBS | RESPIRATION RATE: 16 BRPM | HEART RATE: 60 BPM

## 2019-10-21 DIAGNOSIS — Z00.00 ROUTINE GENERAL MEDICAL EXAMINATION AT A HEALTH CARE FACILITY: Primary | ICD-10-CM

## 2019-10-21 PROCEDURE — G0438 PPPS, INITIAL VISIT: HCPCS | Performed by: INTERNAL MEDICINE

## 2019-10-21 PROCEDURE — 4040F PNEUMOC VAC/ADMIN/RCVD: CPT | Performed by: INTERNAL MEDICINE

## 2019-10-21 PROCEDURE — 3017F COLORECTAL CA SCREEN DOC REV: CPT | Performed by: INTERNAL MEDICINE

## 2019-10-21 PROCEDURE — 1123F ACP DISCUSS/DSCN MKR DOCD: CPT | Performed by: INTERNAL MEDICINE

## 2019-10-21 PROCEDURE — G8482 FLU IMMUNIZE ORDER/ADMIN: HCPCS | Performed by: INTERNAL MEDICINE

## 2019-10-21 ASSESSMENT — PATIENT HEALTH QUESTIONNAIRE - PHQ9
SUM OF ALL RESPONSES TO PHQ QUESTIONS 1-9: 0
SUM OF ALL RESPONSES TO PHQ QUESTIONS 1-9: 0

## 2019-10-21 ASSESSMENT — LIFESTYLE VARIABLES: HOW OFTEN DO YOU HAVE A DRINK CONTAINING ALCOHOL: 0

## 2019-11-05 PROCEDURE — 9900000065 HC CARDIAC REHAB PHASE 3 - 1 VISIT

## 2019-11-11 RX ORDER — MONTELUKAST SODIUM 10 MG/1
TABLET ORAL
Qty: 90 TABLET | Refills: 0 | Status: SHIPPED | OUTPATIENT
Start: 2019-11-11 | End: 2020-02-17

## 2019-11-11 RX ORDER — ATORVASTATIN CALCIUM 40 MG/1
TABLET, FILM COATED ORAL
Qty: 90 TABLET | Refills: 0 | Status: SHIPPED | OUTPATIENT
Start: 2019-11-11 | End: 2020-02-17

## 2019-11-11 RX ORDER — AMLODIPINE BESYLATE 10 MG/1
TABLET ORAL
Qty: 90 TABLET | Refills: 0 | Status: SHIPPED | OUTPATIENT
Start: 2019-11-11 | End: 2020-02-17

## 2019-11-11 RX ORDER — OMEPRAZOLE 40 MG/1
CAPSULE, DELAYED RELEASE ORAL
Qty: 90 CAPSULE | Refills: 0 | Status: SHIPPED | OUTPATIENT
Start: 2019-11-11 | End: 2020-02-17

## 2019-11-11 RX ORDER — METOPROLOL SUCCINATE 50 MG/1
TABLET, EXTENDED RELEASE ORAL
Qty: 90 TABLET | Refills: 0 | Status: SHIPPED | OUTPATIENT
Start: 2019-11-11 | End: 2020-02-17

## 2019-11-26 ENCOUNTER — HOSPITAL ENCOUNTER (OUTPATIENT)
Dept: CARDIAC REHAB | Age: 74
Discharge: HOME | End: 2019-11-26

## 2019-12-02 RX ORDER — SPIRONOLACTONE 25 MG/1
TABLET ORAL
Qty: 90 TABLET | Refills: 0 | Status: SHIPPED | OUTPATIENT
Start: 2019-12-02 | End: 2019-12-23

## 2019-12-02 RX ORDER — LOSARTAN POTASSIUM 100 MG/1
TABLET ORAL
Qty: 90 TABLET | Refills: 0 | Status: SHIPPED | OUTPATIENT
Start: 2019-12-02 | End: 2020-02-17

## 2019-12-09 ENCOUNTER — OFFICE VISIT (OUTPATIENT)
Dept: INTERNAL MEDICINE CLINIC | Age: 74
End: 2019-12-09
Payer: MEDICARE

## 2019-12-09 VITALS
WEIGHT: 231.4 LBS | HEIGHT: 66 IN | SYSTOLIC BLOOD PRESSURE: 120 MMHG | DIASTOLIC BLOOD PRESSURE: 60 MMHG | RESPIRATION RATE: 18 BRPM | OXYGEN SATURATION: 98 % | TEMPERATURE: 97.7 F | BODY MASS INDEX: 37.19 KG/M2 | HEART RATE: 102 BPM

## 2019-12-09 DIAGNOSIS — L81.9 DISCOLORATION OF SKIN OF LOWER LEG: ICD-10-CM

## 2019-12-09 DIAGNOSIS — E03.9 HYPOTHYROIDISM, UNSPECIFIED TYPE: ICD-10-CM

## 2019-12-09 DIAGNOSIS — R79.89 ELEVATED TSH: ICD-10-CM

## 2019-12-09 DIAGNOSIS — R06.02 SOB (SHORTNESS OF BREATH): Primary | ICD-10-CM

## 2019-12-09 PROCEDURE — G8417 CALC BMI ABV UP PARAM F/U: HCPCS | Performed by: INTERNAL MEDICINE

## 2019-12-09 PROCEDURE — 1036F TOBACCO NON-USER: CPT | Performed by: INTERNAL MEDICINE

## 2019-12-09 PROCEDURE — 4040F PNEUMOC VAC/ADMIN/RCVD: CPT | Performed by: INTERNAL MEDICINE

## 2019-12-09 PROCEDURE — 99214 OFFICE O/P EST MOD 30 MIN: CPT | Performed by: INTERNAL MEDICINE

## 2019-12-09 PROCEDURE — G8427 DOCREV CUR MEDS BY ELIG CLIN: HCPCS | Performed by: INTERNAL MEDICINE

## 2019-12-09 PROCEDURE — 3017F COLORECTAL CA SCREEN DOC REV: CPT | Performed by: INTERNAL MEDICINE

## 2019-12-09 PROCEDURE — 1123F ACP DISCUSS/DSCN MKR DOCD: CPT | Performed by: INTERNAL MEDICINE

## 2019-12-09 PROCEDURE — G8482 FLU IMMUNIZE ORDER/ADMIN: HCPCS | Performed by: INTERNAL MEDICINE

## 2019-12-09 RX ORDER — FUROSEMIDE 20 MG/1
20 TABLET ORAL DAILY
Qty: 10 TABLET | Refills: 0 | Status: SHIPPED | OUTPATIENT
Start: 2019-12-09 | End: 2020-06-11 | Stop reason: SDUPTHER

## 2019-12-10 ENCOUNTER — HOSPITAL ENCOUNTER (OUTPATIENT)
Dept: GENERAL RADIOLOGY | Facility: CLINIC | Age: 74
Discharge: HOME | End: 2019-12-12
Payer: MEDICARE

## 2019-12-10 ENCOUNTER — HOSPITAL ENCOUNTER (OUTPATIENT)
Age: 74
Setting detail: SPECIMEN
Discharge: HOME | End: 2019-12-10
Payer: MEDICARE

## 2019-12-10 ENCOUNTER — TELEPHONE (OUTPATIENT)
Dept: INTERNAL MEDICINE CLINIC | Age: 74
End: 2019-12-10

## 2019-12-10 DIAGNOSIS — N20.0 CALCULUS OF KIDNEY: ICD-10-CM

## 2019-12-10 DIAGNOSIS — R06.02 SOB (SHORTNESS OF BREATH): ICD-10-CM

## 2019-12-10 DIAGNOSIS — E03.9 HYPOTHYROIDISM, UNSPECIFIED TYPE: ICD-10-CM

## 2019-12-10 DIAGNOSIS — E87.5 HYPERKALEMIA: Primary | ICD-10-CM

## 2019-12-10 DIAGNOSIS — R79.89 ELEVATED TSH: ICD-10-CM

## 2019-12-10 LAB
ALBUMIN SERPL-MCNC: 4.2 G/DL (ref 3.5–5.2)
ALBUMIN/GLOBULIN RATIO: 1.1 (ref 1–2.5)
ALP BLD-CCNC: 126 U/L (ref 40–129)
ALT SERPL-CCNC: 24 U/L (ref 5–41)
ANION GAP SERPL CALCULATED.3IONS-SCNC: 12 MMOL/L (ref 9–17)
AST SERPL-CCNC: 22 U/L
BILIRUB SERPL-MCNC: 0.33 MG/DL (ref 0.3–1.2)
BUN BLDV-MCNC: 37 MG/DL (ref 8–23)
BUN/CREAT BLD: ABNORMAL (ref 9–20)
CALCIUM SERPL-MCNC: 10 MG/DL (ref 8.6–10.4)
CHLORIDE BLD-SCNC: 115 MMOL/L (ref 98–107)
CO2: 13 MMOL/L (ref 20–31)
CREAT SERPL-MCNC: 1.92 MG/DL (ref 0.7–1.2)
GFR AFRICAN AMERICAN: 42 ML/MIN
GFR NON-AFRICAN AMERICAN: 34 ML/MIN
GFR SERPL CREATININE-BSD FRML MDRD: ABNORMAL ML/MIN/{1.73_M2}
GFR SERPL CREATININE-BSD FRML MDRD: ABNORMAL ML/MIN/{1.73_M2}
GLUCOSE BLD-MCNC: 73 MG/DL (ref 70–99)
HCT VFR BLD CALC: 40.9 % (ref 40.7–50.3)
HEMOGLOBIN: 13.1 G/DL (ref 13–17)
MCH RBC QN AUTO: 31.3 PG (ref 25.2–33.5)
MCHC RBC AUTO-ENTMCNC: 32 G/DL (ref 28.4–34.8)
MCV RBC AUTO: 97.6 FL (ref 82.6–102.9)
NRBC AUTOMATED: 0 PER 100 WBC
PDW BLD-RTO: 13.8 % (ref 11.8–14.4)
PLATELET # BLD: 197 K/UL (ref 138–453)
PMV BLD AUTO: 10.6 FL (ref 8.1–13.5)
POTASSIUM SERPL-SCNC: 6.2 MMOL/L (ref 3.7–5.3)
RBC # BLD: 4.19 M/UL (ref 4.21–5.77)
SODIUM BLD-SCNC: 140 MMOL/L (ref 135–144)
THYROXINE, FREE: 1.07 NG/DL (ref 0.93–1.7)
TOTAL PROTEIN: 7.9 G/DL (ref 6.4–8.3)
TSH SERPL DL<=0.05 MIU/L-ACNC: 5.35 MIU/L (ref 0.3–5)
WBC # BLD: 7.6 K/UL (ref 3.5–11.3)

## 2019-12-10 PROCEDURE — 74018 RADEX ABDOMEN 1 VIEW: CPT

## 2019-12-10 PROCEDURE — 71046 X-RAY EXAM CHEST 2 VIEWS: CPT

## 2019-12-10 RX ORDER — SODIUM POLYSTYRENE SULFONATE 15 G/60ML
30 SUSPENSION ORAL; RECTAL ONCE
Qty: 1 BOTTLE | Refills: 0 | Status: SHIPPED | OUTPATIENT
Start: 2019-12-10 | End: 2020-12-22 | Stop reason: ALTCHOICE

## 2019-12-11 ENCOUNTER — TELEPHONE (OUTPATIENT)
Dept: INTERNAL MEDICINE CLINIC | Age: 74
End: 2019-12-11

## 2019-12-11 DIAGNOSIS — E03.9 HYPOTHYROIDISM, UNSPECIFIED TYPE: Primary | ICD-10-CM

## 2019-12-11 RX ORDER — LEVOTHYROXINE SODIUM 0.03 MG/1
25 TABLET ORAL DAILY
Qty: 30 TABLET | Refills: 3 | Status: SHIPPED | OUTPATIENT
Start: 2019-12-11 | End: 2020-02-12 | Stop reason: SDUPTHER

## 2019-12-12 ENCOUNTER — HOSPITAL ENCOUNTER (OUTPATIENT)
Age: 74
Setting detail: SPECIMEN
Discharge: HOME | End: 2019-12-12
Payer: MEDICARE

## 2019-12-12 DIAGNOSIS — E03.9 HYPOTHYROIDISM, UNSPECIFIED TYPE: ICD-10-CM

## 2019-12-12 LAB
-: NORMAL
REASON FOR REJECTION: NORMAL
ZZ NTE CLEAN UP: ORDERED TEST: NORMAL
ZZ NTE WITH NAME CLEAN UP: SPECIMEN SOURCE: NORMAL

## 2019-12-23 ENCOUNTER — HOSPITAL ENCOUNTER (OUTPATIENT)
Age: 74
Setting detail: SPECIMEN
Discharge: HOME | End: 2019-12-23
Payer: MEDICARE

## 2019-12-23 PROBLEM — D49.0 IPMN (INTRADUCTAL PAPILLARY MUCINOUS NEOPLASM): Status: ACTIVE | Noted: 2017-12-11

## 2019-12-23 PROBLEM — R82.991 HYPOCITRATURIA: Status: ACTIVE | Noted: 2019-12-23

## 2019-12-23 PROBLEM — M16.0 BILATERAL PRIMARY OSTEOARTHRITIS OF HIP: Status: ACTIVE | Noted: 2017-11-09

## 2019-12-23 LAB
THYROXINE, FREE: 1.19 NG/DL (ref 0.93–1.7)
TSH SERPL DL<=0.05 MIU/L-ACNC: 4.27 MIU/L (ref 0.3–5)

## 2020-02-11 PROCEDURE — 9900000065 HC CARDIAC REHAB PHASE 3 - 1 VISIT

## 2020-02-12 ENCOUNTER — OFFICE VISIT (OUTPATIENT)
Dept: INTERNAL MEDICINE CLINIC | Age: 75
End: 2020-02-12
Payer: MEDICARE

## 2020-02-12 VITALS
HEART RATE: 65 BPM | DIASTOLIC BLOOD PRESSURE: 70 MMHG | OXYGEN SATURATION: 98 % | TEMPERATURE: 97 F | BODY MASS INDEX: 37.8 KG/M2 | HEIGHT: 66 IN | SYSTOLIC BLOOD PRESSURE: 130 MMHG | WEIGHT: 235.2 LBS

## 2020-02-12 PROCEDURE — G8417 CALC BMI ABV UP PARAM F/U: HCPCS | Performed by: INTERNAL MEDICINE

## 2020-02-12 PROCEDURE — 1123F ACP DISCUSS/DSCN MKR DOCD: CPT | Performed by: INTERNAL MEDICINE

## 2020-02-12 PROCEDURE — 99214 OFFICE O/P EST MOD 30 MIN: CPT | Performed by: INTERNAL MEDICINE

## 2020-02-12 PROCEDURE — 3046F HEMOGLOBIN A1C LEVEL >9.0%: CPT | Performed by: INTERNAL MEDICINE

## 2020-02-12 PROCEDURE — 3017F COLORECTAL CA SCREEN DOC REV: CPT | Performed by: INTERNAL MEDICINE

## 2020-02-12 PROCEDURE — G8482 FLU IMMUNIZE ORDER/ADMIN: HCPCS | Performed by: INTERNAL MEDICINE

## 2020-02-12 PROCEDURE — 1036F TOBACCO NON-USER: CPT | Performed by: INTERNAL MEDICINE

## 2020-02-12 PROCEDURE — 2022F DILAT RTA XM EVC RTNOPTHY: CPT | Performed by: INTERNAL MEDICINE

## 2020-02-12 PROCEDURE — 4040F PNEUMOC VAC/ADMIN/RCVD: CPT | Performed by: INTERNAL MEDICINE

## 2020-02-12 PROCEDURE — G8427 DOCREV CUR MEDS BY ELIG CLIN: HCPCS | Performed by: INTERNAL MEDICINE

## 2020-02-12 RX ORDER — SILDENAFIL 100 MG/1
100 TABLET, FILM COATED ORAL DAILY PRN
Qty: 90 TABLET | Refills: 0 | Status: SHIPPED | OUTPATIENT
Start: 2020-02-12 | End: 2020-12-30

## 2020-02-12 RX ORDER — LEVOTHYROXINE SODIUM 0.03 MG/1
25 TABLET ORAL DAILY
Qty: 90 TABLET | Refills: 0 | Status: SHIPPED | OUTPATIENT
Start: 2020-02-12 | End: 2020-05-18

## 2020-02-12 NOTE — PROGRESS NOTES
Maternal Grandmother     Heart Disease Maternal Grandfather     Heart Disease Maternal Uncle      ROS   Constitutional:  Negative for fatigue, loss of appetite and unexpected weight change   HEENT            : Negative for neck stiffness and pain, no congestion or sinus pressure   Eyes                : No visual disturbance or pain   Cardiovascular: No chest pain or palpitations or leg swelling   Respiratory      : Negative for cough, shortness of breath or wheezing   Gastrointestinal: Negative for abdominal pain, constipation or diarrhea and bloating No nausea or vomiting   Genitourinary:     No urgency or frequency, no burning or hematuria   Musculoskeletal: No arthralgias, back pain or myalgias   Skin                  : Negative for rash or erythema   Neurological    : Negative for dizziness, weakness, tremors ,light headedness or syncope   Psychiatric       : Negative for dysphoric mood, sleep disturbances, nervous or anxious, or decreased concentration   All other review of systems was negative    Objective  Physical Examination:    Nursing note reviewed    /70 (Site: Right Upper Arm, Position: Sitting, Cuff Size: Large Adult)   Pulse 65   Temp 97 °F (36.1 °C)   Ht 5' 5.98\" (1.676 m)   Wt 235 lb 3.2 oz (106.7 kg)   SpO2 98%   BMI 37.98 kg/m²   BP Readings from Last 3 Encounters:   02/12/20 130/70   12/23/19 (!) 166/87   12/09/19 120/60         Constitutional:  Naheed Mckinney is oriented to place, person and time ,appears well-developed and well-nourished  HEENT:  Atraumatic and normocephalic, external ears normal bilaterally, nose normal no oropharyngeal exudate and is clear and moist  Eyes:  EOCM normal; conjunctivae normal; PERRLA bilaterally  Neck:  Normal range of motion, neck supple, no JVD and no thyromegaly  Cardiovascular:  RRR, normal heart sounds and intact distal pulses  Pulmonary:  effort normal and breath sounds normal bilaterally,no wheezes or rales, no respiratory distress  Abdominal:  Soft, non-tender; normal bowel sounds, no masses  Musculoskeletal:  Normal range of motion and no edema or tenderness bilaterally  No lymphadenopathy  Neurological:  alert, oriented, and normal reflexes bilaterally  Skin: warm and dry  Psychiatric:  normal mood and effect; behavior normal.    Labs:   Lab Results   Component Value Date    LABA1C 6.3 (H) 10/14/2019     Lab Results   Component Value Date    CHOL 97 10/14/2019     Lab Results   Component Value Date    HDL 31 (L) 10/14/2019     Lab Results   Component Value Date    LDLCALC 35 10/17/2017     Lab Results   Component Value Date    TRIG 231 (H) 10/14/2019     No components found for: CHOLHDL  Lab Results   Component Value Date    WBC 7.6 12/10/2019    HGB 13.1 12/10/2019    HCT 40.9 12/10/2019    MCV 97.6 12/10/2019     12/10/2019     Lab Results   Component Value Date    INR 0.9 08/14/2018    PROTIME 10.2 08/14/2018     Lab Results   Component Value Date    GLUCOSE 73 12/10/2019    CREATININE 1.92 (H) 12/10/2019    BUN 37 (H) 12/10/2019     12/10/2019    K 6.2 (HH) 12/10/2019     (H) 12/10/2019    CO2 13 (L) 12/10/2019     Lab Results   Component Value Date    ALT 24 12/10/2019    AST 22 12/10/2019    ALKPHOS 126 12/10/2019    BILITOT 0.33 12/10/2019     Lab Results   Component Value Date    LABPROT 7.0 02/08/2013    LABALBU 4.2 12/10/2019     Lab Results   Component Value Date    TSH 4.27 12/23/2019     Assessment:  1. Hypothyroidism, unspecified type    2. Type 2 diabetes mellitus with other circulatory complication, with long-term current use of insulin ([address]    3.  Essential hypertension        Plan:  Patient's TSH is improved and patient is on 25 mcg of Synthroid and patient to repeat lab work at the end of this month and advised him to continue same  Patient's visit to the endocrinologist regarding diabetes reviewed and last hemoglobin A1c 6.3 and patient's medications reviewed and office notes reviewed  Blood pressure is stable on current medications  He had a diabetic foot exam today  Review in 4 months             1.  Serg received counseling on the following healthy behaviors: nutrition and exercise    2. Prior labs and health maintenance reviewed. 3.  Discussed use, benefit, and side effects of prescribed medications. Barriers to medication compliance addressed. All his questions were answered. Pt voiced understanding. Sana Rome will continue current medications, diet and exercise. Orders Placed This Encounter   Medications    levothyroxine (SYNTHROID) 25 MCG tablet     Sig: Take 1 tablet by mouth daily     Dispense:  90 tablet     Refill:  0    sildenafil (VIAGRA) 100 MG tablet     Sig: Take 1 tablet by mouth daily as needed for Erectile Dysfunction     Dispense:  90 tablet     Refill:  0          Completed Refills               Requested Prescriptions     Signed Prescriptions Disp Refills    levothyroxine (SYNTHROID) 25 MCG tablet 90 tablet 0     Sig: Take 1 tablet by mouth daily    sildenafil (VIAGRA) 100 MG tablet 90 tablet 0     Sig: Take 1 tablet by mouth daily as needed for Erectile Dysfunction     4. Patient given educational materials - see patient instructions    5. Was a self-tracking handout given in paper form or via [employer]? NO    No orders of the defined types were placed in this encounter. Return in about 4 months (around 6/12/2020). Patient voiced understanding and agreed to treatment plan. Electronically signed by Riki Isbell MD on 2/12/2020 at 2:28 PM    This note is created with a voice recognition program and while intend to generate a document that accurately reflects the content of the visit, no guarantee can be provided that every mistake has been identified and corrected by editing.

## 2020-02-17 RX ORDER — ATORVASTATIN CALCIUM 40 MG/1
TABLET, FILM COATED ORAL
Qty: 90 TABLET | Refills: 1 | Status: SHIPPED | OUTPATIENT
Start: 2020-02-17 | End: 2020-08-06

## 2020-02-17 RX ORDER — METOPROLOL SUCCINATE 50 MG/1
TABLET, EXTENDED RELEASE ORAL
Qty: 90 TABLET | Refills: 1 | Status: SHIPPED | OUTPATIENT
Start: 2020-02-17 | End: 2020-07-23

## 2020-02-17 RX ORDER — DICLOFENAC SODIUM 75 MG/1
TABLET, DELAYED RELEASE ORAL
Qty: 60 TABLET | Refills: 5 | Status: SHIPPED | OUTPATIENT
Start: 2020-02-17 | End: 2020-12-22 | Stop reason: ALTCHOICE

## 2020-02-17 RX ORDER — OMEPRAZOLE 40 MG/1
CAPSULE, DELAYED RELEASE ORAL
Qty: 90 CAPSULE | Refills: 1 | Status: SHIPPED | OUTPATIENT
Start: 2020-02-17 | End: 2020-08-06

## 2020-02-17 RX ORDER — AMLODIPINE BESYLATE 10 MG/1
TABLET ORAL
Qty: 90 TABLET | Refills: 1 | Status: SHIPPED | OUTPATIENT
Start: 2020-02-17 | End: 2020-08-06

## 2020-02-17 RX ORDER — MONTELUKAST SODIUM 10 MG/1
TABLET ORAL
Qty: 90 TABLET | Refills: 1 | Status: SHIPPED | OUTPATIENT
Start: 2020-02-17 | End: 2020-08-06

## 2020-02-17 RX ORDER — LOSARTAN POTASSIUM 100 MG/1
TABLET ORAL
Qty: 90 TABLET | Refills: 1 | Status: SHIPPED | OUTPATIENT
Start: 2020-02-17 | End: 2020-08-06

## 2020-02-25 ENCOUNTER — HOSPITAL ENCOUNTER (OUTPATIENT)
Age: 75
Setting detail: SPECIMEN
Discharge: HOME | End: 2020-02-25
Payer: MEDICARE

## 2020-02-25 ENCOUNTER — HOSPITAL ENCOUNTER (OUTPATIENT)
Dept: CARDIAC REHAB | Age: 75
Discharge: HOME | End: 2020-02-25

## 2020-02-25 LAB
THYROXINE, FREE: 1.2 NG/DL (ref 0.93–1.7)
TSH SERPL DL<=0.05 MIU/L-ACNC: 3.21 MIU/L (ref 0.3–5)

## 2020-05-11 RX ORDER — AMLODIPINE BESYLATE 5 MG/1
TABLET ORAL
Qty: 90 TABLET | Refills: 0 | Status: SHIPPED | OUTPATIENT
Start: 2020-05-11 | End: 2020-06-11 | Stop reason: SDUPTHER

## 2020-05-18 RX ORDER — LEVOTHYROXINE SODIUM 0.03 MG/1
TABLET ORAL
Qty: 90 TABLET | Refills: 0 | Status: SHIPPED | OUTPATIENT
Start: 2020-05-18 | End: 2020-08-14

## 2020-05-18 NOTE — TELEPHONE ENCOUNTER
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

## 2020-06-03 RX ORDER — SPIRONOLACTONE 25 MG/1
TABLET ORAL
Qty: 90 TABLET | Refills: 0 | Status: SHIPPED | OUTPATIENT
Start: 2020-06-03 | End: 2020-08-10

## 2020-06-11 ENCOUNTER — OFFICE VISIT (OUTPATIENT)
Dept: INTERNAL MEDICINE CLINIC | Age: 75
End: 2020-06-11
Payer: MEDICARE

## 2020-06-11 VITALS
OXYGEN SATURATION: 97 % | BODY MASS INDEX: 39.95 KG/M2 | HEART RATE: 63 BPM | TEMPERATURE: 97.2 F | DIASTOLIC BLOOD PRESSURE: 70 MMHG | WEIGHT: 239.8 LBS | SYSTOLIC BLOOD PRESSURE: 130 MMHG | HEIGHT: 65 IN

## 2020-06-11 PROCEDURE — 99214 OFFICE O/P EST MOD 30 MIN: CPT | Performed by: INTERNAL MEDICINE

## 2020-06-11 PROCEDURE — 4040F PNEUMOC VAC/ADMIN/RCVD: CPT | Performed by: INTERNAL MEDICINE

## 2020-06-11 PROCEDURE — G8417 CALC BMI ABV UP PARAM F/U: HCPCS | Performed by: INTERNAL MEDICINE

## 2020-06-11 PROCEDURE — 3017F COLORECTAL CA SCREEN DOC REV: CPT | Performed by: INTERNAL MEDICINE

## 2020-06-11 PROCEDURE — 2022F DILAT RTA XM EVC RTNOPTHY: CPT | Performed by: INTERNAL MEDICINE

## 2020-06-11 PROCEDURE — G8427 DOCREV CUR MEDS BY ELIG CLIN: HCPCS | Performed by: INTERNAL MEDICINE

## 2020-06-11 PROCEDURE — 1036F TOBACCO NON-USER: CPT | Performed by: INTERNAL MEDICINE

## 2020-06-11 PROCEDURE — 1123F ACP DISCUSS/DSCN MKR DOCD: CPT | Performed by: INTERNAL MEDICINE

## 2020-06-11 PROCEDURE — 3046F HEMOGLOBIN A1C LEVEL >9.0%: CPT | Performed by: INTERNAL MEDICINE

## 2020-06-11 RX ORDER — FUROSEMIDE 20 MG/1
20 TABLET ORAL DAILY
Qty: 10 TABLET | Refills: 0 | Status: SHIPPED | OUTPATIENT
Start: 2020-06-11 | End: 2020-09-22 | Stop reason: SDUPTHER

## 2020-06-11 ASSESSMENT — PATIENT HEALTH QUESTIONNAIRE - PHQ9
2. FEELING DOWN, DEPRESSED OR HOPELESS: 0
SUM OF ALL RESPONSES TO PHQ QUESTIONS 1-9: 0
SUM OF ALL RESPONSES TO PHQ9 QUESTIONS 1 & 2: 0
1. LITTLE INTEREST OR PLEASURE IN DOING THINGS: 0
SUM OF ALL RESPONSES TO PHQ QUESTIONS 1-9: 0

## 2020-06-11 NOTE — PROGRESS NOTES
 Lumbar herniated disc 1989    Microhematuria     Obesity     GUTIERREZ on CPAP     Pancreatitis     Pneumonia     Tinnitus     Use of cane as ambulatory aid     Uses brace     BACK BRACE AND LEFT HAND    Wears glasses     READING       Past Surgical History:   Procedure Laterality Date    ABDOMEN SURGERY  11/18/2011    RIGHT COLECTOMY    APPENDECTOMY  2011    with colectomy    BACK SURGERY  2014    LUMBAR LAMINECTOMY    BACK SURGERY  03/01/2014    EVACUATION OF SPINAL HEMATOMA    CATARACT REMOVAL Bilateral 01/2018    CATARACTS WITH IOL PLACED    COLONOSCOPY      Removal of colon polyp    CORONARY ARTERY BYPASS GRAFT  09/28/2015    CABG BYPASS X 3    CYSTOSCOPY Right 08/23/2018    CYSTOSCOPY, INSERTION OCCLUSIVE BALLOON, RIGHT RETROGRADE PYELOGRAM     CYSTOSCOPY Right 09/04/2018    CYSTOSCOPY STENT REMOVAL (Right )    KNEE SURGERY Right 2007    TENDON REPAIR    NEPHROSTOMY Right 08/23/2018    HOLMIUM - NEPHROLITHOTOMY PERCUTANEOUS    NERVE BLOCK      STEROID INJECTIONS STARTING 2015 - 04/2018    [place] CYSTOSCOPY,REMV CALCULUS,COMPLIC Right 4/0/7218    CYSTOSCOPY STENT REMOVAL performed by [facility] at [facility] Right 8/23/2018    CYSTOSCOPY, INSERTION OCCLUSIVE BALLOON, RIGHT RETROGRADE PYELOGRAM performed by Mae Farias MD at [address] [place] [address] TO 2 CM Right 8/23/2018    HOLMIUM - NEPHROLITHOTOMY PERCUTANEOUS, C-ARM, LITHOCLAST,  PT COMING FROM INTERV RADIOLOGY  *SHORT STAY performed by Mae Farias MD at [address] [place] [address]       Family History   Problem Relation Age of Onset    Diabetes Father     High Blood Pressure Father     Stroke Mother     Heart Failure Mother     Other Mother         pacemaker    High Blood Pressure Mother     Heart Disease Maternal Grandmother     Heart Disease Maternal Grandfather     Heart Disease Maternal Uncle      ROS   Constitutional:  Negative Panel     Standing Status:   Future     Standing Expiration Date:   6/11/2021     Order Specific Question:   Is Patient Fasting?/# of Hours     Answer:   12     No follow-ups on file. Patient voiced understanding and agreed to treatment plan. Electronically signed by Jon Brennan MD on 6/11/2020 at 11:14 AM    This note is created with a voice recognition program and while intend to generate a document that accurately reflects the content of the visit, no guarantee can be provided that every mistake has been identified and corrected by editing.

## 2020-06-16 ENCOUNTER — HOSPITAL ENCOUNTER (OUTPATIENT)
Age: 75
Setting detail: SPECIMEN
Discharge: HOME | End: 2020-06-16
Payer: MEDICARE

## 2020-06-16 ENCOUNTER — TELEPHONE (OUTPATIENT)
Dept: INTERNAL MEDICINE CLINIC | Age: 75
End: 2020-06-16

## 2020-06-16 LAB
ALBUMIN SERPL-MCNC: 4.2 G/DL (ref 3.5–5.2)
ALBUMIN/GLOBULIN RATIO: 1.3 (ref 1–2.5)
ALP BLD-CCNC: 112 U/L (ref 40–129)
ALT SERPL-CCNC: 19 U/L (ref 5–41)
ANION GAP SERPL CALCULATED.3IONS-SCNC: 19 MMOL/L (ref 9–17)
AST SERPL-CCNC: 23 U/L
BILIRUB SERPL-MCNC: 0.29 MG/DL (ref 0.3–1.2)
BUN BLDV-MCNC: 31 MG/DL (ref 8–23)
BUN/CREAT BLD: ABNORMAL (ref 9–20)
CALCIUM SERPL-MCNC: 9.4 MG/DL (ref 8.6–10.4)
CHLORIDE BLD-SCNC: 108 MMOL/L (ref 98–107)
CHOLESTEROL/HDL RATIO: 2.5
CHOLESTEROL: 88 MG/DL
CO2: 17 MMOL/L (ref 20–31)
CREAT SERPL-MCNC: 1.65 MG/DL (ref 0.7–1.2)
CREATININE URINE: 207.3 MG/DL (ref 39–259)
ESTIMATED AVERAGE GLUCOSE: 111 MG/DL
GFR AFRICAN AMERICAN: 50 ML/MIN
GFR NON-AFRICAN AMERICAN: 41 ML/MIN
GFR SERPL CREATININE-BSD FRML MDRD: ABNORMAL ML/MIN/{1.73_M2}
GFR SERPL CREATININE-BSD FRML MDRD: ABNORMAL ML/MIN/{1.73_M2}
GLUCOSE BLD-MCNC: 35 MG/DL (ref 70–99)
HBA1C MFR BLD: 5.5 % (ref 4–6)
HCT VFR BLD CALC: 40 % (ref 40.7–50.3)
HDLC SERPL-MCNC: 35 MG/DL
HEMOGLOBIN: 12.9 G/DL (ref 13–17)
LDL CHOLESTEROL: 21 MG/DL (ref 0–130)
MCH RBC QN AUTO: 30.5 PG (ref 25.2–33.5)
MCHC RBC AUTO-ENTMCNC: 32.3 G/DL (ref 28.4–34.8)
MCV RBC AUTO: 94.6 FL (ref 82.6–102.9)
MICROALBUMIN/CREAT 24H UR: 2523 MG/L
MICROALBUMIN/CREAT UR-RTO: 1217 MCG/MG CREAT
NRBC AUTOMATED: 0 PER 100 WBC
PDW BLD-RTO: 13.8 % (ref 11.8–14.4)
PLATELET # BLD: 211 K/UL (ref 138–453)
PMV BLD AUTO: 11 FL (ref 8.1–13.5)
POTASSIUM SERPL-SCNC: 4.3 MMOL/L (ref 3.7–5.3)
RBC # BLD: 4.23 M/UL (ref 4.21–5.77)
SODIUM BLD-SCNC: 144 MMOL/L (ref 135–144)
TOTAL PROTEIN: 7.4 G/DL (ref 6.4–8.3)
TRIGL SERPL-MCNC: 158 MG/DL
VLDLC SERPL CALC-MCNC: ABNORMAL MG/DL (ref 1–30)
WBC # BLD: 7.6 K/UL (ref 3.5–11.3)

## 2020-06-16 PROCEDURE — 9900000065 HC CARDIAC REHAB PHASE 3 - 1 VISIT

## 2020-06-30 ENCOUNTER — HOSPITAL ENCOUNTER (OUTPATIENT)
Dept: CARDIAC REHAB | Age: 75
Discharge: HOME | End: 2020-06-30

## 2020-07-23 RX ORDER — METOPROLOL SUCCINATE 50 MG/1
TABLET, EXTENDED RELEASE ORAL
Qty: 90 TABLET | Refills: 3 | Status: SHIPPED | OUTPATIENT
Start: 2020-07-23 | End: 2021-07-19

## 2020-07-23 NOTE — TELEPHONE ENCOUNTER
Last visit: 6/11/2020  Last Med refill: 2/17/2020  Does patient have enough medication for 72 hours: Yes    Next Visit Date:  Future Appointments   Date Time Provider Corinna Hanna   10/15/2020 10:30 AM Jose Cruz Wild  [address] Maintenance   Topic Date Due    DTaP/Tdap/Td vaccine (1 - Tdap) 06/25/1963    Shingles Vaccine (1 of 2) 06/25/1994    Flu vaccine (1) 09/01/2020    Annual Wellness Visit (AWV)  10/21/2020    Lipid screen  06/16/2021    Potassium monitoring  06/16/2021    Creatinine monitoring  06/16/2021    Pneumococcal 65+ yrs at Risk Vaccine  Completed    Hepatitis A vaccine  Aged Out    Hib vaccine  Aged Out    Meningococcal (ACWY) vaccine  Aged Out       Hemoglobin A1C (%)   Date Value   06/16/2020 5.5   10/14/2019 6.3 (H)   07/15/2019 6.4             ( goal A1C is < 7)   Microalb/Crt.  Ratio (mcg/mg creat)   Date Value   06/16/2020 1,217 (H)     LDL Cholesterol (mg/dL)   Date Value   06/16/2020 21   10/14/2019 20     LDL Calculated (mg/dL)   Date Value   10/17/2017 35       (goal LDL is <100)   AST (U/L)   Date Value   06/16/2020 23     ALT (U/L)   Date Value   06/16/2020 19     BUN (mg/dL)   Date Value   06/16/2020 31 (H)     BP Readings from Last 3 Encounters:   06/11/20 130/70   02/12/20 130/70   12/23/19 (!) 166/87          (goal 120/80)    All Future Testing planned in [facility]  Lab Frequency Next Occurrence   XR Abdomen Kub 1 VW Once 12/22/2020               Patient Active Problem List:     HIGH CHOLESTEROL     HTN (hypertension)     Obesity     GUTIERREZ (obstructive sleep apnea)     Colon cancer (HCC)     Hay fever     Lumbar herniated disc     Epidural hematoma (HCC)     Microhematuria     Renal calculus, right     Urolithiasis     Stage 2 chronic kidney disease     Uncontrolled type 2 diabetes mellitus with diabetic neuropathic arthropathy, with long-term current use of insulin (HCC)     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

## 2020-08-06 RX ORDER — LOSARTAN POTASSIUM 100 MG/1
TABLET ORAL
Qty: 90 TABLET | Refills: 3 | Status: ON HOLD | OUTPATIENT
Start: 2020-08-06 | End: 2021-02-26 | Stop reason: HOSPADM

## 2020-08-06 RX ORDER — ATORVASTATIN CALCIUM 40 MG/1
TABLET, FILM COATED ORAL
Qty: 90 TABLET | Refills: 3 | Status: SHIPPED | OUTPATIENT
Start: 2020-08-06 | End: 2021-07-12

## 2020-08-06 RX ORDER — AMLODIPINE BESYLATE 10 MG/1
TABLET ORAL
Qty: 90 TABLET | Refills: 3 | Status: SHIPPED | OUTPATIENT
Start: 2020-08-06 | End: 2021-07-12

## 2020-08-06 RX ORDER — MONTELUKAST SODIUM 10 MG/1
TABLET ORAL
Qty: 90 TABLET | Refills: 3 | Status: SHIPPED | OUTPATIENT
Start: 2020-08-06 | End: 2021-07-12

## 2020-08-06 RX ORDER — OMEPRAZOLE 40 MG/1
CAPSULE, DELAYED RELEASE ORAL
Qty: 90 CAPSULE | Refills: 3 | Status: SHIPPED | OUTPATIENT
Start: 2020-08-06 | End: 2021-07-12

## 2020-08-06 NOTE — TELEPHONE ENCOUNTER
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

## 2020-08-10 RX ORDER — SPIRONOLACTONE 25 MG/1
TABLET ORAL
Qty: 90 TABLET | Refills: 3 | Status: ON HOLD | OUTPATIENT
Start: 2020-08-10 | End: 2021-02-25 | Stop reason: HOSPADM

## 2020-08-10 NOTE — TELEPHONE ENCOUNTER
Last visit: 6/11/2020  Last Med refill: 6/3/2020  Does patient have enough medication for 72 hours: NA    Next Visit Date:  Future Appointments   Date Time Provider Corinna Cyndi   10/15/2020 10:30 AM Armando Burgos MD [facility] [address] Maintenance   Topic Date Due    DTaP/Tdap/Td vaccine (1 - Tdap) 06/25/1963    Shingles Vaccine (1 of 2) 06/25/1994    Flu vaccine (1) 09/01/2020    Diabetic retinal exam  10/01/2020    Annual Wellness Visit (AWV)  10/21/2020    A1C test (Diabetic or Prediabetic)  12/16/2020    Lipid screen  06/16/2021    Potassium monitoring  06/16/2021    Creatinine monitoring  06/16/2021    Diabetic foot exam  07/20/2021    Pneumococcal 65+ yrs at Risk Vaccine  Completed    Hepatitis A vaccine  Aged Out    Hib vaccine  Aged Out    Meningococcal (ACWY) vaccine  Aged Out       Hemoglobin A1C (%)   Date Value   06/16/2020 5.5   10/14/2019 6.3 (H)   07/15/2019 6.4             ( goal A1C is < 7)   Microalb/Crt.  Ratio (mcg/mg creat)   Date Value   06/16/2020 1,217 (H)     LDL Cholesterol (mg/dL)   Date Value   06/16/2020 21   10/14/2019 20     LDL Calculated (mg/dL)   Date Value   10/17/2017 35       (goal LDL is <100)   AST (U/L)   Date Value   06/16/2020 23     ALT (U/L)   Date Value   06/16/2020 19     BUN (mg/dL)   Date Value   06/16/2020 31 (H)     BP Readings from Last 3 Encounters:   06/11/20 130/70   02/12/20 130/70   12/23/19 (!) 166/87          (goal 120/80)    All Future Testing planned in [facility]  Lab Frequency Next Occurrence   XR Abdomen Kub 1 VW Once 12/22/2020               Patient Active Problem List:     HIGH CHOLESTEROL     HTN (hypertension)     Obesity     GUTIERREZ (obstructive sleep apnea)     Colon cancer (HCC)     Hay fever     Lumbar herniated disc     Epidural hematoma (HCC)     Microhematuria     Renal calculus, right     Urolithiasis     Stage 2 chronic kidney disease     Uncontrolled type 2 diabetes mellitus with diabetic neuropathic arthropathy, with long-term current use of insulin (HCC)     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

## 2020-08-14 RX ORDER — LEVOTHYROXINE SODIUM 0.03 MG/1
TABLET ORAL
Qty: 90 TABLET | Refills: 0 | Status: SHIPPED | OUTPATIENT
Start: 2020-08-14 | End: 2020-12-22 | Stop reason: SDUPTHER

## 2020-08-14 NOTE — TELEPHONE ENCOUNTER
Man Cobb is calling to request a refill on the following medication(s):    Medication Request:  Requested Prescriptions     Pending Prescriptions Disp Refills    levothyroxine (SYNTHROID) 25 MCG tablet [Pharmacy Med Name: L-THYROXINE (SYNTHROID) TABS 25MCG] 90 tablet 1     Sig: TAKE 1 TABLET DAILY       Last Visit Date (If Applicable):  4/94/5285    Next Visit Date:    10/15/2020

## 2020-08-25 ENCOUNTER — HOSPITAL ENCOUNTER (OUTPATIENT)
Dept: CARDIAC REHAB | Age: 75
Discharge: HOME | End: 2020-08-25

## 2020-08-25 PROCEDURE — 9900000065 HC CARDIAC REHAB PHASE 3 - 1 VISIT

## 2020-09-22 RX ORDER — INSULIN LISPRO 100 [IU]/ML
INJECTION, SUSPENSION SUBCUTANEOUS
Qty: 165 ML | Refills: 0 | Status: SHIPPED | OUTPATIENT
Start: 2020-09-22 | End: 2020-12-17

## 2020-09-22 RX ORDER — FUROSEMIDE 20 MG/1
20 TABLET ORAL DAILY
Qty: 10 TABLET | Refills: 0 | Status: SHIPPED | OUTPATIENT
Start: 2020-09-22 | End: 2020-12-22 | Stop reason: ALTCHOICE

## 2020-09-22 NOTE — TELEPHONE ENCOUNTER
Pt called to request rx for lasix which he takes PRN for pedal edema. Last filled 6/11/2020 #10 with 0 RF    Last filled humalog 9/9/19 165ml with 3 RF  Last filled metformin 2/17/2020 90 days with 1 RF    Medication Request:  Requested Prescriptions     Pending Prescriptions Disp Refills    HUMALOG MIX 50/50 KWIKPEN (50-50) 100 UNIT/ML SUPN injection pen [Pharmacy Med Name: HUMALOG MIX 50/50 KWKPN 3ML 5 50/50] 165 mL 2     Sig: INJECT 90 UNITS IN THE MORNING, 60 UNITS AT 6 P.M.  AND 30 UNITS AT NIGHT    metFORMIN (GLUCOPHAGE) 500 MG tablet [Pharmacy Med Name: METFORMIN HCL TABS 500MG] 360 tablet 3     Sig: TAKE 2 TABLETS TWICE A DAY    furosemide (LASIX) 20 MG tablet 10 tablet 0     Sig: Take 1 tablet by mouth daily       Last Visit Date (If Applicable):  2/64/5747    Next Visit Date:    10/15/2020

## 2020-10-22 ENCOUNTER — TELEPHONE (OUTPATIENT)
Dept: INTERNAL MEDICINE CLINIC | Age: 75
End: 2020-10-22

## 2020-10-22 NOTE — TELEPHONE ENCOUNTER
[facility] called to clarify does pt need a walker or wheelchair? Rec'd order for walker but chart notes support wheelchair. Please advise.   PT#[phone]

## 2020-11-03 ENCOUNTER — HOSPITAL ENCOUNTER (OUTPATIENT)
Age: 75
Setting detail: SPECIMEN
Discharge: HOME | End: 2020-11-03
Payer: MEDICARE

## 2020-11-03 LAB
ALBUMIN SERPL-MCNC: 4 G/DL (ref 3.5–5.2)
ALBUMIN/GLOBULIN RATIO: 1.3 (ref 1–2.5)
ALP BLD-CCNC: 135 U/L (ref 40–129)
ALT SERPL-CCNC: 15 U/L (ref 5–41)
ANION GAP SERPL CALCULATED.3IONS-SCNC: 13 MMOL/L (ref 9–17)
AST SERPL-CCNC: 19 U/L
BILIRUB SERPL-MCNC: 0.43 MG/DL (ref 0.3–1.2)
BUN BLDV-MCNC: 28 MG/DL (ref 8–23)
BUN/CREAT BLD: ABNORMAL (ref 9–20)
CALCIUM SERPL-MCNC: 9.6 MG/DL (ref 8.6–10.4)
CHLORIDE BLD-SCNC: 109 MMOL/L (ref 98–107)
CO2: 18 MMOL/L (ref 20–31)
CREAT SERPL-MCNC: 1.52 MG/DL (ref 0.7–1.2)
GFR AFRICAN AMERICAN: 54 ML/MIN
GFR NON-AFRICAN AMERICAN: 45 ML/MIN
GFR SERPL CREATININE-BSD FRML MDRD: ABNORMAL ML/MIN/{1.73_M2}
GFR SERPL CREATININE-BSD FRML MDRD: ABNORMAL ML/MIN/{1.73_M2}
GLUCOSE BLD-MCNC: 96 MG/DL (ref 70–99)
POTASSIUM SERPL-SCNC: 4.7 MMOL/L (ref 3.7–5.3)
SODIUM BLD-SCNC: 140 MMOL/L (ref 135–144)
TOTAL PROTEIN: 7 G/DL (ref 6.4–8.3)

## 2020-11-03 PROCEDURE — 9900000065 HC CARDIAC REHAB PHASE 3 - 1 VISIT

## 2020-11-12 RX ORDER — LEVOTHYROXINE SODIUM 0.03 MG/1
TABLET ORAL
Qty: 90 TABLET | Refills: 1 | OUTPATIENT
Start: 2020-11-12

## 2020-11-24 ENCOUNTER — HOSPITAL ENCOUNTER (OUTPATIENT)
Dept: CARDIAC REHAB | Age: 75
Discharge: HOME | End: 2020-11-24

## 2020-12-15 ENCOUNTER — HOSPITAL ENCOUNTER (OUTPATIENT)
Dept: GENERAL RADIOLOGY | Facility: CLINIC | Age: 75
Discharge: HOME | End: 2020-12-17
Payer: MEDICARE

## 2020-12-15 PROCEDURE — 9900000065 HC CARDIAC REHAB PHASE 3 - 1 VISIT

## 2020-12-15 PROCEDURE — 74018 RADEX ABDOMEN 1 VIEW: CPT

## 2020-12-17 RX ORDER — INSULIN LISPRO 100 [IU]/ML
INJECTION, SUSPENSION SUBCUTANEOUS
Qty: 165 ML | Refills: 0 | Status: SHIPPED | OUTPATIENT
Start: 2020-12-17 | End: 2021-03-16

## 2020-12-22 ENCOUNTER — OFFICE VISIT (OUTPATIENT)
Dept: INTERNAL MEDICINE CLINIC | Age: 75
End: 2020-12-22
Payer: MEDICARE

## 2020-12-22 VITALS
SYSTOLIC BLOOD PRESSURE: 132 MMHG | HEART RATE: 60 BPM | BODY MASS INDEX: 38.28 KG/M2 | TEMPERATURE: 97 F | RESPIRATION RATE: 16 BRPM | WEIGHT: 238.2 LBS | DIASTOLIC BLOOD PRESSURE: 74 MMHG | HEIGHT: 66 IN

## 2020-12-22 PROCEDURE — 4040F PNEUMOC VAC/ADMIN/RCVD: CPT | Performed by: INTERNAL MEDICINE

## 2020-12-22 PROCEDURE — G8427 DOCREV CUR MEDS BY ELIG CLIN: HCPCS | Performed by: INTERNAL MEDICINE

## 2020-12-22 PROCEDURE — 99214 OFFICE O/P EST MOD 30 MIN: CPT | Performed by: INTERNAL MEDICINE

## 2020-12-22 PROCEDURE — 1036F TOBACCO NON-USER: CPT | Performed by: INTERNAL MEDICINE

## 2020-12-22 PROCEDURE — G8484 FLU IMMUNIZE NO ADMIN: HCPCS | Performed by: INTERNAL MEDICINE

## 2020-12-22 PROCEDURE — G8417 CALC BMI ABV UP PARAM F/U: HCPCS | Performed by: INTERNAL MEDICINE

## 2020-12-22 PROCEDURE — 1123F ACP DISCUSS/DSCN MKR DOCD: CPT | Performed by: INTERNAL MEDICINE

## 2020-12-22 RX ORDER — LEVOTHYROXINE SODIUM 0.03 MG/1
TABLET ORAL
Qty: 90 TABLET | Refills: 1 | Status: SHIPPED | OUTPATIENT
Start: 2020-12-22 | End: 2021-06-02

## 2020-12-22 NOTE — PROGRESS NOTES
Rosa Isela Shah is a 67 y.o. male who presents for   Chief Complaint   Patient presents with    Diabetes     sees Nia, A1C was 5.3, denies problems- labs in [facility]    Diarrhea     runny stool and gas for 3 months    Other     had xray per Dr Dayna Choudhury in [address] Other     had negative covid test on 12/2/20- was asymptomatic, just wanted to know    Hypothyroidism     levothyroxine was refused, has been off for 3 weeks    and follow up of chronic medical problems. 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    Bilateral primary osteoarthritis of hip    Diffuse large cell lymphoma in remission (HCC)    Iron deficiency anemia    IPMN (intraductal papillary mucinous neoplasm)    Hypocitraturia     HPI  Here for follow-up on blood pressure and diabetes denies any new complaints other than increase the gas and flatus denies any abdominal pain nausea and no loss of appetite    Current Outpatient Medications   Medication Sig Dispense Refill    levothyroxine (SYNTHROID) 25 MCG tablet TAKE 1 TABLET DAILY 90 tablet 1    metFORMIN (GLUCOPHAGE) 500 MG tablet TAKE 2 TABLETS TWICE A  tablet 0    HUMALOG MIX 50/50 KWIKPEN (50-50) 100 UNIT/ML SUPN injection pen INJECT 90 UNITS IN THE MORNING, 60 UNITS AT 6 P.M.  AND 30 UNITS AT NIGHT 165 mL 0    spironolactone (ALDACTONE) 25 MG tablet TAKE 1 TABLET DAILY 90 tablet 3    losartan (COZAAR) 100 MG tablet TAKE 1 TABLET DAILY 90 tablet 3  montelukast (SINGULAIR) 10 MG tablet TAKE 1 TABLET DAILY 90 tablet 3    amLODIPine (NORVASC) 10 MG tablet TAKE 1 TABLET DAILY 90 tablet 3    atorvastatin (LIPITOR) 40 MG tablet TAKE 1 TABLET DAILY 90 tablet 3    omeprazole (PRILOSEC) 40 MG delayed release capsule TAKE 1 CAPSULE DAILY 90 capsule 3    metoprolol succinate (TOPROL XL) 50 MG extended release tablet TAKE 1 TABLET DAILY 90 tablet 3    sildenafil (VIAGRA) 100 MG tablet Take 1 tablet by mouth daily as needed for Erectile Dysfunction 90 tablet 0    meloxicam (MOBIC) 15 MG tablet daily as needed       Respiratory Therapy Supplies MISC Cpap supplies- tubing, mask, filter 1 each 0    aspirin 81 MG tablet Take 81 mg by mouth daily PATIENT TO STOP TAKING 08/17/2018 FOR SURGERY.  Misc. Devices [facility]) MISC 1 each by Does not apply route daily (Patient not taking: Reported on 12/22/2020) 1 each 0     No current facility-administered medications for this visit.         No Known Allergies    Past Medical History:   Diagnosis Date    Arthritis     BACK, BILATERAL SHOULDERS    Asthma     Back pain     BRCA2 positive 09/25/2018    CAD (coronary artery disease) 09/28/2015    CABG BYPASS X 3    Caffeine use     2 cans pop/day    Carpal tunnel syndrome     LEFT HAND    Cataracts, bilateral     Colon cancer ([address] 11/17/2011    A LYMPH NODE IN COLON WAS CANCEROUS    Cyst of pancreas     GETS THIS CHECKED ANNUALLY    Diabetes mellitus ([address]     Difficult intravenous access     PATIENT STATES USUALLY REQUIRES PICC TEAM.    GERD (gastroesophageal reflux disease)     Hay fever     HIGH CHOLESTEROL     History of blood transfusion     NO REACTION    History of chemotherapy 05/2013    Hodgkin's lymphoma ([address]     HTN (hypertension)     Hx of blood clots     AFTER BACK SURGERY, PATIENT HAD BLOOD CLOTS IN SURGICAL AREA AND NEEDED MORE SURGERY TO GET THEM REMOVED    Kidney stone     RIGHT KIDNEY    Lumbar herniated disc 1989    Microhematuria  Obesity     GUTIERREZ on CPAP     Pancreatitis     Pneumonia     Tinnitus     Use of cane as ambulatory aid     Uses brace     BACK BRACE AND LEFT HAND    Wears glasses     READING       Past Surgical History:   Procedure Laterality Date    ABDOMEN SURGERY  11/18/2011    RIGHT COLECTOMY    APPENDECTOMY  2011    with colectomy    BACK SURGERY  2014    LUMBAR LAMINECTOMY    BACK SURGERY  03/01/2014    EVACUATION OF SPINAL HEMATOMA    CATARACT REMOVAL Bilateral 01/2018    CATARACTS WITH IOL PLACED    COLONOSCOPY      Removal of colon polyp    CORONARY ARTERY BYPASS GRAFT  09/28/2015    CABG BYPASS X 3    CYSTOSCOPY Right 08/23/2018    CYSTOSCOPY, INSERTION OCCLUSIVE BALLOON, RIGHT RETROGRADE PYELOGRAM     CYSTOSCOPY Right 09/04/2018    CYSTOSCOPY STENT REMOVAL (Right )    KNEE SURGERY Right 2007    TENDON REPAIR    NEPHROSTOMY Right 08/23/2018    HOLMIUM - NEPHROLITHOTOMY PERCUTANEOUS    NERVE BLOCK      STEROID INJECTIONS STARTING 2015 - 04/2018    NV CYSTOSCOPY,REMV CALCULUS,COMPLIC Right 1/0/8308    CYSTOSCOPY STENT REMOVAL performed by Jordi Barney MD at [facility] Right 8/23/2018    CYSTOSCOPY, INSERTION OCCLUSIVE BALLOON, RIGHT RETROGRADE PYELOGRAM performed by Jordi Barney MD at [address] [place] [address] TO 2 CM Right 8/23/2018    HOLMIUM - NEPHROLITHOTOMY PERCUTANEOUS, C-ARM, LITHOCLAST,  PT COMING FROM INTERV RADIOLOGY  *SHORT STAY performed by Jordi Barney MD at [address] [place] [address]       Family History   Problem Relation Age of Onset    Diabetes Father     High Blood Pressure Father     Stroke Mother     Heart Failure Mother     Other Mother         pacemaker    High Blood Pressure Mother     Heart Disease Maternal Grandmother     Heart Disease Maternal Grandfather     Heart Disease Maternal Uncle      ROS ? Constitutional:  Negative for fatigue, loss of appetite and unexpected weight change  ? HEENT            : Negative for neck stiffness and pain, no congestion or sinus pressure  ? Eyes                : No visual disturbance or pain  ? Cardiovascular: No chest pain or palpitations or leg swelling  ? Respiratory      : Negative for cough, shortness of breath or wheezing  ? Gastrointestinal: Negative for abdominal pain, constipation or diarrhea and bloating No nausea or vomiting  ? Genitourinary:     No urgency or frequency, no burning or hematuria  ? Musculoskeletal: No arthralgias, back pain or myalgias  ? Skin                  : Negative for rash or erythema  ? Neurological    : Negative for dizziness, weakness, tremors ,light headedness or syncope  ? Psychiatric       : Negative for dysphoric mood, sleep disturbances, nervous or anxious, or decreased concentration  ?  All other review of systems was negative    Objective  Physical Examination:    Nursing note reviewed    /74 (Site: Right Upper Arm, Position: Sitting, Cuff Size: Large Adult)   Pulse 60   Temp 97 °F (36.1 °C) (Infrared)   Resp 16   Ht 5' 6\" (1.676 m)   Wt 238 lb 3.2 oz (108 kg)   BMI 38.45 kg/m²   BP Readings from Last 3 Encounters:   12/22/20 132/74   06/11/20 130/70   02/12/20 130/70         Constitutional:  Susy Rivero is oriented to place, person and time ,appears well-developed and well-nourished  HEENT:  Atraumatic and normocephalic, external ears normal bilaterally, nose normal no oropharyngeal exudate and is clear and moist  Eyes:  EOCM normal; conjunctivae normal; PERRLA bilaterally  Neck:  Normal range of motion, neck supple, no JVD and no thyromegaly  Cardiovascular:  RRR, normal heart sounds and intact distal pulses  Pulmonary:  effort normal and breath sounds normal bilaterally,no wheezes or rales, no respiratory distress  Abdominal:  Soft, non-tender; normal bowel sounds, no masses Musculoskeletal:  Normal range of motion and no edema or tenderness bilaterally  No lymphadenopathy  Neurological:  alert, oriented, and normal reflexes bilaterally  Skin: warm and dry  Psychiatric:  normal mood and effect; behavior normal.    Labs:   Lab Results   Component Value Date    LABA1C 5.5 06/16/2020     Lab Results   Component Value Date    CHOL 88 06/16/2020     Lab Results   Component Value Date    HDL 35 (L) 06/16/2020     Lab Results   Component Value Date    LDLCALC 35 10/17/2017     Lab Results   Component Value Date    TRIG 158 (H) 06/16/2020     No components found for: [place], [place]  Lab Results   Component Value Date    WBC 7.6 06/16/2020    HGB 12.9 (L) 06/16/2020    HCT 40.0 (L) 06/16/2020    MCV 94.6 06/16/2020     06/16/2020     Lab Results   Component Value Date    INR 0.9 08/14/2018    PROTIME 10.2 08/14/2018     Lab Results   Component Value Date    GLUCOSE 96 11/03/2020    CREATININE 1.52 (H) 11/03/2020    BUN 28 (H) 11/03/2020     11/03/2020    K 4.7 11/03/2020     (H) 11/03/2020    CO2 18 (L) 11/03/2020     Lab Results   Component Value Date    ALT 15 11/03/2020    AST 19 11/03/2020    ALKPHOS 135 (H) 11/03/2020    BILITOT 0.43 11/03/2020     Lab Results   Component Value Date    LABPROT 7.0 02/08/2013    LABALBU 4.0 11/03/2020     Lab Results   Component Value Date    TSH 3.21 02/25/2020     Assessment:  1. Essential hypertension    2. Diffuse large b-cell lymphoma, intra-abdominal lymph nodes ([address]    3. Morbidly obese ([address]    4. Type 2 diabetes mellitus with other circulatory complication, with long-term current use of insulin ([address]    5. Hypothyroidism, unspecified type    6.  Gas bloat syndrome        Plan:  Blood pressure is stable on current medications and continue same  Patient's hemoglobin A1c was 5.3 and patient had a recent visit with the endocrinologist  Patient had seen the pulmonologist for sleep apnea and reports reviewed and is stable and able to function Patient complaining of bloating and gas mostly flatus and eating a lot of salads and fruits and advised him to cut down the amount and monitor and take Beano if needed and call back if no improvement patient already has an appointment to see the gastroenterologist in the next few weeks for follow-up on his a pancreatic cyst and will discuss with him  Patient is off Synthroid for 3 weeks because his medicine was not filled and a refill was given and patient advised to repeat lab work in March and patient's last TSH was normal in February and patient had a recent THS done by endocrinology and was normal  Patient's creatinine has improved to 1.52 from 1.65 and advised patient to avoid Mobic and NSAIDs and patient will be seeing the urologist next week and had a x-ray of the abdomen done which did not show any acute changes other than stable left renal stone  Patient's large cell lymphoma is resolved and no recurrence  Discussed about Covid vaccination  Review in 3 months           1. Lourdes Napier received counseling on the following healthy behaviors: nutrition and exercise    2. Prior labs and health maintenance reviewed. 3.  Discussed use, benefit, and side effects of prescribed medications. Barriers to medication compliance addressed. All his questions were answered. Pt voiced understanding. Lourdes Napier will continue current medications, diet and exercise. Orders Placed This Encounter   Medications    levothyroxine (SYNTHROID) 25 MCG tablet     Sig: TAKE 1 TABLET DAILY     Dispense:  90 tablet     Refill:  1          Completed Refills               Requested Prescriptions     Signed Prescriptions Disp Refills    levothyroxine (SYNTHROID) 25 MCG tablet 90 tablet 1     Sig: TAKE 1 TABLET DAILY     4. Patient given educational materials - see patient instructions    5. Was a self-tracking handout given in paper form or via [employer]?   NO    Orders Placed This Encounter   Procedures    Hemoglobin A1C Standing Status:   Future     Standing Expiration Date:   12/22/2021    Comprehensive Metabolic Panel     Standing Status:   Future     Standing Expiration Date:   12/22/2021    Microalbumin / Creatinine Urine Ratio     Standing Status:   Future     Standing Expiration Date:   12/22/2021    T4, Free     Standing Status:   Future     Standing Expiration Date:   12/22/2021    Lipid Panel     Standing Status:   Future     Standing Expiration Date:   12/22/2021     Order Specific Question:   Is Patient Fasting?/# of Hours     Answer:   15    TSH without Reflex     Standing Status:   Future     Standing Expiration Date:   12/22/2021    CBC     Standing Status:   Future     Standing Expiration Date:   12/22/2021    CK     Standing Status:   Future     Standing Expiration Date:   12/22/2021    Amylase     Standing Status:   Future     Standing Expiration Date:   12/22/2021    Lipase     Standing Status:   Future     Standing Expiration Date:   12/22/2021     Return in about 3 months (around 3/22/2021). Patient voiced understanding and agreed to treatment plan. Electronically signed by Nancie Mccloud MD on 12/22/2020 at 10:26 AM    This note is created with a voice recognition program and while intend to generate a document that accurately reflects the content of the visit, no guarantee can be provided that every mistake has been identified and corrected by editing.

## 2020-12-29 ENCOUNTER — HOSPITAL ENCOUNTER (OUTPATIENT)
Dept: CARDIAC REHAB | Age: 75
Discharge: HOME | End: 2020-12-29

## 2021-02-23 ENCOUNTER — HOSPITAL ENCOUNTER (OUTPATIENT)
Age: 76
Setting detail: SPECIMEN
Discharge: HOME | End: 2021-02-23
Payer: MEDICARE

## 2021-02-23 ENCOUNTER — TELEPHONE (OUTPATIENT)
Dept: INTERNAL MEDICINE CLINIC | Age: 76
End: 2021-02-23

## 2021-02-23 ENCOUNTER — HOSPITAL ENCOUNTER (OUTPATIENT)
Age: 76
Discharge: HOME | DRG: 641 | End: 2021-02-23
Payer: MEDICARE

## 2021-02-23 ENCOUNTER — HOSPITAL ENCOUNTER (INPATIENT)
Age: 76
LOS: 3 days | Discharge: HOME | DRG: 641 | End: 2021-02-26
Attending: EMERGENCY MEDICINE | Admitting: INTERNAL MEDICINE
Payer: MEDICARE

## 2021-02-23 ENCOUNTER — HOSPITAL ENCOUNTER (OUTPATIENT)
Dept: CARDIAC REHAB | Age: 76
Discharge: HOME | End: 2021-02-23
Payer: MEDICARE

## 2021-02-23 DIAGNOSIS — C83.33 DIFFUSE LARGE B-CELL LYMPHOMA, INTRA-ABDOMINAL LYMPH NODES (HCC): ICD-10-CM

## 2021-02-23 DIAGNOSIS — E11.59 TYPE 2 DIABETES MELLITUS WITH OTHER CIRCULATORY COMPLICATION, WITH LONG-TERM CURRENT USE OF INSULIN (HCC): ICD-10-CM

## 2021-02-23 DIAGNOSIS — I10 ESSENTIAL HYPERTENSION: ICD-10-CM

## 2021-02-23 DIAGNOSIS — E87.5 HYPERKALEMIA: Primary | ICD-10-CM

## 2021-02-23 DIAGNOSIS — E66.01 MORBIDLY OBESE (HCC): ICD-10-CM

## 2021-02-23 DIAGNOSIS — Z79.4 TYPE 2 DIABETES MELLITUS WITH OTHER CIRCULATORY COMPLICATION, WITH LONG-TERM CURRENT USE OF INSULIN (HCC): ICD-10-CM

## 2021-02-23 DIAGNOSIS — E87.5 HYPERKALEMIA: ICD-10-CM

## 2021-02-23 DIAGNOSIS — N17.9 AKI (ACUTE KIDNEY INJURY) (HCC): ICD-10-CM

## 2021-02-23 DIAGNOSIS — E03.9 HYPOTHYROIDISM, UNSPECIFIED TYPE: ICD-10-CM

## 2021-02-23 LAB
-: ABNORMAL
ABSOLUTE EOS #: 0.23 K/UL (ref 0–0.44)
ABSOLUTE IMMATURE GRANULOCYTE: 0.05 K/UL (ref 0–0.3)
ABSOLUTE LYMPH #: 2.39 K/UL (ref 1.1–3.7)
ABSOLUTE MONO #: 0.73 K/UL (ref 0.1–1.2)
ALBUMIN SERPL-MCNC: 4.2 G/DL (ref 3.5–5.2)
ALBUMIN SERPL-MCNC: 4.2 G/DL (ref 3.5–5.2)
ALBUMIN/GLOBULIN RATIO: 1.3 (ref 1–2.5)
ALBUMIN/GLOBULIN RATIO: 1.3 (ref 1–2.5)
ALP BLD-CCNC: 117 U/L (ref 40–129)
ALP BLD-CCNC: 117 U/L (ref 40–129)
ALT SERPL-CCNC: 34 U/L (ref 5–41)
ALT SERPL-CCNC: 34 U/L (ref 5–41)
AMORPHOUS: ABNORMAL
AMYLASE: 92 U/L (ref 28–100)
ANION GAP SERPL CALCULATED.3IONS-SCNC: 13 MMOL/L (ref 9–17)
AST SERPL-CCNC: 26 U/L
AST SERPL-CCNC: 26 U/L
BACTERIA: ABNORMAL
BASOPHILS # BLD: 1 % (ref 0–2)
BASOPHILS ABSOLUTE: 0.06 K/UL (ref 0–0.2)
BILIRUB SERPL-MCNC: 0.4 MG/DL (ref 0.3–1.2)
BILIRUB SERPL-MCNC: 0.4 MG/DL (ref 0.3–1.2)
BILIRUBIN URINE: NEGATIVE
BUN BLDV-MCNC: 40 MG/DL (ref 8–23)
BUN BLDV-MCNC: 41 MG/DL (ref 8–23)
BUN BLDV-MCNC: 41 MG/DL (ref 8–23)
BUN BLDV-MCNC: 42 MG/DL (ref 8–23)
BUN/CREAT BLD: 18 (ref 9–20)
BUN/CREAT BLD: 18 (ref 9–20)
BUN/CREAT BLD: ABNORMAL (ref 9–20)
BUN/CREAT BLD: ABNORMAL (ref 9–20)
CALCIUM SERPL-MCNC: 9.6 MG/DL (ref 8.6–10.4)
CALCIUM SERPL-MCNC: 9.8 MG/DL (ref 8.6–10.4)
CASTS UA: ABNORMAL /LPF
CHLORIDE BLD-SCNC: 111 MMOL/L (ref 98–107)
CHLORIDE BLD-SCNC: 111 MMOL/L (ref 98–107)
CHLORIDE BLD-SCNC: 115 MMOL/L (ref 98–107)
CHLORIDE BLD-SCNC: 115 MMOL/L (ref 98–107)
CHOLESTEROL/HDL RATIO: 2
CHOLESTEROL/HDL RATIO: 2
CHOLESTEROL: 68 MG/DL
CHOLESTEROL: 68 MG/DL
CO2: 14 MMOL/L (ref 20–31)
CO2: 15 MMOL/L (ref 20–31)
COLOR: YELLOW
COMMENT UA: ABNORMAL
CREAT SERPL-MCNC: 2.17 MG/DL (ref 0.7–1.2)
CREAT SERPL-MCNC: 2.21 MG/DL (ref 0.7–1.2)
CREAT SERPL-MCNC: 2.21 MG/DL (ref 0.7–1.2)
CREAT SERPL-MCNC: 2.31 MG/DL (ref 0.7–1.2)
CREATININE URINE: 157.8 MG/DL (ref 39–259)
CRYSTALS, UA: ABNORMAL /HPF
DIFFERENTIAL TYPE: ABNORMAL
EOSINOPHILS RELATIVE PERCENT: 3 % (ref 1–4)
EPITHELIAL CELLS UA: ABNORMAL /HPF (ref 0–5)
ESTIMATED AVERAGE GLUCOSE: 128 MG/DL
FIO2: ABNORMAL
GFR AFRICAN AMERICAN: 34 ML/MIN
GFR AFRICAN AMERICAN: 35 ML/MIN
GFR AFRICAN AMERICAN: 35 ML/MIN
GFR AFRICAN AMERICAN: 36 ML/MIN
GFR NON-AFRICAN AMERICAN: 28 ML/MIN
GFR NON-AFRICAN AMERICAN: 29 ML/MIN
GFR NON-AFRICAN AMERICAN: 29 ML/MIN
GFR NON-AFRICAN AMERICAN: 30 ML/MIN
GFR SERPL CREATININE-BSD FRML MDRD: ABNORMAL ML/MIN/{1.73_M2}
GLUCOSE BLD-MCNC: 103 MG/DL (ref 70–99)
GLUCOSE BLD-MCNC: 90 MG/DL (ref 70–99)
GLUCOSE BLD-MCNC: 90 MG/DL (ref 70–99)
GLUCOSE BLD-MCNC: 95 MG/DL (ref 70–99)
GLUCOSE URINE: NEGATIVE
HBA1C MFR BLD: 6.1 % (ref 4–6)
HCO3 VENOUS: 14.8 MMOL/L (ref 24–30)
HCT VFR BLD CALC: 37.2 % (ref 40.7–50.3)
HCT VFR BLD CALC: 37.9 % (ref 40.7–50.3)
HDLC SERPL-MCNC: 34 MG/DL
HDLC SERPL-MCNC: 34 MG/DL
HEMOGLOBIN: 11.5 G/DL (ref 13–17)
HEMOGLOBIN: 11.6 G/DL (ref 13–17)
IMMATURE GRANULOCYTES: 1 %
KETONES, URINE: NEGATIVE
LACTIC ACID: 1.5 MMOL/L (ref 0.5–2.2)
LDL CHOLESTEROL: 12 MG/DL (ref 0–130)
LDL CHOLESTEROL: 12 MG/DL (ref 0–130)
LEUKOCYTE ESTERASE, URINE: NEGATIVE
LIPASE: 64 U/L (ref 13–60)
LYMPHOCYTES # BLD: 27 % (ref 24–43)
MAGNESIUM: 1.8 MG/DL (ref 1.6–2.6)
MCH RBC QN AUTO: 30.1 PG (ref 25.2–33.5)
MCH RBC QN AUTO: 30.6 PG (ref 25.2–33.5)
MCHC RBC AUTO-ENTMCNC: 30.6 G/DL (ref 28.4–34.8)
MCHC RBC AUTO-ENTMCNC: 30.9 G/DL (ref 28.4–34.8)
MCV RBC AUTO: 98.2 FL (ref 82.6–102.9)
MCV RBC AUTO: 98.9 FL (ref 82.6–102.9)
MICROALBUMIN/CREAT 24H UR: 809 MG/L
MICROALBUMIN/CREAT UR-RTO: 513 MCG/MG CREAT
MONOCYTES # BLD: 8 % (ref 3–12)
MUCUS: ABNORMAL
NEGATIVE BASE EXCESS, VEN: 11 (ref 0–2)
NITRITE, URINE: NEGATIVE
NRBC AUTOMATED: 0 PER 100 WBC
NRBC AUTOMATED: 0 PER 100 WBC
O2 DEVICE/FLOW/%: ABNORMAL
O2 SAT, VEN: 95 %
OTHER OBSERVATIONS UA: ABNORMAL
PATIENT TEMP: ABNORMAL
PCO2, VEN: 28 MM HG (ref 39–55)
PDW BLD-RTO: 14.8 % (ref 11.8–14.4)
PDW BLD-RTO: 14.9 % (ref 11.8–14.4)
PH UA: 6 (ref 5–8)
PH VENOUS: 7.33 (ref 7.32–7.42)
PLATELET # BLD: 167 K/UL (ref 138–453)
PLATELET # BLD: 172 K/UL (ref 138–453)
PLATELET ESTIMATE: ABNORMAL
PMV BLD AUTO: 10.3 FL (ref 8.1–13.5)
PMV BLD AUTO: 10.6 FL (ref 8.1–13.5)
PO2, VEN: 77 MM HG (ref 30–50)
POC PCO2 TEMP: ABNORMAL MM HG
POC PH TEMP: ABNORMAL
POC PO2 TEMP: ABNORMAL MM HG
POSITIVE BASE EXCESS, VEN: ABNORMAL (ref 0–2)
POTASSIUM SERPL-SCNC: 6.1 MMOL/L (ref 3.7–5.3)
POTASSIUM SERPL-SCNC: 6.2 MMOL/L (ref 3.7–5.3)
POTASSIUM SERPL-SCNC: 6.2 MMOL/L (ref 3.7–5.3)
POTASSIUM SERPL-SCNC: 6.6 MMOL/L (ref 3.7–5.3)
POTASSIUM SERPL-SCNC: 7 MMOL/L (ref 3.7–5.3)
PROTEIN UA: ABNORMAL
RBC # BLD: 3.76 M/UL (ref 4.21–5.77)
RBC # BLD: 3.86 M/UL (ref 4.21–5.77)
RBC # BLD: ABNORMAL 10*6/UL
RBC UA: ABNORMAL /HPF (ref 0–2)
RENAL EPITHELIAL, UA: ABNORMAL /HPF
SEG NEUTROPHILS: 60 % (ref 36–65)
SEGMENTED NEUTROPHILS ABSOLUTE COUNT: 5.54 K/UL (ref 1.5–8.1)
SODIUM BLD-SCNC: 139 MMOL/L (ref 135–144)
SODIUM BLD-SCNC: 139 MMOL/L (ref 135–144)
SODIUM BLD-SCNC: 142 MMOL/L (ref 135–144)
SODIUM BLD-SCNC: 143 MMOL/L (ref 135–144)
SPECIFIC GRAVITY UA: 1.02 (ref 1–1.03)
THYROXINE, FREE: 1.06 NG/DL (ref 0.93–1.7)
THYROXINE, FREE: 1.06 NG/DL (ref 0.93–1.7)
TOTAL CK: 192 U/L (ref 39–308)
TOTAL CO2, VENOUS: 16 MMOL/L (ref 25–31)
TOTAL PROTEIN: 7.4 G/DL (ref 6.4–8.3)
TOTAL PROTEIN: 7.4 G/DL (ref 6.4–8.3)
TRICHOMONAS: ABNORMAL
TRIGL SERPL-MCNC: 111 MG/DL
TRIGL SERPL-MCNC: 111 MG/DL
TSH SERPL DL<=0.05 MIU/L-ACNC: 3.6 MIU/L (ref 0.3–5)
TSH SERPL DL<=0.05 MIU/L-ACNC: 3.6 MIU/L (ref 0.3–5)
TURBIDITY: CLEAR
URINE HGB: ABNORMAL
UROBILINOGEN, URINE: NORMAL
VITAMIN B-12: 294 PG/ML (ref 232–1245)
VITAMIN D 25-HYDROXY: 24.3 NG/ML (ref 30–100)
VLDLC SERPL CALC-MCNC: ABNORMAL MG/DL (ref 1–30)
VLDLC SERPL CALC-MCNC: ABNORMAL MG/DL (ref 1–30)
WBC # BLD: 8.1 K/UL (ref 3.5–11.3)
WBC # BLD: 9 K/UL (ref 3.5–11.3)
WBC # BLD: ABNORMAL 10*3/UL
WBC UA: ABNORMAL /HPF (ref 0–5)
YEAST: ABNORMAL

## 2021-02-23 PROCEDURE — 36415 COLL VENOUS BLD VENIPUNCTURE: CPT

## 2021-02-23 PROCEDURE — 6360000002 HC RX W HCPCS: Performed by: EMERGENCY MEDICINE

## 2021-02-23 PROCEDURE — 85025 COMPLETE CBC W/AUTO DIFF WBC: CPT

## 2021-02-23 PROCEDURE — 96375 TX/PRO/DX INJ NEW DRUG ADDON: CPT

## 2021-02-23 PROCEDURE — 2060000000 HC ICU INTERMEDIATE R&B

## 2021-02-23 PROCEDURE — 84132 ASSAY OF SERUM POTASSIUM: CPT

## 2021-02-23 PROCEDURE — 80048 BASIC METABOLIC PNL TOTAL CA: CPT

## 2021-02-23 PROCEDURE — 93005 ELECTROCARDIOGRAM TRACING: CPT | Performed by: EMERGENCY MEDICINE

## 2021-02-23 PROCEDURE — 96365 THER/PROPH/DIAG IV INF INIT: CPT

## 2021-02-23 PROCEDURE — 2500000003 HC RX 250 WO HCPCS: Performed by: EMERGENCY MEDICINE

## 2021-02-23 PROCEDURE — 51798 US URINE CAPACITY MEASURE: CPT

## 2021-02-23 PROCEDURE — 9900000065 HC CARDIAC REHAB PHASE 3 - 1 VISIT

## 2021-02-23 PROCEDURE — 83735 ASSAY OF MAGNESIUM: CPT

## 2021-02-23 PROCEDURE — 6370000000 HC RX 637 (ALT 250 FOR IP): Performed by: EMERGENCY MEDICINE

## 2021-02-23 PROCEDURE — 82803 BLOOD GASES ANY COMBINATION: CPT

## 2021-02-23 PROCEDURE — 2580000003 HC RX 258: Performed by: EMERGENCY MEDICINE

## 2021-02-23 PROCEDURE — U0002 COVID-19 LAB TEST NON-CDC: HCPCS

## 2021-02-23 PROCEDURE — 99283 EMERGENCY DEPT VISIT LOW MDM: CPT

## 2021-02-23 PROCEDURE — 81001 URINALYSIS AUTO W/SCOPE: CPT

## 2021-02-23 PROCEDURE — 83605 ASSAY OF LACTIC ACID: CPT

## 2021-02-23 RX ORDER — MONTELUKAST SODIUM 10 MG/1
10 TABLET ORAL DAILY
Status: DISCONTINUED | OUTPATIENT
Start: 2021-02-24 | End: 2021-02-26 | Stop reason: HOSPADM

## 2021-02-23 RX ORDER — ACETAMINOPHEN 650 MG/1
650 SUPPOSITORY RECTAL EVERY 6 HOURS PRN
Status: DISCONTINUED | OUTPATIENT
Start: 2021-02-23 | End: 2021-02-26 | Stop reason: HOSPADM

## 2021-02-23 RX ORDER — 0.9 % SODIUM CHLORIDE 0.9 %
500 INTRAVENOUS SOLUTION INTRAVENOUS ONCE
Status: COMPLETED | OUTPATIENT
Start: 2021-02-23 | End: 2021-02-23

## 2021-02-23 RX ORDER — PANTOPRAZOLE SODIUM 40 MG/1
40 TABLET, DELAYED RELEASE ORAL
Status: DISCONTINUED | OUTPATIENT
Start: 2021-02-24 | End: 2021-02-26 | Stop reason: HOSPADM

## 2021-02-23 RX ORDER — CALCIUM GLUCONATE 94 MG/ML
1000 INJECTION, SOLUTION INTRAVENOUS ONCE
Status: DISCONTINUED | OUTPATIENT
Start: 2021-02-23 | End: 2021-02-23

## 2021-02-23 RX ORDER — HEPARIN SODIUM 5000 [USP'U]/ML
5000 INJECTION, SOLUTION INTRAVENOUS; SUBCUTANEOUS EVERY 8 HOURS SCHEDULED
Status: DISCONTINUED | OUTPATIENT
Start: 2021-02-24 | End: 2021-02-26 | Stop reason: HOSPADM

## 2021-02-23 RX ORDER — PROMETHAZINE HYDROCHLORIDE 12.5 MG/1
12.5 TABLET ORAL EVERY 6 HOURS PRN
Status: DISCONTINUED | OUTPATIENT
Start: 2021-02-23 | End: 2021-02-26 | Stop reason: HOSPADM

## 2021-02-23 RX ORDER — AMLODIPINE BESYLATE 10 MG/1
10 TABLET ORAL DAILY
Status: DISCONTINUED | OUTPATIENT
Start: 2021-02-24 | End: 2021-02-26 | Stop reason: HOSPADM

## 2021-02-23 RX ORDER — SODIUM POLYSTYRENE SULFONATE 15 G/60ML
15 SUSPENSION ORAL; RECTAL
Status: ACTIVE | OUTPATIENT
Start: 2021-02-23 | End: 2021-02-23

## 2021-02-23 RX ORDER — ATORVASTATIN CALCIUM 40 MG/1
40 TABLET, FILM COATED ORAL DAILY
Status: DISCONTINUED | OUTPATIENT
Start: 2021-02-24 | End: 2021-02-26 | Stop reason: HOSPADM

## 2021-02-23 RX ORDER — ONDANSETRON 2 MG/ML
4 INJECTION INTRAMUSCULAR; INTRAVENOUS EVERY 6 HOURS PRN
Status: DISCONTINUED | OUTPATIENT
Start: 2021-02-23 | End: 2021-02-26 | Stop reason: HOSPADM

## 2021-02-23 RX ORDER — SODIUM POLYSTYRENE SULFONATE 15 G/60ML
30 SUSPENSION ORAL; RECTAL
Status: ACTIVE | OUTPATIENT
Start: 2021-02-23 | End: 2021-02-23

## 2021-02-23 RX ORDER — SODIUM POLYSTYRENE SULFONATE 15 G/60ML
30 SUSPENSION ORAL; RECTAL ONCE
Status: COMPLETED | OUTPATIENT
Start: 2021-02-23 | End: 2021-02-23

## 2021-02-23 RX ORDER — SODIUM CHLORIDE 9 MG/ML
INJECTION, SOLUTION INTRAVENOUS CONTINUOUS
Status: DISCONTINUED | OUTPATIENT
Start: 2021-02-24 | End: 2021-02-25

## 2021-02-23 RX ORDER — ASPIRIN 81 MG/1
81 TABLET ORAL DAILY
Status: DISCONTINUED | OUTPATIENT
Start: 2021-02-24 | End: 2021-02-26 | Stop reason: HOSPADM

## 2021-02-23 RX ORDER — ACETAMINOPHEN 325 MG/1
650 TABLET ORAL EVERY 6 HOURS PRN
Status: DISCONTINUED | OUTPATIENT
Start: 2021-02-23 | End: 2021-02-26 | Stop reason: HOSPADM

## 2021-02-23 RX ORDER — SODIUM CHLORIDE 0.9 % (FLUSH) 0.9 %
10 SYRINGE (ML) INJECTION EVERY 12 HOURS SCHEDULED
Status: DISCONTINUED | OUTPATIENT
Start: 2021-02-24 | End: 2021-02-26 | Stop reason: HOSPADM

## 2021-02-23 RX ORDER — SODIUM CHLORIDE 0.9 % (FLUSH) 0.9 %
10 SYRINGE (ML) INJECTION PRN
Status: DISCONTINUED | OUTPATIENT
Start: 2021-02-23 | End: 2021-02-26 | Stop reason: HOSPADM

## 2021-02-23 RX ORDER — METOPROLOL SUCCINATE 50 MG/1
50 TABLET, EXTENDED RELEASE ORAL DAILY
Status: DISCONTINUED | OUTPATIENT
Start: 2021-02-24 | End: 2021-02-26 | Stop reason: HOSPADM

## 2021-02-23 RX ORDER — LEVOTHYROXINE SODIUM 0.03 MG/1
25 TABLET ORAL DAILY
Status: DISCONTINUED | OUTPATIENT
Start: 2021-02-24 | End: 2021-02-26 | Stop reason: HOSPADM

## 2021-02-23 RX ORDER — NICOTINE 21 MG/24HR
1 PATCH, TRANSDERMAL 24 HOURS TRANSDERMAL DAILY PRN
Status: DISCONTINUED | OUTPATIENT
Start: 2021-02-23 | End: 2021-02-26 | Stop reason: HOSPADM

## 2021-02-23 RX ORDER — DEXTROSE MONOHYDRATE 25 G/50ML
25 INJECTION, SOLUTION INTRAVENOUS ONCE
Status: COMPLETED | OUTPATIENT
Start: 2021-02-23 | End: 2021-02-23

## 2021-02-23 RX ADMIN — CALCIUM GLUCONATE 1000 MG: 98 INJECTION, SOLUTION INTRAVENOUS at 18:57

## 2021-02-23 RX ADMIN — SODIUM POLYSTYRENE SULFONATE 30 G: 15 SUSPENSION ORAL; RECTAL at 18:54

## 2021-02-23 RX ADMIN — SODIUM CHLORIDE 500 ML: 0.9 INJECTION, SOLUTION INTRAVENOUS at 20:09

## 2021-02-23 RX ADMIN — INSULIN HUMAN 10 UNITS: 100 INJECTION, SOLUTION PARENTERAL at 18:53

## 2021-02-23 RX ADMIN — DEXTROSE MONOHYDRATE 25 G: 25 INJECTION, SOLUTION INTRAVENOUS at 18:53

## 2021-02-23 RX ADMIN — SODIUM BICARBONATE 50 MEQ: 84 INJECTION, SOLUTION INTRAVENOUS at 18:53

## 2021-02-23 ASSESSMENT — ENCOUNTER SYMPTOMS
SORE THROAT: 0
SHORTNESS OF BREATH: 0
RHINORRHEA: 0
EYE DISCHARGE: 0
VOMITING: 0
NAUSEA: 0
DIARRHEA: 0
EYE REDNESS: 0
COUGH: 0
COLOR CHANGE: 0

## 2021-02-23 NOTE — ED PROVIDER NOTES
EMERGENCY DEPARTMENT ENCOUNTER    Pt Name: Krystina Rich  MRN: [id number]  [place] 1945  Date of evaluation: 2/23/21  CHIEF COMPLAINT       Chief Complaint   Patient presents with    Other     elevated K     HISTORY OF PRESENT ILLNESS   This is a 58-year-old male with a history of cardiac disease, the patient had outpatient lab work drawn today that showed hyperkalemia. The patient had another lab draw that showed hyperkalemia. For this reason the patient came in to be evaluated. Patient denies any symptoms. REVIEW OF SYSTEMS     Review of Systems   Constitutional: Negative for chills and fever. HENT: Negative for rhinorrhea and sore throat. Eyes: Negative for discharge, redness and visual disturbance. Respiratory: Negative for cough and shortness of breath. Cardiovascular: Negative for chest pain, palpitations and leg swelling. Gastrointestinal: Negative for diarrhea, nausea and vomiting. Musculoskeletal: Negative for arthralgias, myalgias and neck pain. Skin: Negative for color change and rash. Neurological: Negative for seizures, weakness and headaches. Psychiatric/Behavioral: Negative for hallucinations, self-injury and suicidal ideas.      PASTMEDICAL HISTORY     Past Medical History:   Diagnosis Date    Arthritis     BACK, BILATERAL SHOULDERS    Asthma     Back pain     BRCA2 positive 09/25/2018    CAD (coronary artery disease) 09/28/2015    CABG BYPASS X 3    Caffeine use     2 cans pop/day    Carpal tunnel syndrome     LEFT HAND    Cataracts, bilateral     Colon cancer ([address] 11/17/2011    A LYMPH NODE IN COLON WAS CANCEROUS    Cyst of pancreas     GETS THIS CHECKED ANNUALLY    Diabetes mellitus ([address]     Difficult intravenous access     PATIENT STATES USUALLY REQUIRES PICC TEAM.    GERD (gastroesophageal reflux disease)     Hay fever     HIGH CHOLESTEROL     History of blood transfusion     NO REACTION    History of chemotherapy 05/2013  Hodgkin's [facility]     HTN (hypertension)     Hx of blood clots     AFTER BACK SURGERY, PATIENT HAD BLOOD CLOTS IN SURGICAL AREA AND NEEDED MORE SURGERY TO GET THEM REMOVED    Kidney stone     RIGHT KIDNEY    Lumbar herniated disc 1989    Microhematuria     Obesity     GUTIERREZ on CPAP     Pancreatitis     Pneumonia     Tinnitus     Use of cane as ambulatory aid     Uses brace     BACK BRACE AND LEFT HAND    Wears glasses     READING     Past Problem List  Patient Active Problem List   Diagnosis Code    HIGH CHOLESTEROL     Essential hypertension I10    Class 2 severe obesity due to excess calories with serious comorbidity and body mass index (BMI) of 39.0 to 39.9 in adult (HCC) E66.01, Z68.39    GUTIERREZ (obstructive sleep apnea) G47.33    Colon cancer ([facility]) C18.9    Hay fever J30.1    Lumbar herniated disc M51.26    Epidural hematoma ([facility] S06. 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    Hyperkalemia E87.5    Acquired hypothyroidism E03.9    JANIE (acute kidney injury) ([facility] N17.9     SURGICAL HISTORY       Past Surgical History:   Procedure Laterality Date    ABDOMEN SURGERY  11/18/2011    RIGHT COLECTOMY    APPENDECTOMY  2011    with colectomy    BACK SURGERY  2014    LUMBAR LAMINECTOMY    BACK SURGERY  03/01/2014    EVACUATION OF SPINAL HEMATOMA    CATARACT REMOVAL Bilateral 01/2018    CATARACTS WITH IOL PLACED    COLONOSCOPY      Removal of colon polyp    CORONARY ARTERY BYPASS GRAFT  09/28/2015    CABG BYPASS X 3  CYSTOSCOPY Right 08/23/2018    CYSTOSCOPY, INSERTION OCCLUSIVE BALLOON, RIGHT RETROGRADE PYELOGRAM     CYSTOSCOPY Right 09/04/2018    CYSTOSCOPY STENT REMOVAL (Right )    IR NONTUNNELED VASCULAR CATHETER  2/24/2021    IR NONTUNNELED VASCULAR CATHETER 2/24/2021 Erwin Pinto MD [facility] SPECIAL PROCEDURES    KNEE SURGERY Right 2007    TENDON REPAIR    NEPHROSTOMY Right 08/23/2018    HOLMIUM - NEPHROLITHOTOMY PERCUTANEOUS    NERVE BLOCK      STEROID INJECTIONS STARTING 2015 - 04/2018    OR CYSTOSCOPY,REMV CALCULUS,COMPLIC Right 9/4/7680    CYSTOSCOPY STENT REMOVAL performed by Cuate Bergeron MD at [facility] Right 8/23/2018    CYSTOSCOPY, INSERTION OCCLUSIVE BALLOON, RIGHT RETROGRADE PYELOGRAM performed by Cuate Bergeron MD at [address] [place] [address] TO 2 CM Right 8/23/2018    HOLMIUM - NEPHROLITHOTOMY PERCUTANEOUS, C-ARM, LITHOCLAST,  PT COMING FROM INTERV RADIOLOGY  *SHORT STAY performed by Cuate Bergeron MD at [place] [address]       Current Discharge Medication List      CONTINUE these medications which have NOT CHANGED    Details   levothyroxine (SYNTHROID) 25 MCG tablet TAKE 1 TABLET DAILY  Qty: 90 tablet, Refills: 1      metFORMIN (GLUCOPHAGE) 500 MG tablet TAKE 2 TABLETS TWICE A DAY  Qty: 360 tablet, Refills: 0      HUMALOG MIX 50/50 KWIKPEN (50-50) 100 UNIT/ML SUPN injection pen INJECT 90 UNITS IN THE MORNING, 60 UNITS AT 6 P.M. AND 30 UNITS AT NIGHT  Qty: 165 mL, Refills: 0    Comments: Pt needs appt      Misc. Devices (WALKER) MISC 1 each by Does not apply route daily  Qty: 1 each, Refills: 0    Comments: Wheeled walker with a seat  Associated Diagnoses: Leg swelling; At high risk for injury related to fall;  Unsteady gait      losartan (COZAAR) 100 MG tablet TAKE 1 TABLET DAILY  Qty: 90 tablet, Refills: 3      montelukast (SINGULAIR) 10 MG tablet TAKE 1 TABLET DAILY Effort: Pulmonary effort is normal. No accessory muscle usage or respiratory distress. Breath sounds: Normal breath sounds. Chest:      Chest wall: No deformity or tenderness. Abdominal:      General: Bowel sounds are normal. There is no distension or abdominal bruit. Palpations: Abdomen is not rigid. Tenderness: There is no abdominal tenderness. There is no guarding or rebound. Skin:     General: Skin is warm. Findings: No rash. Neurological:      Mental Status: He is alert and oriented to person, place, and time. GCS: GCS eye subscore is 4. GCS verbal subscore is 5. GCS motor subscore is 6. Psychiatric:         Speech: Speech normal.         MEDICAL DECISION MAKIN-year-old male presents with hyperkalemia, plan is recheck his electrolytes, treat and obtain an EKG. 7:00 PM EST  Patient is significantly hyperkalemic with a potassium of 7.0. We will initiate IV insulin, bicarb, Kayexalate, fluids and consult nephrology. ED Course as of 1329   Tue  Discussed case with [facility] team, [facility], who accepts patient for admission to hospital.    [CHRISTIAN]      ED Course User Index  [CHRISTIAN] Jocelin Bowen MD       CRITICAL CARE:       PROCEDURES:    Procedures    DIAGNOSTIC RESULTS   EKG:All EKG's are interpreted by the Emergency Department Physician who either signs or Co-signs this chart in the absence of a cardiologist.        RADIOLOGY:All plain film, CT, MRI, and formal ultrasound images (except ED bedside ultrasound) are read by the radiologist, see reports below, unless otherwisenoted in MDM or here. IR NONTUNNELED VASCULAR CATHETER > 5 YEARS   Final Result   Successful ultrasound and fluoroscopy guided non-tunneled right internal   jugular vein 14 [place] by 24 cm temporary dialysis catheter placement. Ready   for use.          IR ULTRASOUND GUIDANCE VASCULAR ACCESS   Final Result      US RENAL COMPLETE   Final Result All other components within normal limits   CBC - Abnormal; Notable for the following components:    RBC 3.47 (*)     Hemoglobin 10.8 (*)     Hematocrit 36.1 (*)     .0 (*)     RDW 14.7 (*)     Platelets 477 (*)     All other components within normal limits   PROTIME-INR - Abnormal; Notable for the following components:    Protime 14.6 (*)     All other components within normal limits   URINALYSIS WITH MICROSCOPIC - Abnormal; Notable for the following components:    Glucose, Ur TRACE (*)     Urine Hgb 2+ (*)     Protein, UA 1+ (*)     All other components within normal limits   BASIC METABOLIC PANEL - Abnormal; Notable for the following components:    Glucose 299 (*)     BUN 30 (*)     CREATININE 1.79 (*)     Potassium 6.1 (*)     Chloride 108 (*)     CO2 17 (*)     GFR Non- 37 (*)     GFR  45 (*)     All other components within normal limits   BASIC METABOLIC PANEL - Abnormal; Notable for the following components:    Glucose 121 (*)     CREATININE 1.45 (*)     GFR Non- 47 (*)     GFR  57 (*)     All other components within normal limits   CBC WITH AUTO DIFFERENTIAL - Abnormal; Notable for the following components:    RBC 3.66 (*)     Hemoglobin 11.1 (*)     Hematocrit 34.5 (*)     All other components within normal limits   POC GLUCOSE FINGERSTICK - Abnormal; Notable for the following components:    POC Glucose 236 (*)     All other components within normal limits   POC GLUCOSE FINGERSTICK - Abnormal; Notable for the following components:    POC Glucose 137 (*)     All other components within normal limits   POC GLUCOSE FINGERSTICK - Abnormal; Notable for the following components:    POC Glucose 252 (*)     All other components within normal limits   POC GLUCOSE FINGERSTICK - Abnormal; Notable for the following components:    POC Glucose 253 (*)     All other components within normal limits  metoprolol succinate (TOPROL XL) extended release tablet 50 mg    montelukast (SINGULAIR) tablet 10 mg    pantoprazole (PROTONIX) tablet 40 mg    DISCONTD: 0.9 % sodium chloride infusion    sodium chloride flush 0.9 % injection 10 mL    sodium chloride flush 0.9 % injection 10 mL    nicotine (NICODERM CQ) 21 MG/24HR 1 patch     As needed if patient is a smoker.  OR Linked Order Group     promethazine (PHENERGAN) tablet 12.5 mg     ondansetron (ZOFRAN) injection 4 mg    OR Linked Order Group     acetaminophen (TYLENOL) tablet 650 mg     acetaminophen (TYLENOL) suppository 650 mg    sodium polystyrene (KAYEXALATE) 15 GM/60ML suspension 15 g    sodium polystyrene (KAYEXALATE) 15 GM/60ML suspension 30 g    heparin (porcine) injection 5,000 Units    sodium zirconium cyclosilicate (LOKELMA) oral suspension 10 g    DISCONTD: sodium bicarbonate 75 mEq in sodium chloride 0.45 % 1,000 mL infusion    sodium zirconium cyclosilicate (LOKELMA) oral suspension 10 g    dextrose 50 % IV solution    insulin regular (HUMULIN R;NOVOLIN R) injection 10 Units    furosemide (LASIX) injection 20 mg    furosemide (LASIX) injection 20 mg    heparin (porcine) injection    heparin (porcine) injection    insulin lispro prot & lispro (HUMALOG 50/50) injection 30 Units     INJECT 90 UNITS IN THE MORNING, 60 UNITS AT 6 P.M.  AND 30 UNITS AT NIGHT, please correct order    glucose (GLUTOSE) 40 % oral gel 15 g    dextrose 50 % IV solution    glucagon (rDNA) injection 1 mg    dextrose 5 % solution    insulin lispro prot & lispro (HUMALOG 50/50) injection 90 Units    insulin lispro prot & lispro (HUMALOG 50/50) injection 30 Units    sodium citrate 4 % injection 1.6 mL    sodium citrate 4 % injection 1.2 mL    melatonin tablet 3 mg    insulin lispro prot & lispro (HUMALOG 50/50) injection 30 Units    diclofenac sodium (VOLTAREN) 1 % GEL     Sig: Apply topically 4 times daily as needed for Pain  0.9 % sodium chloride infusion     CONSULTS:  IP CONSULT TO NEPHROLOGY  IP CONSULT TO INTERNAL MEDICINE    FINAL IMPRESSION      1. Hyperkalemia    2. JANIE (acute kidney injury) [facility])          DISPOSITION/PLAN   DISPOSITION Admitted 02/23/2021 09:15:16 PM      PATIENT REFERRED TO:  No follow-up provider specified.   DISCHARGE MEDICATIONS:  Current Discharge Medication List      START taking these medications    Details   diclofenac sodium (VOLTAREN) 1 % GEL Apply topically 4 times daily as needed for Pain           Holly Rain MD  Attending Emergency Physician                   Holly Rain MD  02/25/21 [id number]

## 2021-02-23 NOTE — TELEPHONE ENCOUNTER
----- Message from Immanuel Craven MD sent at 2/23/2021 12:54 PM EST -----  Hold Aldactone and repeat potassium tomorrow  And check if patient is seeing a nephrologist

## 2021-02-23 NOTE — TELEPHONE ENCOUNTER
----- Message from Dora Alexis MD sent at 2/23/2021 12:54 PM EST -----  Hold Aldactone and repeat potassium tomorrow  And check if patient is seeing a nephrologist

## 2021-02-24 ENCOUNTER — APPOINTMENT (OUTPATIENT)
Dept: ULTRASOUND IMAGING | Age: 76
DRG: 641 | End: 2021-02-24
Payer: MEDICARE

## 2021-02-24 ENCOUNTER — APPOINTMENT (OUTPATIENT)
Dept: INTERVENTIONAL RADIOLOGY/VASCULAR | Age: 76
DRG: 641 | End: 2021-02-24
Payer: MEDICARE

## 2021-02-24 LAB
% CKMB: 2.5 % (ref 0–3.5)
-: ABNORMAL
ALBUMIN SERPL-MCNC: 3.8 G/DL (ref 3.5–5.2)
ALBUMIN/GLOBULIN RATIO: ABNORMAL (ref 1–2.5)
ALP BLD-CCNC: 117 U/L (ref 40–129)
ALT SERPL-CCNC: 35 U/L (ref 5–41)
AMORPHOUS: ABNORMAL
ANION GAP SERPL CALCULATED.3IONS-SCNC: 10 MMOL/L (ref 9–17)
ANION GAP SERPL CALCULATED.3IONS-SCNC: 11 MMOL/L (ref 9–17)
AST SERPL-CCNC: 28 U/L
BACTERIA: ABNORMAL
BILIRUB SERPL-MCNC: 0.35 MG/DL (ref 0.3–1.2)
BILIRUBIN URINE: NEGATIVE
BUN BLDV-MCNC: 30 MG/DL (ref 8–23)
BUN BLDV-MCNC: 35 MG/DL (ref 8–23)
BUN/CREAT BLD: 17 (ref 9–20)
BUN/CREAT BLD: 19 (ref 9–20)
CALCIUM SERPL-MCNC: 9.1 MG/DL (ref 8.6–10.4)
CALCIUM SERPL-MCNC: 9.4 MG/DL (ref 8.6–10.4)
CASTS UA: ABNORMAL /LPF
CHLORIDE BLD-SCNC: 108 MMOL/L (ref 98–107)
CHLORIDE BLD-SCNC: 113 MMOL/L (ref 98–107)
CHLORIDE, UR: 161 MMOL/L
CK MB: 5 NG/ML
CKMB INTERPRETATION: NORMAL
CO2: 15 MMOL/L (ref 20–31)
CO2: 17 MMOL/L (ref 20–31)
COLOR: YELLOW
COMMENT UA: ABNORMAL
CREAT SERPL-MCNC: 1.79 MG/DL (ref 0.7–1.2)
CREAT SERPL-MCNC: 1.87 MG/DL (ref 0.7–1.2)
CREATININE URINE: 52.9 MG/DL (ref 39–259)
CRYSTALS, UA: ABNORMAL /HPF
EKG ATRIAL RATE: 73 BPM
EKG P AXIS: 47 DEGREES
EKG P-R INTERVAL: 200 MS
EKG Q-T INTERVAL: 416 MS
EKG QRS DURATION: 118 MS
EKG QTC CALCULATION (BAZETT): 458 MS
EKG R AXIS: -43 DEGREES
EKG T AXIS: 63 DEGREES
EKG VENTRICULAR RATE: 73 BPM
EPITHELIAL CELLS UA: ABNORMAL /HPF (ref 0–5)
GFR AFRICAN AMERICAN: 43 ML/MIN
GFR AFRICAN AMERICAN: 45 ML/MIN
GFR NON-AFRICAN AMERICAN: 35 ML/MIN
GFR NON-AFRICAN AMERICAN: 37 ML/MIN
GFR SERPL CREATININE-BSD FRML MDRD: ABNORMAL ML/MIN/{1.73_M2}
GLUCOSE BLD-MCNC: 137 MG/DL (ref 75–110)
GLUCOSE BLD-MCNC: 189 MG/DL (ref 70–99)
GLUCOSE BLD-MCNC: 236 MG/DL (ref 75–110)
GLUCOSE BLD-MCNC: 252 MG/DL (ref 75–110)
GLUCOSE BLD-MCNC: 253 MG/DL (ref 75–110)
GLUCOSE BLD-MCNC: 299 MG/DL (ref 70–99)
GLUCOSE BLD-MCNC: 320 MG/DL (ref 75–110)
GLUCOSE URINE: ABNORMAL
HBV SURFACE AB TITR SER: <3.5 MIU/ML
HCT VFR BLD CALC: 36.1 % (ref 40.7–50.3)
HEMOGLOBIN: 10.8 G/DL (ref 13–17)
HEPATITIS B CORE TOTAL ANTIBODY: NONREACTIVE
HEPATITIS B SURFACE ANTIGEN: NONREACTIVE
INR BLD: 1.2
KETONES, URINE: NEGATIVE
LEUKOCYTE ESTERASE, URINE: NEGATIVE
MAGNESIUM: 1.7 MG/DL (ref 1.6–2.6)
MCH RBC QN AUTO: 31.1 PG (ref 25.2–33.5)
MCHC RBC AUTO-ENTMCNC: 29.9 G/DL (ref 28.4–34.8)
MCV RBC AUTO: 104 FL (ref 82.6–102.9)
MUCUS: ABNORMAL
NITRITE, URINE: NEGATIVE
NRBC AUTOMATED: 0 PER 100 WBC
OSMOLALITY URINE: 523 MOSM/KG (ref 300–1170)
OTHER OBSERVATIONS UA: ABNORMAL
PDW BLD-RTO: 14.7 % (ref 11.8–14.4)
PH UA: 6 (ref 5–8)
PLATELET # BLD: 116 K/UL (ref 138–453)
PMV BLD AUTO: 10.6 FL (ref 8.1–13.5)
POTASSIUM SERPL-SCNC: 6.1 MMOL/L (ref 3.7–5.3)
POTASSIUM SERPL-SCNC: 6.3 MMOL/L (ref 3.7–5.3)
POTASSIUM, UR: 16.7 MMOL/L
PROTEIN UA: ABNORMAL
PROTHROMBIN TIME: 14.6 SEC (ref 11.5–14.2)
RBC # BLD: 3.47 M/UL (ref 4.21–5.77)
RBC UA: ABNORMAL /HPF (ref 0–2)
RENAL EPITHELIAL, UA: ABNORMAL /HPF
SARS-COV-2, RAPID: NOT DETECTED
SODIUM BLD-SCNC: 135 MMOL/L (ref 135–144)
SODIUM BLD-SCNC: 139 MMOL/L (ref 135–144)
SODIUM,UR: 168 MMOL/L
SPECIFIC GRAVITY UA: 1.02 (ref 1–1.03)
SPECIMEN DESCRIPTION: NORMAL
TOTAL CK: 199 U/L (ref 39–308)
TOTAL PROTEIN, URINE: 67 MG/DL
TOTAL PROTEIN: 6.5 G/DL (ref 6.4–8.3)
TRICHOMONAS: ABNORMAL
TURBIDITY: CLEAR
URINE HGB: ABNORMAL
UROBILINOGEN, URINE: NORMAL
WBC # BLD: 6.9 K/UL (ref 3.5–11.3)
WBC UA: ABNORMAL /HPF (ref 0–5)
YEAST: ABNORMAL

## 2021-02-24 PROCEDURE — 6370000000 HC RX 637 (ALT 250 FOR IP): Performed by: INTERNAL MEDICINE

## 2021-02-24 PROCEDURE — 83735 ASSAY OF MAGNESIUM: CPT

## 2021-02-24 PROCEDURE — 80048 BASIC METABOLIC PNL TOTAL CA: CPT

## 2021-02-24 PROCEDURE — 80053 COMPREHEN METABOLIC PANEL: CPT

## 2021-02-24 PROCEDURE — 2060000000 HC ICU INTERMEDIATE R&B

## 2021-02-24 PROCEDURE — 82436 ASSAY OF URINE CHLORIDE: CPT

## 2021-02-24 PROCEDURE — 83935 ASSAY OF URINE OSMOLALITY: CPT

## 2021-02-24 PROCEDURE — 6370000000 HC RX 637 (ALT 250 FOR IP): Performed by: NURSE PRACTITIONER

## 2021-02-24 PROCEDURE — C1752 CATH,HEMODIALYSIS,SHORT-TERM: HCPCS

## 2021-02-24 PROCEDURE — 6360000002 HC RX W HCPCS: Performed by: INTERNAL MEDICINE

## 2021-02-24 PROCEDURE — 02HV33Z INSERTION OF INFUSION DEVICE INTO SUPERIOR VENA CAVA, PERCUTANEOUS APPROACH: ICD-10-PCS | Performed by: RADIOLOGY

## 2021-02-24 PROCEDURE — 2580000003 HC RX 258: Performed by: NURSE PRACTITIONER

## 2021-02-24 PROCEDURE — 99223 1ST HOSP IP/OBS HIGH 75: CPT | Performed by: NURSE PRACTITIONER

## 2021-02-24 PROCEDURE — 36415 COLL VENOUS BLD VENIPUNCTURE: CPT

## 2021-02-24 PROCEDURE — 84300 ASSAY OF URINE SODIUM: CPT

## 2021-02-24 PROCEDURE — 86038 ANTINUCLEAR ANTIBODIES: CPT

## 2021-02-24 PROCEDURE — 77001 FLUOROGUIDE FOR VEIN DEVICE: CPT | Performed by: RADIOLOGY

## 2021-02-24 PROCEDURE — 84156 ASSAY OF PROTEIN URINE: CPT

## 2021-02-24 PROCEDURE — 2580000003 HC RX 258: Performed by: INTERNAL MEDICINE

## 2021-02-24 PROCEDURE — 86317 IMMUNOASSAY INFECTIOUS AGENT: CPT

## 2021-02-24 PROCEDURE — 82553 CREATINE MB FRACTION: CPT

## 2021-02-24 PROCEDURE — 87340 HEPATITIS B SURFACE AG IA: CPT

## 2021-02-24 PROCEDURE — 90935 HEMODIALYSIS ONE EVALUATION: CPT

## 2021-02-24 PROCEDURE — 82947 ASSAY GLUCOSE BLOOD QUANT: CPT

## 2021-02-24 PROCEDURE — 82570 ASSAY OF URINE CREATININE: CPT

## 2021-02-24 PROCEDURE — 6360000002 HC RX W HCPCS: Performed by: NURSE PRACTITIONER

## 2021-02-24 PROCEDURE — 82550 ASSAY OF CK (CPK): CPT

## 2021-02-24 PROCEDURE — 84133 ASSAY OF URINE POTASSIUM: CPT

## 2021-02-24 PROCEDURE — 36556 INSERT NON-TUNNEL CV CATH: CPT | Performed by: RADIOLOGY

## 2021-02-24 PROCEDURE — 5A1D70Z PERFORMANCE OF URINARY FILTRATION, INTERMITTENT, LESS THAN 6 HOURS PER DAY: ICD-10-PCS | Performed by: INTERNAL MEDICINE

## 2021-02-24 PROCEDURE — 85027 COMPLETE CBC AUTOMATED: CPT

## 2021-02-24 PROCEDURE — 93010 ELECTROCARDIOGRAM REPORT: CPT | Performed by: INTERNAL MEDICINE

## 2021-02-24 PROCEDURE — 85610 PROTHROMBIN TIME: CPT

## 2021-02-24 PROCEDURE — 83516 IMMUNOASSAY NONANTIBODY: CPT

## 2021-02-24 PROCEDURE — 76770 US EXAM ABDO BACK WALL COMP: CPT

## 2021-02-24 PROCEDURE — 2500000003 HC RX 250 WO HCPCS: Performed by: INTERNAL MEDICINE

## 2021-02-24 PROCEDURE — 81001 URINALYSIS AUTO W/SCOPE: CPT

## 2021-02-24 PROCEDURE — 76937 US GUIDE VASCULAR ACCESS: CPT | Performed by: RADIOLOGY

## 2021-02-24 PROCEDURE — 2709999900 IR ULTRASOUND GUIDANCE VASCULAR ACCESS

## 2021-02-24 PROCEDURE — 86704 HEP B CORE ANTIBODY TOTAL: CPT

## 2021-02-24 PROCEDURE — 6360000002 HC RX W HCPCS: Performed by: RADIOLOGY

## 2021-02-24 RX ORDER — HEPARIN SODIUM 1000 [USP'U]/ML
INJECTION, SOLUTION INTRAVENOUS; SUBCUTANEOUS
Status: COMPLETED | OUTPATIENT
Start: 2021-02-24 | End: 2021-02-24

## 2021-02-24 RX ORDER — NICOTINE POLACRILEX 4 MG
15 LOZENGE BUCCAL PRN
Status: DISCONTINUED | OUTPATIENT
Start: 2021-02-24 | End: 2021-02-26 | Stop reason: HOSPADM

## 2021-02-24 RX ORDER — DEXTROSE MONOHYDRATE 25 G/50ML
12.5 INJECTION, SOLUTION INTRAVENOUS PRN
Status: DISCONTINUED | OUTPATIENT
Start: 2021-02-24 | End: 2021-02-26 | Stop reason: HOSPADM

## 2021-02-24 RX ORDER — SODIUM CITRATE 4 % (5 ML)
1.2 SYRINGE (ML) MISCELLANEOUS PRN
Status: DISCONTINUED | OUTPATIENT
Start: 2021-02-24 | End: 2021-02-26 | Stop reason: HOSPADM

## 2021-02-24 RX ORDER — SODIUM CITRATE 4 % (5 ML)
1.6 SYRINGE (ML) MISCELLANEOUS PRN
Status: DISCONTINUED | OUTPATIENT
Start: 2021-02-24 | End: 2021-02-26 | Stop reason: HOSPADM

## 2021-02-24 RX ORDER — LANOLIN ALCOHOL/MO/W.PET/CERES
3 CREAM (GRAM) TOPICAL NIGHTLY PRN
Status: DISCONTINUED | OUTPATIENT
Start: 2021-02-24 | End: 2021-02-26 | Stop reason: HOSPADM

## 2021-02-24 RX ORDER — DEXTROSE MONOHYDRATE 25 G/50ML
25 INJECTION, SOLUTION INTRAVENOUS ONCE
Status: COMPLETED | OUTPATIENT
Start: 2021-02-24 | End: 2021-02-24

## 2021-02-24 RX ORDER — FUROSEMIDE 10 MG/ML
20 INJECTION INTRAMUSCULAR; INTRAVENOUS ONCE
Status: COMPLETED | OUTPATIENT
Start: 2021-02-24 | End: 2021-02-24

## 2021-02-24 RX ORDER — DEXTROSE MONOHYDRATE 50 MG/ML
100 INJECTION, SOLUTION INTRAVENOUS PRN
Status: DISCONTINUED | OUTPATIENT
Start: 2021-02-24 | End: 2021-02-26 | Stop reason: HOSPADM

## 2021-02-24 RX ADMIN — Medication 1.6 ML: at 22:09

## 2021-02-24 RX ADMIN — HEPARIN SODIUM 5000 UNITS: 5000 INJECTION INTRAVENOUS; SUBCUTANEOUS at 21:33

## 2021-02-24 RX ADMIN — MONTELUKAST 10 MG: 10 TABLET, FILM COATED ORAL at 08:17

## 2021-02-24 RX ADMIN — ASPIRIN 81 MG: 81 TABLET, COATED ORAL at 08:17

## 2021-02-24 RX ADMIN — INSULIN HUMAN 10 UNITS: 100 INJECTION, SOLUTION PARENTERAL at 07:13

## 2021-02-24 RX ADMIN — SODIUM ZIRCONIUM CYCLOSILICATE 10 G: 10 POWDER, FOR SUSPENSION ORAL at 07:13

## 2021-02-24 RX ADMIN — FUROSEMIDE 20 MG: 10 INJECTION, SOLUTION INTRAMUSCULAR; INTRAVENOUS at 10:49

## 2021-02-24 RX ADMIN — HEPARIN SODIUM 5000 UNITS: 5000 INJECTION INTRAVENOUS; SUBCUTANEOUS at 14:07

## 2021-02-24 RX ADMIN — Medication 3 MG: at 23:04

## 2021-02-24 RX ADMIN — LEVOTHYROXINE SODIUM 25 MCG: 0.03 TABLET ORAL at 07:14

## 2021-02-24 RX ADMIN — Medication 1.2 ML: at 22:08

## 2021-02-24 RX ADMIN — FUROSEMIDE 20 MG: 10 INJECTION, SOLUTION INTRAMUSCULAR; INTRAVENOUS at 07:13

## 2021-02-24 RX ADMIN — Medication: at 08:07

## 2021-02-24 RX ADMIN — DEXTROSE MONOHYDRATE 25 G: 25 INJECTION, SOLUTION INTRAVENOUS at 07:13

## 2021-02-24 RX ADMIN — ATORVASTATIN CALCIUM 40 MG: 40 TABLET, FILM COATED ORAL at 08:18

## 2021-02-24 RX ADMIN — AMLODIPINE BESYLATE 10 MG: 10 TABLET ORAL at 08:18

## 2021-02-24 RX ADMIN — INSULIN LISPRO 30 UNITS: 100 INJECTION, SUSPENSION SUBCUTANEOUS at 21:34

## 2021-02-24 RX ADMIN — SODIUM CHLORIDE: 9 INJECTION, SOLUTION INTRAVENOUS at 00:26

## 2021-02-24 RX ADMIN — PANTOPRAZOLE SODIUM 40 MG: 40 TABLET, DELAYED RELEASE ORAL at 07:14

## 2021-02-24 RX ADMIN — HEPARIN SODIUM 1200 UNITS: 1000 INJECTION, SOLUTION INTRAVENOUS; SUBCUTANEOUS at 16:16

## 2021-02-24 RX ADMIN — HEPARIN SODIUM 1000 UNITS: 1000 INJECTION, SOLUTION INTRAVENOUS; SUBCUTANEOUS at 16:17

## 2021-02-24 RX ADMIN — METOPROLOL SUCCINATE 50 MG: 50 TABLET, EXTENDED RELEASE ORAL at 08:17

## 2021-02-24 ASSESSMENT — PAIN SCALES - GENERAL: PAINLEVEL_OUTOF10: 0

## 2021-02-24 NOTE — H&P
[facility]  Office: Tereista Shah, DO, Milagros Valderrama, DO, Bhaskar Charles, DO, Pop Bianchi, DO, Ghislaine Kimbrough MD, Eugenia Enriquez MD, Poncho Forrest MD, Amber Sauceda MD, Shannan Gamble MD, Sara Quiroz MD, Sharma Babinski, MD, Julian Jessica MD, Karan Hou MD, Jose Enrique Villar DO, Sonny Jackson MD, Aline Otto MD, Jolynn Crump, DO, Rivas Lopes MD,  Dee Rodrigues DO, Kj Fritz MD, Jarred Prado MD, Cheri [facility], [facility], [facility], Henran Werner, CNP, Venus Pike, CNS, Julee Moya, CNP, Anshu Mitchell, CNP, Rocío Cortes, CNP, Juanita Vargas, CNP, Ronald Grady, CNP, Sharon Metcalf PA-C, Lashon Ordonez, [facility], Izabela Lynn, CNP, Inna Litter, CNP, Cheikh Samson, CNP, Maurice Ramirez, CNP, Rosa Isela Campos, [facility]    HISTORY AND PHYSICAL EXAMINATION            Date:   2/24/2021  Patient name:  Ricki Regalado  Date of admission:  2/23/2021  5:45 PM  MRN:   [id number]  Account:  [de-identified]  YOB: 1945  PCP:    Norman Gaviria MD  Room:   [address] [place] [address]  Code Status:    Full Code    Chief Complaint:     Chief Complaint   Patient presents with    Other     elevated K       History Obtained From:     patient    History of Present Illness:     Ricki Regalado is a 67 y.o. Non-/non  male who presents with hyperkalemia (6.2) after having outpatient labs drawn and is admitted to the hospital for the management of JANIE (acute kidney injury) ([facility] [address] He denies any complaints or symptomology other than chronic back pain. Lab work in ED revealed repeat potassium of 7.0 and cre 2.3. He does have a history of CKD, baseline cre 1.4-1.6. EKG and UA unremarkable.  He was given insulin, d50, sodium bicarb injection, Lokelma, calcium gluconate and kayexalate in ED with little improvement of hyperkalemia     Past Medical History:     Past Medical History:   Diagnosis Date    Arthritis BACK, BILATERAL SHOULDERS    Asthma     Back pain     BRCA2 positive 09/25/2018    CAD (coronary artery disease) 09/28/2015    CABG BYPASS X 3    Caffeine use     2 cans pop/day    Carpal tunnel syndrome     LEFT HAND    Cataracts, bilateral     Colon cancer ([address] 11/17/2011    A LYMPH NODE IN COLON WAS CANCEROUS    Cyst of pancreas     GETS THIS CHECKED ANNUALLY    Diabetes mellitus ([address]     Difficult intravenous access     PATIENT STATES USUALLY REQUIRES PICC TEAM.    GERD (gastroesophageal reflux disease)     Hay fever     HIGH CHOLESTEROL     History of blood transfusion     NO REACTION    History of chemotherapy 05/2013    Hodgkin's lymphoma ([address]     HTN (hypertension)     Hx of blood clots     AFTER BACK SURGERY, PATIENT HAD BLOOD CLOTS IN SURGICAL AREA AND NEEDED MORE SURGERY TO GET THEM REMOVED    Kidney stone     RIGHT KIDNEY    Lumbar herniated disc 1989    Microhematuria     Obesity     GUTIERREZ on CPAP     Pancreatitis     Pneumonia     Tinnitus     Use of cane as ambulatory aid     Uses brace     BACK BRACE AND LEFT HAND    Wears glasses     READING        Past Surgical History:     Past Surgical History:   Procedure Laterality Date    ABDOMEN SURGERY  11/18/2011    RIGHT COLECTOMY    APPENDECTOMY  2011    with colectomy    BACK SURGERY  2014    LUMBAR LAMINECTOMY    BACK SURGERY  03/01/2014    EVACUATION OF SPINAL HEMATOMA    CATARACT REMOVAL Bilateral 01/2018    CATARACTS WITH IOL PLACED    COLONOSCOPY      Removal of colon polyp    CORONARY ARTERY BYPASS GRAFT  09/28/2015    CABG BYPASS X 3    CYSTOSCOPY Right 08/23/2018    CYSTOSCOPY, INSERTION OCCLUSIVE BALLOON, RIGHT RETROGRADE PYELOGRAM     CYSTOSCOPY Right 09/04/2018    CYSTOSCOPY STENT REMOVAL (Right )    KNEE SURGERY Right 2007    TENDON REPAIR    NEPHROSTOMY Right 08/23/2018    HOLMIUM - NEPHROLITHOTOMY PERCUTANEOUS    NERVE BLOCK      STEROID INJECTIONS STARTING 2015 - 04/2018  OH CYSTOSCOPY,REMV CALCULUS,COMPLIC Right 5/4/3447    CYSTOSCOPY STENT REMOVAL performed by Kaushik Raymond MD at [facility] Right 8/23/2018    CYSTOSCOPY, INSERTION OCCLUSIVE BALLOON, RIGHT RETROGRADE PYELOGRAM performed by Kaushik Raymond MD at [address] [place] [address] TO 2 CM Right 8/23/2018    HOLMIUM - NEPHROLITHOTOMY PERCUTANEOUS, C-ARM, LITHOCLAST,  PT COMING FROM INTERV RADIOLOGY  *SHORT STAY performed by Kaushik Raymond MD at [address] [place] [address]        Medications Prior to Admission:     Prior to Admission medications    Medication Sig Start Date End Date Taking? Authorizing Provider   levothyroxine (SYNTHROID) 25 MCG tablet TAKE 1 TABLET DAILY 12/22/20   Dora Alexis MD   metFORMIN (GLUCOPHAGE) 500 MG tablet TAKE 2 TABLETS TWICE A DAY 12/17/20   Dora Alexis MD   HUMALOG MIX 50/50 KWIKPEN (50-50) 100 UNIT/ML SUPN injection pen INJECT 90 UNITS IN THE MORNING, 60 UNITS AT 6 P.M. AND 30 UNITS AT NIGHT 12/17/20   Dora Alexis MD   Misc.  Devices [facility]) MISC 1 each by Does not apply route daily 10/15/20   Dora Alexis MD   spironolactone (ALDACTONE) 25 MG tablet TAKE 1 TABLET DAILY 8/10/20   Dora Alexis MD   losartan (COZAAR) 100 MG tablet TAKE 1 TABLET DAILY 8/6/20   Dora Alexis MD   montelukast (SINGULAIR) 10 MG tablet TAKE 1 TABLET DAILY 8/6/20   Dora Alexis MD   amLODIPine (NORVASC) 10 MG tablet TAKE 1 TABLET DAILY 8/6/20   Dora Alexis MD   atorvastatin (LIPITOR) 40 MG tablet TAKE 1 TABLET DAILY 8/6/20   Dora Alexis MD   omeprazole (PRILOSEC) 40 MG delayed release capsule TAKE 1 CAPSULE DAILY 8/6/20   Dora Alexis MD   metoprolol succinate (TOPROL XL) 50 MG extended release tablet TAKE 1 TABLET DAILY 7/23/20   Dora Alexis MD   meloxicam (MOBIC) 15 MG tablet daily as needed     Historical Provider, MD Respiratory Therapy Supplies MISC Cpap supplies- tubing, mask, filter 1/29/18   Wilma Michel MD   aspirin 81 MG tablet Take 81 mg by mouth daily PATIENT TO STOP TAKING 08/17/2018 FOR SURGERY. Historical Provider, MD        Allergies:     Patient has no known allergies. Social History:     Tobacco:    reports that he has never smoked. He has never used smokeless tobacco.  Alcohol:      reports no history of alcohol use. Drug Use:  reports no history of drug use. Family History:     Family History   Problem Relation Age of Onset    Diabetes Father     High Blood Pressure Father     Stroke Mother     Heart Failure Mother     Other Mother         pacemaker    High Blood Pressure Mother     Heart Disease Maternal Grandmother     Heart Disease Maternal Grandfather     Heart Disease Maternal Uncle        Review of Systems:     Positive and Negative as described in HPI.     CONSTITUTIONAL:  negative for fevers, chills, sweats, fatigue, weight loss  HEENT:  negative for vision, hearing changes, runny nose, throat pain  RESPIRATORY:  negative for shortness of breath, cough, congestion, wheezing  CARDIOVASCULAR:  negative for chest pain, palpitations  GASTROINTESTINAL:  negative for nausea, vomiting, diarrhea, constipation, change in bowel habits, abdominal pain   GENITOURINARY:  negative for difficulty of urination, burning with urination, frequency   INTEGUMENT:  negative for rash, skin lesions, easy bruising   HEMATOLOGIC/LYMPHATIC:  negative for swelling/edema   ALLERGIC/IMMUNOLOGIC:  negative for urticaria , itching  ENDOCRINE:  negative increase in drinking, increase in urination, hot or cold intolerance  MUSCULOSKELETAL:  negative joint pains, muscle aches, swelling of joints  NEUROLOGICAL:  negative for headaches, dizziness, lightheadedness, numbness, pain, tingling extremities  BEHAVIOR/PSYCH:  negative for depression, anxiety    Physical Exam: BP (!) 153/63   Pulse 60   Temp 97.7 °F (36.5 °C) (Oral)   Resp 18   Ht 5' 6\" (1.676 m)   Wt 243 lb (110.2 kg)   SpO2 94%   BMI 39.22 kg/m²   Temp (24hrs), Av.9 °F (36.6 °C), Min:97.7 °F (36.5 °C), Max:98.4 °F (36.9 °C)    Recent Labs     21  0025 21  0710   POCGLU 236* 137*       Intake/Output Summary (Last 24 hours) at 2021 0951  Last data filed at 2021 0940  Gross per 24 hour   Intake    Output 1500 ml   Net -1500 ml       General Appearance:  alert, well appearing, and in no acute distress  Mental status: oriented to person, place, and time  Head:  normocephalic, atraumatic  Eye: no icterus, redness, pupils equal and reactive, extraocular eye movements intact, conjunctiva clear  Ear: normal external ear, no discharge, hearing intact  Nose:  no drainage noted  Mouth: mucous membranes moist  Neck: supple, no carotid bruits, thyroid not palpable  Lungs: Bilateral equal air entry, clear to auscultation, no wheezing, rales or rhonchi, normal effort  Cardiovascular: normal rate, regular rhythm, no murmur, gallop, rub.   Abdomen: Soft, obese, nontender, nondistended, normal bowel sounds, no hepatomegaly or splenomegaly  Neurologic: There are no new focal motor or sensory deficits, normal muscle tone and bulk, no abnormal sensation, normal speech, cranial nerves II through XII grossly intact  Skin: No gross lesions, rashes, bruising or bleeding on exposed skin area  Extremities:  peripheral pulses palpable, no pedal edema or calf pain with palpation  Psych: normal affect     Investigations:      Laboratory Testing:  Recent Results (from the past 24 hour(s))   Potassium    Collection Time: 21  3:34 PM   Result Value Ref Range    Potassium 6.6 (HH) 3.7 - 5.3 mmol/L   Basic Metabolic Panel    Collection Time: 21  5:58 PM   Result Value Ref Range    Glucose 103 (H) 70 - 99 mg/dL    BUN 42 (H) 8 - 23 mg/dL    CREATININE 2.31 (H) 0.70 - 1.20 mg/dL    Bun/Cre Ratio 18 9 - 20 Calcium 9.6 8.6 - 10.4 mg/dL    Sodium 142 135 - 144 mmol/L    Potassium 7.0 (HH) 3.7 - 5.3 mmol/L    Chloride 115 (H) 98 - 107 mmol/L    CO2 14 (L) 20 - 31 mmol/L    Anion Gap 13 9 - 17 mmol/L    GFR Non-African American 28 (L) >60 mL/min    GFR  34 (L) >60 mL/min    GFR Comment          GFR Staging NOT REPORTED    CBC Auto Differential    Collection Time: 02/23/21  5:58 PM   Result Value Ref Range    WBC 9.0 3.5 - 11.3 k/uL    RBC 3.76 (L) 4.21 - 5.77 m/uL    Hemoglobin 11.5 (L) 13.0 - 17.0 g/dL    Hematocrit 37.2 (L) 40.7 - 50.3 %    MCV 98.9 82.6 - 102.9 fL    MCH 30.6 25.2 - 33.5 pg    MCHC 30.9 28.4 - 34.8 g/dL    RDW 14.9 (H) 11.8 - 14.4 %    Platelets 068 991 - 272 k/uL    MPV 10.3 8.1 - 13.5 fL    NRBC Automated 0.0 0.0 per 100 WBC    Differential Type NOT REPORTED     Seg Neutrophils 60 36 - 65 %    Lymphocytes 27 24 - 43 %    Monocytes 8 3 - 12 %    Eosinophils % 3 1 - 4 %    Basophils 1 0 - 2 %    Immature Granulocytes 1 (H) 0 %    Segs Absolute 5.54 1.50 - 8.10 k/uL    Absolute Lymph # 2.39 1.10 - 3.70 k/uL    Absolute Mono # 0.73 0.10 - 1.20 k/uL    Absolute Eos # 0.23 0.00 - 0.44 k/uL    Basophils Absolute 0.06 0.00 - 0.20 k/uL    Absolute Immature Granulocyte 0.05 0.00 - 0.30 k/uL    WBC Morphology NOT REPORTED     RBC Morphology ANISOCYTOSIS PRESENT     Platelet Estimate NOT REPORTED    Magnesium    Collection Time: 02/23/21  5:58 PM   Result Value Ref Range    Magnesium 1.8 1.6 - 2.6 mg/dL   EKG 12 Lead    Collection Time: 02/23/21  6:04 PM   Result Value Ref Range    Ventricular Rate 73 BPM    Atrial Rate 73 BPM    P-R Interval 200 ms    QRS Duration 118 ms    Q-T Interval 416 ms    QTc Calculation (Bazett) 458 ms    P Axis 47 degrees    R Axis -43 degrees    T Axis 63 degrees   Lactic Acid    Collection Time: 02/23/21  8:08 PM   Result Value Ref Range    Lactic Acid 1.5 0.5 - 2.2 mmol/L   Basic Metabolic Panel    Collection Time: 02/23/21  8:30 PM   Result Value Ref Range Glucose 95 70 - 99 mg/dL    BUN 40 (H) 8 - 23 mg/dL    CREATININE 2.17 (H) 0.70 - 1.20 mg/dL    Bun/Cre Ratio 18 9 - 20    Calcium 9.8 8.6 - 10.4 mg/dL    Sodium 143 135 - 144 mmol/L    Potassium 6.1 (HH) 3.7 - 5.3 mmol/L    Chloride 115 (H) 98 - 107 mmol/L    CO2 15 (L) 20 - 31 mmol/L    Anion Gap 13 9 - 17 mmol/L    GFR Non-African American 30 (L) >60 mL/min    GFR  36 (L) >60 mL/min    GFR Comment          GFR Staging NOT REPORTED    POC PANEL (G3)-SHANEKA    Collection Time: 02/23/21  8:53 PM   Result Value Ref Range    pH, Shaneka 7.33 7.32 - 7.42    pCO2, Shaneka 28 (L) 39 - 55 mm Hg    pO2, Shaneka 77 (H) 30 - 50 mm Hg    Total CO2, Venous 16 (L) 25 - 31 mmol/L    HCO3, Venous 14.8 (L) 24 - 30 mmol/L    Negative Base Excess, Shaneka 11 (H) 0.0 - 2.0    Positive Base Excess, Shaneka NOT REPORTED 0.0 - 2.0    O2 Sat, Shaneka 95 %    Pt Temp NOT REPORTED     O2 Device/Flow/% NOT REPORTED     FIO2 NOT REPORTED     POC pH Temp NOT REPORTED     POC pCO2 Temp NOT REPORTED mm Hg    POC pO2 Temp NOT REPORTED mm Hg   Urinalysis    Collection Time: 02/23/21  9:10 PM   Result Value Ref Range    Color, UA YELLOW YELLOW    Turbidity UA CLEAR CLEAR    Glucose, Ur NEGATIVE NEGATIVE    Bilirubin Urine NEGATIVE NEGATIVE    Ketones, Urine NEGATIVE NEGATIVE    Specific Gravity, UA 1.025 1.005 - 1.030    Urine Hgb 1+ (A) NEGATIVE    pH, UA 6.0 5.0 - 8.0    Protein, UA 1+ (A) NEGATIVE    Urobilinogen, Urine Normal Normal    Nitrite, Urine NEGATIVE NEGATIVE    Leukocyte Esterase, Urine NEGATIVE NEGATIVE    Urinalysis Comments NOT REPORTED    Microscopic Urinalysis    Collection Time: 02/23/21  9:10 PM   Result Value Ref Range    -          WBC, UA 2 TO 5 0 - 5 /HPF    RBC, UA 10 TO 20 0 - 2 /HPF    Casts UA NOT REPORTED /LPF    Crystals, UA NOT REPORTED None /HPF    Epithelial Cells UA 0 TO 2 0 - 5 /HPF    Renal Epithelial, UA NOT REPORTED 0 /HPF    Bacteria, UA RARE (A) None    Mucus, UA NOT REPORTED None Trichomonas, UA NOT REPORTED None    Amorphous, UA NOT REPORTED None    Other Observations UA NOT REPORTED NOT REQ. Yeast, UA NOT REPORTED None   COVID-19, Rapid    Collection Time: 02/23/21 11:30 PM    Specimen: Nasopharyngeal Swab   Result Value Ref Range    Specimen Description . NASOPHARYNGEAL SWAB     SARS-CoV-2, Rapid Not Detected Not Detected   POC Glucose Fingerstick    Collection Time: 02/24/21 12:25 AM   Result Value Ref Range    POC Glucose 236 (H) 75 - 110 mg/dL   Comprehensive Metabolic Panel w/ Reflex to MG    Collection Time: 02/24/21  4:11 AM   Result Value Ref Range    Glucose 189 (H) 70 - 99 mg/dL    BUN 35 (H) 8 - 23 mg/dL    CREATININE 1.87 (H) 0.70 - 1.20 mg/dL    Bun/Cre Ratio 19 9 - 20    Calcium 9.1 8.6 - 10.4 mg/dL    Sodium 139 135 - 144 mmol/L    Potassium 6.3 (HH) 3.7 - 5.3 mmol/L    Chloride 113 (H) 98 - 107 mmol/L    CO2 15 (L) 20 - 31 mmol/L    Anion Gap 11 9 - 17 mmol/L    Alkaline Phosphatase 117 40 - 129 U/L    ALT 35 5 - 41 U/L    AST 28 <40 U/L    Total Bilirubin 0.35 0.3 - 1.2 mg/dL    Total Protein 6.5 6.4 - 8.3 g/dL    Albumin 3.8 3.5 - 5.2 g/dL    Albumin/Globulin Ratio NOT REPORTED 1.0 - 2.5    GFR Non- 35 (L) >60 mL/min    GFR  43 (L) >60 mL/min    GFR Comment          GFR Staging NOT REPORTED    Magnesium    Collection Time: 02/24/21  4:11 AM   Result Value Ref Range    Magnesium 1.7 1.6 - 2.6 mg/dL   CBC    Collection Time: 02/24/21  4:11 AM   Result Value Ref Range    WBC 6.9 3.5 - 11.3 k/uL    RBC 3.47 (L) 4.21 - 5.77 m/uL    Hemoglobin 10.8 (L) 13.0 - 17.0 g/dL    Hematocrit 36.1 (L) 40.7 - 50.3 %    .0 (H) 82.6 - 102.9 fL    MCH 31.1 25.2 - 33.5 pg    MCHC 29.9 28.4 - 34.8 g/dL    RDW 14.7 (H) 11.8 - 14.4 %    Platelets 892 (L) 179 - 453 k/uL    MPV 10.6 8.1 - 13.5 fL    NRBC Automated 0.0 0.0 per 100 WBC   Protime-INR    Collection Time: 02/24/21  4:11 AM   Result Value Ref Range    Protime 14.6 (H) 11.5 - 14.2 sec INR 1.2    CK isoenzymes    Collection Time: 02/24/21  4:11 AM   Result Value Ref Range    Total  39 - 308 U/L    CK-MB 5.0 <10.5 ng/mL    % CKMB 2.5 0.0 - 3.5 %    CKMB Interpretation NORMAL ISOENZYME PATTERN    Urinalysis with microscopic    Collection Time: 02/24/21  7:00 AM   Result Value Ref Range    Color, UA YELLOW YELLOW    Turbidity UA CLEAR CLEAR    Glucose, Ur TRACE (A) NEGATIVE    Bilirubin Urine NEGATIVE NEGATIVE    Ketones, Urine NEGATIVE NEGATIVE    Specific Gravity, UA 1.020 1.005 - 1.030    Urine Hgb 2+ (A) NEGATIVE    pH, UA 6.0 5.0 - 8.0    Protein, UA 1+ (A) NEGATIVE    Urobilinogen, Urine Normal Normal    Nitrite, Urine NEGATIVE NEGATIVE    Leukocyte Esterase, Urine NEGATIVE NEGATIVE    Urinalysis Comments NOT REPORTED     -          WBC, UA 5 TO 10 0 - 5 /HPF    RBC, UA 20 TO 50 0 - 2 /HPF    Casts UA NOT REPORTED /LPF    Crystals, UA NOT REPORTED None /HPF    Epithelial Cells UA 2 TO 5 0 - 5 /HPF    Renal Epithelial, UA NOT REPORTED 0 /HPF    Bacteria, UA NOT REPORTED None    Mucus, UA NOT REPORTED None    Trichomonas, UA NOT REPORTED None    Amorphous, UA NOT REPORTED None    Other Observations UA NOT REPORTED NOT REQ. Yeast, UA NOT REPORTED None   Osmolality, urine    Collection Time: 02/24/21  7:00 AM   Result Value Ref Range    Osmolality, Ur 523 300 - 1170 mOsm/kg   POC Glucose Fingerstick    Collection Time: 02/24/21  7:10 AM   Result Value Ref Range    POC Glucose 137 (H) 75 - 110 mg/dL       Imaging/Diagnostics:  Us Renal Complete    Result Date: 2/24/2021  No acute findings. No hydronephrosis.  Tiny nonobstructing left renal calculus and a small left renal cyst.       Assessment :      Hospital Problems           Last Modified POA    * (Principal) JANIE (acute kidney injury) ([facility] [address] 2/23/2021 Yes    Essential hypertension 2/23/2021 Yes Class 2 severe obesity due to excess calories with serious comorbidity and body mass index (BMI) of 39.0 to 39.9 in adult [facility]) 2/23/2021 Yes    GUTIERREZ (obstructive sleep apnea) 2/24/2021 Yes    Overview Signed 2/12/2013  9:21 AM by John Leyva     SEVERE         Hyperkalemia 2/23/2021 Yes    Acquired hypothyroidism 2/23/2021 Yes          Plan:     Patient status inpatient in the Progressive Unit/Step down    1. resume select home meds  2. JANIE on CKD2: continue IV fluids, renal ultrasound unremarkable for acute findings. 3. IV Lasix to manage hyperkalemia  4. Nephrology consult  5. Monitor labs, trend kidney function and electrolytes  6. Monitor intake and output  7. Heparin subcu for DVT prophylaxis  8. GI prophylaxis  9. Monitor and control blood pressure  10. Telemetry monitoring  11. Renal diet  12. Daily weights  13. Activity as tolerated  14. Plan discussed with patient and staff    Consultations:   IP CONSULT TO NEPHROLOGY  IP CONSULT TO INTERNAL MEDICINE     Patient is admitted as inpatient status because of co-morbidities listed above, severity of signs and symptoms as outlined, requirement for current medical therapies and most importantly because of direct risk to patient if care not provided in a hospital setting. Expected length of stay > 48 hours.     OSCAR PATIÑO NP  2/24/2021  9:51 AM    Copy sent to Dr. Torrey Traore MD

## 2021-02-24 NOTE — PROGRESS NOTES
Pt admitted to PCU assigned room 1006. VSS. Telemetry placed. Database and assessment initiated. Oriented to room and call light.  BS obtained; 236

## 2021-02-24 NOTE — ED NOTES
Handoff to [facility], gave opportunity for questions.       Ramon Mas RN  02/23/21 [id number]

## 2021-02-24 NOTE — PLAN OF CARE
Problem: Fluid Volume:  Goal: Ability to achieve a balanced intake and output will improve  Description: Ability to achieve a balanced intake and output will improve  Outcome: Ongoing  Note: Monitor I&O. Administer lasix as ordered. Will continue to monitor.       Problem: Physical Regulation:  Goal: Ability to maintain clinical measurements within normal limits will improve  Description: Ability to maintain clinical measurements within normal limits will improve  Outcome: Ongoing     Problem: Physical Regulation:  Goal: Will show no signs and symptoms of electrolyte imbalance  Description: Will show no signs and symptoms of electrolyte imbalance  Outcome: Ongoing

## 2021-02-24 NOTE — PROGRESS NOTES
ABG attempted 1 time. Patient refused another attempt. Dr Gamal Snow notified and stated he would order a VBG instead. Will continue to monitor.

## 2021-02-24 NOTE — ED NOTES
Attempted to call report to progressive, RN will call back for report.        Neelima Brewster RN  02/23/21 [id number]

## 2021-02-24 NOTE — PLAN OF CARE
Problem: Fluid Volume:  Goal: Ability to achieve a balanced intake and output will improve  Description: Ability to achieve a balanced intake and output will improve  Outcome: Ongoing     Problem: Physical Regulation:  Goal: Ability to maintain clinical measurements within normal limits will improve  Description: Ability to maintain clinical measurements within normal limits will improve  Outcome: Ongoing     Problem: Physical Regulation:  Goal: Will show no signs and symptoms of electrolyte imbalance  Description: Will show no signs and symptoms of electrolyte imbalance  Outcome: Ongoing

## 2021-02-24 NOTE — CONSULTS
NEPHROLOGY CONSULT     Patient :  Aleksandar Meléndez; 67 y.o. MRN# [id number]  Location:  0749/8280-35  Attending:  Ousmane Bianchi DO  Admit Date:  2/23/2021   Hospital Day: 1      Reason for Consult: Acute kidney injury, hyperkalemia      Chief Complaint: Abnormal labs  History Obtained From: Patient    History of Present Illness: This is a 67 y.o. male with past medical history of type 2 diabetes insulin-dependent diabetes mellitus, essential hypertension, coronary artery disease status post CABG, history of colon cancer status post colectomy, obstructive sleep apnea. History of nephrolithiasis obstructive uropathy requiring nephrostomy tube in the past follow-up with your with urology. He presented to the hospital with complaints of normal labs hyperkalemia, outpatient potassium was checked and it was 6.2 patient was sent to the ER in the ER repeat potassium showed 7.0  Did not have any complaints therefore exertional dyspnea no chest pain no palpitation    She was treated with medication including insulin D50 bicarb Kayexalate      Patient was taking Metformin losartan and spironolactone and meloxicam NSAID. Patient denies dysuria, gross hematuria, flank pain, nocturia, urgency, passing frothy urine or urinary incontinence. There has been no recent exposure to IV contrast.   There is no history  of paraprotein disease.       Past Medical History:        Diagnosis Date    Arthritis     BACK, BILATERAL SHOULDERS    Asthma     Back pain     BRCA2 positive 09/25/2018    CAD (coronary artery disease) 09/28/2015    CABG BYPASS X 3    Caffeine use     2 cans pop/day    Carpal tunnel syndrome     LEFT HAND    Cataracts, bilateral     Colon cancer ([address] 11/17/2011    A LYMPH NODE IN COLON WAS CANCEROUS    Cyst of pancreas     GETS THIS CHECKED ANNUALLY    Diabetes mellitus ([address]     Difficult intravenous access     PATIENT STATES USUALLY REQUIRES PICC TEAM.    GERD (gastroesophageal reflux disease)  Hay fever     HIGH CHOLESTEROL     History of blood transfusion     NO REACTION    History of chemotherapy 05/2013    [facility]gkin's [facility]     HTN (hypertension)     Hx of blood clots     AFTER BACK SURGERY, PATIENT HAD BLOOD CLOTS IN SURGICAL AREA AND NEEDED MORE SURGERY TO GET THEM REMOVED    Kidney stone     RIGHT KIDNEY    Lumbar herniated disc 1989    Microhematuria     Obesity     GUTIERREZ on CPAP     Pancreatitis     Pneumonia     Tinnitus     Use of cane as ambulatory aid     Uses brace     BACK BRACE AND LEFT HAND    Wears glasses     READING       Past Surgical History:        Procedure Laterality Date    ABDOMEN SURGERY  11/18/2011    RIGHT COLECTOMY    APPENDECTOMY  2011    with colectomy    BACK SURGERY  2014    LUMBAR LAMINECTOMY    BACK SURGERY  03/01/2014    EVACUATION OF SPINAL HEMATOMA    CATARACT REMOVAL Bilateral 01/2018    CATARACTS WITH IOL PLACED    COLONOSCOPY      Removal of colon polyp    CORONARY ARTERY BYPASS GRAFT  09/28/2015    CABG BYPASS X 3    CYSTOSCOPY Right 08/23/2018    CYSTOSCOPY, INSERTION OCCLUSIVE BALLOON, RIGHT RETROGRADE PYELOGRAM     CYSTOSCOPY Right 09/04/2018    CYSTOSCOPY STENT REMOVAL (Right )    KNEE SURGERY Right 2007    TENDON REPAIR    NEPHROSTOMY Right 08/23/2018    HOLMIUM - NEPHROLITHOTOMY PERCUTANEOUS    NERVE BLOCK      STEROID INJECTIONS STARTING 2015 - 04/2018    CA CYSTOSCOPY,REMV CALCULUS,COMPLIC Right 2/1/8066    CYSTOSCOPY STENT REMOVAL performed by Jonna Nuñez MD at [facility] Right 8/23/2018    CYSTOSCOPY, INSERTION OCCLUSIVE BALLOON, RIGHT RETROGRADE PYELOGRAM performed by Jonna Nuñez MD at [address] [place] [address] TO 2 CM Right 8/23/2018    HOLMIUM - NEPHROLITHOTOMY PERCUTANEOUS, C-ARM, LITHOCLAST,  PT COMING FROM INTERV RADIOLOGY  *SHORT STAY performed by Jonna Nuñez MD at [address] [place] [address] Current Medications:        sodium bicarbonate 75 mEq in sodium chloride 0.45 % 1,000 mL infusion, Continuous      sodium zirconium cyclosilicate (LOKELMA) oral suspension 10 g, BID      furosemide (LASIX) injection 20 mg, Once      amLODIPine (NORVASC) tablet 10 mg, Daily      aspirin EC tablet 81 mg, Daily      atorvastatin (LIPITOR) tablet 40 mg, Daily      levothyroxine (SYNTHROID) tablet 25 mcg, Daily      metoprolol succinate (TOPROL XL) extended release tablet 50 mg, Daily      montelukast (SINGULAIR) tablet 10 mg, Daily      pantoprazole (PROTONIX) tablet 40 mg, QAM AC      0.9 % sodium chloride infusion, Continuous      sodium chloride flush 0.9 % injection 10 mL, 2 times per day      sodium chloride flush 0.9 % injection 10 mL, PRN      nicotine (NICODERM CQ) 21 MG/24HR 1 patch, Daily PRN      promethazine (PHENERGAN) tablet 12.5 mg, Q6H PRN    Or      ondansetron (ZOFRAN) injection 4 mg, Q6H PRN      acetaminophen (TYLENOL) tablet 650 mg, Q6H PRN    Or      acetaminophen (TYLENOL) suppository 650 mg, Q6H PRN      heparin (porcine) injection 5,000 Units, 3 times per day        Allergies:  Patient has no known allergies.     Social History:   Social History     Socioeconomic History    Marital status:      Spouse name: Jonathan Marrero Number of children: 2    Years of education: Not on file    Highest education level: Not on file   Occupational History    Occupation:      Employer: [address] [place] [address] Dr resource strain: Not on file    Food insecurity     Worry: Not on file     Inability: Not on file   Plastio needs     Medical: Not on file     Non-medical: Not on file   Tobacco Use    Smoking status: Never Smoker    Smokeless tobacco: Never Used   Substance and Sexual Activity    Alcohol use: No    Drug use: No    Sexual activity: Yes     Partners: Female   Lifestyle    Physical activity     Days per week: Not on file Minutes per session: Not on file    Stress: Not on file   Relationships    Social connections     Talks on phone: Not on file     Gets together: Not on file     Attends [employer] service: Not on file     Active member of club or organization: Not on file     Attends meetings of clubs or organizations: Not on file     Relationship status: Not on file    Intimate partner violence     Fear of current or ex partner: Not on file     Emotionally abused: Not on file     Physically abused: Not on file     Forced sexual activity: Not on file   Other Topics Concern    Not on file   Social History Narrative    Not on file       Family History:   Family History   Problem Relation Age of Onset    Diabetes Father     High Blood Pressure Father     Stroke Mother     Heart Failure Mother     Other Mother         pacemaker    High Blood Pressure Mother     Heart Disease Maternal Grandmother     Heart Disease Maternal Grandfather     Heart Disease Maternal Uncle        Review of Systems:    Constitutional: No fever, no chills, no night sweats, fatigue, generalized weakness, loss of appetite  HEENT:  No headache, otalgia, itchy eyes, epistaxis, nasal discharge or sore throat. Cardiac:  No chest pain, +exertional dyspnea, no orthopnea or PND, palpitations  Chest:  No cough, hemoptysis, pleuritic chest pain, wheezing,SOB  Abdomen:  No abdominal pain, nausea, vomiting, diarrhea, melena, dysphagia hematemesis,constipation, abdominal bloating, flank pain  Neuro:  No CVA, TIA or seizure like activity. Skin:   No rashes, no itching. :   No hematuria, no pyuria, no dysuria, no flank pain. Extremities:  No swelling or joint pains.       Objective:  CURRENT TEMPERATURE:  Temp: 97.7 °F (36.5 °C)  MAXIMUM TEMPERATURE OVER 24HRS:  Temp (24hrs), Av.9 °F (36.6 °C), Min:97.7 °F (36.5 °C), Max:98.4 °F (36.9 °C)    CURRENT RESPIRATORY RATE:  Resp: 18  CURRENT PULSE:  Pulse: 60  CURRENT BLOOD PRESSURE:  BP: (!) 153/63 24HR BLOOD PRESSURE RANGE:  Systolic (52HHW), UXN:395 , Min:153 , HF   ; Diastolic (28KDY), PSR:39, Min:63, Max:71    24HR INTAKE/OUTPUT:      Intake/Output Summary (Last 24 hours) at 2021 0943  Last data filed at 2021 0940  Gross per 24 hour   Intake    Output 1500 ml   Net -1500 ml     Patient Vitals for the past 96 hrs (Last 3 readings):   Weight   21 0030 243 lb (110.2 kg)   21 1733 243 lb 8 oz (110.5 kg)       Physical Exam:  GENERAL APPEARANCE: Alert and cooperative, and appears to be in no acute distress. HEAD: normocephalic  EYES: PERRL, EOMI. Not pale, anicteric   NOSE:  No nasal discharge. THROAT:  Oral cavity and pharynx normal. Moist  CARDIAC: Normal S1 and S2. No S3, S4 or murmurs. Rhythm is regular. LUNGS: Clear to auscultation and percussion without rales, rhonchi, wheezing or diminished breath sounds. NECK: Neck supple, non-tender without lymphadenopathy, masses or thyromegaly. BACK: Examination of the spine reveals normal gait and posture, no spinal deformity, symmetry of spinal muscles, without tenderness, decreased range of motion or muscular spasm  MUSKULOSKELETAL: Adequately aligned spine. No joint erythema or tenderness. EXTREMITIES: + edema. Peripheral pulses intact. NEURO: Nonfocal    Labs:   CBC:  Recent Labs     21  0945 211   WBC 8.1 9.0 6.9   RBC 3.86* 3.76* 3.47*   HGB 11.6* 11.5* 10.8*   HCT 37.9* 37.2* 36.1*   MCV 98.2 98.9 104.0*   MCH 30.1 30.6 31.1   MCHC 30.6 30.9 29.9   RDW 14.8* 14.9* 14.7*    167 116*   MPV 10.6 10.3 10.6      BMP:   Recent Labs     21  1758 21  0411    143 139   K 7.0* 6.1* 6.3*   * 115* 113*   CO2 14* 15* 15*   BUN 42* 40* 35*   CREATININE 2.31* 2.17* 1.87*   GLUCOSE 103* 95 189*   CALCIUM 9.6 9.8 9.1      Phosphorus:  No results for input(s): PHOS in the last 72 hours.   Magnesium:   Recent Labs     21  1758 21  0411   MG 1.8 1.7 Albumin:   Recent Labs     02/23/21  0945 02/24/21  0411   LABALBU 4.2  4.2 3.8       IRON:  No results for input(s): IRON in the last 72 hours. Iron Saturation:  Invalid input(s): PERCENTFE  TIBC:  No results for input(s): TIBC in the last 72 hours. FERRITIN:  No results for input(s): FERRITIN in the last 72 hours. SPEP: No results for input(s): SPEP in the last 72 hours. Recent Labs     02/24/21  0411   PROT 6.5     UPEP: No results for input(s): TPU in the last 72 hours. Urine Sodium:  No results for input(s): PARVIN in the last 72 hours. Urine Potassium: No results for input(s): KUR in the last 72 hours. Urine Chloride: Invalid input(s): CLU  Urine Ph:  Invalid input(s): PO4U  Urine Osmolarity:   Recent Labs     02/24/21  0700   OSMOU 523     Urine Creatinine:    Recent Labs     02/23/21  0945   LABCREA 157.8     Urine Eosinophils: Invalid input(s): EOSU  Urine Protein:  No results for input(s): TPU in the last 72 hours. Urinalysis:    Recent Labs     02/24/21  0700   NITRU NEGATIVE   COLORU YELLOW   PHUR 6.0   WBCUA 5 TO 10   RBCUA 20 TO 50   MUCUS NOT REPORTED   TRICHOMONAS NOT REPORTED   YEAST NOT REPORTED   BACTERIA NOT REPORTED   SPECGRAV 1.020   LEUKOCYTESUR NEGATIVE   UROBILINOGEN Normal   BILIRUBINUR NEGATIVE   GLUCOSEU TRACE*   [address]   AMORPHOUS NOT REPORTED         Radiology:  Reviewed as available. Assessment:    1. JANIE on CKD, nonoliguric most likely secondary to prerenal azotemia possible causes use of Aldactone losartan, use of NSAIDs, renal ultrasound shows no hydronephrosis tiny kidney stones  2. Hyperkalemia-possible causes includes use of Aldactone potassium sparing diuretics, losartan possible causes RTA for her hyporenin hypoaldosteronism from NSAIDs and diabetes  3. Type 2 diabetes  4. Essential hypertension  5. [place] metabolic acidosis rule out RTA 4  6. Anemia macrocytic anemia       Plan:  1.  Urine electrolytes calculate urine anion gap 2. Give IV Lasix, Lokelma, insulin D50  3. Recheck potassium at noon  4. Continue to hold Aldactone Metformin losartan and NSAIDs    If hyperkalemia remains refractory I have discussed with the patient regarding need for dialysis may be a temporary dialysis for hyperkalemia patient has no objection if dialysis is needed for refractory hyperkalemia      Thank you for the consultation.       Electronically signed by Darby Celestin MD on 2/24/2021 at 9:43 AM

## 2021-02-24 NOTE — ED PROVIDER NOTES
eMERGENCY dEPARTMENT eNCOUnter   Independent Attestation     Pt Name: David Richard  MRN: [id number]  [place] 1945  Date of evaluation: 2/23/21     David Richard is a 67 y.o. male with CC: Other (elevated K)      Based on the medical record the care appears appropriate. I was personally available for consultation in the Emergency Department. Clinical impression: JANIE, hyperkalemia  Diagnosis: JANIE, hyperkalemia. Disposition: Admitted.     Jade Pratt MD  Attending Emergency Physician                    Liz Caro MD  02/23/21 [id number]       Liz Caro MD  02/24/21 2007       Liz Caro MD  02/25/21 9521

## 2021-02-25 LAB
ABSOLUTE EOS #: 0.23 K/UL (ref 0–0.44)
ABSOLUTE IMMATURE GRANULOCYTE: 0.03 K/UL (ref 0–0.3)
ABSOLUTE LYMPH #: 2.44 K/UL (ref 1.1–3.7)
ABSOLUTE MONO #: 0.79 K/UL (ref 0.1–1.2)
ANION GAP SERPL CALCULATED.3IONS-SCNC: 10 MMOL/L (ref 9–17)
BASOPHILS # BLD: 1 % (ref 0–2)
BASOPHILS ABSOLUTE: 0.04 K/UL (ref 0–0.2)
BUN BLDV-MCNC: 22 MG/DL (ref 8–23)
BUN/CREAT BLD: 15 (ref 9–20)
CALCIUM SERPL-MCNC: 8.9 MG/DL (ref 8.6–10.4)
CHLORIDE BLD-SCNC: 104 MMOL/L (ref 98–107)
CO2: 25 MMOL/L (ref 20–31)
CREAT SERPL-MCNC: 1.45 MG/DL (ref 0.7–1.2)
DIFFERENTIAL TYPE: ABNORMAL
EOSINOPHILS RELATIVE PERCENT: 3 % (ref 1–4)
GFR AFRICAN AMERICAN: 57 ML/MIN
GFR NON-AFRICAN AMERICAN: 47 ML/MIN
GFR SERPL CREATININE-BSD FRML MDRD: ABNORMAL ML/MIN/{1.73_M2}
GFR SERPL CREATININE-BSD FRML MDRD: ABNORMAL ML/MIN/{1.73_M2}
GLUCOSE BLD-MCNC: 117 MG/DL (ref 75–110)
GLUCOSE BLD-MCNC: 121 MG/DL (ref 70–99)
GLUCOSE BLD-MCNC: 126 MG/DL (ref 75–110)
GLUCOSE BLD-MCNC: 203 MG/DL (ref 75–110)
GLUCOSE BLD-MCNC: 249 MG/DL (ref 75–110)
GLUCOSE BLD-MCNC: 251 MG/DL (ref 75–110)
HCT VFR BLD CALC: 34.5 % (ref 40.7–50.3)
HEMOGLOBIN: 11.1 G/DL (ref 13–17)
IMMATURE GRANULOCYTES: 0 %
LYMPHOCYTES # BLD: 32 % (ref 24–43)
MCH RBC QN AUTO: 30.3 PG (ref 25.2–33.5)
MCHC RBC AUTO-ENTMCNC: 32.2 G/DL (ref 28.4–34.8)
MCV RBC AUTO: 94.3 FL (ref 82.6–102.9)
MONOCYTES # BLD: 10 % (ref 3–12)
NRBC AUTOMATED: 0 PER 100 WBC
PDW BLD-RTO: 14.3 % (ref 11.8–14.4)
PLATELET # BLD: 147 K/UL (ref 138–453)
PLATELET ESTIMATE: ABNORMAL
PMV BLD AUTO: 10.7 FL (ref 8.1–13.5)
POTASSIUM SERPL-SCNC: 5 MMOL/L (ref 3.7–5.3)
RBC # BLD: 3.66 M/UL (ref 4.21–5.77)
RBC # BLD: ABNORMAL 10*6/UL
SARS-COV-2: NORMAL
SARS-COV-2: NORMAL
SEG NEUTROPHILS: 54 % (ref 36–65)
SEGMENTED NEUTROPHILS ABSOLUTE COUNT: 4.17 K/UL (ref 1.5–8.1)
SODIUM BLD-SCNC: 139 MMOL/L (ref 135–144)
SOURCE: NORMAL
WBC # BLD: 7.7 K/UL (ref 3.5–11.3)
WBC # BLD: ABNORMAL 10*3/UL

## 2021-02-25 PROCEDURE — 85025 COMPLETE CBC W/AUTO DIFF WBC: CPT

## 2021-02-25 PROCEDURE — 99232 SBSQ HOSP IP/OBS MODERATE 35: CPT | Performed by: INTERNAL MEDICINE

## 2021-02-25 PROCEDURE — 2060000000 HC ICU INTERMEDIATE R&B

## 2021-02-25 PROCEDURE — 6370000000 HC RX 637 (ALT 250 FOR IP): Performed by: NURSE PRACTITIONER

## 2021-02-25 PROCEDURE — 80048 BASIC METABOLIC PNL TOTAL CA: CPT

## 2021-02-25 PROCEDURE — 82947 ASSAY GLUCOSE BLOOD QUANT: CPT

## 2021-02-25 PROCEDURE — 2580000003 HC RX 258: Performed by: INTERNAL MEDICINE

## 2021-02-25 PROCEDURE — 2500000003 HC RX 250 WO HCPCS: Performed by: INTERNAL MEDICINE

## 2021-02-25 PROCEDURE — 6360000002 HC RX W HCPCS: Performed by: NURSE PRACTITIONER

## 2021-02-25 PROCEDURE — 6370000000 HC RX 637 (ALT 250 FOR IP): Performed by: INTERNAL MEDICINE

## 2021-02-25 PROCEDURE — APPSS30 APP SPLIT SHARED TIME 16-30 MINUTES: Performed by: NURSE PRACTITIONER

## 2021-02-25 PROCEDURE — 36415 COLL VENOUS BLD VENIPUNCTURE: CPT

## 2021-02-25 RX ORDER — SODIUM CHLORIDE 9 MG/ML
INJECTION, SOLUTION INTRAVENOUS CONTINUOUS
Status: DISCONTINUED | OUTPATIENT
Start: 2021-02-25 | End: 2021-02-26

## 2021-02-25 RX ADMIN — LEVOTHYROXINE SODIUM 25 MCG: 0.03 TABLET ORAL at 06:12

## 2021-02-25 RX ADMIN — HEPARIN SODIUM 5000 UNITS: 5000 INJECTION INTRAVENOUS; SUBCUTANEOUS at 20:18

## 2021-02-25 RX ADMIN — ATORVASTATIN CALCIUM 40 MG: 40 TABLET, FILM COATED ORAL at 08:25

## 2021-02-25 RX ADMIN — Medication 3 MG: at 23:05

## 2021-02-25 RX ADMIN — MONTELUKAST 10 MG: 10 TABLET, FILM COATED ORAL at 08:25

## 2021-02-25 RX ADMIN — HEPARIN SODIUM 5000 UNITS: 5000 INJECTION INTRAVENOUS; SUBCUTANEOUS at 06:12

## 2021-02-25 RX ADMIN — AMLODIPINE BESYLATE 10 MG: 10 TABLET ORAL at 08:25

## 2021-02-25 RX ADMIN — SODIUM ZIRCONIUM CYCLOSILICATE 10 G: 10 POWDER, FOR SUSPENSION ORAL at 08:23

## 2021-02-25 RX ADMIN — INSULIN LISPRO 30 UNITS: 100 INJECTION, SUSPENSION SUBCUTANEOUS at 20:18

## 2021-02-25 RX ADMIN — PANTOPRAZOLE SODIUM 40 MG: 40 TABLET, DELAYED RELEASE ORAL at 06:12

## 2021-02-25 RX ADMIN — INSULIN LISPRO 30 UNITS: 100 INJECTION, SUSPENSION SUBCUTANEOUS at 17:34

## 2021-02-25 RX ADMIN — Medication: at 02:59

## 2021-02-25 RX ADMIN — SODIUM CHLORIDE: 9 INJECTION, SOLUTION INTRAVENOUS at 11:08

## 2021-02-25 RX ADMIN — METOPROLOL SUCCINATE 50 MG: 50 TABLET, EXTENDED RELEASE ORAL at 08:25

## 2021-02-25 RX ADMIN — SODIUM ZIRCONIUM CYCLOSILICATE 10 G: 10 POWDER, FOR SUSPENSION ORAL at 20:18

## 2021-02-25 RX ADMIN — INSULIN LISPRO 30 UNITS: 100 INJECTION, SUSPENSION SUBCUTANEOUS at 06:37

## 2021-02-25 RX ADMIN — ASPIRIN 81 MG: 81 TABLET, COATED ORAL at 08:25

## 2021-02-25 ASSESSMENT — PAIN SCALES - GENERAL: PAINLEVEL_OUTOF10: 0

## 2021-02-25 NOTE — PROGRESS NOTES
[facility]  Office: [address], DO, Audraarturobarbara Alize, DO, Zora Ghosh, DO, Ambar Anival Blood, DO, Alex Trimble MD, Idalia Duong MD, Perico Dixon MD, Jose Ontiveros MD, Hoda Martin MD, Sridevi Yousif MD, Moises Tompkins MD, Apolinar Ramirez MD, Karan Lan MD, Jenny Castro, DO, Neelima Arellano MD, Vilma Sweet MD, Jesus Raman, DO, Joey Oglesby MD,  Yesy Flores, DO, Maria Isabel Baez MD, Car Jarquin MD, Priyanka Valerio, [facility], [facility], CNP, Jonn Louise, CNP, Jadon Onofre, CNS, Lathan Pallas, CNP, Hima Haynes, CNP, Christina Werner, CNP, Cristina Kim, CNP, Minoo Chan, CNP, Luke Ruano PA-C, Roderick Norman, [facility], Babak Barlow, CNP, Opal Sarabia, CNP, Juana Ivory, CNP, Socorro Vizcarra, [facility], [facility], [facility]    Progress Note    2/25/2021    8:31 AM    Name:   Juanita Jaime  MRN:     [id number]     Acct:      [de-identified]   Room:   [address] [place] [address] Day:  2  Admit Date:  2/23/2021  5:45 PM    PCP:   Sue Garcia MD  Code Status:  Full Code    Subjective:     C/C:   Chief Complaint   Patient presents with    Other     elevated K     Interval History Status: improved. Patient had temp cath placed and HD yesterday due to persistent hyperkalemia unresponsive to medical treatment. VSS, labs improved. Good urine output overnight. No new complaints per patient or staff     Brief History: Ashok Harmon is a 67 y.o. Non-/non  male who presents with hyperkalemia (6.2) after having outpatient labs drawn and is admitted to the hospital for the management of JANIE (acute kidney injury) ([facility] He denies any complaints or symptomology other than chronic back pain. Lab work in ED revealed repeat potassium of 7.0 and cre 2.3. He does have a history of CKD, baseline cre 1.4-1.6. EKG and UA unremarkable. He was given insulin, d50, sodium bicarb injection, Lokelma, calcium gluconate and kayexalate in ED with little improvement of hyperkalemia     Review of Systems:     Constitutional:  negative for chills, fevers, sweats  Respiratory:  negative for cough, dyspnea on exertion, shortness of breath, wheezing  Cardiovascular:  negative for chest pain, chest pressure/discomfort, lower extremity edema, palpitations  Gastrointestinal:  negative for abdominal pain, constipation, diarrhea, nausea, vomiting  Neurological:  negative for dizziness, headache    Medications:      Allergies:  No Known Allergies    Current Meds:   Scheduled Meds:    sodium zirconium cyclosilicate  10 g Oral BID    insulin lispro prot & lispro  30 Units Subcutaneous Nightly    [Held by provider] insulin lispro prot & lispro  90 Units Subcutaneous QAM AC    insulin lispro prot & lispro  30 Units Subcutaneous Daily before lunch    amLODIPine  10 mg Oral Daily    aspirin  81 mg Oral Daily    atorvastatin  40 mg Oral Daily    levothyroxine  25 mcg Oral Daily    metoprolol succinate  50 mg Oral Daily    montelukast  10 mg Oral Daily    pantoprazole  40 mg Oral QAM AC    sodium chloride flush  10 mL Intravenous 2 times per day    heparin (porcine)  5,000 Units Subcutaneous 3 times per day     Continuous Infusions:    sodium bicarbonate infusion 75 mL/hr at 02/25/21 0259    dextrose      sodium chloride 75 mL/hr at 02/24/21 0026 PRN Meds: glucose, dextrose, glucagon (rDNA), dextrose, sodium citrate, sodium citrate, melatonin, sodium chloride flush, nicotine, promethazine **OR** ondansetron, acetaminophen **OR** acetaminophen    Data:     Past Medical History:   has a past medical history of Arthritis, Asthma, Back pain, BRCA2 positive, CAD (coronary artery disease), Caffeine use, Carpal tunnel syndrome, Cataracts, bilateral, Colon cancer ([facility] [address] Cyst of pancreas, Diabetes mellitus ([facility] [facility] Difficult intravenous access, GERD (gastroesophageal reflux disease), Hay fever, HIGH CHOLESTEROL, History of blood transfusion, History of chemotherapy, Hodgkin's lymphoma ([facility] [facility] HTN (hypertension), Hx of blood clots, Kidney stone, Lumbar herniated disc, Microhematuria, Obesity, GUTIERREZ on CPAP, Pancreatitis, Pneumonia, Tinnitus, Use of cane as ambulatory aid, Uses brace, and Wears glasses. Social History:   reports that he has never smoked. He has never used smokeless tobacco. He reports that he does not drink alcohol or use drugs. Family History:   Family History   Problem Relation Age of Onset    Diabetes Father     High Blood Pressure Father     Stroke Mother     Heart Failure Mother     Other Mother         pacemaker    High Blood Pressure Mother     Heart Disease Maternal Grandmother     Heart Disease Maternal Grandfather     Heart Disease Maternal Uncle        Vitals:  BP (!) 146/66   Pulse 63   Temp 97.9 °F (36.6 °C) (Oral)   Resp 16   Ht 5' 6\" (1.676 m)   Wt 107 lb 14.4 oz (48.9 kg)   SpO2 98%   BMI 17.42 kg/m²   Temp (24hrs), Av.9 °F (36.6 °C), Min:97.5 °F (36.4 °C), Max:98.6 °F (37 °C)    Recent Labs     21  1640 21  1906 21  0610 21  0711   POCGLU 253* 320* 117* 126*       I/O (24Hr):     Intake/Output Summary (Last 24 hours) at 2021 0831  Last data filed at 2021 0829  Gross per 24 hour   Intake 1930 ml   Output 2850 ml   Net -920 ml       Labs:  Hematology:  Recent Labs     21 1758 02/24/21 0411 02/25/21  0735   WBC 9.0 6.9 7.7   RBC 3.76* 3.47* 3.66*   HGB 11.5* 10.8* 11.1*   HCT 37.2* 36.1* 34.5*   MCV 98.9 104.0* 94.3   MCH 30.6 31.1 30.3   MCHC 30.9 29.9 32.2   RDW 14.9* 14.7* 14.3    116* 147   MPV 10.3 10.6 10.7   INR  --  1.2  --      Chemistry:  Recent Labs     02/23/21  0945 02/23/21  0945 02/23/21  1758 02/23/21  1758 02/24/21 0411 02/24/21  1202 02/25/21  0735     139  --  142   < > 139 135 139   K 6.2*  6.2*   < > 7.0*   < > 6.3* 6.1* 5.0   *  111*  --  115*   < > 113* 108* 104   CO2 15*  15*  --  14*   < > 15* 17* 25   GLUCOSE 90  90  --  103*   < > 189* 299* 121*   BUN 41*  41*  --  42*   < > 35* 30* 22   CREATININE 2.21*  2.21*  --  2.31*   < > 1.87* 1.79* 1.45*   MG  --   --  1.8  --  1.7  --   --    ANIONGAP 13  13  --  13   < > 11 10 10   LABGLOM 29*  29*  --  28*   < > 35* 37* 47*   GFRAA 35*  35*  --  34*   < > 43* 45* 57*   CALCIUM 9.6  9.6  --  9.6   < > 9.1 9.4 8.9   CKTOTAL 192  --   --   --  199  --   --    CKMB  --   --   --   --  5.0  --   --     < > = values in this interval not displayed.      Recent Labs     02/23/21  0945 02/23/21  0945 02/24/21  0411 02/24/21  0710 02/24/21  1057 02/24/21  1640 02/24/21  1906 02/25/21  0610 02/25/21  0711   PROT 7.4  7.4  --  6.5  --   --   --   --   --   --    LABALBU 4.2  4.2  --  3.8  --   --   --   --   --   --    LABA1C 6.1*  --   --   --   --   --   --   --   --    TSH 3.60  3.60  --   --   --   --   --   --   --   --    AST 26  26  --  28  --   --   --   --   --   --    ALT 34  34  --  35  --   --   --   --   --   --    ALKPHOS 117  117  --  117  --   --   --   --   --   --    BILITOT 0.40  0.40  --  0.35  --   --   --   --   --   --    AMYLASE 92  --   --   --   --   --   --   --   --    LIPASE 64*  --   --   --   --   --   --   --   --    CHOL 68  68  --   --   --   --   --   --   --   --    HDL 34*  34*  --   --   --   --   --   --   --   -- LDLCHOLESTEROL 12  12  --   --   --   --   --   --   --   --    CHOLHDLRATIO 2.0  2.0  --   --   --   --   --   --   --   --    TRIG 111  111  --   --   --   --   --   --   --   --    VLDL NOT REPORTED  NOT REPORTED  --   --   --   --   --   --   --   --    POCGLU  --    < >  --  137* 252* 253* 320* 117* 126*    < > = values in this interval not displayed. ABG:  Lab Results   Component Value Date    FIO2 NOT REPORTED 02/23/2021     Lab Results   Component Value Date/Time    SPECIAL NOT REPORTED 10/14/2019 09:48 AM     Lab Results   Component Value Date/Time    CULTURE NO GROWTH 6 DAYS 10/14/2019 09:48 AM       Radiology:  Us Renal Complete    Result Date: 2/24/2021  No acute findings. No hydronephrosis. Tiny nonobstructing left renal calculus and a small left renal cyst.     Ir Nontunneled Vascular Catheter > 5 Years    Result Date: 2/24/2021  Successful ultrasound and fluoroscopy guided non-tunneled right internal jugular vein 14 Y[place] by 24 cm temporary dialysis catheter placement. Ready for use.        Physical Examination:        General appearance:  alert, cooperative and no distress  Mental Status:  oriented to person, place and time and normal affect  Lungs:  clear to auscultation bilaterally, normal effort  Heart:  regular rate and rhythm, no murmur  Abdomen:  soft, nontender, nondistended, normal bowel sounds, no masses, hepatomegaly, splenomegaly  Extremities:  no edema, redness, tenderness in the calves  Skin:  no gross lesions, rashes, induration    Assessment:        Hospital Problems           Last Modified POA    * (Principal) JANIE (acute kidney injury) ([facility] [address] 2/23/2021 Yes    Essential hypertension 2/23/2021 Yes    Class 2 severe obesity due to excess calories with serious comorbidity and body mass index (BMI) of 39.0 to 39.9 in adult ([facility] [address] 2/23/2021 Yes    GUTIERREZ (obstructive sleep apnea) 2/24/2021 Yes    Overview Signed 2/12/2013  9:21 AM by Joleen Kitten SEVERE Hyperkalemia 2/23/2021 Yes    Acquired hypothyroidism 2/23/2021 Yes          Plan:        1. Continue to monitor labs, kidney function back to baseline following HD. Potassium better today   2. Continue telemetry monitoring   3. Continue renal diet  4. Monitor intake and output  5. Continue GI and DVT prophylaxis   6. Nephrology consult reviewed, continue Lokelma   7. Glycemic control, continue insulin   8. Discharge likely tomorrow if potassium remains stable   9.  Plan discussed with patient and staff     OSCAR Alcaraz NP  2/25/2021  8:31 AM

## 2021-02-25 NOTE — PLAN OF CARE
Problem: Fluid Volume:  Goal: Ability to achieve a balanced intake and output will improve  Description: Ability to achieve a balanced intake and output will improve  Outcome: Ongoing  Note: Monitor I&O. Problem: Physical Regulation:  Goal: Ability to maintain clinical measurements within normal limits will improve  Description: Ability to maintain clinical measurements within normal limits will improve  Outcome: Ongoing     Problem: Physical Regulation:  Goal: Will show no signs and symptoms of electrolyte imbalance  Description: Will show no signs and symptoms of electrolyte imbalance  Outcome: Ongoing  Note: Administer medication as ordered. Daily BMP. Will continue to monitor for signs and symptoms.

## 2021-02-25 NOTE — PROGRESS NOTES
HEMODIALYSIS POST TREATMENT NOTE    Treatment time ordered: 2 hours    Actual treatment time: 2 hours    UltraFiltration Goal: 0  UltraFiltration Removed: 0      Pre Treatment weight: 110.2kg  Post Treatment weight: 110.2kg  Estimated Dry Weight:     Access used:     Central Venous Catheter: Right neck non-tunneled catheter     Internal Access:        Access Flow: Good     Sign and symptoms of infection: na       If YES:     Medications or blood products given: None    Regular outpatient schedule: JANIE tx #1    Summary of response to treatment: Patient tolerated treatment well. NO s/s of distress noted during treatment     Explain if orders NOT met, was physician notified:vlad      ACES flowsheet faxed to patient unit/ placed in patient chart: yes    Post assessment completed: yes    Report given to: Marshall Stahl documented Safety Checks include the followin) Access and face visible at all times. 2) All connections and blood lines are secure with no kinks. 3) NVL alarm engaged. 4) Hemosafe device applied (if applicable). 5) No collapse of Arterial or Venous blood chambers. 6) All blood lines / pump segments in the air detectors.

## 2021-02-25 NOTE — PROGRESS NOTES
Nephrology Progress Note    Subjective/   67y.o. year old male who we are seeing in consultation for acute kidney injury and hyperkalemia. Interval history  Patient had acute dialysis yesterday for persistent hyperkalemia not managed with medication. Serum potassium has improved to 5.0. He has satisfactory urine output. Metabolic acidosis is improving. He denies chest pain shortness of breath or muscle cramping. History of Present Illness: This is a 67 y.o. male with past medical history of type 2 diabetes insulin-dependent diabetes mellitus, essential hypertension, coronary artery disease status post CABG, history of colon cancer status post colectomy, obstructive sleep apnea. History of nephrolithiasis obstructive uropathy requiring nephrostomy tube in the past follow-up with your with urology. He presented to the hospital with complaints of normal labs hyperkalemia, outpatient potassium was checked and it was 6.2 patient was sent to the ER in the ER repeat potassium showed 7.0  Did not have any complaints therefore exertional dyspnea no chest pain no palpitation  She was treated with medication including insulin D50 bicarb Kayexalate  Patient was taking Metformin losartan and spironolactone and meloxicam NSAID.   Patient denies dysuria, gross hematuria, flank pain, nocturia, urgency, passing frothy urine or urinary incontinence.       Objective/     Vitals:    02/25/21 0344 02/25/21 0624 02/25/21 0714 02/25/21 0825   BP: (!) 142/60  (!) 146/66    Pulse: 52  53 63   Resp: 16  16    Temp: 98.6 °F (37 °C)  97.9 °F (36.6 °C)    TempSrc: Oral  Oral    SpO2: 98%  98%    Weight:  107 lb 14.4 oz (48.9 kg)     Height:         24HR INTAKE/OUTPUT:      Intake/Output Summary (Last 24 hours) at 2/25/2021 1022  Last data filed at 2/25/2021 0829  Gross per 24 hour   Intake 1690 ml   Output 2050 ml   Net -360 ml     Patient Vitals for the past 96 hrs (Last 3 readings):   Weight 02/25/21 0624 107 lb 14.4 oz (48.9 kg)   02/24/21 2208 242 lb 15.2 oz (110.2 kg)   02/24/21 0030 243 lb (110.2 kg)       Constitutional:  Alert, awake, no apparent distress  Cardiovascular:  S1, S2 without m/r/g  Respiratory:  CTA B without w/r/r  Abdomen: +bs, soft, nt  Ext: 2+LE edema    Data/  Recent Labs     02/23/21 1758 02/24/21  0411 02/25/21  0735   WBC 9.0 6.9 7.7   HGB 11.5* 10.8* 11.1*   HCT 37.2* 36.1* 34.5*   MCV 98.9 104.0* 94.3    116* 147     Recent Labs     02/23/21 1758 02/23/21 1758 02/24/21  0411 02/24/21  1202 02/25/21  0735      < > 139 135 139   K 7.0*   < > 6.3* 6.1* 5.0   *   < > 113* 108* 104   CO2 14*   < > 15* 17* 25   GLUCOSE 103*   < > 189* 299* 121*   MG 1.8  --  1.7  --   --    BUN 42*   < > 35* 30* 22   CREATININE 2.31*   < > 1.87* 1.79* 1.45*   LABGLOM 28*   < > 35* 37* 47*   GFRAA 34*   < > 43* 45* 57*    < > = values in this interval not displayed. Assessment/   1. Cute kidney injury on CKD stage III nonoliguric most likely secondary to prerenal azotemia possible causes use of Aldactone losartan, use of NSAIDs, renal ultrasound shows no hydronephrosis tiny kidney stones  2. Hyperkalemia-possible causes includes use of Aldactone potassium sparing diuretics, losartan possible causes RTA for her hyporenin hypoaldosteronism from NSAIDs and diabetes  Potassium improved after 1 treatment of dialysis  3. Type 2 diabetes  4. Essential hypertension  5. [place] metabolic acidosis rule out RTA 4-improved  6. Anemia macrocytic anemia      Plan/   1-we will hold off dialysis today-monitor potassium tomorrow morning. Dialysis is only temporary for hyperkalemia and will be discontinued once potassium remains stable. 2-continue Lokelma 10 g twice daily  3-2 g potassium diet  4. Continue to hold Aldactone, Metformin, losartan and NSAIDs  5.. Discontinue bicarbonate drip and start 0.9 saline at 50 cc an hour  6.   Strict I's and O's      Sigrid Roman M.D, [facility] Nephrologist

## 2021-02-25 NOTE — PROGRESS NOTES
Dr. Lawson Samayoa phoned regarding elevated potassium, new orders received for temp dialysis cath and pt to have hemo dialysis today, pt informed of plan

## 2021-02-25 NOTE — PROGRESS NOTES
HEMODIALYSIS PRE-TREATMENT NOTE    Patient Identifiers prior to treatment: Name and     Isolation Required: No                      Isolation Type:        (please document if patient is being managed as a PUI/COVID-19 patient)        Hepatitis status:                           Date Drawn                             Result  Hepatitis B Surface Antigen 2021     Negative                     Hepatitis B Surface Antibody 2021 3        Hepatitis B Core Antibody 2021 Negative          How was Hepatitis Status verified: EMR     Was a copy of the labs you documented provided to facility for the patient's chart: na    Hemodialysis orders verified: yes    Access Within normal limits ( I.e. s/s of infection,...): yes     Pre-Assessment completed: Yes    Pre-dialysis report received from: Ezequiel Matta                      Time:

## 2021-02-26 VITALS
BODY MASS INDEX: 37.91 KG/M2 | DIASTOLIC BLOOD PRESSURE: 69 MMHG | TEMPERATURE: 97.7 F | HEIGHT: 66 IN | RESPIRATION RATE: 24 BRPM | WEIGHT: 235.89 LBS | HEART RATE: 55 BPM | SYSTOLIC BLOOD PRESSURE: 156 MMHG | OXYGEN SATURATION: 96 %

## 2021-02-26 LAB
ABSOLUTE EOS #: 0.21 K/UL (ref 0–0.44)
ABSOLUTE IMMATURE GRANULOCYTE: 0.03 K/UL (ref 0–0.3)
ABSOLUTE LYMPH #: 2.49 K/UL (ref 1.1–3.7)
ABSOLUTE MONO #: 0.93 K/UL (ref 0.1–1.2)
ANION GAP SERPL CALCULATED.3IONS-SCNC: 12 MMOL/L (ref 9–17)
BASOPHILS # BLD: 1 % (ref 0–2)
BASOPHILS ABSOLUTE: 0.05 K/UL (ref 0–0.2)
BUN BLDV-MCNC: 22 MG/DL (ref 8–23)
BUN/CREAT BLD: 14 (ref 9–20)
CALCIUM SERPL-MCNC: 8.6 MG/DL (ref 8.6–10.4)
CHLORIDE BLD-SCNC: 105 MMOL/L (ref 98–107)
CO2: 22 MMOL/L (ref 20–31)
CREAT SERPL-MCNC: 1.54 MG/DL (ref 0.7–1.2)
DIFFERENTIAL TYPE: ABNORMAL
EOSINOPHILS RELATIVE PERCENT: 3 % (ref 1–4)
GFR AFRICAN AMERICAN: 54 ML/MIN
GFR NON-AFRICAN AMERICAN: 44 ML/MIN
GFR SERPL CREATININE-BSD FRML MDRD: ABNORMAL ML/MIN/{1.73_M2}
GFR SERPL CREATININE-BSD FRML MDRD: ABNORMAL ML/MIN/{1.73_M2}
GLUCOSE BLD-MCNC: 172 MG/DL (ref 75–110)
GLUCOSE BLD-MCNC: 229 MG/DL (ref 75–110)
GLUCOSE BLD-MCNC: 79 MG/DL (ref 70–99)
GLUCOSE BLD-MCNC: 96 MG/DL (ref 75–110)
HCT VFR BLD CALC: 33.5 % (ref 40.7–50.3)
HEMOGLOBIN: 10.7 G/DL (ref 13–17)
IMMATURE GRANULOCYTES: 0 %
LYMPHOCYTES # BLD: 32 % (ref 24–43)
MCH RBC QN AUTO: 30.5 PG (ref 25.2–33.5)
MCHC RBC AUTO-ENTMCNC: 31.9 G/DL (ref 28.4–34.8)
MCV RBC AUTO: 95.4 FL (ref 82.6–102.9)
MONOCYTES # BLD: 12 % (ref 3–12)
NRBC AUTOMATED: 0 PER 100 WBC
PDW BLD-RTO: 14.3 % (ref 11.8–14.4)
PLATELET # BLD: 135 K/UL (ref 138–453)
PLATELET ESTIMATE: ABNORMAL
PMV BLD AUTO: 10.8 FL (ref 8.1–13.5)
POTASSIUM SERPL-SCNC: 4.2 MMOL/L (ref 3.7–5.3)
RBC # BLD: 3.51 M/UL (ref 4.21–5.77)
RBC # BLD: ABNORMAL 10*6/UL
SEG NEUTROPHILS: 52 % (ref 36–65)
SEGMENTED NEUTROPHILS ABSOLUTE COUNT: 4.05 K/UL (ref 1.5–8.1)
SODIUM BLD-SCNC: 139 MMOL/L (ref 135–144)
WBC # BLD: 7.8 K/UL (ref 3.5–11.3)
WBC # BLD: ABNORMAL 10*3/UL

## 2021-02-26 PROCEDURE — 2580000003 HC RX 258: Performed by: NURSE PRACTITIONER

## 2021-02-26 PROCEDURE — 6370000000 HC RX 637 (ALT 250 FOR IP): Performed by: INTERNAL MEDICINE

## 2021-02-26 PROCEDURE — 2580000003 HC RX 258: Performed by: INTERNAL MEDICINE

## 2021-02-26 PROCEDURE — 82947 ASSAY GLUCOSE BLOOD QUANT: CPT

## 2021-02-26 PROCEDURE — 80048 BASIC METABOLIC PNL TOTAL CA: CPT

## 2021-02-26 PROCEDURE — 6370000000 HC RX 637 (ALT 250 FOR IP): Performed by: NURSE PRACTITIONER

## 2021-02-26 PROCEDURE — 85025 COMPLETE CBC W/AUTO DIFF WBC: CPT

## 2021-02-26 PROCEDURE — 99239 HOSP IP/OBS DSCHRG MGMT >30: CPT | Performed by: INTERNAL MEDICINE

## 2021-02-26 PROCEDURE — 6360000002 HC RX W HCPCS: Performed by: NURSE PRACTITIONER

## 2021-02-26 PROCEDURE — 36415 COLL VENOUS BLD VENIPUNCTURE: CPT

## 2021-02-26 RX ORDER — FUROSEMIDE 20 MG/1
20 TABLET ORAL DAILY
Qty: 30 TABLET | Refills: 1 | Status: SHIPPED | OUTPATIENT
Start: 2021-02-26 | End: 2021-03-22 | Stop reason: SDUPTHER

## 2021-02-26 RX ORDER — CHLORTHALIDONE 25 MG/1
25 TABLET ORAL DAILY
Status: DISCONTINUED | OUTPATIENT
Start: 2021-02-26 | End: 2021-02-26

## 2021-02-26 RX ORDER — HYDRALAZINE HYDROCHLORIDE 25 MG/1
25 TABLET, FILM COATED ORAL EVERY 8 HOURS SCHEDULED
Status: DISCONTINUED | OUTPATIENT
Start: 2021-02-26 | End: 2021-02-26 | Stop reason: HOSPADM

## 2021-02-26 RX ORDER — FUROSEMIDE 20 MG/1
20 TABLET ORAL DAILY
Status: DISCONTINUED | OUTPATIENT
Start: 2021-02-26 | End: 2021-02-26 | Stop reason: HOSPADM

## 2021-02-26 RX ORDER — SODIUM POLYSTYRENE SULFONATE 15 G/60ML
15 SUSPENSION ORAL; RECTAL
Qty: 1 BOTTLE | Refills: 1 | Status: SHIPPED | OUTPATIENT
Start: 2021-03-01 | End: 2021-03-22 | Stop reason: ALTCHOICE

## 2021-02-26 RX ORDER — HYDRALAZINE HYDROCHLORIDE 25 MG/1
25 TABLET, FILM COATED ORAL EVERY 8 HOURS SCHEDULED
Qty: 90 TABLET | Refills: 3 | Status: SHIPPED | OUTPATIENT
Start: 2021-02-26 | End: 2021-03-22 | Stop reason: ALTCHOICE

## 2021-02-26 RX ORDER — SODIUM POLYSTYRENE SULFONATE 15 G/60ML
15 SUSPENSION ORAL; RECTAL
Qty: 1 BOTTLE | Refills: 1 | Status: SHIPPED | OUTPATIENT
Start: 2021-03-01 | End: 2021-02-26 | Stop reason: SDUPTHER

## 2021-02-26 RX ADMIN — ATORVASTATIN CALCIUM 40 MG: 40 TABLET, FILM COATED ORAL at 08:16

## 2021-02-26 RX ADMIN — MONTELUKAST 10 MG: 10 TABLET, FILM COATED ORAL at 08:16

## 2021-02-26 RX ADMIN — ASPIRIN 81 MG: 81 TABLET, COATED ORAL at 08:16

## 2021-02-26 RX ADMIN — PANTOPRAZOLE SODIUM 40 MG: 40 TABLET, DELAYED RELEASE ORAL at 06:18

## 2021-02-26 RX ADMIN — AMLODIPINE BESYLATE 10 MG: 10 TABLET ORAL at 08:16

## 2021-02-26 RX ADMIN — SODIUM ZIRCONIUM CYCLOSILICATE 10 G: 10 POWDER, FOR SUSPENSION ORAL at 08:16

## 2021-02-26 RX ADMIN — LEVOTHYROXINE SODIUM 25 MCG: 0.03 TABLET ORAL at 06:18

## 2021-02-26 RX ADMIN — INSULIN LISPRO 40 UNITS: 100 INJECTION, SUSPENSION SUBCUTANEOUS at 10:57

## 2021-02-26 RX ADMIN — METOPROLOL SUCCINATE 50 MG: 50 TABLET, EXTENDED RELEASE ORAL at 08:16

## 2021-02-26 RX ADMIN — SODIUM CHLORIDE, PRESERVATIVE FREE 10 ML: 5 INJECTION INTRAVENOUS at 08:16

## 2021-02-26 RX ADMIN — SODIUM CHLORIDE: 9 INJECTION, SOLUTION INTRAVENOUS at 06:24

## 2021-02-26 RX ADMIN — HEPARIN SODIUM 5000 UNITS: 5000 INJECTION INTRAVENOUS; SUBCUTANEOUS at 06:18

## 2021-02-26 RX ADMIN — FUROSEMIDE 20 MG: 20 TABLET ORAL at 10:52

## 2021-02-26 NOTE — DISCHARGE SUMMARY
into hospital.  Had refractory hyperkalemia so had a dialysis catheter placed and had one emergency dialysis treatment. He was taken off aldactone and losartan. Also taken off metformin and mobic due to ckd-3. Potassium corrected and cr dropped back to baseline. Nephrology added hydralazine for htn and lasix for a diuretic. They will get repeat outpatient labs and decide on any doses of kayexalate or not. Significant therapeutic interventions: see above    Significant Diagnostic Studies:   Labs / Micro:  CBC:   Lab Results   Component Value Date    WBC 7.8 02/26/2021    RBC 3.51 02/26/2021    RBC 2.43 11/20/2011    HGB 10.7 02/26/2021    HCT 33.5 02/26/2021    MCV 95.4 02/26/2021    MCH 30.5 02/26/2021    MCHC 31.9 02/26/2021    RDW 14.3 02/26/2021     02/26/2021     11/20/2011     CMP:    Lab Results   Component Value Date    GLUCOSE 79 02/26/2021    GLUCOSE 208 11/20/2011     02/26/2021    K 4.2 02/26/2021     02/26/2021    CO2 22 02/26/2021    BUN 22 02/26/2021    CREATININE 1.54 02/26/2021    ANIONGAP 12 02/26/2021    ALKPHOS 117 02/24/2021    ALT 35 02/24/2021    AST 28 02/24/2021    BILITOT 0.35 02/24/2021    LABALBU 3.8 02/24/2021    LABALBU 4.0 11/16/2011    ALBUMIN NOT REPORTED 02/24/2021    LABGLOM 44 02/26/2021    GFRAA 54 02/26/2021    GFR      02/26/2021    GFR NOT REPORTED 02/26/2021    PROT 6.5 02/24/2021    CALCIUM 8.6 02/26/2021        Radiology:  Us Renal Complete    Result Date: 2/24/2021  No acute findings. No hydronephrosis. Tiny nonobstructing left renal calculus and a small left renal cyst.     Ir Nontunneled Vascular Catheter > 5 Years    Result Date: 2/24/2021  Successful ultrasound and fluoroscopy guided non-tunneled right internal jugular vein 14 [place] by 24 cm temporary dialysis catheter placement. Ready for use.        Consultations:    Consults:     Final Specialist Recommendations/Findings:   IP CONSULT TO NEPHROLOGY  IP CONSULT TO INTERNAL MEDICINE      The patient was seen and examined on day of discharge and this discharge summary is in conjunction with any daily progress note from day of discharge. Discharge plan:     Disposition: Home    Physician Follow Up:     Gale Miner MD  [facility] [address]  [address]    In 1 week      [facility] [place], [place]  [address] [facility] [address] [place] [address]  [phone]    In 2 weeks         Requiring Further Evaluation/Follow Up POST HOSPITALIZATION/Incidental Findings: monitor k and cr levels    Diet: diabetic diet    Activity: As tolerated    Instructions to Patient: take medications as prescribed      Discharge Medications:      Medication List      START taking these medications    diclofenac sodium 1 % Gel  Commonly known as: Voltaren  Apply topically 4 times daily as needed for Pain     furosemide 20 MG tablet  Commonly known as: LASIX  Take 1 tablet by mouth daily     hydrALAZINE 25 MG tablet  Commonly known as: APRESOLINE  Take 1 tablet by mouth every 8 hours     sodium zirconium cyclosilicate 10 g Pack oral suspension  Commonly known as: LOKELMA  Take 10 g by mouth See Admin Instructions for 4 doses Take 1 packet on sundays and wednesdays for 2 weeks (total 4 doses)        CONTINUE taking these medications    amLODIPine 10 MG tablet  Commonly known as: NORVASC  TAKE 1 TABLET DAILY     aspirin 81 MG tablet     atorvastatin 40 MG tablet  Commonly known as: LIPITOR  TAKE 1 TABLET DAILY     HumaLOG Mix 50/50 KwikPen (50-50) 100 UNIT per ML Supn injection pen  Generic drug: insulin lispro prot & lispro  INJECT 90 UNITS IN THE MORNING, 60 UNITS AT 6 P.M.  AND 30 UNITS AT NIGHT     levothyroxine 25 MCG tablet  Commonly known as: SYNTHROID  TAKE 1 TABLET DAILY     metoprolol succinate 50 MG extended release tablet  Commonly known as: TOPROL XL  TAKE 1 TABLET DAILY     montelukast 10 MG tablet  Commonly known as: SINGULAIR  TAKE 1 TABLET DAILY     omeprazole 40 MG delayed release capsule  Commonly known as: PRILOSEC  TAKE 1 CAPSULE DAILY     Respiratory Therapy Supplies Misc  Cpap supplies- tubing, mask, filter     Walker Misc  1 each by Does not apply route daily        STOP taking these medications    losartan 100 MG tablet  Commonly known as: COZAAR     metFORMIN 500 MG tablet  Commonly known as: GLUCOPHAGE     Mobic 15 MG tablet  Generic drug: meloxicam     spironolactone 25 MG tablet  Commonly known as: ALDACTONE           Where to Get Your Medications      These medications were sent to [facility] [address], [address], [address] [place] [address]    Phone: [phone]   · furosemide 20 MG tablet  · hydrALAZINE 25 MG tablet  · sodium zirconium cyclosilicate 10 g Pack oral suspension     You can get these medications from any pharmacy    You don't need a prescription for these medications  · diclofenac sodium 1 % Gel         No discharge procedures on file. Time Spent on discharge is  32 mins in patient examination, evaluation, counseling as well as medication reconciliation, prescriptions for required medications, discharge plan and follow up. Electronically signed by   Ashli Bianchi DO  2/26/2021  9:13 AM      Thank you Dr. Marilyn Silverman MD for the opportunity to be involved in this patient's care.

## 2021-02-26 NOTE — PROGRESS NOTES
Nephrology Progress Note    Subjective/   67y.o. year old male who we are seeing in consultation for acute kidney injury and hyperkalemia. Interval history  Patient seen and examined patient is doing well denies any new complaints  His kidney function has improved and potassium is within normal limits  Patient had dialysis on 2/24/2021 for hyperkalemia  He denies chest pain shortness of breath or muscle cramping. History of Present Illness: This is a 67 y.o. male with past medical history of type 2 diabetes insulin-dependent diabetes mellitus, essential hypertension, coronary artery disease status post CABG, history of colon cancer status post colectomy, obstructive sleep apnea. History of nephrolithiasis obstructive uropathy requiring nephrostomy tube in the past follow-up with your with urology. He presented to the hospital with complaints of normal labs hyperkalemia, outpatient potassium was checked and it was 6.2 patient was sent to the ER in the ER repeat potassium showed 7.0  Did not have any complaints therefore exertional dyspnea no chest pain no palpitation  She was treated with medication including insulin D50 bicarb Kayexalate  Patient was taking Metformin losartan and spironolactone and meloxicam NSAID.   Patient denies dysuria, gross hematuria, flank pain, nocturia, urgency, passing frothy urine or urinary incontinence.       Objective/     Vitals:    02/26/21 0000 02/26/21 0414 02/26/21 0622 02/26/21 0817   BP: (!) 140/60 (!) 134/56  (!) 156/69   Pulse: 52 52  55   Resp: 18 20  24   Temp: 98 °F (36.7 °C) 98.1 °F (36.7 °C)  97.7 °F (36.5 °C)   TempSrc: Oral Oral  Oral   SpO2: 95% 97%  96%   Weight:   235 lb 14.3 oz (107 kg)    Height:         24HR INTAKE/OUTPUT:      Intake/Output Summary (Last 24 hours) at 2/26/2021 0936  Last data filed at 2/26/2021 4171  Gross per 24 hour   Intake 3423 ml   Output 2200 ml   Net 1223 ml     Patient Vitals for the past 96 hrs (Last 3 readings): Weight   02/26/21 0622 235 lb 14.3 oz (107 kg)   02/25/21 1130 230 lb 13.2 oz (104.7 kg)   02/25/21 0624 107 lb 14.4 oz (48.9 kg)       Constitutional:  Alert, awake, no apparent distress  Cardiovascular:  S1, S2 without m/r/g  Respiratory:  CTA B without w/r/r  Abdomen: +bs, soft, nt  Ext: 2+LE edema    Data/  Recent Labs     02/24/21  0411 02/25/21  0735 02/26/21  0343   WBC 6.9 7.7 7.8   HGB 10.8* 11.1* 10.7*   HCT 36.1* 34.5* 33.5*   .0* 94.3 95.4   * 147 135*     Recent Labs     02/23/21  1758 02/23/21  1758 02/24/21  0411 02/24/21  1202 02/25/21  0735 02/26/21  0343      < > 139 135 139 139   K 7.0*   < > 6.3* 6.1* 5.0 4.2   *   < > 113* 108* 104 105   CO2 14*   < > 15* 17* 25 22   GLUCOSE 103*   < > 189* 299* 121* 79   MG 1.8  --  1.7  --   --   --    BUN 42*   < > 35* 30* 22 22   CREATININE 2.31*   < > 1.87* 1.79* 1.45* 1.54*   LABGLOM 28*   < > 35* 37* 47* 44*   GFRAA 34*   < > 43* 45* 57* 54*    < > = values in this interval not displayed. Assessment/   1. Cute kidney injury on CKD stage III nonoliguric most likely secondary to prerenal azotemia possible causes use of Aldactone losartan, use of NSAIDs, renal ultrasound shows no hydronephrosis tiny kidney stones  2. Hyperkalemia-possible causes includes use of Aldactone potassium sparing diuretics, losartan possible causes RTA for her hyporenin hypoaldosteronism from NSAIDs and diabetes  Potassium improved after 1 treatment of dialysis  3. Type 2 diabetes  4. Essential hypertension suboptimally controlled hydralazine added  5. [place] metabolic acidosis rule out RTA 4-improved  6. Anemia macrocytic anemia      Plan/   1-no further need for dialysis. Remove Myron dialysis catheter  Patient on discharge from nephrology standpoint  2-continue Lokelma 10 g twice a week discharge  3-low potassium diet  4. Continue to hold Aldactone, Metformin, losartan and NSAIDs on discharge  5.. DC IV fluids  6.   Lasix 20 mg daily and hydralazine 25 mg 3 times daily on discharge.     BMP on Monday follow-up in 2 weeks outpatient    [facility] MD    Nephrologist

## 2021-02-26 NOTE — PROGRESS NOTES
Discharge instructions and medications  reviewed with the patient and he signed understanding. Pt given lab slip and instructed to have blood drawn on 3/1. Instructed on low potassium diet. Pt to  RXs at [address] [place] [address],.  Awaiting wife to transport

## 2021-02-26 NOTE — PLAN OF CARE
Problem: Fluid Volume:  Goal: Ability to achieve a balanced intake and output will improve  Description: Ability to achieve a balanced intake and output will improve  2/25/2021 2246 by Brittany Rollins RN  Outcome: Ongoing     Problem: Physical Regulation:  Goal: Ability to maintain clinical measurements within normal limits will improve  Description: Ability to maintain clinical measurements within normal limits will improve  2/25/2021 2246 by Brittany Rollins RN  Outcome: Ongoing     Problem: Physical Regulation:  Goal: Will show no signs and symptoms of electrolyte imbalance  Description: Will show no signs and symptoms of electrolyte imbalance  2/25/2021 2246 by Brittany Rollins RN  Outcome: Ongoing

## 2021-02-26 NOTE — PROGRESS NOTES
[facility]  Office: [address], DO, Sylvia Brooks, DO, Amanda Sofia, DO, Campos Bianchi, DO, Evelyn Vanegas MD, Alexa Tolentino MD, Jc Brewster MD, Jeffrey Muller MD, Zuleika Padilla MD, Surendra Robb MD, Tracey Alejandro MD, Chanell Hebert MD, Karan Mercedes MD, Sloane Long, DO, Saud Genao MD, Mitzi Sawant, DO, Darshana Valle MD,  Ernesto Wells, DO, Carlitos Christina MD, Chip Pepper MD, Alma Rodarte, [facility], [facility] Luanne, CNP, Kaleb Pena, CNP, Geovanny Pablo, CNS, Duane Bottcher, CNP, Austin Salazar, CNP, Mehran Travis, CNP, Elpidio Black, CNP, Pushpa Terrell, CNP, Go Pablo, PA-C, Dearl Shilpa, [facility], Giovanna Harris, CNP, Eli Richey, CNP, Rodolfo Barrett, CNP, Gabby Urbina, CNP, Luis Murcia, [facility], Faustino [facility]    Progress Note    2/26/2021    9:06 AM    Name:   Dayana French  MRN:     [id number]     Acct:      [de-identified]   Room:   [facility]1006-02   Day:  3  Admit Date:  2/23/2021  5:45 PM    PCP:   Mckenzie Ontiveros MD  Code Status:  Full Code    Subjective:     C/C:   Chief Complaint   Patient presents with    Other     elevated K     Interval History Status: improved. Feels fine, but never really had any symptoms, just was told to come to hospital due to high k and vivienne.     Denies cp/sob/n/v    Brief History: Per my KARINA:  \"Serg Page is a 67 y.o. Non-/non  male who presents with hyperkalemia (6.2) after having outpatient labs drawn and is admitted to the hospital for the management of JANIE (acute kidney injury) ([facility] [address] He denies any complaints or symptomology other than chronic back pain. Lab work in ED revealed repeat potassium of 7.0 and cre 2.3. He does have a history of CKD, baseline cre 1.4-1.6. EKG and UA unremarkable. He was given insulin, d50, sodium bicarb injection, Lokelma, calcium gluconate and kayexalate in ED with little improvement of hyperkalemia \"    Review of Systems:     Constitutional:  negative for chills, fevers, sweats  Respiratory:  negative for cough, dyspnea on exertion, shortness of breath, wheezing  Cardiovascular:  negative for chest pain, chest pressure/discomfort, lower extremity edema, palpitations  Gastrointestinal:  negative for abdominal pain, constipation, diarrhea, nausea, vomiting  Neurological:  negative for dizziness, headache    Medications:      Allergies:  No Known Allergies    Current Meds:   Scheduled Meds:    insulin lispro prot & lispro  40 Units Subcutaneous QAM AC    sodium zirconium cyclosilicate  10 g Oral BID    insulin lispro prot & lispro  30 Units Subcutaneous Nightly    insulin lispro prot & lispro  30 Units Subcutaneous Daily before lunch    amLODIPine  10 mg Oral Daily    aspirin  81 mg Oral Daily    atorvastatin  40 mg Oral Daily    levothyroxine  25 mcg Oral Daily    metoprolol succinate  50 mg Oral Daily    montelukast  10 mg Oral Daily    pantoprazole  40 mg Oral QAM AC    sodium chloride flush  10 mL Intravenous 2 times per day    heparin (porcine)  5,000 Units Subcutaneous 3 times per day     Continuous Infusions:    dextrose       PRN Meds: glucose, dextrose, glucagon (rDNA), dextrose, sodium citrate, sodium citrate, melatonin, sodium chloride flush, nicotine, promethazine **OR** ondansetron, acetaminophen **OR** acetaminophen Data:     Past Medical History:   has a past medical history of Arthritis, Asthma, Back pain, BRCA2 positive, CAD (coronary artery disease), Caffeine use, Carpal tunnel syndrome, Cataracts, bilateral, Colon cancer ([facility] [address] Cyst of pancreas, Diabetes mellitus ([facility] [address] Difficult intravenous access, GERD (gastroesophageal reflux disease), Hay fever, HIGH CHOLESTEROL, History of blood transfusion, History of chemotherapy, Hodgkin's lymphoma ([facility] [address] HTN (hypertension), Hx of blood clots, Kidney stone, Lumbar herniated disc, Microhematuria, Obesity, GUTIERREZ on CPAP, Pancreatitis, Pneumonia, Tinnitus, Use of cane as ambulatory aid, Uses brace, and Wears glasses. Social History:   reports that he has never smoked. He has never used smokeless tobacco. He reports that he does not drink alcohol or use drugs. Family History:   Family History   Problem Relation Age of Onset    Diabetes Father     High Blood Pressure Father     Stroke Mother     Heart Failure Mother     Other Mother         pacemaker    High Blood Pressure Mother     Heart Disease Maternal Grandmother     Heart Disease Maternal Grandfather     Heart Disease Maternal Uncle        Vitals:  BP (!) 156/69   Pulse 55   Temp 97.7 °F (36.5 °C) (Oral)   Resp 24   Ht 5' 6\" (1.676 m)   Wt 235 lb 14.3 oz (107 kg)   SpO2 96%   BMI 38.07 kg/m²   Temp (24hrs), Av.9 °F (36.6 °C), Min:97.6 °F (36.4 °C), Max:98.1 °F (36.7 °C)    Recent Labs     21  1143 21  1639 21  1920 21  0733   POCGLU 203* 249* 251* 96       I/O (24Hr):     Intake/Output Summary (Last 24 hours) at 2021 0906  Last data filed at 2021 2241  Gross per 24 hour   Intake 3423 ml   Output 2200 ml   Net 1223 ml       Labs:  Hematology:  Recent Labs     21  0411 21  0735 21  0343   WBC 6.9 7.7 7.8   RBC 3.47* 3.66* 3.51*   HGB 10.8* 11.1* 10.7*   HCT 36.1* 34.5* 33.5*   .0* 94.3 95.4   MCH 31.1 30.3 30.5   MCHC 29.9 32.2 31.9 RDW 14.7* 14.3 14.3   * 147 135*   MPV 10.6 10.7 10.8   INR 1.2  --   --      Chemistry:  Recent Labs     02/23/21  0945 02/23/21  0945 02/23/21  1758 02/23/21  1758 02/24/21  0411 02/24/21  1202 02/25/21  0735 02/26/21  0343     139  --  142   < > 139 135 139 139   K 6.2*  6.2*   < > 7.0*   < > 6.3* 6.1* 5.0 4.2   *  111*  --  115*   < > 113* 108* 104 105   CO2 15*  15*  --  14*   < > 15* 17* 25 22   GLUCOSE 90  90  --  103*   < > 189* 299* 121* 79   BUN 41*  41*  --  42*   < > 35* 30* 22 22   CREATININE 2.21*  2.21*  --  2.31*   < > 1.87* 1.79* 1.45* 1.54*   MG  --   --  1.8  --  1.7  --   --   --    ANIONGAP 13  13  --  13   < > 11 10 10 12   LABGLOM 29*  29*  --  28*   < > 35* 37* 47* 44*   GFRAA 35*  35*  --  34*   < > 43* 45* 57* 54*   CALCIUM 9.6  9.6  --  9.6   < > 9.1 9.4 8.9 8.6   CKTOTAL 192  --   --   --  199  --   --   --    CKMB  --   --   --   --  5.0  --   --   --     < > = values in this interval not displayed.      Recent Labs     02/23/21  0945 02/23/21  0945 02/24/21  0411 02/24/21  0411 02/25/21  0610 02/25/21  0711 02/25/21  1143 02/25/21  1639 02/25/21  1920 02/26/21  0733   PROT 7.4  7.4  --  6.5  --   --   --   --   --   --   --    LABALBU 4.2  4.2  --  3.8  --   --   --   --   --   --   --    LABA1C 6.1*  --   --   --   --   --   --   --   --   --    TSH 3.60  3.60  --   --   --   --   --   --   --   --   --    AST 26  26  --  28  --   --   --   --   --   --   --    ALT 34  34  --  35  --   --   --   --   --   --   --    ALKPHOS 117  117  --  117  --   --   --   --   --   --   --    BILITOT 0.40  0.40  --  0.35  --   --   --   --   --   --   --    AMYLASE 92  --   --   --   --   --   --   --   --   --    LIPASE 64*  --   --   --   --   --   --   --   --   --    CHOL 68  68  --   --   --   --   --   --   --   --   --    HDL 34*  34*  --   --   --   --   --   --   --   --   --    LDLCHOLESTEROL 12  12  --   --   --   --   --   --   --   --   -- Acquired hypothyroidism 2/23/2021 Yes          Plan:        1. Resume am 50/50 insulin but reduce dose as his glucose is <100  2. D/w Dr Andrew Wynn: will dc dialysis cath and keep off aldactone and arb; start lasix and hydralazine for htn and cont lokelma x 4 doses over 2 weeks and they will recheck his labs next week  3.  D/w LO YOO Blood, DO  2/26/2021  9:06 AM

## 2021-02-26 NOTE — CARE COORDINATION
DC Planning    Pt declines any dc needs, wife will . Called [facility]-waited on hold for 15 minutes-they do not have lokelma in stock nor will they have available until Monday. The insurance is not authorizing Lukasz Santiago but may cover Geryl Catching in which they are not sure if they have any stock. Call out to [address] [place] [address] with Dr. Anne-Marie Ibarra- ok to switch to Kayexelate 15 gm po twice weekly as needed x 4 doses pt is to call office after lab work for instructions. Called [facility] back spoke with Berta Macdonald to update med change they do have Kayexelate in [facility]

## 2021-02-26 NOTE — DISCHARGE INSTR - DIET
? Good nutrition is important when healing from an illness, injury, or surgery. Follow any nutrition recommendations given to you during your hospital stay. ? If you were given an oral nutrition supplement while in the hospital, continue to take this supplement at home. You can take it with meals, in-between meals, and/or before bedtime. These supplements can be purchased at most local grocery stores, pharmacies, and chain [email or web address]-stores. ? If you have any questions about your diet or nutrition, call the hospital and ask for the dietitian.       LOW POTASSIUM DIET

## 2021-02-27 ENCOUNTER — CARE COORDINATION (OUTPATIENT)
Dept: CASE MANAGEMENT | Age: 76
End: 2021-02-27

## 2021-02-27 NOTE — CARE COORDINATION
[facility] Transitions Initial Follow Up Call    Call within 2 business days of discharge: Yes    Patient: Gris Jung Patient : 1945   MRN: <[id number]>  Reason for Admission: JANIE, Hyperkalemia  Discharge Date: 21 RARS: Readmission Risk Score: 20      Last Discharge [address] [address]       Complaint Diagnosis Description Type Department Provider    21 Other Hyperkalemia . .. ED to Hosp-Admission (Discharged) (ADMITTED) TONNY Bianchi, DO; Balwinder Larios. .. Facility:  [facility]    Follow Up:  First attempt to contact patient for initial transition follow up. Unable to reach patient. Left message with contact information and request for call back.       Future Appointments   Date Time Provider Corinna Hanna   3/22/2021 11:00 AM Dora Alexis MD [facility] Efren Velázquez RN

## 2021-03-01 ENCOUNTER — CARE COORDINATION (OUTPATIENT)
Dept: CASE MANAGEMENT | Age: 76
End: 2021-03-01

## 2021-03-01 DIAGNOSIS — E87.5 HYPERKALEMIA: Primary | ICD-10-CM

## 2021-03-01 LAB
ANCA MYELOPEROXIDASE: 14 AU/ML
ANCA PROTEINASE 3: 7 AU/ML
ANTI-NUCLEAR ANTIBODY (ANA): ABNORMAL

## 2021-03-01 PROCEDURE — 1111F DSCHRG MED/CURRENT MED MERGE: CPT | Performed by: INTERNAL MEDICINE

## 2021-03-01 NOTE — CARE COORDINATION
[facility] Transitions Initial Follow Up Call    Call within 2 business days of discharge: Yes    Patient: Ashok Harmon Patient : 1945   MRN: [id number]  Reason for Admission: hyperkalemia (sent d/t K+ lab 6.2), JANIE   Discharge Date: 21 RARS: Readmission Risk Score: 20      Last Discharge [facility]       Complaint Diagnosis Description Type Department Provider    21 Other Hyperkalemia . .. ED to Hosp-Admission (Discharged) (ADMITTED) TONNY YOO Blood, DO; Balwinder FALK Strug. .. Spoke with: Frederick Essex    Call to pt who states he is doing \"fine\". States understanding low potassium diet as instructed  Denies SOB, fever/ chills and has had both COVID vaccines      Challenges to be reviewed by the provider   Additional needs identified to be addressed with provider   none    Discussed COVID-19 related testing which was available at this time. Test results were negative. Patient informed of results, if available? Yes         Method of communication with provider : none    Advance Care Planning:   Does patient have an Advance Directive:  reviewed and current. Was this a readmission? No  Patient stated reason for admission: high potassium   Patients top risk factors for readmission: lack of knowledge about disease and medical condition    Care Transition Nurse (CTN) contacted the patient by telephone to perform post hospital discharge assessment. Verified name and  with patient as identifiers. Provided introduction to self, and explanation of the CTN role. CTN reviewed discharge instructions, medical action plan and red flags with patient who verbalized understanding. Patient given an opportunity to ask questions and does not have any further questions or concerns at this time. Were discharge instructions available to patient? Yes. Reviewed appropriate site of care based on symptoms and resources available to patient including: PCP, Specialist and When to call 911.  The patient agrees to contact the PCP office for questions related to their healthcare. Medication reconciliation was performed with patient, who verbalizes understanding of administration of home medications. Advised obtaining a 90-day supply of all daily and as-needed medications. Covid Risk Education    Patient has following risk factors of: diabetes and chronic kidney disease. Education provided regarding infection prevention, and signs and symptoms of COVID-19 and when to seek medical attention with patient who verbalized understanding. Discussed exposure protocols and quarantine From CDC: Are you at higher risk for severe illness?   and given an opportunity for questions and concerns. The patient agrees to contact the COVID-19 hotline [phone] or PCP office for questions related to COVID-19. For more information on steps you can take to protect yourself, see CDC's How to Protect Yourself     Discussed follow-up appointments. If no appointment was previously scheduled, appointment scheduling offered: Yes. Is follow up appointment scheduled within 7 days of discharge? 3/2- PCP   Non-[facility] follow up appointment(s): pt calling PCP to find a nephrologist closer to his home in [place], [place] ([place], [place] is very far for him to drive to)     Plan for follow-up call in 7-10 days based on severity of symptoms and risk factors. Plan for next call: symptom management-routine follow up/ labs and follow up appointment-neph appt   CTN provided contact information for future needs.           Non-face-to-face services provided:  Scheduled appointment with PCP-confirmed appt 3/22  Scheduled appointment with Specialist-pt asking Dr Serafin Martin for a different referral to neph, closer to his home   Obtained and reviewed discharge summary and/or continuity of care documents  Assessment and support for treatment adherence and medication management-1111F complete    Care Transitions 24 Hour Call    Do you have any ongoing

## 2021-03-02 ENCOUNTER — TELEPHONE (OUTPATIENT)
Dept: INTERNAL MEDICINE CLINIC | Age: 76
End: 2021-03-02

## 2021-03-04 ENCOUNTER — HOSPITAL ENCOUNTER (OUTPATIENT)
Age: 76
Setting detail: SPECIMEN
Discharge: HOME | End: 2021-03-04
Payer: MEDICARE

## 2021-03-04 LAB
ANION GAP SERPL CALCULATED.3IONS-SCNC: 14 MMOL/L (ref 9–17)
BUN BLDV-MCNC: 32 MG/DL (ref 8–23)
BUN/CREAT BLD: ABNORMAL (ref 9–20)
CALCIUM SERPL-MCNC: 9.4 MG/DL (ref 8.6–10.4)
CHLORIDE BLD-SCNC: 110 MMOL/L (ref 98–107)
CO2: 21 MMOL/L (ref 20–31)
CREAT SERPL-MCNC: 1.76 MG/DL (ref 0.7–1.2)
GFR AFRICAN AMERICAN: 46 ML/MIN
GFR NON-AFRICAN AMERICAN: 38 ML/MIN
GFR SERPL CREATININE-BSD FRML MDRD: ABNORMAL ML/MIN/{1.73_M2}
GFR SERPL CREATININE-BSD FRML MDRD: ABNORMAL ML/MIN/{1.73_M2}
GLUCOSE BLD-MCNC: 77 MG/DL (ref 70–99)
POTASSIUM SERPL-SCNC: 3.9 MMOL/L (ref 3.7–5.3)
SODIUM BLD-SCNC: 145 MMOL/L (ref 135–144)

## 2021-03-08 ENCOUNTER — CARE COORDINATION (OUTPATIENT)
Dept: CASE MANAGEMENT | Age: 76
End: 2021-03-08

## 2021-03-08 NOTE — CARE COORDINATION
[facility] Transitions Follow Up Call    3/8/2021    Patient: Josue Carbone  Patient : 1945   MRN: <[id number]>  Reason for Admission:   Discharge Date: 21 RARS: Readmission Risk Score: 20         Spoke with: Marcella Rubinstein, patient    Care Transitions Subsequent and Final Call    Subsequent and Final Calls  Do you have any ongoing symptoms?: Yes  Patient-reported symptoms: Constipation  Have your medications changed?: Yes  Patient Reports: Started on OTC stool softener  Do you have any questions related to your medications?: No  Do you currently have any active services?: No  Do you have any needs or concerns that I can assist you with?: No  Identified Barriers: None  Care Transitions Interventions  Other Interventions: Follow Up:  Received phone call from patient. Left message stating he is returning phone call. He can be reached at [phone] called patient. Mr. Darrell Jolly stated that he is doing fine. Denies having any c/o chest pain/discomfort, shortness of breath, nausea/vomiting. He stated the only issue he is having is moving his bowels. He denies having any abdominal pain. He stated his last bowel movement was a couple days ago. He stated he just started taking an OTC stool softener. Encouraged to increase water intake. Advised to contact provider if he does not have a bowel movement within the next day. He verbalized understanding. He does not have any other questions or concerns for CTN at this time.       Future Appointments   Date Time Provider Corinna Hanna   3/22/2021 11:00 AM Hola Ardon MD [facility] Emelyn Richardson, RN

## 2021-03-11 ENCOUNTER — CARE COORDINATION (OUTPATIENT)
Dept: CASE MANAGEMENT | Age: 76
End: 2021-03-11

## 2021-03-11 NOTE — CARE COORDINATION
[facility] Transitions Follow Up Call- final call     3/11/2021    Patient: David Willson  Patient : 1945   MRN: [id number]  Reason for Admission: hyperkalemia (sent d/t K+ lab 6.2), JANIE    Discharge Date: 21 RARS: Readmission Risk Score: 20         Attempted to reach patient for final transitional call. VM left to return call to [phone] or continue communication with providers.  Episode closed            Follow Up  Future Appointments   Date Time Provider Corinna Hanna   3/22/2021 11:00 AM Steve Roslaes MD [facility] Bulmaro Granados, RN

## 2021-03-16 RX ORDER — INSULIN LISPRO 100 [IU]/ML
INJECTION, SUSPENSION SUBCUTANEOUS
Qty: 165 ML | Refills: 0 | Status: SHIPPED | OUTPATIENT
Start: 2021-03-16 | End: 2021-06-07

## 2021-03-16 NOTE — TELEPHONE ENCOUNTER
Joan Holm is calling to request a refill on the following medication(s):    Medication Request:    Last filled 12/17/20 #165ml with 0 RF    Requested Prescriptions     Pending Prescriptions Disp Refills    HUMALOG MIX 50/50 KWIKPEN (50-50) 100 UNIT/ML SUPN injection pen [Pharmacy Med Name: HUMALOG MIX 50/50 KWKPN 3ML 5 50/50] 165 mL 2     Sig: INJECT 90 UNITS IN THE MORNING, 60 UNITS AT 6 P.M.  AND 30 UNITS AT NIGHT (NEEDS APPOINTMENT)       Last Visit Date (If Applicable):  12/16/4277    Next Visit Date:    3/22/2021

## 2021-03-22 ENCOUNTER — OFFICE VISIT (OUTPATIENT)
Dept: INTERNAL MEDICINE CLINIC | Age: 76
End: 2021-03-22
Payer: MEDICARE

## 2021-03-22 VITALS
HEART RATE: 56 BPM | WEIGHT: 245 LBS | RESPIRATION RATE: 16 BRPM | DIASTOLIC BLOOD PRESSURE: 66 MMHG | SYSTOLIC BLOOD PRESSURE: 134 MMHG | BODY MASS INDEX: 39.37 KG/M2 | HEIGHT: 66 IN | TEMPERATURE: 97.1 F

## 2021-03-22 DIAGNOSIS — E87.5 HYPERKALEMIA: Primary | ICD-10-CM

## 2021-03-22 DIAGNOSIS — E11.59 TYPE 2 DIABETES MELLITUS WITH OTHER CIRCULATORY COMPLICATION, WITH LONG-TERM CURRENT USE OF INSULIN (HCC): ICD-10-CM

## 2021-03-22 DIAGNOSIS — I10 ESSENTIAL HYPERTENSION: ICD-10-CM

## 2021-03-22 DIAGNOSIS — N18.9 ACUTE RENAL FAILURE SUPERIMPOSED ON CHRONIC KIDNEY DISEASE, UNSPECIFIED CKD STAGE, UNSPECIFIED ACUTE RENAL FAILURE TYPE (HCC): ICD-10-CM

## 2021-03-22 DIAGNOSIS — N17.9 ACUTE RENAL FAILURE SUPERIMPOSED ON CHRONIC KIDNEY DISEASE, UNSPECIFIED CKD STAGE, UNSPECIFIED ACUTE RENAL FAILURE TYPE (HCC): ICD-10-CM

## 2021-03-22 DIAGNOSIS — N17.9 ACUTE KIDNEY INJURY (HCC): ICD-10-CM

## 2021-03-22 DIAGNOSIS — Z79.4 TYPE 2 DIABETES MELLITUS WITH OTHER CIRCULATORY COMPLICATION, WITH LONG-TERM CURRENT USE OF INSULIN (HCC): ICD-10-CM

## 2021-03-22 PROCEDURE — 1111F DSCHRG MED/CURRENT MED MERGE: CPT | Performed by: INTERNAL MEDICINE

## 2021-03-22 PROCEDURE — 99215 OFFICE O/P EST HI 40 MIN: CPT | Performed by: INTERNAL MEDICINE

## 2021-03-22 RX ORDER — FUROSEMIDE 20 MG/1
20 TABLET ORAL DAILY
Qty: 60 TABLET | Refills: 0 | Status: SHIPPED | OUTPATIENT
Start: 2021-03-22 | End: 2021-04-08 | Stop reason: SDUPTHER

## 2021-03-22 ASSESSMENT — PATIENT HEALTH QUESTIONNAIRE - PHQ9
2. FEELING DOWN, DEPRESSED OR HOPELESS: 0
SUM OF ALL RESPONSES TO PHQ QUESTIONS 1-9: 0
SUM OF ALL RESPONSES TO PHQ QUESTIONS 1-9: 0
1. LITTLE INTEREST OR PLEASURE IN DOING THINGS: 0

## 2021-03-22 NOTE — PROGRESS NOTES
Post-Discharge Transitional Care Management Services or Hospital Follow Up      [address]   YOB: 1945    Date of Office Visit:  3/22/2021  Date of Hospital Admission: 2/23/21  Date of Hospital Discharge: 2/26/21  Risk of hospital readmission (high >=14%.  Medium >=10%) :Readmission Risk Score: 20      Care management risk score Rising risk (score 2-5) and Complex Care (Scores >=6): 6     Non face to face  following discharge, date last encounter closed (first attempt may have been earlier): *No documented post hospital discharge outreach found in the last 14 days    Call initiated 2 business days of discharge: *No response recorded in the last 14 days    Patient Active Problem List   Diagnosis    HIGH CHOLESTEROL    Essential hypertension    Class 2 severe obesity due to excess calories with serious comorbidity and body mass index (BMI) of 39.0 to 39.9 in adult ([address]    GUTIERREZ (obstructive sleep apnea)    [facility] [address]    Hay fever    Lumbar herniated disc    Epidural hematoma (HCC)    Microhematuria    Renal calculus, left    Urolithiasis    Stage 2 chronic kidney disease    Type 2 diabetes mellitus with stage 3b chronic kidney disease, with long-term current use of insulin (HCC)    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    Bilateral primary osteoarthritis of hip    Diffuse large cell lymphoma in remission ([address]    Iron deficiency anemia    IPMN (intraductal papillary mucinous neoplasm)    Hypocitraturia    Hyperkalemia    Acquired hypothyroidism    JANIE (acute kidney injury) ([address]       No Known Allergies    Medications listed as ordered at the time of discharge from hospital   NAILA [address] Medication Instructions JACKSON:    Printed on:03/22/21 1254   Medication Information                      amLODIPine (NORVASC) 10 MG tablet  TAKE 1 TABLET DAILY             aspirin 81 MG tablet  Take 81 mg by mouth daily              atorvastatin DAILY 90 tablet 3    atorvastatin (LIPITOR) 40 MG tablet TAKE 1 TABLET DAILY 90 tablet 3    omeprazole (PRILOSEC) 40 MG delayed release capsule TAKE 1 CAPSULE DAILY 90 capsule 3    metoprolol succinate (TOPROL XL) 50 MG extended release tablet TAKE 1 TABLET DAILY 90 tablet 3    Respiratory Therapy Supplies MISC Cpap supplies- tubing, mask, filter 1 each 0    aspirin 81 MG tablet Take 81 mg by mouth daily           Medications patient taking as of now reconciled against medications ordered at time of hospital discharge: Yes    Chief Complaint   Patient presents with    Hypertension     3 mo check up- only taking Hydralazine once daoly- NOT TID as ordered    Diabetes     up and down since [place] Metformin    Follow-Up from [facility], potassium was elevated, had dialysis , stated needs Nephrology referral    Referral - General     wants to see different nephrologist as [place] [facility] [place] [facility] and too far away   Kacey Dougherty Immunizations     did get both Covid vaccines at [place] [facility]     co retaining water, but stated he does urinate frequently during the day       History of Present illness - Follow up of Hospital diagnosis(es): Patient had persistent hyperkalemia for which he was advised to go to the emergency room and patient had elevated creatinine from his baseline and potassium was elevated and patient was admitted and patient's potassium was persistent and so patient had dialysis done and potassium came back to normal and his creatinine came back to baseline and patient's medications reviewed and Aldactone Metformin losartan and Mobic were discontinued at this time because of the elevated creatinine and hyperkalemia and patient was started on hydralazine 3 times a day 25 mg for his blood pressure control and also advised to continue Norvasc and patient was started on Lasix 20 mg daily and advised to follow as an outpatient and patient currently on 50-50 insulin for his diabetes control and patient complaining of pain because he was not taking Mobic anymore    Inpatient course: Discharge summary reviewed- see chart. Interval history/Current status: Advised patient to take Tylenol as needed for his pain not more than 2 or 3 times a day and if no improvement will plan to do tramadol as patient cannot take the NSAIDs because of his renal function and I did discuss with patient about the pros and cons of tramadol  Patient advised to follow-up with nephrology and as no other nephrologist is available we did send him back to same nephrologist that has seen him in the hospital even though it is far from his home  Patient advised to discontinue hydralazine 25 mg as he is taking only once a day and the blood pressure is better controlled and advised to increase the Lasix to 20 mg alternating with 40 mg as his legs are still swollen  Patient's recent creatinine last week was within reasonable limits at his baseline at 1.46  Patient advised to contact his endocrinologist for his diabetes and he will be seeing him next month to discuss about the medications for his diabetes control  Review in 2 months    A comprehensive review of systems was negative except for what was noted in the HPI. Vitals:    03/22/21 1116   BP: 134/66   Site: Right Upper Arm   Position: Sitting   Cuff Size: Large Adult   Pulse: 56   Resp: 16   Temp: 97.1 °F (36.2 °C)   TempSrc: Infrared   Weight: 245 lb (111.1 kg)   Height: 5' 6\" (1.676 m)     Body mass index is 39.54 kg/m².    Wt Readings from Last 3 Encounters:   03/22/21 245 lb (111.1 kg)   02/26/21 235 lb 14.3 oz (107 kg)   12/30/20 238 lb (108 kg)     BP Readings from Last 3 Encounters:   03/22/21 134/66   02/26/21 (!) 156/69   12/22/20 132/74        Physical Exam:  General Appearance: alert and oriented to person, place and time, well developed and well- nourished, in no acute distress  Skin: warm and dry, no rash or erythema  Head: normocephalic and atraumatic  Eyes: pupils equal, round, and reactive to light, extraocular eye movements intact, conjunctivae normal  ENT: tympanic membrane, external ear and ear canal normal bilaterally, nose without deformity, nasal mucosa and turbinates normal without polyps  Neck: supple and non-tender without mass, no thyromegaly or thyroid nodules, no cervical lymphadenopathy  Pulmonary/Chest: clear to auscultation bilaterally- no wheezes, rales or rhonchi, normal air movement, no respiratory distress  Cardiovascular: normal rate, regular rhythm, normal S1 and S2, no murmurs, rubs, clicks, or gallops, distal pulses intact, no carotid bruits  Abdomen: soft, non-tender, non-distended, normal bowel sounds, no masses or organomegaly  Extremities: no cyanosis, clubbing or edema  Musculoskeletal: normal range of motion, no joint swelling, deformity or tenderness  Neurologic: reflexes normal and symmetric, no cranial nerve deficit, gait, coordination and speech normal    Assessment/Plan:   Diagnosis Orders   1. Hyperkalemia  CA DISCHARGE MEDS RECONCILED W/ CURRENT OUTPATIENT MED LIST   2. Acute kidney injury ([facility] [address]  CA DISCHARGE MEDS RECONCILED W/ CURRENT OUTPATIENT MED LIST   3. Essential hypertension     4. Type 2 diabetes mellitus with other circulatory complication, with long-term current use of insulin ([address]     5.  Acute renal failure superimposed on chronic kidney disease, unspecified CKD stage, unspecified acute renal failure type [facility])               Medical Decision Making: high complexity

## 2021-04-08 RX ORDER — FUROSEMIDE 20 MG/1
20 TABLET ORAL DAILY
Qty: 60 TABLET | Refills: 1 | Status: SHIPPED | OUTPATIENT
Start: 2021-04-08 | End: 2021-05-25 | Stop reason: DRUGHIGH

## 2021-04-08 NOTE — TELEPHONE ENCOUNTER
Devi Gauthier is calling to request a refill on the following medication(s):    Medication Request:  Requested Prescriptions     Pending Prescriptions Disp Refills    furosemide (LASIX) 20 MG tablet 60 tablet 0     Sig: Take 1 tablet by mouth daily 20 mg Monday Wednesday Friday and Sunday and  40 mg daily Tuesday Thursday and Saturday       Last Visit Date (If Applicable):  8/17/5937    Next Visit Date:    5/18/2021

## 2021-04-20 PROCEDURE — 9900000065 HC CARDIAC REHAB PHASE 3 - 1 VISIT

## 2021-04-27 ENCOUNTER — HOSPITAL ENCOUNTER (OUTPATIENT)
Dept: CARDIAC REHAB | Age: 76
Discharge: HOME | End: 2021-04-27
Payer: MEDICARE

## 2021-05-18 ENCOUNTER — OFFICE VISIT (OUTPATIENT)
Dept: INTERNAL MEDICINE CLINIC | Age: 76
End: 2021-05-18
Payer: MEDICARE

## 2021-05-18 ENCOUNTER — HOSPITAL ENCOUNTER (OUTPATIENT)
Age: 76
Setting detail: SPECIMEN
Discharge: HOME | End: 2021-05-18
Payer: MEDICARE

## 2021-05-18 VITALS
DIASTOLIC BLOOD PRESSURE: 70 MMHG | WEIGHT: 246 LBS | TEMPERATURE: 97.5 F | HEIGHT: 66 IN | HEART RATE: 60 BPM | SYSTOLIC BLOOD PRESSURE: 156 MMHG | BODY MASS INDEX: 39.53 KG/M2 | RESPIRATION RATE: 16 BRPM

## 2021-05-18 DIAGNOSIS — N17.9 ACUTE KIDNEY INJURY (HCC): ICD-10-CM

## 2021-05-18 DIAGNOSIS — E87.5 HYPERKALEMIA: ICD-10-CM

## 2021-05-18 DIAGNOSIS — I10 ESSENTIAL HYPERTENSION: ICD-10-CM

## 2021-05-18 DIAGNOSIS — E55.9 VITAMIN D DEFICIENCY: ICD-10-CM

## 2021-05-18 DIAGNOSIS — M19.90 ARTHRITIS: ICD-10-CM

## 2021-05-18 DIAGNOSIS — R01.1 HEART MURMUR: ICD-10-CM

## 2021-05-18 DIAGNOSIS — I10 ESSENTIAL HYPERTENSION: Primary | ICD-10-CM

## 2021-05-18 DIAGNOSIS — C83.33 DIFFUSE LARGE B-CELL LYMPHOMA, INTRA-ABDOMINAL LYMPH NODES (HCC): ICD-10-CM

## 2021-05-18 LAB
-: NORMAL
ALBUMIN SERPL-MCNC: 3.7 G/DL (ref 3.5–5.2)
ALBUMIN/GLOBULIN RATIO: 1 (ref 1–2.5)
ALP BLD-CCNC: 141 U/L (ref 40–129)
ALT SERPL-CCNC: 14 U/L (ref 5–41)
AMORPHOUS: NORMAL
ANION GAP SERPL CALCULATED.3IONS-SCNC: 12 MMOL/L (ref 9–17)
AST SERPL-CCNC: 24 U/L
BACTERIA: NORMAL
BILIRUB SERPL-MCNC: 0.46 MG/DL (ref 0.3–1.2)
BILIRUBIN URINE: NEGATIVE
BUN BLDV-MCNC: 22 MG/DL (ref 8–23)
BUN/CREAT BLD: ABNORMAL (ref 9–20)
CALCIUM SERPL-MCNC: 9 MG/DL (ref 8.6–10.4)
CASTS UA: NORMAL /LPF (ref 0–8)
CHLORIDE BLD-SCNC: 106 MMOL/L (ref 98–107)
CO2: 25 MMOL/L (ref 20–31)
COLOR: YELLOW
COMMENT UA: ABNORMAL
CREAT SERPL-MCNC: 1.59 MG/DL (ref 0.7–1.2)
CRYSTALS, UA: NORMAL /HPF
EPITHELIAL CELLS UA: NORMAL /HPF (ref 0–5)
GFR AFRICAN AMERICAN: 52 ML/MIN
GFR NON-AFRICAN AMERICAN: 43 ML/MIN
GFR SERPL CREATININE-BSD FRML MDRD: ABNORMAL ML/MIN/{1.73_M2}
GFR SERPL CREATININE-BSD FRML MDRD: ABNORMAL ML/MIN/{1.73_M2}
GLUCOSE BLD-MCNC: 52 MG/DL (ref 70–99)
GLUCOSE URINE: NEGATIVE
HCT VFR BLD CALC: 41.1 % (ref 40.7–50.3)
HEMOGLOBIN: 13 G/DL (ref 13–17)
KETONES, URINE: NEGATIVE
LEUKOCYTE ESTERASE, URINE: NEGATIVE
MCH RBC QN AUTO: 29.5 PG (ref 25.2–33.5)
MCHC RBC AUTO-ENTMCNC: 31.6 G/DL (ref 28.4–34.8)
MCV RBC AUTO: 93.4 FL (ref 82.6–102.9)
MUCUS: NORMAL
NITRITE, URINE: NEGATIVE
NRBC AUTOMATED: 0 PER 100 WBC
OTHER OBSERVATIONS UA: NORMAL
PDW BLD-RTO: 13.7 % (ref 11.8–14.4)
PH UA: 6.5 (ref 5–8)
PLATELET # BLD: 201 K/UL (ref 138–453)
PMV BLD AUTO: 10.9 FL (ref 8.1–13.5)
POTASSIUM SERPL-SCNC: 3.9 MMOL/L (ref 3.7–5.3)
PROTEIN UA: ABNORMAL
PTH INTACT: 42.04 PG/ML (ref 15–65)
RBC # BLD: 4.4 M/UL (ref 4.21–5.77)
RBC UA: NORMAL /HPF (ref 0–4)
RENAL EPITHELIAL, UA: NORMAL /HPF
SODIUM BLD-SCNC: 143 MMOL/L (ref 135–144)
SPECIFIC GRAVITY UA: 1.01 (ref 1–1.03)
TOTAL PROTEIN: 7.3 G/DL (ref 6.4–8.3)
TRICHOMONAS: NORMAL
TURBIDITY: CLEAR
URINE HGB: NEGATIVE
UROBILINOGEN, URINE: NORMAL
VITAMIN D 25-HYDROXY: 19.8 NG/ML (ref 30–100)
WBC # BLD: 9 K/UL (ref 3.5–11.3)
WBC UA: NORMAL /HPF (ref 0–5)
YEAST: NORMAL

## 2021-05-18 PROCEDURE — 1123F ACP DISCUSS/DSCN MKR DOCD: CPT | Performed by: INTERNAL MEDICINE

## 2021-05-18 PROCEDURE — 99214 OFFICE O/P EST MOD 30 MIN: CPT | Performed by: INTERNAL MEDICINE

## 2021-05-18 PROCEDURE — G8427 DOCREV CUR MEDS BY ELIG CLIN: HCPCS | Performed by: INTERNAL MEDICINE

## 2021-05-18 PROCEDURE — 4040F PNEUMOC VAC/ADMIN/RCVD: CPT | Performed by: INTERNAL MEDICINE

## 2021-05-18 PROCEDURE — 9900000065 HC CARDIAC REHAB PHASE 3 - 1 VISIT

## 2021-05-18 PROCEDURE — G8417 CALC BMI ABV UP PARAM F/U: HCPCS | Performed by: INTERNAL MEDICINE

## 2021-05-18 PROCEDURE — 1036F TOBACCO NON-USER: CPT | Performed by: INTERNAL MEDICINE

## 2021-05-18 RX ORDER — ACETAMINOPHEN 325 MG/1
650 TABLET ORAL EVERY 6 HOURS PRN
COMMUNITY

## 2021-05-18 ASSESSMENT — SOCIAL DETERMINANTS OF HEALTH (SDOH): HOW HARD IS IT FOR YOU TO PAY FOR THE VERY BASICS LIKE FOOD, HOUSING, MEDICAL CARE, AND HEATING?: NOT HARD AT ALL

## 2021-05-18 NOTE — PROGRESS NOTES
Elijah Alanis is a 67 y.o. male who presents for   Chief Complaint   Patient presents with    Shoulder Pain     off anti-inflammatory and Tylenol is not helping pain, said the diclofenac gel not helping    Diabetes     2 mo check up, this . A1c was 6.2 1.5 mo ago at endo    Edema     co leg swelling despite taking lasix    and follow up of chronic medical problems. Patient Active Problem List   Diagnosis    HIGH CHOLESTEROL    Essential hypertension    Class 2 severe obesity due to excess calories with serious comorbidity and body mass index (BMI) of 39.0 to 39.9 in adult ([address]    GUTIERREZ (obstructive sleep apnea)    Colon cancer (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 [address]    Iron deficiency anemia    IPMN (intraductal papillary mucinous neoplasm)    Hypocitraturia    Hyperkalemia    Acquired hypothyroidism    JANIE (acute kidney injury) ([facility] [address]     HPI  Here for follow-up on arthritis and Voltaren gel did not help him and also he saw his sleep specialist who mentioned that he had a heart murmur    Current Outpatient Medications   Medication Sig Dispense Refill    acetaminophen (TYLENOL) 325 MG tablet Take 650 mg by mouth every 6 hours as needed for Pain      furosemide (LASIX) 20 MG tablet Take 1 tablet by mouth daily 20 mg Monday Wednesday Friday and Sunday and  40 mg daily Tuesday Thursday and Saturday 60 tablet 1    HUMALOG MIX 50/50 KWIKPEN (50-50) 100 UNIT/ML SUPN injection pen INJECT 90 UNITS IN THE MORNING, 60 UNITS AT 6 P.M.  AND 30 UNITS AT NIGHT (NEEDS APPOINTMENT) (Patient taking differently: 90 units AM, 60 units at 6pm, 60 units at HS) 165 mL 0    diclofenac sodium (VOLTAREN) 1 % GEL Apply topically 4 times daily as needed for Pain      levothyroxine (SYNTHROID) 25 MCG tablet TAKE 1 TABLET DAILY 90 tablet 1    Misc. Devices (WALKER) MISC 1 each by Does not apply route daily (Patient taking differently: 1 each by Does not apply route daily prn) 1 each 0    montelukast (SINGULAIR) 10 MG tablet TAKE 1 TABLET DAILY 90 tablet 3    amLODIPine (NORVASC) 10 MG tablet TAKE 1 TABLET DAILY 90 tablet 3    atorvastatin (LIPITOR) 40 MG tablet TAKE 1 TABLET DAILY 90 tablet 3    omeprazole (PRILOSEC) 40 MG delayed release capsule TAKE 1 CAPSULE DAILY 90 capsule 3    metoprolol succinate (TOPROL XL) 50 MG extended release tablet TAKE 1 TABLET DAILY 90 tablet 3    Respiratory Therapy Supplies MISC Cpap supplies- tubing, mask, filter 1 each 0    aspirin 81 MG tablet Take 81 mg by mouth daily        No current facility-administered medications for this visit.        No Known Allergies    Past Medical History:   Diagnosis Date    Arthritis     BACK, BILATERAL SHOULDERS    Asthma     Back pain     BRCA2 positive 09/25/2018    CAD (coronary artery disease) 09/28/2015    CABG BYPASS X 3    Caffeine use     2 cans pop/day    Carpal tunnel syndrome     LEFT HAND    Cataracts, bilateral     Colon cancer ([address] 11/17/2011    A LYMPH NODE IN COLON WAS CANCEROUS    Cyst of pancreas     GETS THIS CHECKED ANNUALLY    Diabetes mellitus ([address]     Difficult intravenous access     PATIENT STATES USUALLY REQUIRES PICC TEAM.    GERD (gastroesophageal reflux disease)     Hay fever     HIGH CHOLESTEROL     History of blood transfusion     NO REACTION    History of chemotherapy 05/2013    Hodgkin's lymphoma ([address]     HTN (hypertension)     Hx of blood clots     AFTER BACK SURGERY, PATIENT HAD BLOOD CLOTS IN SURGICAL AREA AND NEEDED MORE SURGERY TO GET THEM REMOVED    Kidney stone     RIGHT KIDNEY    Lumbar herniated disc 1989    Microhematuria     Obesity     GUTIERREZ on CPAP     Pancreatitis     Pneumonia     Tinnitus     Use of cane as ambulatory aid     Uses brace     BACK BRACE AND LEFT HAND    Wears glasses     READING       Past Surgical History:   Procedure Laterality Date    ABDOMEN SURGERY  11/18/2011    RIGHT COLECTOMY    APPENDECTOMY  2011    with colectomy    BACK SURGERY  2014    LUMBAR LAMINECTOMY    BACK SURGERY  03/01/2014    EVACUATION OF SPINAL HEMATOMA    CATARACT REMOVAL Bilateral 01/2018    CATARACTS WITH IOL PLACED    COLONOSCOPY      Removal of colon polyp    CORONARY ARTERY BYPASS GRAFT  09/28/2015    CABG BYPASS X 3    CYSTOSCOPY Right 08/23/2018    CYSTOSCOPY, INSERTION OCCLUSIVE BALLOON, RIGHT RETROGRADE PYELOGRAM     CYSTOSCOPY Right 09/04/2018    CYSTOSCOPY STENT REMOVAL (Right )    IR NONTUNNELED VASCULAR CATHETER  2/24/2021    IR NONTUNNELED VASCULAR CATHETER 2/24/2021 Kiera Hollins MD [facility]EDURES    KNEE SURGERY Right 2007    TENDON REPAIR    NEPHROSTOMY Right 08/23/2018    HOLMIUM - NEPHROLITHOTOMY PERCUTANEOUS    NERVE BLOCK      STEROID INJECTIONS STARTING 2015 - 04/2018    TN CYSTOSCOPY,REMV CALCULUS,COMPLIC Right 6/9/9586    CYSTOSCOPY STENT REMOVAL performed by Evita [facility]du[facility]ie [facility] at [facility] Right 8/23/2018    CYSTOSCOPY, INSERTION OCCLUSIVE BALLOON, RIGHT RETROGRADE PYELOGRAM performed by Evita Mcduffie MD at [address] [place] [address] TO 2 CM Right 8/23/2018    HOLMIUM - NEPHROLITHOTOMY PERCUTANEOUS, C-ARM, LITHOCLAST,  PT COMING FROM INTERV RADIOLOGY  *SHORT STAY performed by Evita Mcduffie MD at [place] [address]      AS A CHILD       Family History   Problem Relation Age of Onset    Diabetes Father     High Blood Pressure Father     Stroke Mother     Heart Failure Mother     Other Mother         pacemaker    High Blood Pressure Mother     Heart Disease Soft, non-tender; normal bowel sounds, no masses  Musculoskeletal:  Normal range of motion and no edema or tenderness bilaterally  No lymphadenopathy  Neurological:  alert, oriented, and normal reflexes bilaterally  Skin: warm and dry  Psychiatric:  normal mood and effect; behavior normal.    Labs:   Lab Results   Component Value Date    LABA1C 6.1 (H) 02/23/2021     Lab Results   Component Value Date    CHOL 68 02/23/2021    CHOL 68 02/23/2021     Lab Results   Component Value Date    HDL 34 (L) 02/23/2021    HDL 34 (L) 02/23/2021     Lab Results   Component Value Date    LDLCALC 35 10/17/2017     Lab Results   Component Value Date    TRIG 111 02/23/2021    TRIG 111 02/23/2021     No components found for: CHOLHDL  Lab Results   Component Value Date    WBC 7.8 02/26/2021    HGB 10.7 (L) 02/26/2021    HCT 33.5 (L) 02/26/2021    MCV 95.4 02/26/2021     (L) 02/26/2021     Lab Results   Component Value Date    INR 1.2 02/24/2021    PROTIME 14.6 (H) 02/24/2021     Lab Results   Component Value Date    GLUCOSE 77 03/04/2021    CREATININE 1.76 (H) 03/04/2021    BUN 32 (H) 03/04/2021     (H) 03/04/2021    K 3.9 03/04/2021     (H) 03/04/2021    CO2 21 03/04/2021     Lab Results   Component Value Date    ALT 35 02/24/2021    AST 28 02/24/2021    ALKPHOS 117 02/24/2021    BILITOT 0.35 02/24/2021     Lab Results   Component Value Date    LABPROT 7.0 02/08/2013    LABALBU 3.8 02/24/2021     Lab Results   Component Value Date    TSH 3.60 02/23/2021    TSH 3.60 02/23/2021     Assessment:  1. Essential hypertension    2. Diffuse large b-cell lymphoma, intra-abdominal lymph nodes ([facility] [address]    3. Acute kidney injury ([facility] [address]    4. Hyperkalemia    5.  Vitamin D deficiency        Plan:  Patient blood pressure is stable on current medications  Patient's last creatinine was 1.76 and was not recently admitted in the hospital for acute kidney injury and hyperkalemia and patient will be seeing the nephrologist this month in the next week and so advised patient to get the blood work done to repeat the renal function and also he can discuss with him about the anti-inflammatories that he was taking in the past for his arthritis and which was stopped since hospital admission and we also discussed about pain medications including tramadol and at this time will wait until he sees the nephrologist  Labs ordered to check for vitamin D urinalysis  Patient's echocardiogram reviewed from 2016 did not show any significant valvular murmurs except trace tricuspid regurgitation and I did advise patient to talk to his cardiologist whom he will be seeing in the next 1 month  Review in 4 months           1. Elva Mcdonnell received counseling on the following healthy behaviors: nutrition and exercise    2. Prior labs and health maintenance reviewed. 3.  Discussed use, benefit, and side effects of prescribed medications. Barriers to medication compliance addressed. All his questions were answered. Pt voiced understanding. Elva Mcdonnell will continue current medications, diet and exercise. No orders of the defined types were placed in this encounter. Completed Refills               Requested Prescriptions      No prescriptions requested or ordered in this encounter     4. Patient given educational materials - see patient instructions    5. Was a self-tracking handout given in paper form or via [employer]? NO    Orders Placed This Encounter   Procedures    Urinalysis     Standing Status:   Future     Standing Expiration Date:   5/18/2022     Order Specific Question:   SPECIFY(EX-CATH,MIDSTREAM,CYSTO,ETC)?      Answer:   midstream    Comprehensive Metabolic Panel     Standing Status:   Future     Standing Expiration Date:   5/18/2022    CBC     Standing Status:   Future     Standing Expiration Date:   5/18/2022    Vitamin D 25 Hydroxy     Standing Status:   Future     Standing Expiration Date:   5/18/2022    PTH, Intact     Standing Status: Future     Standing Expiration Date:   5/18/2022     Return in about 4 months (around 9/18/2021). Patient voiced understanding and agreed to treatment plan. Electronically signed by Monika Greer MD on 5/18/2021 at 10:03 AM    This note is created with a voice recognition program and while intend to generate a document that accurately reflects the content of the visit, no guarantee can be provided that every mistake has been identified and corrected by editing.

## 2021-05-25 ENCOUNTER — HOSPITAL ENCOUNTER (OUTPATIENT)
Dept: CARDIAC REHAB | Age: 76
Discharge: HOME | End: 2021-05-25
Payer: MEDICARE

## 2021-05-25 PROBLEM — M54.50 LOW BACK PAIN: Status: ACTIVE | Noted: 2017-11-09

## 2021-06-01 ENCOUNTER — TELEPHONE (OUTPATIENT)
Dept: INTERNAL MEDICINE CLINIC | Age: 76
End: 2021-06-01

## 2021-06-01 NOTE — TELEPHONE ENCOUNTER
Patient calling to let you know that his nephrologist changed his water pill dose to 40mg. 7 days a week and wants to see him back in 4 months.

## 2021-06-02 RX ORDER — LEVOTHYROXINE SODIUM 0.03 MG/1
TABLET ORAL
Qty: 90 TABLET | Refills: 1 | Status: SHIPPED | OUTPATIENT
Start: 2021-06-02 | End: 2021-11-29

## 2021-06-07 RX ORDER — INSULIN LISPRO 100 [IU]/ML
INJECTION, SUSPENSION SUBCUTANEOUS
Qty: 165 ML | Refills: 2 | Status: SHIPPED | OUTPATIENT
Start: 2021-06-07 | End: 2022-02-07

## 2021-06-07 NOTE — TELEPHONE ENCOUNTER
Ulises Shanks is calling to request a refill on the following medication(s):    Medication Request:    Last filled 3/16/2021 165 mL with 0 RF    Requested Prescriptions     Pending Prescriptions Disp Refills    HUMALOG MIX 50/50 KWIKPEN (50-50) 100 UNIT/ML SUPN injection pen [Pharmacy Med Name: HUMALOG MIX 50/50 KWKPN 3ML 5 50/50] 165 mL 2     Sig: INJECT 90 UNITS IN THE MORNING, 60 UNITS AT 6 P.M.  AND 30 UNITS AT NIGHT (NEEDS APPOINTMENT)       Last Visit Date (If Applicable):  7/58/4838    Next Visit Date:    9/20/2021

## 2021-07-12 RX ORDER — AMLODIPINE BESYLATE 10 MG/1
TABLET ORAL
Qty: 90 TABLET | Refills: 1 | Status: SHIPPED | OUTPATIENT
Start: 2021-07-12 | End: 2022-01-07

## 2021-07-12 RX ORDER — MONTELUKAST SODIUM 10 MG/1
TABLET ORAL
Qty: 90 TABLET | Refills: 1 | Status: SHIPPED | OUTPATIENT
Start: 2021-07-12 | End: 2022-01-07

## 2021-07-12 RX ORDER — ATORVASTATIN CALCIUM 40 MG/1
TABLET, FILM COATED ORAL
Qty: 90 TABLET | Refills: 1 | Status: SHIPPED | OUTPATIENT
Start: 2021-07-12 | End: 2022-01-07

## 2021-07-12 RX ORDER — OMEPRAZOLE 40 MG/1
CAPSULE, DELAYED RELEASE ORAL
Qty: 90 CAPSULE | Refills: 1 | Status: SHIPPED | OUTPATIENT
Start: 2021-07-12 | End: 2022-01-07

## 2021-07-19 RX ORDER — METOPROLOL SUCCINATE 50 MG/1
TABLET, EXTENDED RELEASE ORAL
Qty: 90 TABLET | Refills: 0 | Status: SHIPPED | OUTPATIENT
Start: 2021-07-19 | End: 2021-10-18

## 2021-07-19 NOTE — TELEPHONE ENCOUNTER
Franchesca Cruz is calling to request a refill on the following medication(s):    Medication Request:    Last filled 7/23/2020 #90 with 3 RF    Requested Prescriptions     Signed Prescriptions Disp Refills    metoprolol succinate (TOPROL XL) 50 MG extended release tablet 90 tablet 0     Sig: TAKE 1 TABLET DAILY     Authorizing Provider: Marina Lopez     Ordering User: Kumar Villa       Last Visit Date (If Applicable):  2/48/7408    Next Visit Date:    9/20/2021 no

## 2021-08-24 ENCOUNTER — HOSPITAL ENCOUNTER (OUTPATIENT)
Age: 76
Setting detail: SPECIMEN
Discharge: HOME | End: 2021-08-24
Payer: MEDICARE

## 2021-08-24 DIAGNOSIS — N18.32 STAGE 3B CHRONIC KIDNEY DISEASE (HCC): ICD-10-CM

## 2021-08-24 DIAGNOSIS — N18.30 SECONDARY DIABETES MELLITUS WITH STAGE 3 CHRONIC KIDNEY DISEASE (HCC): ICD-10-CM

## 2021-08-24 DIAGNOSIS — N20.0 RENAL CALCULUS, LEFT: ICD-10-CM

## 2021-08-24 DIAGNOSIS — I12.9 BENIGN HYPERTENSION WITH CKD (CHRONIC KIDNEY DISEASE) STAGE III (HCC): ICD-10-CM

## 2021-08-24 DIAGNOSIS — R80.8 OTHER PROTEINURIA: ICD-10-CM

## 2021-08-24 DIAGNOSIS — N18.30 BENIGN HYPERTENSION WITH CKD (CHRONIC KIDNEY DISEASE) STAGE III (HCC): ICD-10-CM

## 2021-08-24 DIAGNOSIS — E55.9 VITAMIN D DEFICIENCY: ICD-10-CM

## 2021-08-24 DIAGNOSIS — E13.22 SECONDARY DIABETES MELLITUS WITH STAGE 3 CHRONIC KIDNEY DISEASE (HCC): ICD-10-CM

## 2021-08-24 LAB
-: ABNORMAL
ABSOLUTE EOS #: 0.19 K/UL (ref 0–0.44)
ABSOLUTE IMMATURE GRANULOCYTE: 0.04 K/UL (ref 0–0.3)
ABSOLUTE LYMPH #: 2.35 K/UL (ref 1.1–3.7)
ABSOLUTE MONO #: 0.6 K/UL (ref 0.1–1.2)
AMORPHOUS: ABNORMAL
ANION GAP SERPL CALCULATED.3IONS-SCNC: 14 MMOL/L (ref 9–17)
BACTERIA: ABNORMAL
BASOPHILS # BLD: 1 % (ref 0–2)
BASOPHILS ABSOLUTE: 0.06 K/UL (ref 0–0.2)
BILIRUBIN URINE: NEGATIVE
BUN BLDV-MCNC: 31 MG/DL (ref 8–23)
BUN/CREAT BLD: ABNORMAL (ref 9–20)
CALCIUM SERPL-MCNC: 9.1 MG/DL (ref 8.6–10.4)
CASTS UA: ABNORMAL /LPF (ref 0–8)
CHLORIDE BLD-SCNC: 107 MMOL/L (ref 98–107)
CO2: 22 MMOL/L (ref 20–31)
COLOR: YELLOW
CREAT SERPL-MCNC: 1.87 MG/DL (ref 0.7–1.2)
CREATININE URINE: 113.2 MG/DL (ref 39–259)
CRYSTALS, UA: ABNORMAL /HPF
DIFFERENTIAL TYPE: ABNORMAL
EOSINOPHILS RELATIVE PERCENT: 3 % (ref 1–4)
EPITHELIAL CELLS UA: ABNORMAL /HPF (ref 0–5)
FREE KAPPA/LAMBDA RATIO: 1.58 (ref 0.26–1.65)
GFR AFRICAN AMERICAN: 43 ML/MIN
GFR NON-AFRICAN AMERICAN: 35 ML/MIN
GFR SERPL CREATININE-BSD FRML MDRD: ABNORMAL ML/MIN/{1.73_M2}
GFR SERPL CREATININE-BSD FRML MDRD: ABNORMAL ML/MIN/{1.73_M2}
GLUCOSE BLD-MCNC: 194 MG/DL (ref 70–99)
GLUCOSE URINE: ABNORMAL
HCT VFR BLD CALC: 39.7 % (ref 40.7–50.3)
HEMOGLOBIN: 12.5 G/DL (ref 13–17)
IMMATURE GRANULOCYTES: 1 %
KAPPA FREE LIGHT CHAINS QNT: 9.8 MG/DL (ref 0.37–1.94)
KETONES, URINE: NEGATIVE
LAMBDA FREE LIGHT CHAINS QNT: 6.22 MG/DL (ref 0.57–2.63)
LEUKOCYTE ESTERASE, URINE: NEGATIVE
LYMPHOCYTES # BLD: 31 % (ref 24–43)
MAGNESIUM: 1.9 MG/DL (ref 1.6–2.6)
MCH RBC QN AUTO: 29.3 PG (ref 25.2–33.5)
MCHC RBC AUTO-ENTMCNC: 31.5 G/DL (ref 28.4–34.8)
MCV RBC AUTO: 93.2 FL (ref 82.6–102.9)
MONOCYTES # BLD: 8 % (ref 3–12)
MUCUS: ABNORMAL
NITRITE, URINE: NEGATIVE
NRBC AUTOMATED: 0 PER 100 WBC
OTHER OBSERVATIONS UA: ABNORMAL
PDW BLD-RTO: 15 % (ref 11.8–14.4)
PH UA: 6.5 (ref 5–8)
PHOSPHORUS: 3.5 MG/DL (ref 2.5–4.5)
PLATELET # BLD: 169 K/UL (ref 138–453)
PLATELET ESTIMATE: ABNORMAL
PMV BLD AUTO: 10.7 FL (ref 8.1–13.5)
POTASSIUM SERPL-SCNC: 3.9 MMOL/L (ref 3.7–5.3)
PROTEIN UA: ABNORMAL
RBC # BLD: 4.26 M/UL (ref 4.21–5.77)
RBC # BLD: ABNORMAL 10*6/UL
RBC UA: ABNORMAL /HPF (ref 0–4)
RENAL EPITHELIAL, UA: ABNORMAL /HPF
SEG NEUTROPHILS: 56 % (ref 36–65)
SEGMENTED NEUTROPHILS ABSOLUTE COUNT: 4.38 K/UL (ref 1.5–8.1)
SODIUM BLD-SCNC: 143 MMOL/L (ref 135–144)
SPECIFIC GRAVITY UA: 1.02 (ref 1–1.03)
TOTAL PROTEIN, URINE: 385 MG/DL
TRICHOMONAS: ABNORMAL
TURBIDITY: CLEAR
URINE HGB: ABNORMAL
URINE TOTAL PROTEIN CREATININE RATIO: 3.4 (ref 0–0.2)
UROBILINOGEN, URINE: NORMAL
WBC # BLD: 7.6 K/UL (ref 3.5–11.3)
WBC # BLD: ABNORMAL 10*3/UL
WBC UA: ABNORMAL /HPF (ref 0–5)
YEAST: ABNORMAL

## 2021-08-24 PROCEDURE — 9900000065 HC CARDIAC REHAB PHASE 3 - 1 VISIT

## 2021-08-26 LAB
ALBUMIN (CALCULATED): 3.4 G/DL (ref 3.2–5.2)
ALBUMIN PERCENT: 54 % (ref 45–65)
ALPHA 1 PERCENT: 3 % (ref 3–6)
ALPHA 2 PERCENT: 13 % (ref 6–13)
ALPHA-1-GLOBULIN: 0.2 G/DL (ref 0.1–0.4)
ALPHA-2-GLOBULIN: 0.8 G/DL (ref 0.5–0.9)
BETA GLOBULIN: 0.7 G/DL (ref 0.5–1.1)
BETA PERCENT: 11 % (ref 11–19)
GAMMA GLOBULIN %: 20 % (ref 9–20)
GAMMA GLOBULIN: 1.3 G/DL (ref 0.5–1.5)
PATHOLOGIST: ABNORMAL
PROTEIN ELECTROPHORESIS, SERUM: ABNORMAL
TOTAL PROT. SUM,%: 101 % (ref 98–102)
TOTAL PROT. SUM: 6.4 G/DL (ref 6.3–8.2)
TOTAL PROTEIN: 6.3 G/DL (ref 6.4–8.3)

## 2021-08-27 LAB
P E INTERPRETATION, U: NORMAL
PATHOLOGIST: NORMAL
SPECIMEN TYPE: NORMAL
URINE IFX INTERP: NORMAL
URINE IFX SPECIMEN: NORMAL
URINE TOTAL PROTEIN: 384 MG/DL
URINE TOTAL PROTEIN: 384 MG/DL
VOLUME: NORMAL ML

## 2021-08-31 ENCOUNTER — HOSPITAL ENCOUNTER (OUTPATIENT)
Dept: CARDIAC REHAB | Age: 76
Discharge: HOME | End: 2021-08-31
Payer: MEDICARE

## 2021-09-20 ENCOUNTER — OFFICE VISIT (OUTPATIENT)
Dept: INTERNAL MEDICINE CLINIC | Age: 76
End: 2021-09-20
Payer: MEDICARE

## 2021-09-20 VITALS
DIASTOLIC BLOOD PRESSURE: 64 MMHG | SYSTOLIC BLOOD PRESSURE: 138 MMHG | TEMPERATURE: 97.1 F | HEIGHT: 66 IN | RESPIRATION RATE: 16 BRPM | BODY MASS INDEX: 38.41 KG/M2 | HEART RATE: 56 BPM | WEIGHT: 239 LBS

## 2021-09-20 DIAGNOSIS — I10 ESSENTIAL HYPERTENSION: ICD-10-CM

## 2021-09-20 DIAGNOSIS — E11.59 TYPE 2 DIABETES MELLITUS WITH OTHER CIRCULATORY COMPLICATION, WITH LONG-TERM CURRENT USE OF INSULIN (HCC): ICD-10-CM

## 2021-09-20 DIAGNOSIS — G89.29 CHRONIC PAIN OF BOTH SHOULDERS: Primary | ICD-10-CM

## 2021-09-20 DIAGNOSIS — M25.512 CHRONIC PAIN OF BOTH SHOULDERS: Primary | ICD-10-CM

## 2021-09-20 DIAGNOSIS — Z23 NEED FOR INFLUENZA VACCINATION: ICD-10-CM

## 2021-09-20 DIAGNOSIS — M25.511 CHRONIC PAIN OF BOTH SHOULDERS: Primary | ICD-10-CM

## 2021-09-20 DIAGNOSIS — M54.40 ACUTE BILATERAL LOW BACK PAIN WITH SCIATICA, SCIATICA LATERALITY UNSPECIFIED: ICD-10-CM

## 2021-09-20 DIAGNOSIS — Z79.4 TYPE 2 DIABETES MELLITUS WITH OTHER CIRCULATORY COMPLICATION, WITH LONG-TERM CURRENT USE OF INSULIN (HCC): ICD-10-CM

## 2021-09-20 DIAGNOSIS — Z71.3 DIETARY COUNSELING: ICD-10-CM

## 2021-09-20 DIAGNOSIS — E66.01 MORBIDLY OBESE (HCC): ICD-10-CM

## 2021-09-20 PROCEDURE — 1123F ACP DISCUSS/DSCN MKR DOCD: CPT | Performed by: INTERNAL MEDICINE

## 2021-09-20 PROCEDURE — G0444 DEPRESSION SCREEN ANNUAL: HCPCS | Performed by: INTERNAL MEDICINE

## 2021-09-20 PROCEDURE — G0447 BEHAVIOR COUNSEL OBESITY 15M: HCPCS | Performed by: INTERNAL MEDICINE

## 2021-09-20 PROCEDURE — 1036F TOBACCO NON-USER: CPT | Performed by: INTERNAL MEDICINE

## 2021-09-20 PROCEDURE — G8427 DOCREV CUR MEDS BY ELIG CLIN: HCPCS | Performed by: INTERNAL MEDICINE

## 2021-09-20 PROCEDURE — 99214 OFFICE O/P EST MOD 30 MIN: CPT | Performed by: INTERNAL MEDICINE

## 2021-09-20 PROCEDURE — 4040F PNEUMOC VAC/ADMIN/RCVD: CPT | Performed by: INTERNAL MEDICINE

## 2021-09-20 PROCEDURE — G0008 ADMIN INFLUENZA VIRUS VAC: HCPCS | Performed by: INTERNAL MEDICINE

## 2021-09-20 PROCEDURE — 90694 VACC AIIV4 NO PRSRV 0.5ML IM: CPT | Performed by: INTERNAL MEDICINE

## 2021-09-20 PROCEDURE — G8417 CALC BMI ABV UP PARAM F/U: HCPCS | Performed by: INTERNAL MEDICINE

## 2021-09-20 NOTE — PROGRESS NOTES
Jo Ann Horn is a 67 y.o. male who presents for   Chief Complaint   Patient presents with    Diabetes     4 mo check up A1C ws 6.6n on 7/29/21 per [facility], labs per Nephrology    Hypertension     as above    Hyperlipidemia     as above    Shoulder Pain     bilat pan, using Voltaren gel without much relief- wpould like to try PT again    Immunizations     did get Covid vaccine, asking for flu shot    Leg Swelling     occas still retaining water- notices in AM feet and ankles    and follow up of chronic medical problems.   Patient Active Problem List   Diagnosis    HIGH CHOLESTEROL    Essential hypertension    Class 2 severe obesity due to excess calories with serious comorbidity and body mass index (BMI) of 39.0 to 39.9 in adult ([facility]    GUTIERREZ (obstructive sleep apnea)    Colon cancer (HCC)    Hay fever    Lumbar herniated disc    Epidural hematoma (HCC)    Microhematuria    Renal calculus, left    Urolithiasis    Stage 2 chronic kidney disease    Type 2 diabetes mellitus with stage 3b chronic kidney disease, with long-term current use of insulin (HCC)    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    Bilateral primary osteoarthritis of hip    Diffuse large cell lymphoma in remission ([address]    Iron deficiency anemia    IPMN (intraductal papillary mucinous neoplasm)    Hypocitraturia    Hyperkalemia    Acquired hypothyroidism    JANIE (acute kidney injury) ([address]    Low back pain    B-cell lymphoma (HCC)     HPI  Here for follow-up on diabetes and blood pressure denies any new complaints except his back and shoulders are bothering him again and wants to do physical therapy and his leg swelling is still happening occasionally    Current Outpatient Medications   Medication Sig Dispense Refill    metoprolol succinate (TOPROL XL) 50 MG extended release tablet TAKE 1 TABLET DAILY 90 tablet 0    furosemide (LASIX) 40 MG tablet Take 1 tablet by mouth daily 90 tablet 1    omeprazole (PRILOSEC) 40 MG delayed release capsule TAKE 1 CAPSULE DAILY 90 capsule 1    atorvastatin (LIPITOR) 40 MG tablet TAKE 1 TABLET DAILY 90 tablet 1    montelukast (SINGULAIR) 10 MG tablet TAKE 1 TABLET DAILY 90 tablet 1    amLODIPine (NORVASC) 10 MG tablet TAKE 1 TABLET DAILY 90 tablet 1    insulin lispro prot & lispro (HUMALOG MIX 50/50 KWIKPEN) (50-50) 100 UNIT per ML SUPN injection pen INJECT 90 UNITS IN THE MORNING, 60 UNITS AT 6 P.M. AND 30 UNITS AT NIGHT (Patient taking differently: INJECT 90 UNITS IN THE MORNING, 60 UNITS AT 6 P.M. AND 60 UNITS AT NIGHT) 165 mL 2    levothyroxine (SYNTHROID) 25 MCG tablet TAKE 1 TABLET DAILY 90 tablet 1    Blood Glucose Monitoring Suppl (ONETOUCH VERIO FLEX SYSTEM) w/Device KIT as directed      BD PEN NEEDLE NATASHA U/F 32G X 4 MM MISC       acetaminophen (TYLENOL) 325 MG tablet Take 650 mg by mouth every 6 hours as needed for Pain      diclofenac sodium (VOLTAREN) 1 % GEL Apply topically 4 times daily as needed for Pain      Misc. Devices (WALKER) MISC 1 each by Does not apply route daily (Patient taking differently: 1 each by Does not apply route daily prn) 1 each 0    Respiratory Therapy Supplies MISC Cpap supplies- tubing, mask, filter 1 each 0    aspirin 81 MG tablet Take 81 mg by mouth daily        No current facility-administered medications for this visit.        No Known Allergies    Past Medical History:   Diagnosis Date    Arthritis     BACK, BILATERAL SHOULDERS    Asthma     Back pain     BRCA2 positive 09/25/2018    CAD (coronary artery disease) 09/28/2015    CABG BYPASS X 3    Caffeine use     2 cans pop/day    Carpal tunnel syndrome     LEFT HAND    Cataracts, bilateral     Colon cancer ([address] 11/17/2011    A LYMPH NODE IN COLON WAS CANCEROUS    Cyst of pancreas     GETS THIS CHECKED ANNUALLY    Diabetes mellitus ([address]     Difficult intravenous access     PATIENT STATES USUALLY REQUIRES PICC TEAM.    GERD (gastroesophageal reflux disease)     Hay fever     HIGH CHOLESTEROL     History of blood transfusion     NO REACTION    History of chemotherapy 05/2013    Hodgkin's lymphoma [facility]     HTN (hypertension)     Hx of blood clots     AFTER BACK SURGERY, PATIENT HAD BLOOD CLOTS IN SURGICAL AREA AND NEEDED MORE SURGERY TO GET THEM REMOVED    Kidney stone     RIGHT KIDNEY    Lumbar herniated disc 1989    Microhematuria     Obesity     GUTIERREZ on CPAP     Pancreatitis     Pneumonia     Tinnitus     Use of cane as ambulatory aid     Uses brace     BACK BRACE AND LEFT HAND    Wears glasses     READING       Past Surgical History:   Procedure Laterality Date    ABDOMEN SURGERY  11/18/2011    RIGHT COLECTOMY    APPENDECTOMY  2011    with colectomy    BACK SURGERY  2014    LUMBAR LAMINECTOMY    BACK SURGERY  03/01/2014    EVACUATION OF SPINAL HEMATOMA    CATARACT REMOVAL Bilateral 01/2018    CATARACTS WITH IOL PLACED    COLONOSCOPY      Removal of colon polyp    CORONARY ARTERY BYPASS GRAFT  09/28/2015    CABG BYPASS X 3    CYSTOSCOPY Right 08/23/2018    CYSTOSCOPY, INSERTION OCCLUSIVE BALLOON, RIGHT RETROGRADE PYELOGRAM     CYSTOSCOPY Right 09/04/2018    CYSTOSCOPY STENT REMOVAL (Right )    IR NONTUNNELED VASCULAR CATHETER  2/24/2021    IR NONTUNNELED VASCULAR CATHETER 2/24/2021 Arlyn Negro MD [facility] SPECIAL PROCEDURES    KNEE SURGERY Right 2007    TENDON REPAIR    NEPHROSTOMY Right 08/23/2018    HOLMIUM - NEPHROLITHOTOMY PERCUTANEOUS    NERVE BLOCK      STEROID INJECTIONS STARTING 2015 - 04/2018    PA CYSTOSCOPY,REMV CALCULUS,COMPLIC Right 6/3/2309    CYSTOSCOPY STENT REMOVAL performed by Peyton Jaeger [facility] at [facility] Right 8/23/2018    CYSTOSCOPY, INSERTION OCCLUSIVE BALLOON, RIGHT RETROGRADE PYELOGRAM performed by Peyton Jaeger MD at [address] [place] [address] TO 2 CM Right 8/23/2018    HOLMIUM - NEPHROLITHOTOMY PERCUTANEOUS, C-ARM, LITHOCLAST,  PT COMING FROM INTERV RADIOLOGY  *SHORT STAY performed by Nilson Ibrahim MD at [address]      AS A CHILD       Family History   Problem Relation Age of Onset    Diabetes Father     High Blood Pressure Father     Stroke Mother     Heart Failure Mother     Other Mother         pacemaker    High Blood Pressure Mother     Heart Disease Maternal Grandmother     Heart Disease Maternal Grandfather     Heart Disease Maternal Uncle      ROS   Constitutional:  Negative for fatigue, loss of appetite and unexpected weight change   HEENT            : Negative for neck stiffness and pain, no congestion or sinus pressure   Eyes                : No visual disturbance or pain   Cardiovascular: No chest pain or palpitations positive for leg swelling   Respiratory      : Negative for cough, shortness of breath or wheezing   Gastrointestinal: Negative for abdominal pain, constipation or diarrhea and bloating No nausea or vomiting   Genitourinary:     No urgency or frequency, no burning or hematuria   Musculoskeletal: Positive for arthralgias, back pain or myalgias   Skin                  : Negative for rash or erythema   Neurological    : Negative for dizziness, weakness, tremors ,light headedness or syncope   Psychiatric       : Negative for dysphoric mood, sleep disturbances, nervous or anxious, or decreased concentration   All other review of systems was negative    Objective  Physical Examination:    Nursing note reviewed    /64 (Site: Right Upper Arm, Position: Sitting, Cuff Size: Large Adult)   Pulse 56   Temp 97.1 °F (36.2 °C) (Infrared)   Resp 16   Ht 5' 6\" (1.676 m)   Wt 239 lb (108.4 kg)   BMI 38.58 kg/m²   BP Readings from Last 3 Encounters:   09/20/21 138/64   05/25/21 118/64   05/18/21 (!) 156/70         Constitutional:  Carol Miller is oriented to place, person and time ,appears well-developed and well-nourished  HEENT:  Atraumatic and normocephalic, external ears normal bilaterally, nose normal no oropharyngeal exudate and is clear and moist  Eyes:  EOCM normal; conjunctivae normal; PERRLA bilaterally  Neck:  Normal range of motion, neck supple, no JVD and no thyromegaly  Cardiovascular:  RRR, normal heart sounds and intact distal pulses  Pulmonary:  effort normal and breath sounds normal bilaterally,no wheezes or rales, no respiratory distress  Abdominal:  Soft, non-tender; normal bowel sounds, no masses  Musculoskeletal: Limited range of motion and trace edema or tenderness bilaterally  No lymphadenopathy  Neurological:  alert, oriented, and normal reflexes bilaterally  Skin: warm and dry  Psychiatric:  normal mood and effect; behavior normal.    Labs:   Lab Results   Component Value Date    LABA1C 6.1 (H) 02/23/2021     Lab Results   Component Value Date    CHOL 68 02/23/2021    CHOL 68 02/23/2021     Lab Results   Component Value Date    HDL 34 (L) 02/23/2021    HDL 34 (L) 02/23/2021     Lab Results   Component Value Date    LDLCALC 35 10/17/2017     Lab Results   Component Value Date    TRIG 111 02/23/2021    TRIG 111 02/23/2021     No components found for: CHOLHDL  Lab Results   Component Value Date    WBC 7.6 08/24/2021    HGB 12.5 (L) 08/24/2021    HCT 39.7 (L) 08/24/2021    MCV 93.2 08/24/2021     08/24/2021     Lab Results   Component Value Date    INR 1.2 02/24/2021    PROTIME 14.6 (H) 02/24/2021     Lab Results   Component Value Date    GLUCOSE 194 (H) 08/24/2021    CREATININE 1.87 (H) 08/24/2021    BUN 31 (H) 08/24/2021     08/24/2021    K 3.9 08/24/2021     08/24/2021    CO2 22 08/24/2021     Lab Results   Component Value Date    ALT 14 05/18/2021    AST 24 05/18/2021    ALKPHOS 141 (H) 05/18/2021    BILITOT 0.46 05/18/2021     Lab Results   Component Value Date    LABPROT 7.0 02/08/2013    LABALBU 3.7 05/18/2021     Lab Results   Component Value Date    TSH 3.60 02/23/2021    TSH 3.60 02/23/2021     Assessment:  1.  Chronic pain of both shoulders 2. Need for influenza vaccination    3. Acute bilateral low back pain with sciatica, sciatica laterality unspecified    4. Essential hypertension    5. Type 2 diabetes mellitus with other circulatory complication, with long-term current use of insulin ([facility] [address]        Plan:  Patient's recent hemoglobin A1c is 6.6 and patient's visit to the endocrinologist and office notes reviewed  Blood pressure is stable on current medications and patient has seen the cardiologist last month and no changes were made  Patient's back pain is still bothering him occasionally and not taking any medications for pain and is on Tylenol but is not helping much and also patient having bilateral shoulder pain and requesting physical therapy and a prescription was given and advised to go for physical therapy and patient also doing cardio rehabilitation once every 2 weeks at [facility]  Patient's creatinine is stable at 1.87 and following with nephrology and will be seeing them next week and patient's lab work done recently was reviewed and continue current treatment of Lasix at 40 mg daily and patient taking once a week 80 mg when his legs are swollen and using compression stockings and patient states his swelling is more at night when he wakes up in the morning but later it gets better  Review in 4 months           1. Emmanuel Ordonez received counseling on the following healthy behaviors: nutrition and exercise    2. Prior labs and health maintenance reviewed. 3.  Discussed use, benefit, and side effects of prescribed medications. Barriers to medication compliance addressed. All his questions were answered. Pt voiced understanding. Emmanuel Ordonez will continue current medications, diet and exercise. No orders of the defined types were placed in this encounter. Completed Refills               Requested Prescriptions      No prescriptions requested or ordered in this encounter     4.  Patient given educational materials - see patient instructions    5. Was a self-tracking handout given in paper form or via [email or web address]? NO    Orders Placed This Encounter   Procedures    INFLUENZA, QUADV, ADJUVANTED, 72 YRS =, IM, PF, PREFILL SYR, 0.5ML (FLUAD)     2021 HD flu   [facility] - [facility]     Referral Priority:   Routine     Referral Type:   Eval and Treat     Referral Reason:   Specialty Services Required     Requested Specialty:   Physical Therapy     Number of Visits Requested:   1     No follow-ups on file. Patient voiced understanding and agreed to treatment plan. Electronically signed by Ralph Soliz MD on 9/20/2021 at 1:17 PM    This note is created with a voice recognition program and while intend to generate a document that accurately reflects the content of the visit, no guarantee can be provided that every mistake has been identified and corrected by editing. Patient's recent hemoglobin A1c is 6.6 and endocrinology note reviewed  BMI was elevated today, and weight loss plan recommended is : daily exercise regimen.

## 2021-09-26 NOTE — PATIENT INSTRUCTIONS
foods in reasonable amounts and becoming more active, even a little bit every day. Making small changes over time can add up to a lot. Make a plan for change. Many people have found that naming their reasons for change and staying focused on their plan can make a big difference. Work with your doctor to create a plan that is right for you. · Ask yourself: Clair Yarbrough are my personal, most powerful reasons for wanting this change? What will my life look like when I've made the change? \"  · Set your long-term goal. Make it specific, such as \"I will lose x pounds. \"  · Break your long-term goal into smaller, short-term goals. Make these small steps specific and within your reach, things you know you can do. These steps are what keep you going from day to day. Talk with your doctor about other weight-loss options. If you have a BMI in a certain range and have not been able to lose weight with diet and exercise, medicine or surgery may be an option for you. Before your doctor will prescribe medicines or surgery, he or she will probably want you to be more active and follow your healthy eating plan for a period of time. These habits are key lifelong changes for managing your weight, with or without other medical treatment. And these changes can help you avoid weight-related health problems. How can you stay on your plan for change? Be ready. Choose to start during a time when there are few events like holidays, social events, and high-stress periods. These events might trigger slip-ups. Decide on your first few steps. Most people have more success when they make small changes, one step at a time. For example, you might switch a daily candy bar to a piece of fruit, walk 10 minutes more, or add more vegetables to a meal.  Line up your support people. Make sure you're not going to be alone as you make this change. Connect with people who understand how important it is to you.  Ask family members and friends for help in keeping It's also a good idea to know your test results and keep a list of the medicines you take. Where can you learn more? Go to [email or web address] org and sign in to your [employer] account. Enter N111 in the [facility] box to learn more about \"Learning About Obesity. \"     If you do not have an account, please click on the \"Sign Up Now\" link. Current as of: March 17, 2021               Content Version: 13.0  © 9035-1113 Healthwise, Incorporated. Care instructions adapted under license by [facility] ([facility]). If you have questions about a medical condition or this instruction, always ask your healthcare professional. [address] any warranty or liability for your use of this information.

## 2021-09-28 PROBLEM — E55.9 VITAMIN D DEFICIENCY: Status: ACTIVE | Noted: 2021-09-28

## 2021-10-04 ENCOUNTER — HOSPITAL ENCOUNTER (OUTPATIENT)
Dept: PHYSICAL THERAPY | Facility: CLINIC | Age: 76
Setting detail: THERAPIES SERIES
Discharge: HOME | End: 2021-10-04
Payer: MEDICARE

## 2021-10-04 PROCEDURE — 97110 THERAPEUTIC EXERCISES: CPT

## 2021-10-04 PROCEDURE — 97162 PT EVAL MOD COMPLEX 30 MIN: CPT

## 2021-10-04 NOTE — CARE COORDINATION
[] [facility]) - [facility]  Therapy  [address]  [phone]  F: [phone] [x] [address] [facility] [address]   [address]  P: [phone]  F: [phone] [] [address] &  Therapy  [address] [facility]  P: [phone]  F: [phone] [] [address]  [facility]   [address]   [place]: [phone]  F: [phone]     THERAPY RESPONSIBILITY OF CARE TRANSFER FORM       PATIENT NAME: Simon Granger  MRN: [id number]   : 1945      TRANSFERRING FACILITY:    [] [facility]   [] [facility]   [x]  [facility]   [] [facility]   [] [facility]   [] [facility]   [] [facility]  [] [facility]   [] Other:       ACCEPTING FACILITY   [x] [facility]   [] [facility]   []  [facility]   [] [facility]   [] [facility]   [facility]   [] [facility]  [] [facility]   [] Other:          REASON FOR TRANSFER: [facility] to patient's home - his 1st visit is scheduled for 10/6/21      TRANSFER OF CARE:    I am transferring the care of the above patient to: Shatnal Gallardo, PT  Devang Roman PT  10/4/2021      ACCEPTANCE OF CARE:     I am accepting the care of the above patient.  Shantal Gallardo, PT

## 2021-10-04 NOTE — PLAN OF CARE
[] [facility]) [facility]  Therapy  [address]  [phone]  F: [phone] [x] [address] [facility] [place] [address]   [address]  P: [phone]  F: [phone] [] [address] &  Therapy  [address]  P: [phone]  F: [phone] [] [address]  P: [phone]  F: [phone] [] [address]  [facility]   [address]   [place]: [phone]  F: [phone]        Physical Therapy Plan of Care    Date:  10/4/2021  Patient: Mila Whitmore  : 1945  MRN: [id number]  Physician: Earline Freitas               Insurance: Medicare/MMO Supp  Medical Diagnosis:  M25.511, G89.29, M25.512 (ICD-10-CM) - Chronic pain of both shoulders   M54.40 (ICD-10-CM) - Acute bilateral low back pain with sciatica, sciatica laterality unspecified   Rehab Codes: M54.2, M54.5, M54.9, R29.3, M62.81, R26.2 NEC, M79.1, M25.512 M25.612,M62.512, M25.511,M25.611,M62.511, M62.81, M25.551, M25.552  Onset Date:3/1/21                              Next 's appt. : 22     Subjective:   CC: Pt presents to PT with multiple areas of complaint such as LBP with BLE radic (pimarily numbness) into B buttocks, B shoulder pain (R worse than L), neck pain/stiffness, decreased flexibility & overall decreased walking/upright function. Pt reports achey, dull pain that is intermittent; which can sometimes be sharp with movement.  Pt denies popping, locking, buckling and giving way. Pt notes difficulty with: BLE/BUE weakness, BLE edema (on LASIX), balancing, prolonged sitting, standing, walking, stairs, bending, squatting/stooping, lifting LEs/getting into & out of car/bed, rising from seated postions especially at varying heights, sleeping (2-3 hours of sleep), WBing, & most Obesity []? Dialysis  []? N/A   [x]? Asthma/COPD []? Dementia []? Other:   []? Stroke [x]? Sleep apnea []? Other:   []? Vascular disease []? Rheumatic disease []? Other:      Tests:    Lumbar MRI: 11/2016  Extensive decompression with enhancement and granulation tissue between L2-L5. Multilevel disc space narrowing and disc lesions posterior element hypertrophy most significant at L3-4. At L3-4 there is central canal stenosis. In addition there is facet hypertrophy. There is also significant neural canal stenosis of at least a moderate degree bilaterally. Granulation tissue is noted on the left side of L3-4 disc. Left-sided predominant. Broad-based asymmetrical disc bulge at L4-5 is identified     Lumbosacral x-ray: 11/9/17  *  Multilevel degenerative disc space disease, severe at L3-4. *  Sacralization of the L5 vertebral body. *  Left-sided neural foraminal narrowing in the lower lumbar spine.      Assessment: Pt presents with decreased function due to OA of multiple body regions. Pt presents with deficits in AROM of BLE/BUE/Lumbar/Cervical/B Hips, BLE/BUE strength, balance, walking, standing, WBing activity, bending, squatting, stooping & overall function. See below problem list for further details. Please refer to full evaluation consult from 10/4/21 for findings. The pt would benefit from skilled PT Aquatic services in order to improve strength, AROM of multiple areas to improve tolerance to return to ADLs.  Pt is being transferred to [address] [place] [address] per pt request (closer to home) & his interest in Aquatics.  Aquatics is the best suited environment for patient to offload spine and joints for ease of movement. Due to h/o cardiac issues pt should be monitored closely in the aquatic setting.   Problems at initial evaluation 10/4/21:     [x]???? ? Pain: 10/10 at worst in multiple areas                                         [x]???? ? AROM - all planes sig limited (cervical, lumbar, B shoulders, B RBEH)                     [x]???? ?BLE/ BUE Strength: Decreased grossly throughout BLE/BUE  [x]???? ? Function: UEFS score = 36/80 = 55% deficit; Oswestry LBP =29/50 = 58% deficit                 [x]? ??? Postural Deviations: Pt stands with flexed trunk, posterior tilt and kyphosis  [x]???? Gait Deviations: Pt ambulates forward flexed trunk, decreased trunk rotation & B arm swing, dec B stride/step length/stance time & B decreased heel/toe progression   [x]???? Other: Decreased Balance: TUG 16 sec without AD; SLS: R 7sec L 2 sec; + B Slump's      STG: (to be met in 10 treatments)  1. ? Pain: to <7/10 with activity for ease with ADLs. 2. ?  AROM: All areas by 25% increase to improve ADLs & gait. 3. ? BLE & BUE Strength by 1/2 to a full grade to improve lifting, gait & standing tolerance. 4. ? Function: Pt to be able to navigate up & down 4 steps independently, with no increase in sxs, to improve overall stair navigation within community.    5. Independent with Home Exercise Programs  6. Demonstrate Knowledge of fall prevention (Baljinder completed 10/4/21. Fall prevention not needed).   7. ? Balance Function: Pt to demo TUG score of <12 seconds, without AD, to improve gait speed and reduce risk of falls. 8. ? Balance Function: Pt to demonstrate  B SLS sec of >10sec to improve SLS balance & fall prevention.        LTG: (to be met in 18 treatments)  1. ? Pain: to <2-3/10 with activity for ease with ADLs. 2. AROM: All areas by 50% increase or more to improve ADLs & gait. 3. ? BLE & BUE Strength to 4+/5 to improve lifting, gait & standing tolerance. 4. Pt to ambulate independently with 50% improved upright posture for >500', to improve community navigation. 5.  Pt to verbalize being able to get in/out of car/bed/shower to improve functional mobility with ADLs. 6. Pt to demo <25% impairment on UEFS & Oswestry to improve overall functional mobility.    7.  Pt to demo TUG score of <10 seconds, without AD, to improve gait speed and reduce risk of falls. 8. ? Balance Function: Pt to demonstrate  B SLS sec of >20sec to improve SLS balance & fall prevention.        Patient goals: \"flexibility, walk better, better balance\"     Suggested Professional Referral:  []?? No  [x]? ? Yes: Aquatics    [x]? ?? Therapeutic Exercise   30408             []??? Iontophoresis: 4 mg/mL Dexamethasone Sodium Phosphate  mAmin  22667   [x]? ?? Therapeutic Activity  40434 [x]? ?? Vasopneumatic cold with compression  59839               [x]? ?? Gait Training    [phone] []??? Ultrasound        [id number]   [x]? ?? Neuromuscular Re-education  [id number] [x]? ?? Electrical Stimulation Unattended  28509   [x]? ?? Manual Therapy  83937 []??? Electrical Stimulation Attended  [id number]   [x]? ?? Instruction in HEP       []??? Lumbar/Cervical Traction  [id number]   [x]??? Aquatic Therapy           [id number] [x]? ?? Cold/hotpack     []??? Massage   [id number]      []??? Dry Needling, 1 or 2 muscles  32240   []??? Biofeedback, first 15 minutes   29260  []??? Biofeedback, additional 15 minutes   49649 []??? Dry Needling, 3 or more muscles  98542      Frequency:  2-3 x/week for 18 visits    Electronically signed by: Toshia Karimi PT    Physician Signature:________________________________Date:__________________  By signing above or cosigning this note, I have reviewed this plan of care and certify a need for medically necessary rehabilitation services.      *PLEASE SIGN ABOVE AND FAX BACK ALL PAGES*

## 2021-10-04 NOTE — CONSULTS
[] [facility]) - [facility] &  Therapy  [address]  [phone]  F: [phone] [x] [address]  [facility]   [address]  P: [phone]  F: [phone] [] [address]  [address]  P: [phone]  F: [phone] [] [address]  P: [phone]  F: [phone] [] [address]  [facility]   [address]   [place]: [phone]  F: [phone]      Physical Therapy Spine Evaluation    Date:  10/4/2021  Patient: Breanna Milton  : 1945  MRN: [id number]  Physician: Vladislav Alejandro  Insurance: Medicare/MMO Supp  Medical Diagnosis:  M25.511, G89.29, M25.512 (ICD-10-CM) - Chronic pain of both shoulders   M54.40 (ICD-10-CM) - Acute bilateral low back pain with sciatica, sciatica laterality unspecified   Rehab Codes: M54.2, M54.5, M54.9, R29.3, M62.81, R26.2 NEC, M79.1, M25.512 M25.612,M62.512, M25.511,M25.611,M62.511, M62.81, M25.551, M25.552  Onset Date:3/1/21   Next 's appt. : 22    Subjective:   CC: Pt presents to PT with multiple areas of complaint such as LBP with BLE radic (pimarily numbness) into B buttocks, B shoulder pain (R worse than L), neck pain/stiffness, decreased flexibility & overall decreased walking/upright function. Pt reports achey, dull pain that is intermittent; which can sometimes be sharp with movement. Pt denies popping, locking, buckling and giving way. Pt notes difficulty with: BLE/BUE weakness, BLE edema (on LASIX), balancing, prolonged sitting, standing, walking, stairs, bending, squatting/stooping, lifting LEs/getting into & out of car/bed, rising from seated postions especially at varying heights, sleeping (2-3 hours of sleep), WBing, & most ADLs.       HPI: Pt reports his pain began after a hospital stay due to high potassium in late feb 2021. Pt notes that being in bed for extended period of time increased pain in various areas. Pt reports intermittent 10/10 pain & especially at night with B shoulders. Has tried tylenol pills with minimal relief. Pt denies falls, but overall does notice balance issues. Pt is currently being monitored by PCP for current conditions & being controlled with meds. Pt denies any red flags, as pertaining to BLEs.  pt has cane, RW as home adaptive equipment. Pt elaborates he is currently in cardiac rehab at [facility]. Further HPI copied from 6/15/2008 evaluation: (1/2013) Patient reports he had a L3 laminectomy in 2013 and developed drop foot in LLE after surgery. Since then, has noticed difficulty clearing foot mostly when walking in the grass and when stair climbing stating he must work harder and focus to lift LLE. Patient reports he had PT and aquatic therapy after surgery and again in 2014 for continued LBP. Patient reports getting injections in low back for several years, with decreasing symptom relief; reports last injection was ~ 6 months ago. Also had a consult with Dr. Eder Garcia for nerve block stimulator to decrease LB pain, but patient wants to try PT first to assist with symptom relief. Patient also reports that in 2007, he tore his patellar tendon and had surgery to repair and pins placed in R knee. 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      PMHx: [] Unremarkable [x] Diabetes [x] HTN [] Pacemaker   [x] MI/Heart Problems [] Cancer [x] Arthritis [x] Other: colon CA 2011 - Non-hodgkin's lymphoma, BRCA2, h/o chemotherapy, h/o blood clots, CTS, GERD, anemic, CABGx3, currently in cardiac rehab              [x] Refer to full medical chart  In EPIC     Comorbidities:   [x] Obesity [] Dialysis  [] N/A   [x] Asthma/COPD [] Dementia [] Other:   [] Stroke [x] Sleep apnea [] Other:   [] Vascular disease [] Rheumatic disease [] Other:     Tests:   Lumbar MRI: 11/2016  Extensive decompression with enhancement and granulation tissue between L2-L5. Multilevel disc space narrowing and disc lesions posterior element hypertrophy most significant at L3-4. At L3-4 there is central canal stenosis. In addition there is facet hypertrophy. There is also significant neural canal stenosis of at least a moderate degree bilaterally. Granulation tissue is noted on the left side of L3-4 disc. Left-sided predominant. Broad-based asymmetrical disc bulge at L4-5 is identified     Lumbosacral x-ray: 11/9/17  *  Multilevel degenerative disc space disease, severe at L3-4. *  Sacralization of the L5 vertebral body. *  Left-sided neural foraminal narrowing in the lower lumbar spine.      Medications: [x] Refer to full medical record [] None [] Other:  Allergies:      [x] Refer to full medical record [] None [] Other:    Function:  Hand Dominance  [x] Right  [] Left    Patient lives with: wife   In what type of home [x]? One story   []? Two story   []? Split level   Number of stairs to enter 1   With handrail on the [x]? 0   Bathroom has a []? Tub only  [x]? Tub/shower combo   []? Walk in shower    [x]? Grab bars   Washing machine is on [x]? Main level   Employer Retired    Job Status []? Normal duty   []? Light duty   []? Off due to condition    [x]? Retired   []? Not employed   []? Disability  []? Other:  []? Return to work: Work activities/duties  for [employer]; ADLs; Boating      ADL/IADL Previous level of function Current level of function Who currently assists the patient with task   Bathing  [x]? ?? Independent  []? ?? Assist []? ?? Independent  [x]??? Assist Independent with increased time to perform   Dress/grooming [x]? ?? Independent  []? ?? Assist []? ?? Independent  [x]??? Assist Independent with increased time to perform    Transfer/mobility [x]??? Independent  []? ?? Assist []? ?? Independent  [x]??? Assist Independent with increased time to perform   Feeding [x]? ?? Independent  []? ?? Assist [x]? ?? Independent  []? ?? Assist     Toileting [x]? ?? Independent  []? ?? Assist []? ?? Independent  [x]??? Assist Independent with increased time to perform   Driving [x]? ?? Independent  []? ?? Assist []? ?? Independent  [x]??? Assist Independent with increased time to perform    Housekeeping [x]? ?? Independent  []? ?? Assist []? ?? Independent  [x]??? Assist Independent with increased time to perform    Grocery shop/meal prep [x]? ?? Independent  []? ?? Assist []? ?? Independent  [x]??? Assist Independent with increased time to perform       Gait Prior level of function Current level of function     [x]? ? Independent  []? ? Assist [x]? ? Independent   []? ? Assist   Device: [x]? ? Independent [x]? ? Independent      Pain:  [x]? Yes  []? No  Location: multiple areas see subjective report Pain Rating: (0-10 scale) 10/10 at worst  Pain altered Tx:  []? Yes  [x]? No  Action:     Symptoms:  []? Improving     [x]? Worsening  []? Same     Better:  []? AM    []? PM    []? Sit    []? Rise/Sit    []? Stand    []? Walk    []? Lying    []? Other:  Worse: [x]? AM    [x]? PM    [x]? Sit    [x]? Rise/Sit    [x]? Stand    [x]? Walk    [x]? Lying    [x]? Bend                      []? Valsalva    []? Other: see subjective report     Sleep: []? OK    [x]?  Disturbed     Objective:  AROM:  Cervical spine: limited by 50% all planes   Lumbar: limited by 75% all planes   B Shoulders: limited by 75% all planes   B Hips: limited 50% all planes    Flexibility:  + B HS 90/90  + B Ely's  + B piriformis      MMT Strength  B UEs: B 4-/5 all planes grossly throughout within available ROM  B LEs: B 4-/5 all planes grossly throughout within available ROM; 3+/5 at L ankle    B 70lbs     Special Tests:  + B Slump  Unable to test all areas due to time constraint of multiple body regions, can be assessed at subsequent visits    OBSERVATION No Deficit Deficit Not Tested Comments   Posture           Forward Head []????  [x]? ???  []????      Rounded Shoulders []????  [x]? ???  []????  Kyphotic, trunk flexed    Lordosis []????  [x]? ???  []????  Posterior Pelvic Tilt, trunk flexed   Flexed Knees []????  [x]? ???  []????  B knees   Pronation   [x]? ???        Palpation []????  [x]? ???  []????  TTP at all areas of MMT    Sensation []????  []????  [x]? ???      Edema []????  []????  [x]? ???      Neurological []????  []????  [x]? ???      Gait []????  [x]? ???  []????  Analysis: Pt ambulates forward flexed trunk, decreased trunk rotation & B arm swing, dec B stride/step length/stance time & B decreased heel/toe progression       FUNCTION Normal Difficult Unable   Sitting []? ?  [x]? ?  []??    Standing []? ?  [x]? ?  []??    Ambulation []? ?  [x]? ?  []??    Groom/Dress []? ?  [x]? ?  []??    Lift/Carry []? ?  [x]? ?  []??    Stairs []? ?  [x]? ?  []??    Bending []? ?  [x]? ?  []??    Squat []? ?  [x]? ?  []??    Kneel []? ?  [x]? ?  []??       Functional Test:   UEFS  Oswestry Score:   UEFS =36/80 = 55% deficit  Oswestry = 29/50 = 58% deficit              Comments: SLS: R 7sec L 2 sec; TUG without AD 16sec                 Assessment: Pt presents with decreased function due to OA of multiple body regions. Pt presents with deficits in AROM of BLE/BUE/Lumbar/Cervical/B Hips, BLE/BUE strength, balance, walking, standing, WBing activity, bending, squatting, stooping & overall function. See below problem list for further details. Please refer to full evaluation consult from 10/4/21 for findings. The pt would benefit from skilled PT Aquatic services in order to improve strength, AROM of multiple areas to improve tolerance to return to ADLs.  Pt is being transferred to [address] [place] [address] per pt request (closer to home) & his interest in Aquatics.  Aquatics is the best suited environment for patient to offload spine and joints for ease of movement. Due to h/o cardiac issues pt should be monitored closely in the aquatic setting.   Problems at initial evaluation 10/4/21:     [x]???? ? Pain: 10/10 at worst in multiple areas                                         [x]???? ? AROM - all planes sig limited (cervical, lumbar, B shoulders, B hips)                     [x]???? ?BLE/ BUE Strength: Decreased grossly throughout BLE/BUE  [x]???? ? Function: UEFS score = 36/80 = 55% deficit; Oswestry LBP =29/50 = 58% deficit                 [x]? ??? Postural Deviations: Pt stands with flexed trunk, posterior tilt and kyphosis  [x]???? Gait Deviations: Pt ambulates forward flexed trunk, decreased trunk rotation & B arm swing, dec B stride/step length/stance time & B decreased heel/toe progression   [x]???? Other: Decreased Balance: TUG 16 sec without AD; SLS: R 7sec L 2 sec; + B Slump's      STG: (to be met in 10 treatments)  1. ? Pain: to <7/10 with activity for ease with ADLs. 2. ?  AROM: All areas by 25% increase to improve ADLs & gait. 3. ? BLE & BUE Strength by 1/2 to a full grade to improve lifting, gait & standing tolerance. 4. ? Function: Pt to be able to navigate up & down 4 steps independently, with no increase in sxs, to improve overall stair navigation within community.    5. Independent with Home Exercise Programs  6. Demonstrate Knowledge of fall prevention (Baljinder completed 10/4/21. Fall prevention not needed).   7. ? Balance Function: Pt to demo TUG score of <12 seconds, without AD, to improve gait speed and reduce risk of falls. 8. ? Balance Function: Pt to demonstrate  B SLS sec of >10sec to improve SLS balance & fall prevention.        LTG: (to be met in 18 treatments)  1. ? Pain: to <2-3/10 with activity for ease with ADLs. 2. AROM: All areas by 50% increase or more to improve ADLs & gait. 3. ? BLE & BUE Strength to 4+/5 to improve lifting, gait & standing tolerance.   4. Pt to ambulate independently with 50% improved upright posture for >500', to improve community navigation. 5.  Pt to verbalize being able to get in/out of car/bed/shower to improve functional mobility with ADLs. 6. Pt to demo <25% impairment on UEFS & Oswestry to improve overall functional mobility. 7.  Pt to demo TUG score of <10 seconds, without AD, to improve gait speed and reduce risk of falls. 8. ? Balance Function: Pt to demonstrate  B SLS sec of >20sec to improve SLS balance & fall prevention.        Patient goals: \"flexibility, walk better, better balance\"     Rehab Potential:  [x]? ? Good  []? ? Fair  []? ? Poor     Suggested Professional Referral:  []?? No  [x]? ? Yes: Aquatics  Barriers to Goal Achievement:  []?? No  [x]? ? Yes: multiple body regions   Domestic Concerns:  [x]? ? No  []? ? Yes:     Pt. Education:  [x]? ? Plans/Goals, Risks/Benefits discussed  [x]? ? Home exercise program    Method of Education: [x]? ? Verbal  [x]? ? Demo  [x]? ? Written, as charted in EDU section in ex log  Comprehension of Education:  []?? Verbalizes understanding.  []?? Demonstrates understanding. [x]? ? Needs Review. []?? Demonstrates/verbalizes understanding of HEP/Ed previously given.     Treatment Plan:  [x]??? Therapeutic Exercise   [id number]             []??? Iontophoresis: 4 mg/mL Dexamethasone Sodium Phosphate  mAmin  42809   [x]? ?? Therapeutic Activity  82831 [x]? ?? Vasopneumatic cold with compression  61825               [x]? ?? Gait Training    [phone] []??? Ultrasound        [id number]   [x]? ?? Neuromuscular Re-education  [id number] [x]? ?? Electrical Stimulation Unattended  46142   [x]? ?? Manual Therapy  33989 []??? Electrical Stimulation Attended  [id number]   [x]? ?? Instruction in HEP       []??? Lumbar/Cervical Traction  [id number]   [x]??? Aquatic Therapy           [id number] [x]? ?? Cold/hotpack     []??? Massage   [id number]      []??? Dry Needling, 1 or 2 muscles  20560   []??? Biofeedback, first 15 minutes   43033  []??? Biofeedback, additional 15 minutes   54622 []??? Dry Needling, 3 or more muscles  20561    Frequency:  2-3 x/week for 18 visits     Todays Treatment:  Modalities:   Precautions: CABGx3, anemia, currently in cardiac rehab (taking LASIX)   Exercises: SafetyTat Access code [email or web address]  Exercise Reps/ Time Weight/ Level Comments   Seated         LAQ HEP       Marches HEP       Shoulder AROM HEP   Flexion, ABD/ADD   Shoulder shrug HEP       Chest press HEP       Scap retraction HEP       Elbow flex/ext HEP       Hip ABD HEP       Knee slides HEP       Glute sets HEP       Hip ADD squeeze HEP       Other: education HEP & activity & movement, house hold activities, seated posture, aquatics     Specific Instructions for next treatment: Aquatics & eventual progress to land once pt is ready, Dynamic Lumbar Stabilization, BLE/BUE Strength, BLE/BUE Flexibility exercises, B HIP/B shoulder AROM/PROM, progress to standing as able, dynamic/static balance activities, transfers/safety & gait training, vasocompression, progress to land and alter g for increasing walking distance and posture     Estimated Medicare Charges as of 10/4/21: $120.85     Evaluation Complexity:  History (Personal factors, comorbidities) []?? 0 []?? 1-2 [x]? ? 3+   Exam (limitations, restrictions) []?? 1-2 []?? 3 [x]? ? 4+   Clinical presentation (progression) []? ? Stable [x]? ? Evolving  []? ? Unstable   Decision Making []? ? Low [x]? ? Moderate []? ? High     []? ? Low Complexity [x]? ? Moderate Complexity []? ? High Complexity      Treatment Charges: Mins Units   [x]? Evaluation       []? Low       [x]? Moderate       []? High 50 1   []? Modalities       [x]? Ther Exercise 8 1   []? Manual Therapy       [x]? Ther Activities     []? Aquatics       []? Vasocompression       []?   Other          TOTAL TREATMENT TIME: 58 min      Time in: 410p      Time out: 515p    Electronically signed by: Rosa Chi PT        Physician Signature:________________________________Date:__________________  By signing above or cosigning this note, I have reviewed this plan of care and certify a need for medically necessary rehabilitation services.      *PLEASE SIGN ABOVE AND FAX BACK ALL PAGES*

## 2021-10-06 ENCOUNTER — HOSPITAL ENCOUNTER (OUTPATIENT)
Dept: PHYSICAL THERAPY | Facility: CLINIC | Age: 76
Setting detail: THERAPIES SERIES
Discharge: HOME | End: 2021-10-06
Payer: MEDICARE

## 2021-10-06 PROCEDURE — 97113 AQUATIC THERAPY/EXERCISES: CPT

## 2021-10-06 NOTE — FLOWSHEET NOTE
[] [facility]       Physical Therapy MOB2       [facility] [address]        [address]       Phone: [phone]       Fax: [phone] [] [facility]       Promotion at [address]       Phone: [phone]       Fax: [phone] [] Darryl. [facility]5 [facility] Promotion  [address] [place] [address]   Phone: [phone]   Fax:  [phone]     Physical Therapy Daily  Aquatic Treatment Note    Date:  10/6/2021  Patient Name:  Arjun Paredes    :  1945  MRN: [id number]  Physician: Zenia Houser               Insurance: Medicare/O Supp  Medical Diagnosis:  M25.511, G89.29, M25.512 (ICD-10-CM) - Chronic pain of both shoulders   M54.40 (ICD-10-CM) - Acute bilateral low back pain with sciatica, sciatica laterality unspecified   Rehab Codes: M54.2, M54.5, M54.9, R29.3, M62.81, R26.2 NEC, M79.1, M25.512 M25.612,M62.512, M25.511,M25.611,M62.511, M62.81, M25.551, M25.552  Onset Date:3/1/21                              Next 's appt. : 22    Visit# / total visits:  Cancels/No Shows:     Subjective:    Pain:  [x] Yes  [] No Location: LB/bilat shoulders Pain Rating: (0-10 scale) 3-4/10  Pain altered Tx:  [] No  [x] Yes  Action:Initiated aquatics  Comments:Pt reports with pain with increased walking. Pt has fwd flexed posture. Pt with pain LB and shoulders L>R. Pain trying to sleep and laying on L side.     Objective:     KEY  B = Belt G = Gloves N = Noodle   C = Cuffs K = Kickboard P = Paddles   CC = Cervical Collar L = Laps T = Theratube   DB = Dumbells M = Minutes W = Weights     Exercises/Activities  Warm-up/Amb 10/6 Dynamic Exercises 10/6   Forward 3L March    Sideways 3L Squat    Backwards 3L Retro HS curls 3L     Retro SLR    Stretches  Braiding    Gastroc/Soleus  Heel to Toe amb    Hamstring  Toe amb    Hip flexor  Heel amb    Piriformis      SKTC      Pec Stretch      Post Deltoid  Static Exercises UE Shoulder flex/ext 10   Static Exercises LE  Shoulder abd/add 10   Heel/toe raises 10 Shoulder H.  abd/add 10   Marches 10 Shoulder IR/ER 10   Mini-squats 10 Rowing 10   4-way hip  10 Arm Circles (post) 10   Hamstring curls 10 Post sh rolls 10   Hip Circles/Fig 8  Scap squeezes    Ankle ROM  Diagonals 1/2    Lunges   Elbow flex/ext      Pron/Sup    Functional Exercise  Wrist AROM    Step      Wall Push-ups  Deep H20/ N behind back and under arms   SLS  Bike (end of rail) 3m   Breast Stroke on Noodle  Hip abd/add    Noodle Twist  Hip flex/ext    Noodle Push down  Hip IR/ER    Kickboard push/pull  Knee flex/ext      Push/pull on [employer] 5m   Other:    Specific Instructions for next treatment:dynamic and balance ex, step ups as ramya    Treatment Charges: Mins Units   []  Modalities     []  Ther Exercise     []  Manual Therapy     []  Ther Activities     [x]  Aquatics 35 2   []  Other       Assessment: [x] Progressing toward goals. Initiated aquatic ex per flow sheet with VC for upright posture. Focused on ROM and posterior chain strengthening, to help improve posture and balance, with good ramya by pt. Pt notes tightness in shoulders and R knee during ex, but no pain or soreness noted during ex. After retro amb note increased LB soreness from trying to stand up straighter. To deep water with noodle placed behind back and under arms to cont to promote scap retraction and more upright posture. Pt feels pretty good after tx, slight soreness in LB.    [] No change. [] Other:  STG: (to be met in 10 treatments)  1. ? Pain: to <7/10 with activity for ease with ADLs. 2. ?  AROM: All areas by 25% increase to improve ADLs & gait. 3. ? BLE & BUE Strength by 1/2 to a full grade to improve lifting, gait & standing tolerance. 4. ? Function: Pt to be able to navigate up & down 4 steps independently, with no increase in sxs, to improve overall stair navigation within community.    5.  Independent with Home Exercise Programs  6. Demonstrate Knowledge of fall prevention (Gale completed 10/4/21. Fall prevention not needed).   7. ? Balance Function: Pt to demo TUG score of <12 seconds, without AD, to improve gait speed and reduce risk of falls. 8. ? Balance Function: Pt to demonstrate  B SLS sec of >10sec to improve SLS balance & fall prevention.        LTG: (to be met in 18 treatments)  1. ? Pain: to <2-3/10 with activity for ease with ADLs. 2. AROM: All areas by 50% increase or more to improve ADLs & gait. 3. ? BLE & BUE Strength to 4+/5 to improve lifting, gait & standing tolerance. 4. Pt to ambulate independently with 50% improved upright posture for >500', to improve community navigation. 5.  Pt to verbalize being able to get in/out of car/bed/shower to improve functional mobility with ADLs. 6. Pt to demo <25% impairment on UEFS & Oswestry to improve overall functional mobility. 7.  Pt to demo TUG score of <10 seconds, without AD, to improve gait speed and reduce risk of falls. ? Balance Function: Pt to demonstrate  B SLS sec of >20sec to improve SLS balance & fall prevention. Pt. Education:  [x] Yes  [] No  [] Reviewed Prior HEP/Ed  Method of Education: [x] Verbal  [] Demo  [] Written  Comprehension of Education:  [x] Verbalizes understanding. [] Demonstrates understanding. [] Needs review. [] Demonstrates/verbalizes HEP/Ed previously given. Plan: [x] Continue per plan of care.    [] Other:      Time In:1:35pm           Time Out: 2:15pm    Electronically signed by:  Brittni Marin PTA

## 2021-10-06 NOTE — PRE-CERTIFICATION NOTE
Medicare Cap   [x] Physical Therapy  [] Speech Therapy  [] Occupational therapy  *PT and Speech caps combine      $2100 Limit for PT and Speech combined  $2100 Limit for OT individually  At the beginning of the month where you expect to go over $2100, please add the [address] [place] [address] modifier      Patient Name: Breanna Milton  YOB: 1945    Note:  This is an estimate of charges billed.      Date of [address] Name # units/ charge $$$ charge Daily Total Charge Ongoing Total $$$   10/4/2021 Eval+TE   120.85 120.85   10/6/21 Aqua 2  36.63+27.07 63.70 184.55

## 2021-10-08 ENCOUNTER — HOSPITAL ENCOUNTER (OUTPATIENT)
Dept: PHYSICAL THERAPY | Facility: CLINIC | Age: 76
Setting detail: THERAPIES SERIES
Discharge: HOME | End: 2021-10-08
Payer: MEDICARE

## 2021-10-08 PROCEDURE — 97113 AQUATIC THERAPY/EXERCISES: CPT

## 2021-10-08 PROCEDURE — 97140 MANUAL THERAPY 1/> REGIONS: CPT

## 2021-10-08 NOTE — FLOWSHEET NOTE
[] [facility]       Physical Therapy MOB2       [place] [address]        [address]       Phone: [phone]       Fax: [phone] [] [facility]       Promotion at [address]       Phone: [phone]       Fax: [phone] [x] [place]. [facility] Prom[facility]  [address]   Phone: [phone]   Fax:  [phone]     Physical Therapy Daily  Aquatic Treatment Note    Date:  10/8/2021  Patient Name:  Patrick Cheung    :  1945  MRN: [id number]  Physician: Leslie Jarvis               Insurance: Medicare/MMO Supp  Medical Diagnosis:  M25.511, G89.29, M25.512 (ICD-10-CM) - Chronic pain of both shoulders   M54.40 (ICD-10-CM) - Acute bilateral low back pain with sciatica, sciatica laterality unspecified   Rehab Codes: M54.2, M54.5, M54.9, R29.3, M62.81, R26.2 NEC, M79.1, M25.512 M25.612,M62.512, M25.511,M25.611,M62.511, M62.81, M25.551, M25.552  Onset Date:3/1/21                              Next 's appt. : 22    Visit# / total visits: 3/ 18 Cancels/No Shows:     Subjective:    Pain:  [x] Yes  [] No Location: LB/bilat shoulders Pain Rating: (0-10 scale) 4/10  Pain altered Tx:  [] No  [x] Yes  Action:  Comments:Pt notes he is unable to walk more than \"3 yards\" without back pain. Notes he would like to increase ROM in shoulders.       **Pt requires assistance to get noodle around back and under arms**    Objective:     KEY  B = Belt G = Gloves N = Noodle   C = Cuffs K = Kickboard P = Paddles   CC = Cervical Collar L = Laps T = Theratube   DB = Dumbells M = Minutes W = Weights     Exercises/Activities  Warm-up/Amb 10/8 Dynamic Exercises 10/8   Forward 3L March    Sideways 3L Squat    Backwards 3L Retro HS curls 3L     Retro SLR    Stretches  Braiding    Gastroc/Soleus 3x30\" Heel to Toe amb    Hamstring 3x30\" Toe amb    Hip flexor  Heel amb    Piriformis 3x30\" ModA     Noodle cat cow  UT/Levator Scap S 10x10\"  10x10\"     Pec Stretch 3x30\"     Post Deltoid 3x30\" Static Exercises UE      Shoulder flex/ext 10   Static Exercises LE  Shoulder abd/add 10   Heel/toe raises 10 Shoulder H.  abd/add 10   Marches 10 Shoulder IR/ER 10   Mini-squats 10 Rowing 10   4-way hip  10 Arm Circles (post) 10   Hamstring curls 10 Post sh rolls 10   Hip Circles/Fig 8  Scap squeezes    Ankle ROM  Diagonals 1/2    Lunges   Elbow flex/ext      Pron/Sup    Functional Exercise  Wrist AROM    Step      Wall Push-ups  Deep H20/    SLS  Bike (end of rail)    Breast Stroke on Noodle  Hip abd/add    Noodle Twist  Hip flex/ext    Noodle Push down  Hip IR/ER    Kickboard push/pull  Knee flex/ext      Push/pull on [employer] 5m cervical collar   Other:    Specific Instructions for next treatment:dynamic and balance ex, step ups as ramya    Treatment Charges: Mins Units   []  Modalities     []  Ther Exercise     [x]  Manual Therapy 15 1   []  Ther Activities     [x]  Aquatics 30 2   []  Other       Assessment: [x] Progressing toward goals. Continued with current strengthening program, pt displayed fair tolerance. Some facial grimace with shoulder movements, denied pain and noted tight feeling in shoulders and chest mm. Pectorals, deltoid mm leathery and tight, with mild TTP. Performed STM/DI to aforementioned mm with fair release noted. Pt able to perform ROM more smoothly, noting \"it doesn't feel as tight\". Performed deep water hang with cervical collar to warm all involved mm prior to stretching to improve tissue extensibility. Pt noted some discomfort with cervical collar. Pt monitored closely throughout d/t prior history of heart surgery. [] No change. [] Other:  STG: (to be met in 10 treatments)  1. ? Pain: to <7/10 with activity for ease with ADLs. 2. ?  AROM: All areas by 25% increase to improve ADLs & gait. 3. ? BLE & BUE Strength by 1/2 to a full grade to improve lifting, gait & standing tolerance.    4. ? Function: Pt to be able to navigate up & down 4 steps independently, with no increase in sxs, to improve overall stair navigation within community.    5. Independent with Home Exercise Programs  6. Demonstrate Knowledge of fall prevention (Baljinder completed 10/4/21. Fall prevention not needed).   7. ? Balance Function: Pt to demo TUG score of <12 seconds, without AD, to improve gait speed and reduce risk of falls. 8. ? Balance Function: Pt to demonstrate  B SLS sec of >10sec to improve SLS balance & fall prevention.        LTG: (to be met in 18 treatments)  1. ? Pain: to <2-3/10 with activity for ease with ADLs. 2. AROM: All areas by 50% increase or more to improve ADLs & gait. 3. ? BLE & BUE Strength to 4+/5 to improve lifting, gait & standing tolerance. 4. Pt to ambulate independently with 50% improved upright posture for >500', to improve community navigation. 5.  Pt to verbalize being able to get in/out of car/bed/shower to improve functional mobility with ADLs. 6. Pt to demo <25% impairment on UEFS & Oswestry to improve overall functional mobility. 7.  Pt to demo TUG score of <10 seconds, without AD, to improve gait speed and reduce risk of falls. ? Balance Function: Pt to demonstrate  B SLS sec of >20sec to improve SLS balance & fall prevention. Pt. Education:  [x] Yes  [] No  [] Reviewed Prior HEP/Ed  Method of Education: [x] Verbal  [] Demo  [] Written  Comprehension of Education:  [x] Verbalizes understanding. [] Demonstrates understanding. [] Needs review. [] Demonstrates/verbalizes HEP/Ed previously given. Plan: [x] Continue per plan of care.    [] Other:      Time In: 8:05 am           Time Out: 9:-- am    Electronically signed by:  Leona Encinas PTA

## 2021-10-11 ENCOUNTER — HOSPITAL ENCOUNTER (OUTPATIENT)
Dept: PHYSICAL THERAPY | Facility: CLINIC | Age: 76
Setting detail: THERAPIES SERIES
Discharge: HOME | End: 2021-10-11
Payer: MEDICARE

## 2021-10-11 PROCEDURE — 97113 AQUATIC THERAPY/EXERCISES: CPT

## 2021-10-11 NOTE — PRE-CERTIFICATION NOTE
Medicare Cap   [x] Physical Therapy  [] Speech Therapy  [] Occupational therapy  *PT and Speech caps combine      $2100 Limit for PT and Speech combined  $2100 Limit for OT individually  At the beginning of the month where you expect to go over $2100, please add the [address] [place] [address] modifier      Patient Name: Jose Warner  YOB: 1945    Note:  This is an estimate of charges billed.      Date of [address] Name # units/ charge $$$ charge Daily Total Charge Ongoing Total $$$   10/4/2021 Eval+TE   120.85 120.85   10/6/21 Aqua 2  36.63+27.07 63.70 184.55   10/8 Man 1, Aqua 2  36.63+27.07+21.34 85.04 269.59   10/11 Aqua 2  36.63+27.07 63.70 333.29

## 2021-10-11 NOTE — FLOWSHEET NOTE
[] [facility]       Physical Therapy MOB2       [place] [address]        [address]       Phone: [phone]       Fax: [phone] [] [facility] at [address]       Phone: [phone]       Fax: [phone] [x] Darryl. [facility]5 [facility] Health Promotion  [address] [facility]   Phone: [phone]   Fax:  [phone]     Physical Therapy Daily  Aquatic Treatment Note    Date:  10/11/2021  Patient Name:  Arjun Paredes    :  1945  MRN: [id number]  Physician: Zenia Houser               Insurance: Medicare/[employer] Supp  Medical Diagnosis:  M25.511, G89.29, M25.512 (ICD-10-CM) - Chronic pain of both shoulders   M54.40 (ICD-10-CM) - Acute bilateral low back pain with sciatica, sciatica laterality unspecified   Rehab Codes: M54.2, M54.5, M54.9, R29.3, M62.81, R26.2 NEC, M79.1, M25.512 M25.612,M62.512, M25.511,M25.611,M62.511, M62.81, M25.551, M25.552  Onset Date:3/1/21                              Next 's appt. : 22    Visit# / total visits:  Cancels/No Shows:     Subjective:    Pain:  [x] Yes  [] No Location: LB/bilat shoulders Pain Rating: (0-10 scale) 2/10  Pain altered Tx:  [] No  [x] Yes  Action:  Comments: Pt arrived stating he is feeling \"sore today\". Slight soreness noted after previous aquatic session.       **Pt requires assistance to get noodle around back and under arms**    Objective:     KEY  B = Belt G = Gloves N = Noodle   C = Cuffs K = Kickboard P = Paddles   CC = Cervical Collar L = Laps T = Theratube   DB = Dumbells M = Minutes W = Weights     Exercises/Activities  Warm-up/Amb 10/11 Dynamic Exercises 10/11   Forward 3L March    Sideways 3L Squat    Backwards 3L Retro HS curls 3L     Retro SLR    Stretches  Braiding    Gastroc/Soleus 3x30\" Heel to Toe amb    Hamstring 3x30\" Toe amb    Hip flexor  Heel amb    Piriformis 3x30\" ModA     Noodle cat cow  UT/Levator Scap S 10x10\"  10x10\"     Pec Stretch 3x30\"     Post Deltoid 3x30\" Static Exercises UE      Shoulder flex/ext 15   Static Exercises LE  Shoulder abd/add 15   Heel/toe raises 15 Shoulder H.  abd/add 15   Marches 15 Shoulder IR/ER 15   Mini-squats 15 Rowing 15   4-way hip  15 Arm Circles (post) 15   Hamstring curls 15 Post sh rolls 15   Hip Circles/Fig 8  Scap squeezes    Ankle ROM  Diagonals 1/2    Lunges   Elbow flex/ext      Pron/Sup    Functional Exercise  Wrist AROM    Step      Wall Push-ups  Deep H20/    SLS  Bike (end of rail) 3m   Breast Stroke on Noodle  Hip abd/add    Noodle Twist  Hip flex/ext    Noodle Push down  Hip IR/ER    Kickboard push/pull  Knee flex/ext      Push/pull on [employer] 10m   Other:    Specific Instructions for next treatment:dynamic and balance ex, step ups as ramya    Treatment Charges: Mins Units   []  Modalities     []  Ther Exercise     []  Manual Therapy     []  Ther Activities     [x]  Aquatics 30 2   []  Other       Assessment: [x] Progressing toward goals. Continued with aquatics program as noted above with improved tolerance displayed. Able to increase reps for static UE/LE therex with verbal cueing and demo provided on proper posture and reducing compensatory movements. No facial grimacing expressed; improved carryover with maintaining a comfortable range. Discontinued use of cervical collar as pt stated he did not find the collar to be comfortable. Ended with deep water bicycle and extended hang time to promote relaxation along with stretching to enhance flexibility of tissue. Pt denies any increase in symptoms post treatment. [] No change. [] Other:  STG: (to be met in 10 treatments)  1. ? Pain: to <7/10 with activity for ease with ADLs. 2. ?  AROM: All areas by 25% increase to improve ADLs & gait. 3. ? BLE & BUE Strength by 1/2 to a full grade to improve lifting, gait & standing tolerance.    4. ? Function: Pt to be able to navigate up & down 4 steps independently, with no increase in sxs, to improve overall stair navigation within community.    5. Independent with Home Exercise Programs  6. Demonstrate Knowledge of fall prevention (Baljinder completed 10/4/21. Fall prevention not needed).   7. ? Balance Function: Pt to demo TUG score of <12 seconds, without AD, to improve gait speed and reduce risk of falls. 8. ? Balance Function: Pt to demonstrate  B SLS sec of >10sec to improve SLS balance & fall prevention.        LTG: (to be met in 18 treatments)  1. ? Pain: to <2-3/10 with activity for ease with ADLs. 2. AROM: All areas by 50% increase or more to improve ADLs & gait. 3. ? BLE & BUE Strength to 4+/5 to improve lifting, gait & standing tolerance. 4. Pt to ambulate independently with 50% improved upright posture for >500', to improve community navigation. 5.  Pt to verbalize being able to get in/out of car/bed/shower to improve functional mobility with ADLs. 6. Pt to demo <25% impairment on UEFS & Oswestry to improve overall functional mobility. 7.  Pt to demo TUG score of <10 seconds, without AD, to improve gait speed and reduce risk of falls. ? Balance Function: Pt to demonstrate  B SLS sec of >20sec to improve SLS balance & fall prevention. Pt. Education:  [x] Yes  [] No  [] Reviewed Prior HEP/Ed  Method of Education: [x] Verbal  [] Demo  [] Written  Comprehension of Education:  [x] Verbalizes understanding. [] Demonstrates understanding. [] Needs review. [] Demonstrates/verbalizes HEP/Ed previously given. Plan: [x] Continue per plan of care. [] Other:      Time In: 8:16 am           Time Out: 9:10 am    Electronically signed by:   Lucille See, [address]

## 2021-10-13 ENCOUNTER — HOSPITAL ENCOUNTER (OUTPATIENT)
Dept: PHYSICAL THERAPY | Facility: CLINIC | Age: 76
Setting detail: THERAPIES SERIES
Discharge: HOME | End: 2021-10-13
Payer: MEDICARE

## 2021-10-13 PROCEDURE — 97140 MANUAL THERAPY 1/> REGIONS: CPT

## 2021-10-13 PROCEDURE — 97113 AQUATIC THERAPY/EXERCISES: CPT

## 2021-10-13 NOTE — FLOWSHEET NOTE
[] 57 [facility]       Physical Therapy MOB2       [facility] [address]        [address]       Phone: [phone]       Fax: [phone] [] [facility]       Promotion at [address] [place] [address]       Phone: [phone]       Fax: [phone] [x] Darryl. [address] [facility] Promotion  [address] [facility]   Phone: [phone]   Fax:  [phone]     Physical Therapy Daily  Aquatic Treatment Note    Date:  10/13/2021  Patient Name:  Attila Bajwa    :  1945  MRN: [id number]  Physician: Dragan Garcia               Insurance: Medicare/O Supp  Medical Diagnosis:  M25.511, G89.29, M25.512 (ICD-10-CM) - Chronic pain of both shoulders   M54.40 (ICD-10-CM) - Acute bilateral low back pain with sciatica, sciatica laterality unspecified   Rehab Codes: M54.2, M54.5, M54.9, R29.3, M62.81, R26.2 NEC, M79.1, M25.512 M25.612,M62.512, M25.511,M25.611,M62.511, M62.81, M25.551, M25.552  Onset Date:3/1/21                              Next 's appt. : 22    Visit# / total visits:  Cancels/No Shows:     Subjective:    Pain:  [x] Yes  [] No Location: LB/bilat shoulders Pain Rating: (0-10 scale) 4/10  Pain altered Tx:  [] No  [x] Yes  Action:  Comments: Pt continues to arrive with soreness, notes his LB and (B) shoulders are the most sore.      **Pt requires assistance to get noodle around back and under arms**    Objective:     KEY  B = Belt G = Gloves N = Noodle   C = Cuffs K = Kickboard P = Paddles   CC = Cervical Collar L = Laps T = Theratube   DB = Dumbells M = Minutes W = Weights     Exercises/Activities  Warm-up/Amb 10/13 Dynamic Exercises 10/13   Forward 4L March    Sideways 4L Squat    Backwards 4L Retro HS curls 4L     Retro SLR    Stretches  Braiding    Gastroc/Soleus HELD Heel to Toe amb 1L   Hamstring HELD Toe amb    Hip flexor  Heel amb    Piriformis HELD     Noodle cat cow  UT/Levator Scap S   3x30\"     Pec Stretch 6x30\" MOD A; CR     Post Deltoid 3x30\" manual  Static Exercises UE Hold 5\" end of ROM     Shoulder flex/ext 15   Static Exercises LE  Shoulder abd/add 15   Heel/toe raises 15 Shoulder H.  abd/add 15   Marches 15 Shoulder IR/ER 15   Mini-squats 15 Rowing 15   4-way hip  15 Arm Circles (post) 15   Hamstring curls 15 Post sh rolls 15   Hip Circles/Fig 8  Scap squeezes 15   Ankle ROM  Diagonals 1/2    Lunges   Elbow flex/ext      Shoulder PROM 3' (B)    Functional Exercise  ER Rotation stretch with ModA pectoral S 10x10\"   Step x5 (B)      Wall Push-ups  Deep H20/    SLS  Bike (end of rail) 3m HELD   Breast Stroke on Noodle  Hip abd/add    Noodle Twist  Hip flex/ext    Noodle Push down  Hip IR/ER    Kickboard push/pull x10 Knee flex/ext    SLS  30\"x2 (B) Push/pull on Rail      Hang 10m lg wt on wrists  10m lg wt on ankles   Other:    Specific Instructions for next treatment:     Treatment Charges: Mins Units   []  Modalities     []  Ther Exercise     [x]  Manual Therapy 20 1   []  Ther Activities     [x]  Aquatics 30 2   []  Other       Assessment: [x] Progressing toward goals. Incorporated further dynamic balance laps, step ups and SLS therex to improve balance function and ability to navigate stairs. Performed PROM with DI to UT with some crepitus noted, pain denied. Instructed pt in contract-relax stretch for pectorals with good release noted. Educated pt on importance of strengthening scapular mm and releasing pectoral tightness to improve posture and shoulder mechanics. Added holds to UE therex for further stretch to inc ROM with fair tolerance. Encouraged pt to substitute bench press therex during personal gym visits for rowing. Provided verbal and tactile cues throughout to reduce UT compensation with poor carryover. Added deep water hang utilizing weights around wrists to decompress glenohumeral joint and loosen tight UE mm with good release noted.  Cont with traditional deep water hang at railing for LBP, encouraged pt to be mindful of posture and explained importance of core strengthening to reduce LBP. Good understanding, encouraged to continue moving within all pain-free ROM gained this date. Pt noted his pain was reduced to \"2/10\" at conclusion of tx.      [] No change. [] Other:  STG: (to be met in 10 treatments)  1. ? Pain: to <7/10 with activity for ease with ADLs. 2. ?  AROM: All areas by 25% increase to improve ADLs & gait. 3. ? BLE & BUE Strength by 1/2 to a full grade to improve lifting, gait & standing tolerance. 4. ? Function: Pt to be able to navigate up & down 4 steps independently, with no increase in sxs, to improve overall stair navigation within community.    5. Independent with Home Exercise Programs  6. Demonstrate Knowledge of fall prevention (Gale completed 10/4/21. Fall prevention not needed).   7. ? Balance Function: Pt to demo TUG score of <12 seconds, without AD, to improve gait speed and reduce risk of falls. 8. ? Balance Function: Pt to demonstrate  B SLS sec of >10sec to improve SLS balance & fall prevention.        LTG: (to be met in 18 treatments)  1. ? Pain: to <2-3/10 with activity for ease with ADLs. 2. AROM: All areas by 50% increase or more to improve ADLs & gait. 3. ? BLE & BUE Strength to 4+/5 to improve lifting, gait & standing tolerance. 4. Pt to ambulate independently with 50% improved upright posture for >500', to improve community navigation. 5.  Pt to verbalize being able to get in/out of car/bed/shower to improve functional mobility with ADLs. 6. Pt to demo <25% impairment on UEFS & Oswestry to improve overall functional mobility. 7.  Pt to demo TUG score of <10 seconds, without AD, to improve gait speed and reduce risk of falls. ? Balance Function: Pt to demonstrate  B SLS sec of >20sec to improve SLS balance & fall prevention.      Pt. Education:  [x] Yes  [] No  [] Reviewed Prior HEP/Ed  Method of Education: [x] Verbal  [] Demo  [] Written  Comprehension of Education:  [x] Verbalizes understanding. [] Demonstrates understanding. [] Needs review. [] Demonstrates/verbalizes HEP/Ed previously given. Plan: [x] Continue per plan of care.    [] Other:      Time In: 8:15 am           Time Out: 9:25 am    Electronically signed by:  Jelena Gooden PTA

## 2021-10-18 RX ORDER — METOPROLOL SUCCINATE 50 MG/1
TABLET, EXTENDED RELEASE ORAL
Qty: 90 TABLET | Refills: 1 | Status: SHIPPED | OUTPATIENT
Start: 2021-10-18 | End: 2022-04-15 | Stop reason: SDUPTHER

## 2021-10-18 NOTE — TELEPHONE ENCOUNTER
Semaj Presumbarbara is calling to request a refill on the following medication(s):    Medication Request:    Last filled 7/19/21 #90 with 0 RF    Requested Prescriptions     Pending Prescriptions Disp Refills    metoprolol succinate (TOPROL XL) 50 MG extended release tablet [Pharmacy Med Name: METOPROLOL SUCCINATE ER TABS 50MG] 90 tablet 3     Sig: TAKE 1 TABLET DAILY       Last Visit Date (If Applicable):  7/39/5445    Next Visit Date:    1/20/2022

## 2021-10-19 PROCEDURE — 9900000065 HC CARDIAC REHAB PHASE 3 - 1 VISIT

## 2021-10-20 ENCOUNTER — HOSPITAL ENCOUNTER (OUTPATIENT)
Dept: PHYSICAL THERAPY | Facility: CLINIC | Age: 76
Setting detail: THERAPIES SERIES
Discharge: HOME | End: 2021-10-20
Payer: MEDICARE

## 2021-10-20 PROCEDURE — 97113 AQUATIC THERAPY/EXERCISES: CPT

## 2021-10-20 PROCEDURE — 97140 MANUAL THERAPY 1/> REGIONS: CPT

## 2021-10-20 NOTE — FLOWSHEET NOTE
[] 57 [facility] Out[facility]       Physical Therapy MOB2       [place] [address]        [address]       Phone: [phone]       Fax: [phone] [] [facility]       Promotion at [address] [facility]       Phone: [phone]       Fax: [phone] [x] [facility] Promotion  [address] [facility]   Phone: [phone]   Fax:  [phone]     Physical Therapy Daily  Aquatic Treatment Note    Date:  10/20/2021  Patient Name:  Kenneth Ambrocio  \"Akil\"  :  1945  MRN: [id number]  Physician: Jessica Stewart               Insurance: Medicare/MMO Supp  Medical Diagnosis:  M25.511, G89.29, M25.512 (ICD-10-CM) - Chronic pain of both shoulders   M54.40 (ICD-10-CM) - Acute bilateral low back pain with sciatica, sciatica laterality unspecified   Rehab Codes: M54.2, M54.5, M54.9, R29.3, M62.81, R26.2 NEC, M79.1, M25.512 M25.612,M62.512, M25.511,M25.611,M62.511, M62.81, M25.551, M25.552  Onset Date:3/1/21                              Next 's appt. : 22    Visit# / total visits:  Cancels/No Shows:     Subjective:    Pain:  [x] Yes  [] No Location: LB/bilat shoulders Pain Rating: (0-10 scale) 2/10  Pain altered Tx:  [] No  [x] Yes  Action:  Comments: Pt notes reduced pain this date, feeling tightness in the shoulders and LBP.      **Pt requires assistance to get noodle around back and under arms**    Objective:     KEY  B = Belt G = Gloves N = Noodle   C = Cuffs K = Kickboard P = Paddles   CC = Cervical Collar L = Laps T = Theratube   DB = Dumbells M = Minutes W = Weights     Exercises/Activities  Warm-up/Amb 10/20 Dynamic Exercises 10/20   Forward 4L March    Sideways 4L Squat    Backwards 4L Retro HS curls      Retro SLR    Stretches  Braiding    Gastroc/Soleus HELD Heel to Toe amb    Hamstring HELD Toe amb    Hip flexor  Heel amb    Piriformis HELD     Noodle cat cow  UT/Levator Scap S      Pec Stretch 6x30\" MOD A; CR Post Deltoid  Static Exercises UE    Bicep on railing 10x10\" Shoulder flex/ext 20   Static Exercises LE TvA Shoulder abd/add 20   Heel/toe raises 20 Shoulder H.  abd/add 20   Marches 20 Shoulder IR/ER 20   Mini-squats 20 Rowing 20   4-way hip  20 Arm Circles (post) 20 Therapist assist    Hamstring curls 20 Post sh rolls 20    Hip Circles/Fig 8  Scap squeezes 20   Ankle ROM  Diagonals 1/2    Lunges   Elbow flex/ext      Shoulder PROM 10x flexion, abduction with hold @ end range    Functional Exercise  ER Rotation stretch with ModA pectoral S    Step       Wall Push-ups  Deep H20/    SLS  Bike (end of rail) 3m HELD   Breast Stroke on Noodle  Hip abd/add    Noodle Twist  Hip flex/ext    Noodle Push down  Hip IR/ER    Kickboard push/pull x20 Knee flex/ext    SLS   Push/pull on Rail      Hang 10m lg wt on wrists  10m lg wt on ankles   Other:    Specific Instructions for next treatment:     Treatment Charges: Mins Units   []  Modalities     []  Ther Exercise     [x]  Manual Therapy 20 1   []  Ther Activities     [x]  Aquatics 30 2   []  Other       Assessment: [x] Progressing toward goals. Cued to reduce UT compensations with UE ex, minimal carryover. Crepitus noted throughout ROM with therapist assisted shoulder rolls. Performed PROM with hold at end of range with pt supine, shoulders submerged to improve tolerance. Pt noted decreased crepitus post-PROM with improved smoothness of AROM. Performed LE and core ex with cues for posture and TA activation. Cont with deep water hang for traction and decompression of lumbar and glenohumeral joints. Encouraged pt to continue moving within gained range to maintain progress. [] No change. [] Other:  STG: (to be met in 10 treatments)  1. ? Pain: to <7/10 with activity for ease with ADLs. 2. ?  AROM: All areas by 25% increase to improve ADLs & gait. 3. ? BLE & BUE Strength by 1/2 to a full grade to improve lifting, gait & standing tolerance.    4. ? Function: Pt to be able to navigate up & down 4 steps independently, with no increase in sxs, to improve overall stair navigation within community.    5. Independent with Home Exercise Programs  6. Demonstrate Knowledge of fall prevention (Baljinder completed 10/4/21. Fall prevention not needed).   7. ? Balance Function: Pt to demo TUG score of <12 seconds, without AD, to improve gait speed and reduce risk of falls. 8. ? Balance Function: Pt to demonstrate  B SLS sec of >10sec to improve SLS balance & fall prevention.        LTG: (to be met in 18 treatments)  1. ? Pain: to <2-3/10 with activity for ease with ADLs. 2. AROM: All areas by 50% increase or more to improve ADLs & gait. 3. ? BLE & BUE Strength to 4+/5 to improve lifting, gait & standing tolerance. 4. Pt to ambulate independently with 50% improved upright posture for >500', to improve community navigation. 5.  Pt to verbalize being able to get in/out of car/bed/shower to improve functional mobility with ADLs. 6. Pt to demo <25% impairment on UEFS & Oswestry to improve overall functional mobility. 7.  Pt to demo TUG score of <10 seconds, without AD, to improve gait speed and reduce risk of falls. ? Balance Function: Pt to demonstrate  B SLS sec of >20sec to improve SLS balance & fall prevention. Pt. Education:  [x] Yes  [] No  [] Reviewed Prior HEP/Ed  Method of Education: [x] Verbal  [] Demo  [] Written  Comprehension of Education:  [x] Verbalizes understanding. [] Demonstrates understanding. [] Needs review. [] Demonstrates/verbalizes HEP/Ed previously given. Plan: [x] Continue per plan of care.    [] Other:      Time In: 8:00 am           Time Out: 9:20 am    Electronically signed by:  Logan Vu PTA

## 2021-10-22 ENCOUNTER — HOSPITAL ENCOUNTER (OUTPATIENT)
Dept: PHYSICAL THERAPY | Facility: CLINIC | Age: 76
Setting detail: THERAPIES SERIES
Discharge: HOME | End: 2021-10-22
Payer: MEDICARE

## 2021-10-22 PROCEDURE — 97140 MANUAL THERAPY 1/> REGIONS: CPT

## 2021-10-22 PROCEDURE — 97113 AQUATIC THERAPY/EXERCISES: CPT

## 2021-10-22 NOTE — FLOWSHEET NOTE
[] [facility] Out[facility]       Physical Therapy MOB2       [place] [address]        [address]       Phone: [phone]       Fax: [phone] [] [facility]       Promotion at [address] [facility]       Phone: [phone]       Fax: [phone] [x] [facility]  [address]   Phone: [phone]   Fax:  [phone]     Physical Therapy Daily  Aquatic Treatment Note    Date:  10/22/2021  Patient Name:  Mila Whitmore  \"Faustinoky\"  :  1945  MRN: [id number]  Physician: Earline Freitas               Insurance: Medicare/MMO Supp  Medical Diagnosis:  M25.511, G89.29, M25.512 (ICD-10-CM) - Chronic pain of both shoulders   M54.40 (ICD-10-CM) - Acute bilateral low back pain with sciatica, sciatica laterality unspecified   Rehab Codes: M54.2, M54.5, M54.9, R29.3, M62.81, R26.2 NEC, M79.1, M25.512 M25.612,M62.512, M25.511,M25.611,M62.511, M62.81, M25.551, M25.552  Onset Date:3/1/21                              Next 's appt. : 22    Visit# / total visits:  Cancels/No Shows:     Subjective:    Pain:  [x] Yes  [] No Location: LB/bilat shoulders Pain Rating: (0-10 scale) 5/10  Pain altered Tx:  [] No  [x] Yes  Action:  Comments: Pt remarked he had inc soreness after last tx, notes feeling somewhat better this date. Pt notes being able to touch his (L) shoulder with his (R) hand to apply pain relief cream, he was not able to touch opposite shoulder before initiating therapy.      **Pt requires assistance to get noodle around back and under arms**    Objective:     KEY  B = Belt G = Gloves N = Noodle   C = Cuffs K = Kickboard P = Paddles   CC = Cervical Collar L = Laps T = Theratube   DB = Dumbells M = Minutes W = Weights     Exercises/Activities  Warm-up/Amb 10/22 Dynamic Exercises 10/22   Forward 4L arm swing March    Sideways 4L Squat    Backwards 4L arm swing  Retro HS curls      Retro SLR Stretches  Braiding    Gastroc/Soleus ** Heel to Toe amb    Hamstring * Toe amb    Hip flexor ** Heel amb    Piriformis **     Noodle cat cow  UT/Levator Scap S      Pec Stretch      Post Deltoid  Static Exercises UE RED DB    Bicep on railing  Shoulder flex/ext 20   Static Exercises LE Kickboard, sm wt  Shoulder abd/add 20   Heel/toe raises 20 Shoulder H.  abd/add 20   Marches 20 Shoulder IR/ER 20   Mini-squats 20 Rowing 20   4-way hip  20 Arm Circles (post) 10 fwd, 10 bck (B) AAROM   10 fwd, 10 bck (B) AROM    Hamstring curls 20 Post sh rolls 20    Hip Circles/Fig 8  Scap squeezes 20   Ankle ROM  Diagonals 1/2    Lunges   Elbow flex/ext      Shoulder PROM    Functional Exercise  ER Rotation stretch with ModA pectoral S    Step x10 B      Wall Push-ups  Deep H20/    SLS  Bike (end of rail)     Breast Stroke on Noodle  Hip abd/add    Noodle Twist x10 B pec ROM Hip flex/ext    Noodle Push down x15 Hip IR/ER    Kickboard push/pull x20 Knee flex/ext    SLS   Push/pull on Rail      Hang 5m sm ankle wt    Other:    Specific Instructions for next treatment:     Treatment Charges: Mins Units   []  Modalities     []  Ther Exercise     [x]  Manual Therapy 15 1   []  Ther Activities     [x]  Aquatics 30 2   []  Other       Assessment: [x] Progressing toward goals. Cued for arm swing during warm up laps to inc shoulder mobility. Performed UE ex with foam dumbbells to inc resistance on concentric and provide additional mobility support with cues to relax on eccentric contraction. Cued to reduce UT compensation with arm ex and provided AAROM for arm circles with crepitus noted (B) this date. Provided kickboard to enhance upright posture with LE ex, able to add resistance with good ramya. Utilized toes against pool wall to reduce excessive anterior translation of knees during mini squat. Cues for TA activation and upright posture throughout.  Incorporated dynamic pectoral and bicep stretch and further core work to support upright posture. Completed DI to (B) UT with good release noted, finished with deep water hang to reduce soreness. Pt reported pain as reduced to \"around a 2\" at conclusion of tx.       [] No change. [] Other:  STG: (to be met in 10 treatments)  1. ? Pain: to <7/10 with activity for ease with ADLs. 2. ?  AROM: All areas by 25% increase to improve ADLs & gait. 3. ? BLE & BUE Strength by 1/2 to a full grade to improve lifting, gait & standing tolerance. 4. ? Function: Pt to be able to navigate up & down 4 steps independently, with no increase in sxs, to improve overall stair navigation within community.    5. Independent with Home Exercise Programs  6. Demonstrate Knowledge of fall prevention (Baljinder completed 10/4/21. Fall prevention not needed).   7. ? Balance Function: Pt to demo TUG score of <12 seconds, without AD, to improve gait speed and reduce risk of falls. 8. ? Balance Function: Pt to demonstrate  B SLS sec of >10sec to improve SLS balance & fall prevention.        LTG: (to be met in 18 treatments)  1. ? Pain: to <2-3/10 with activity for ease with ADLs. 2. AROM: All areas by 50% increase or more to improve ADLs & gait. 3. ? BLE & BUE Strength to 4+/5 to improve lifting, gait & standing tolerance. 4. Pt to ambulate independently with 50% improved upright posture for >500', to improve community navigation. 5.  Pt to verbalize being able to get in/out of car/bed/shower to improve functional mobility with ADLs. 6. Pt to demo <25% impairment on UEFS & Oswestry to improve overall functional mobility. 7.  Pt to demo TUG score of <10 seconds, without AD, to improve gait speed and reduce risk of falls. ? Balance Function: Pt to demonstrate  B SLS sec of >20sec to improve SLS balance & fall prevention. Pt. Education:  [x] Yes  [] No  [] Reviewed Prior HEP/Ed  Method of Education: [x] Verbal  [] Demo  [] Written  Comprehension of Education:  [x] Verbalizes understanding.   [] Demonstrates understanding. [] Needs review. [] Demonstrates/verbalizes HEP/Ed previously given. Plan: [x] Continue per plan of care.    [] Other:      Time In: 8:00 am           Time Out: 9:20 am    Electronically signed by:  Shabana House PTA

## 2021-10-26 ENCOUNTER — HOSPITAL ENCOUNTER (OUTPATIENT)
Dept: CARDIAC REHAB | Age: 76
Discharge: HOME | End: 2021-10-26
Payer: MEDICARE

## 2021-10-27 ENCOUNTER — HOSPITAL ENCOUNTER (OUTPATIENT)
Dept: PHYSICAL THERAPY | Facility: CLINIC | Age: 76
Setting detail: THERAPIES SERIES
Discharge: HOME | End: 2021-10-27
Payer: MEDICARE

## 2021-10-27 PROCEDURE — 97113 AQUATIC THERAPY/EXERCISES: CPT

## 2021-10-27 NOTE — FLOWSHEET NOTE
[] [facility]       Physical Therapy MOB2       [facility] [address]        [address]       Phone: [phone]       Fax: [phone] [] [facility]       Promotion at [address] [facility]       Phone: [phone]       Fax: [phone] [x] [facility] Promotion  [address]   Phone: [phone]   Fax:  [phone]     Physical Therapy Daily  Aquatic Treatment Note    Date:  10/27/2021  Patient Name:  Woody Hoang  \"Akil\"  :  1945  MRN: [id number]  Physician: Wm Heading               Insurance: Medicare/MMO Supp  Medical Diagnosis:  M25.511, G89.29, M25.512 (ICD-10-CM) - Chronic pain of both shoulders   M54.40 (ICD-10-CM) - Acute bilateral low back pain with sciatica, sciatica laterality unspecified   Rehab Codes: M54.2, M54.5, M54.9, R29.3, M62.81, R26.2 NEC, M79.1, M25.512 M25.612,M62.512, M25.511,M25.611,M62.511, M62.81, M25.551, M25.552  Onset Date:3/1/21                              Next 's appt. : 22    Visit# / total visits:  Cancels/No Shows:     Subjective:    Pain:  [x] Yes  [] No Location: LB/bilat shoulders Pain Rating: (0-10 scale) -/10  Pain altered Tx:  [] No  [x] Yes  Action:  Comments: Pt notes no pain this date, just tightness shoulders>back. Has been completing shoulder rolls at home, feels therapy is making things a little easier.      **Pt requires assistance to get noodle around back and under arms**    Objective:     KEY  B = Belt G = Gloves N = Noodle   C = Cuffs K = Kickboard P = Paddles   CC = Cervical Collar L = Laps T = Theratube   DB = Dumbells M = Minutes W = Weights     Exercises/Activities  Warm-up/Amb 10/27 Dynamic Exercises 10/27   Forward 4L arm swing [place]    Sideways 4L arm [place]  Squat    Backwards 4L arm swing  Retro HS curls      Retro SLR    Stretches  Braiding    Gastroc/Soleus 3x30\" Heel to Toe amb 3L   Hamstring 3x30\" Toe amb    Hip flexor  Heel amb    Piriformis      UT/Levator Scap S 3x30\" ea      Pec Stretch 3x30\"     Post Deltoid  Static Exercises UE RED DB    Bicep on railing  Shoulder flex/ext 20   Static Exercises LE Sm wt  Shoulder abd/add 20 UT compensations   Heel/toe raises 20 Shoulder H.  abd/add 20    Marches 20 Shoulder IR/ER 20   Mini-squats 20 Rowing 20   4-way hip  20 Arm Circles (post) 10 fwd, 10 bck (B) AROM   Hamstring curls 20 Post sh rolls 20    Hip Circles/Fig 8  Arm scissors  20   Ankle ROM  Diagonals 1/2    Lunges   Elbow flex/ext      Shoulder PROM    Functional Exercise  ER Rotation stretch with ModA pectoral S    Step x15 B      Wall Push-ups  Deep H20/    SLS  Bike (end of rail)  3m lg wt    Breast Stroke on Noodle  Hip abd/add    Noodle Twist x15 B pec ROM Hip flex/ext 3m lg wt    Noodle Push down x20 Hip IR/ER    Kickboard push/pull x20 Knee flex/ext    SLS  3x30' B Push/pull on Rail      Hang 5m sm ankle wt    Other:    Specific Instructions for next treatment: Progress to lg wt for all LE ex, inc DB resistance     Treatment Charges: Mins Units   []  Modalities     []  Ther Exercise     []  Manual Therapy     []  Ther Activities     [x]  Aquatics 30 2   []  Other       Assessment: [x] Progressing toward goals. Cont with emphasis on shoulder mobility during warm up laps. Cues to reduce trunk compensation during three way hip ex. Integrated further balance work with SLS and tandem laps; pt unable to maintain SLS on (R) for >10 seconds. Able to progress shoulder mobility to AROM with cues to remain in pain free rom and addition of arm scissors with improved ROM. Added repetitions to functional ex with good ramya, and integrated resisted deep water ex. Finished tx with deep water hang and stretching to improve LE and UT tissue extensibility. [] No change. [] Other:  STG: (to be met in 10 treatments)  1. ? Pain: to <7/10 with activity for ease with ADLs.    2. ?  AROM: All areas by 25% increase to improve ADLs & gait. 3. ? BLE & BUE Strength by 1/2 to a full grade to improve lifting, gait & standing tolerance. 4. ? Function: Pt to be able to navigate up & down 4 steps independently, with no increase in sxs, to improve overall stair navigation within community.    5. Independent with Home Exercise Programs  6. Demonstrate Knowledge of fall prevention (Gale completed 10/4/21. Fall prevention not needed).   7. ? Balance Function: Pt to demo TUG score of <12 seconds, without AD, to improve gait speed and reduce risk of falls. 8. ? Balance Function: Pt to demonstrate  B SLS sec of >10sec to improve SLS balance & fall prevention.        LTG: (to be met in 18 treatments)  1. ? Pain: to <2-3/10 with activity for ease with ADLs. 2. AROM: All areas by 50% increase or more to improve ADLs & gait. 3. ? BLE & BUE Strength to 4+/5 to improve lifting, gait & standing tolerance. 4. Pt to ambulate independently with 50% improved upright posture for >500', to improve community navigation. 5.  Pt to verbalize being able to get in/out of car/bed/shower to improve functional mobility with ADLs. 6. Pt to demo <25% impairment on UEFS & Oswestry to improve overall functional mobility. 7.  Pt to demo TUG score of <10 seconds, without AD, to improve gait speed and reduce risk of falls. ? Balance Function: Pt to demonstrate  B SLS sec of >20sec to improve SLS balance & fall prevention. Pt. Education:  [x] Yes  [] No  [] Reviewed Prior HEP/Ed  Method of Education: [x] Verbal  [] Demo  [] Written  Comprehension of Education:  [x] Verbalizes understanding. [] Demonstrates understanding. [] Needs review. [] Demonstrates/verbalizes HEP/Ed previously given. Plan: [x] Continue per plan of care.    [] Other:      Time In: 8:00 am           Time Out: 9:15 am    Electronically signed by:  Tab Wood PTA

## 2021-10-29 ENCOUNTER — HOSPITAL ENCOUNTER (OUTPATIENT)
Dept: PHYSICAL THERAPY | Facility: CLINIC | Age: 76
Setting detail: THERAPIES SERIES
Discharge: HOME | End: 2021-10-29
Payer: MEDICARE

## 2021-10-29 PROCEDURE — 97113 AQUATIC THERAPY/EXERCISES: CPT

## 2021-10-29 NOTE — FLOWSHEET NOTE
[] [facility]       Physical Therapy MOB2       [facility] [address]        [address]       Phone: [phone]       Fax: [phone] [] [facility]       Promotion at [address]       Phone: [phone]       Fax: [phone] [x] [facility] Promotion  [address]   Phone: [phone]   Fax:  [phone]     Physical Therapy Daily  Aquatic Treatment Note    Date:  10/29/2021  Patient Name:  Paulina Huggins  \"Akil\"  :  1945  MRN: [id number]  Physician: Avtar Tovar               Insurance: Medicare/O Supp  Medical Diagnosis:  M25.511, G89.29, M25.512 (ICD-10-CM) - Chronic pain of both shoulders   M54.40 (ICD-10-CM) - Acute bilateral low back pain with sciatica, sciatica laterality unspecified   Rehab Codes: M54.2, M54.5, M54.9, R29.3, M62.81, R26.2 NEC, M79.1, M25.512 M25.612,M62.512, M25.511,M25.611,M62.511, M62.81, M25.551, M25.552  Onset Date:3/1/21                              Next 's appt. : 22    Visit# / total visits:  Cancels/No Shows:     Subjective:    Pain:  [x] Yes  [] No Location: LB/bilat shoulders Pain Rating: (0-10 scale) -/10  Pain altered Tx:  [] No  [x] Yes  Action:  Comments: Pt reports no pain, some soreness in shoulders and LB after tx.      **Pt requires assistance to get noodle around back and under arms**    Objective:     KEY  B = Belt G = Gloves N = Noodle   C = Cuffs K = Kickboard P = Paddles   CC = Cervical Collar L = Laps T = Theratube   DB = Dumbells M = Minutes W = Weights     Exercises/Activities  Warm-up/Amb 10/29 Dynamic Exercises 10/29   Forward 4L arm swing March    Sideways 4L arm [place]  Squat    Backwards 4L arm swing  Retro HS curls      Retro SLR    Stretches  Braiding    Gastroc/Soleus 3x30\" Heel to Toe amb 3L   Hamstring 3x30\" Toe amb    Hip flexor  Heel amb    Piriformis      UT/Levator Scap S 3x30\" ea      Pec Stretch 3x30\" Post Deltoid  Static Exercises UE *RED DB    Bicep on railing  Shoulder flex/ext 20*   Static Exercises LE Lg wt  Shoulder abd/add 20* UT compensations   Heel/toe raises 20 Shoulder H.  abd/add 20*    Marches 20 Shoulder IR/ER 20*   Mini-squats 20 Rowing 20*   4-way hip  20 Arm Circles (post) 10 fwd, 10 bck (B) AROM   Hamstring curls 20 Post sh rolls 20    Hip Circles/Fig 8  Arm scissors  20   Ankle ROM  Open book on railing 20 B   Lunges   Elbow flex/ext      Shoulder PROM    Functional Exercise  ER Rotation stretch with ModA pectoral S    Step x15 B lg wt       Wall Push-ups  Deep H20/    SLS  Bike (end of rail)  3m lg wt    Breast Stroke on Noodle  Hip abd/add    Noodle Twist x15 B pec ROM Hip flex/ext 3m lg wt    Noodle Push down x20 Hip IR/ER    Kickboard push/pull x20 Knee flex/ext    SLS  3x30\" B Push/pull on Rail    Tandem stance  3x30\" B Hang 5m lg wt    Other:    Specific Instructions for next treatment: Increase DB resistance     Treatment Charges: Mins Units   []  Modalities     []  Ther Exercise     []  Manual Therapy     []  Ther Activities     [x]  Aquatics 30 2   []  Other       Assessment: [x] Progressing toward goals. Added modified open book stretch with emphasis on thoracic mobility, pt displayed some difficulty completing motion and UT compensations. Pt noted inc crepitus, denies pain, with shoulder horizontal abduction. Added tandem stance for inc balance training; pt continues to display difficulty maintaining SLS on (L) for more than 2-3 seconds. Finished with deep water hang and stretches to promote tissue extensibility. [] No change. [] Other:  STG: (to be met in 10 treatments)  1. ? Pain: to <7/10 with activity for ease with ADLs. 2. ?  AROM: All areas by 25% increase to improve ADLs & gait. 3. ? BLE & BUE Strength by 1/2 to a full grade to improve lifting, gait & standing tolerance.    4. ? Function: Pt to be able to navigate up & down 4 steps independently, with no increase in sxs, to improve overall stair navigation within community.    5. Independent with Home Exercise Programs  6. Demonstrate Knowledge of fall prevention (Baljinder completed 10/4/21. Fall prevention not needed).   7. ? Balance Function: Pt to demo TUG score of <12 seconds, without AD, to improve gait speed and reduce risk of falls. 8. ? Balance Function: Pt to demonstrate  B SLS sec of >10sec to improve SLS balance & fall prevention.        LTG: (to be met in 18 treatments)  1. ? Pain: to <2-3/10 with activity for ease with ADLs. 2. AROM: All areas by 50% increase or more to improve ADLs & gait. 3. ? BLE & BUE Strength to 4+/5 to improve lifting, gait & standing tolerance. 4. Pt to ambulate independently with 50% improved upright posture for >500', to improve community navigation. 5.  Pt to verbalize being able to get in/out of car/bed/shower to improve functional mobility with ADLs. 6. Pt to demo <25% impairment on UEFS & Oswestry to improve overall functional mobility. 7.  Pt to demo TUG score of <10 seconds, without AD, to improve gait speed and reduce risk of falls. ? Balance Function: Pt to demonstrate  B SLS sec of >20sec to improve SLS balance & fall prevention. Pt. Education:  [x] Yes  [] No  [] Reviewed Prior HEP/Ed  Method of Education: [x] Verbal  [] Demo  [] Written  Comprehension of Education:  [x] Verbalizes understanding. [] Demonstrates understanding. [] Needs review. [] Demonstrates/verbalizes HEP/Ed previously given. Plan: [x] Continue per plan of care.    [] Other:      Time In: 8:00 am           Time Out: 9:15 am    Electronically signed by:  Mila Miner PTA

## 2021-11-03 ENCOUNTER — HOSPITAL ENCOUNTER (OUTPATIENT)
Dept: PHYSICAL THERAPY | Facility: CLINIC | Age: 76
Setting detail: THERAPIES SERIES
Discharge: HOME | End: 2021-11-03
Payer: MEDICARE

## 2021-11-03 PROCEDURE — 97113 AQUATIC THERAPY/EXERCISES: CPT

## 2021-11-03 NOTE — FLOWSHEET NOTE
[] [facility]       Physical Therapy MOB2       [facility] [address]        [address]       Phone: [phone]       Fax: [phone] [] [facility]       Promotion at [address] [facility]       Phone: [phone]       Fax: [phone] [x] [facility] Promotion  [address]   Phone: [phone]   Fax:  [phone]     Physical Therapy Daily  Aquatic Treatment Note    Date:  11/3/2021  Patient Name:  Marybeth Atkinson  \"Akil\"  :  1945  MRN: [id number]  Physician: Fabiola Jung               Insurance: Medicare/[employer] Supp  Medical Diagnosis:  M25.511, G89.29, M25.512 (ICD-10-CM) - Chronic pain of both shoulders   M54.40 (ICD-10-CM) - Acute bilateral low back pain with sciatica, sciatica laterality unspecified   Rehab Codes: M54.2, M54.5, M54.9, R29.3, M62.81, R26.2 NEC, M79.1, M25.512 M25.612,M62.512, M25.511,M25.611,M62.511, M62.81, M25.551, M25.552  Onset Date:3/1/21                              Next 's appt. : 22    Visit# / total visits:  Cancels/No Shows:     Subjective:    Pain:  [x] Yes  [] No Location: LB/bilat shoulders Pain Rating: (0-10 scale) -/10  Pain altered Tx:  [] No  [x] Yes  Action:  Comments: Pt reports no soreness this date     **Pt requires assistance to get noodle around back and under arms**    Objective:     KEY  B = Belt G = Gloves N = Noodle   C = Cuffs K = Kickboard P = Paddles   CC = Cervical Collar L = Laps T = Theratube   DB = Dumbells M = Minutes W = Weights     Exercises/Activities  Warm-up/Amb 11/3 Dynamic Exercises 11/3   Forward 4L arm swing March    Sideways 4L arm [place]  Squat    Backwards 4L arm swing  Retro HS curls      Retro SLR    Stretches  Braiding    Gastroc/Soleus  Heel to Toe amb 3L   Hamstring  Toe amb    Hip flexor  Heel amb    Piriformis      UT/Levator Scap S 3x30\" ea      Pec Stretch 3x30\"     Post Deltoid  Static Exercises UE *RED DB    Bicep on railing  Shoulder flex/ext 20*   Static Exercises LE Lg wt  Shoulder abd/add 20* UT compensations   Heel/toe raises 20 Shoulder H.  abd/add 20*    Marches 20 Shoulder IR/ER 20*   Mini-squats 20 Rowing 20*   4-way hip  20 Arm Circles (post) 10 fwd, 10 bck (B) AROM   Hamstring curls 20 Post sh rolls 20    Hip Circles/Fig 8  Arm scissors  20   Ankle ROM  Open book on railing 20 B   Lunges   Elbow flex/ext      Shoulder PROM    Functional Exercise  ER Rotation stretch with ModA pectoral S    Step x15 B lg wt       Wall Push-ups  Deep H20/    SLS  Bike (end of rail)  3m lg wt    Breast Stroke on Noodle  Hip abd/add    Noodle Twist  Hip flex/ext 3m lg wt    Noodle Push down x20 - held Hip IR/ER    Kickboard push/pull  Knee flex/ext    SLS  3x30\" B - held Push/pull on Rail    Tandem stance  3x30\" B - held Hang 5m lg wt    Other:    Specific Instructions for next treatment: Increase DB resistance     Treatment Charges: Mins Units   []  Modalities     []  Ther Exercise     []  Manual Therapy     []  Ther Activities     [x]  Aquatics 30 2   []  Other       Assessment: [x] Progressing toward goals. Cont with current strengthening and ROM program, providing cues to reduce UT compensation throughout. Held balance and core ex as well as LE stretches this date due to time constraints. Pt noted some soreness in (B) shoulders toward end of tx. Reinforced education for postural awareness between tx.        [] No change. [] Other:  STG: (to be met in 10 treatments)  1. ? Pain: to <7/10 with activity for ease with ADLs. 2. ?  AROM: All areas by 25% increase to improve ADLs & gait. 3. ? BLE & BUE Strength by 1/2 to a full grade to improve lifting, gait & standing tolerance. 4. ? Function: Pt to be able to navigate up & down 4 steps independently, with no increase in sxs, to improve overall stair navigation within community.    5. Independent with Home Exercise Programs  6.  Demonstrate Knowledge of fall prevention (Gale completed 10/4/21. Fall prevention not needed).   7. ? Balance Function: Pt to demo TUG score of <12 seconds, without AD, to improve gait speed and reduce risk of falls. 8. ? Balance Function: Pt to demonstrate  B SLS sec of >10sec to improve SLS balance & fall prevention.        LTG: (to be met in 18 treatments)  1. ? Pain: to <2-3/10 with activity for ease with ADLs. 2. AROM: All areas by 50% increase or more to improve ADLs & gait. 3. ? BLE & BUE Strength to 4+/5 to improve lifting, gait & standing tolerance. 4. Pt to ambulate independently with 50% improved upright posture for >500', to improve community navigation. 5.  Pt to verbalize being able to get in/out of car/bed/shower to improve functional mobility with ADLs. 6. Pt to demo <25% impairment on UEFS & Oswestry to improve overall functional mobility. 7.  Pt to demo TUG score of <10 seconds, without AD, to improve gait speed and reduce risk of falls. ? Balance Function: Pt to demonstrate  B SLS sec of >20sec to improve SLS balance & fall prevention. Pt. Education:  [x] Yes  [] No  [] Reviewed Prior HEP/Ed  Method of Education: [x] Verbal  [] Demo  [] Written  Comprehension of Education:  [x] Verbalizes understanding. [] Demonstrates understanding. [] Needs review. [] Demonstrates/verbalizes HEP/Ed previously given. Plan: [x] Continue per plan of care.    [] Other:      Time In: 8:00 am           Time Out: 9: 06 am    Electronically signed by:  Geetha Hamilton PTA

## 2021-11-05 ENCOUNTER — HOSPITAL ENCOUNTER (OUTPATIENT)
Dept: PHYSICAL THERAPY | Facility: CLINIC | Age: 76
Setting detail: THERAPIES SERIES
Discharge: HOME | End: 2021-11-05
Payer: MEDICARE

## 2021-11-05 PROCEDURE — 97113 AQUATIC THERAPY/EXERCISES: CPT

## 2021-11-05 NOTE — FLOWSHEET NOTE
[] [facility]       Physical Therapy MOB2       [facility] [address]        [address]       Phone: [phone]       Fax: [phone] [] [facility]       Promotion at [address] [facility]       Phone: [phone]       Fax: [phone] [x] [place] [address] [facility] Promotion  [address] [facility]   Phone: [phone]   Fax:  [phone]     Physical Therapy Daily  Aquatic Treatment Note    Date:  2021  Patient Name:  Arjun Paredes  \"Corky\"  :  1945  MRN: [id number]  Physician: Zenia Houser               Insurance: Medicare/MMO Supp  Medical Diagnosis:  M25.511, G89.29, M25.512 (ICD-10-CM) - Chronic pain of both shoulders   M54.40 (ICD-10-CM) - Acute bilateral low back pain with sciatica, sciatica laterality unspecified   Rehab Codes: M54.2, M54.5, M54.9, R29.3, M62.81, R26.2 NEC, M79.1, M25.512 M25.612,M62.512, M25.511,M25.611,M62.511, M62.81, M25.551, M25.552  Onset Date:3/1/21                              Next 's appt. : 22    Visit# / total visits: 10/18 Cancels/No Shows:     Subjective:    Pain:  [x] Yes  [] No Location: LB/bilat shoulders Pain Rating: (0-10 scale) -/10  Pain altered Tx:  [] No  [x] Yes  Action:  Comments: Pt notes feeling less stiffness, continues to display kyphotic posture.      **Pt requires assistance to get noodle around back and under arms**    Objective:     KEY  B = Belt G = Gloves N = Noodle   C = Cuffs K = Kickboard P = Paddles   CC = Cervical Collar L = Laps T = Theratube   DB = Dumbells M = Minutes W = Weights     Exercises/Activities  Warm-up/Amb  Dynamic Exercises    Forward 4L RDB March    Sideways 4L RDB  Squat    Backwards 4L RDB  Retro HS curls      Retro SLR    Stretches  Braiding    Gastroc/Soleus 3x20\" Heel to Toe amb 3L RDB   Hamstring 3x20\" Toe amb    Hip flexor 3x20\"   Heel amb    Piriformis      UT/Levator Scap S 3x30\" ea      Pec Stretch 3x30\" 4 steps independently, with no increase in sxs, to improve overall stair navigation within community.    5. Independent with Home Exercise Programs  6. Demonstrate Knowledge of fall prevention (Baljinder completed 10/4/21. Fall prevention not needed).   7. ? Balance Function: Pt to demo TUG score of <12 seconds, without AD, to improve gait speed and reduce risk of falls. 8. ? Balance Function: Pt to demonstrate  B SLS sec of >10sec to improve SLS balance & fall prevention.        LTG: (to be met in 18 treatments)  1. ? Pain: to <2-3/10 with activity for ease with ADLs. 2. AROM: All areas by 50% increase or more to improve ADLs & gait. 3. ? BLE & BUE Strength to 4+/5 to improve lifting, gait & standing tolerance. 4. Pt to ambulate independently with 50% improved upright posture for >500', to improve community navigation. 5.  Pt to verbalize being able to get in/out of car/bed/shower to improve functional mobility with ADLs. 6. Pt to demo <25% impairment on UEFS & Oswestry to improve overall functional mobility. 7.  Pt to demo TUG score of <10 seconds, without AD, to improve gait speed and reduce risk of falls. ? Balance Function: Pt to demonstrate  B SLS sec of >20sec to improve SLS balance & fall prevention. Pt. Education:  [x] Yes  [] No  [] Reviewed Prior HEP/Ed  Method of Education: [x] Verbal  [] Demo  [] Written  Comprehension of Education:  [x] Verbalizes understanding. [] Demonstrates understanding. [] Needs review. [] Demonstrates/verbalizes HEP/Ed previously given. Plan: [x] Continue per plan of care.    [] Other:      Time In: 8:00 am           Time Out: 9:05 am    Electronically signed by:  April Kulkarni PTA

## 2021-11-10 ENCOUNTER — HOSPITAL ENCOUNTER (OUTPATIENT)
Dept: PHYSICAL THERAPY | Facility: CLINIC | Age: 76
Setting detail: THERAPIES SERIES
Discharge: HOME | End: 2021-11-10
Payer: MEDICARE

## 2021-11-10 PROCEDURE — 97530 THERAPEUTIC ACTIVITIES: CPT

## 2021-11-10 PROCEDURE — 97113 AQUATIC THERAPY/EXERCISES: CPT

## 2021-11-10 NOTE — FLOWSHEET NOTE
[] [facility]       Physical Therapy MOB2       [facility] [address]        [address]       Phone: [phone]       Fax: [phone] [] [facility]       Promotion at [address] [place] [address]       Phone: [phone]       Fax: [phone] [x] Darryl. [address] [facility] Promotion  [address] [facility]   Phone: [phone]   Fax:  [phone]     Physical Therapy Daily  Aquatic Treatment Note    Date:  11/10/2021  Patient Name:  Lisa Winkler  \"Akil\"  :  1945  MRN: [id number]  Physician: Avery Patterson               Insurance: Medicare/[employer]  Medical Diagnosis:  M25.511, G89.29, M25.512 (ICD-10-CM) - Chronic pain of both shoulders   M54.40 (ICD-10-CM) - Acute bilateral low back pain with sciatica, sciatica laterality unspecified   Rehab Codes: M54.2, M54.5, M54.9, R29.3, M62.81, R26.2 NEC, M79.1, M25.512 M25.612,M62.512, M25.511,M25.611,M62.511, M62.81, M25.551, M25.552  Onset Date:3/1/21                              Next 's appt. : 22    Visit# / total visits:  Cancels/No Shows:     Subjective:    Pain:  [x] Yes  [] No Location: LB/bilat shoulders Pain Rating: (0-10 scale) -/10  Pain altered Tx:  [] No  [x] Yes  Action:  Comments: Pt remarked he has difficulty with hygiene after toileting due to limitations in ER.      **Pt requires assistance to get noodle around back and under arms**    Objective:     KEY  B = Belt G = Gloves N = Noodle   C = Cuffs K = Kickboard P = Paddles   CC = Cervical Collar L = Laps T = Theratube   DB = Dumbells M = Minutes W = Weights     Exercises/Activities  Warm-up/Amb 11/10 Dynamic Exercises 11/10   Forward 4L Arm swing March 4L   Sideways 4L Habd  Squat 4L   Backwards 4L Arm swing Retro HS curls 4L     Retro SLR    Stretches  Braiding    Gastroc/Soleus  Heel to Toe amb 4L   Hamstring 3x20\" (B) Toe amb    Hip flexor 3x20\" Heel amb    Piriformis 10x10\" LB standing UT/Levator Scap S 3x30\" ea Jeannie     Pec Stretch held     ER Shoulder 3x30\" mod A Static Exercises UE    Bicep on railing  Shoulder flex/ext 20   Static Exercises LE Shallow end Shoulder abd/add 20   Heel/toe raises 20 Shoulder H.  abd/add 20   Marches 20 Shoulder IR/ER    Mini-squats 20 Rowing    4-way hip  20 Arm Circles (post) 20   Hamstring curls 20 Post sh rolls 20   Hip Circles/Fig 8  Arm scissors  20   Ankle ROM  Open book on railing    Lunges   Elbow flex/ext      Shoulder PROM    Functional Exercise  ER Rotation stretch with ModA pectoral S    Step x20 B      Wall Push-ups  Deep H20/    SLS  Bike (end of rail) 3m   Breast Stroke on Noodle  Hip abd/add 3m    Noodle Twist  Hip flex/ext 3m    Noodle Push down x20 purple barbell  Hip IR/ER    Kickboard push/pull  Knee flex/ext    SLS   Push/pull on Rail    Tandem stance   Hang 5m   Other:    Specific Instructions for next treatment:     Treatment Charges: Mins Units   []  Modalities     []  Ther Exercise     []  Manual Therapy     []  Ther Activities     [x]  Aquatics 30 2   []  Other       Assessment: [x] Progressing toward goals. Per re-evaluation 11/10, focused tx towards improving balance and ROM. Added further dynamic laps for TA work and dynamic balance progression. Progressed LE ex to shallow end with some difficulties balancing noted, SBA for ex until improved steadiness displayed. Improved ROM displayed in h-abd but continues to display difficulty with flexion and abduction motions. Less UT compensations noted via removal of resistance to inc focus on ROM. Some inc in sx noted, but overall pain dec with stretches and deep water hang. Provided assistance for UT and ER stretches to ensure correct form. Will cont to progress as tolerated. [] No change. [] Other:    STG: (to be met in 10 treatments)- 11/10/21  1. ? Pain: to <7/10 with activity for ease with ADLs.  (Patient reports pain as 4 or lower that increases especially at night) (MET) 2. ?  AROM: All areas by 25% increase to improve ADLs & gait. (ongoing)   3. ? BLE & BUE Strength by 1/2 to a full grade to improve lifting, gait & standing tolerance. (MET)  4. ? Function: Pt to be able to navigate up & down 4 steps independently, with no increase in sxs, to improve overall stair navigation within community. Emy Sagastume reports he is able to negotiate stairs slowly with use of handrail, however symptoms continue to occur (ongoing)  5. Independent with Home Exercise Programs Patient reports completing HEP 3x/week (MET)  6. Demonstrate Knowledge of fall prevention (Gale completed 10/4/21. Fall prevention not needed).  (MET)  7. ? Balance Function: Pt to demo TUG score of <12 seconds, without AD, to improve gait speed and reduce risk of falls. Patient completed TUG in 10 sec (MET)  8. ? Balance Function: Pt to demonstrate  B SLS sec of >10sec to improve SLS balance & fall prevention.  (R) LE: 10 sec, (L) LE: 2 sec (Ongoing)   1.       LTG: (to be met in 18 treatments)  1. ? Pain: to <2-3/10 with activity for ease with ADLs. 2. AROM: All areas by 50% increase or more to improve ADLs & gait. 3. ? BLE & BUE Strength to 4+/5 to improve lifting, gait & standing tolerance. 4. Pt to ambulate independently with 50% improved upright posture for >500', to improve community navigation. 5.  Pt to verbalize being able to get in/out of car/bed/shower to improve functional mobility with ADLs. 6. Pt to demo <25% impairment on UEFS & Oswestry to improve overall functional mobility. 7.  Pt to demo TUG score of <10 seconds, without AD, to improve gait speed and reduce risk of falls. 8. ? Balance Function: Pt to demonstrate  B SLS sec of >20sec to improve SLS balance & fall prevention. Pt. Education:  [x] Yes  [] No  [] Reviewed Prior HEP/Ed  Method of Education: [x] Verbal  [] Demo  [] Written  Comprehension of Education:  [x] Verbalizes understanding. [] Demonstrates understanding.   [] Needs review. [] Demonstrates/verbalizes HEP/Ed previously given. Plan: [x] Continue per plan of care.    [] Other:      Time In: 10:30 am           Time Out: 11:50 am    Electronically signed by:  Isabela Cummings PTA

## 2021-11-10 NOTE — PROGRESS NOTES
limited by 25% with flexion, 75% with SB and ext, 50% with rot  B Shoulders: limited by 50% all planes    B LE: limited 50% knee extension, limited 25% (B) hip in all planes     MMT Strength  B UEs: B 4+/5 all planes grossly throughout within available ROM  B LEs: B 4+/5 all planes grossly throughout within available ROM; 3+/5 at L ankle    B 70lbs   Assessment:  Reassessed patient's goals today with fair progress. Patient met 4/8 short term goals at this time. Patient demonstrates improvements in pain tolerance, (B) LE strength, (B) UE strength and gait speed. Patient would continue to benefit from skilled physical therapy in order to continue improving (B) LE ROM, lumbar ROM, (B) LE strength, (B) UE strength, balance and functional strength. Plan to continue with current aquatic POC in order to improve impairments. Discussed focusing on mobility and balance going forward with treatment. Discussed options after physical therapy program is complete such as aquatic transition program and land based program to continue to improve mobility and strength within lumbar spine, (B) LE and (B) UE. Patient stated interest in transition program and would like to continue physical therapy at home after program is complete. STG: (to be met in 10 treatments)- 11/10/21  1. ? Pain: to <7/10 with activity for ease with ADLs. (Patient reports pain as 4 or lower that increases especially at night) (MET)    2. ?  AROM: All areas by 25% increase to improve ADLs & gait. (ongoing)   3. ? BLE & BUE Strength by 1/2 to a full grade to improve lifting, gait & standing tolerance. (MET)  4. ? Function: Pt to be able to navigate up & down 4 steps independently, with no increase in sxs, to improve overall stair navigation within community.  Patient reports he is able to negotiate stairs slowly with use of handrail, however symptoms continue to occur (ongoing)  5.  Independent with Home Exercise Programs Patient reports completing HEP 3x/week (MET)  6. Demonstrate Knowledge of fall prevention (Gale completed 10/4/21. Fall prevention not needed).  (MET)  7. ? Balance Function: Pt to demo TUG score of <12 seconds, without AD, to improve gait speed and reduce risk of falls. Patient completed TUG in 10 sec (MET)  8. ? Balance Function: Pt to demonstrate  B SLS sec of >10sec to improve SLS balance & fall prevention.  (R) LE: 10 sec, (L) LE: 2 sec (Ongoing)       LTG: (to be met in 18 treatments)  1. ? Pain: to <2-3/10 with activity for ease with ADLs. 2. AROM: All areas by 50% increase or more to improve ADLs & gait. 3. ? BLE & BUE Strength to 4+/5 to improve lifting, gait & standing tolerance. 4. Pt to ambulate independently with 50% improved upright posture for >500', to improve community navigation. 5.  Pt to verbalize being able to get in/out of car/bed/shower to improve functional mobility with ADLs. 6. Pt to demo <25% impairment on UEFS & Oswestry to improve overall functional mobility. 7.  Pt to demo TUG score of <10 seconds, without AD, to improve gait speed and reduce risk of falls.   8. ? Balance Function: Pt to demonstrate  B SLS sec of >20sec to improve SLS balance & fall prevention.         Treatment Plan:  [x] Therapeutic Exercise   80173  [] Iontophoresis: 4 mg/mL Dexamethasone Sodium Phosphate  mAmin  63103   [x] Therapeutic Activity  09080 [x] Vasopneumatic cold with compression  41065    [x] Gait Training   76494 [] Ultrasound   55689   [x] Neuromuscular Re-education  47347 [] Electrical Stimulation Unattended  68929   [x] Manual Therapy  14309 [] Electrical Stimulation Attended  79974   [x] Instruction in HEP  [] Lumbar/Cervical Traction  75133   [x] Aquatic Therapy   95737 [x] Cold/hotpack    [] Massage   86198      [] Dry Needling, 1 or 2 muscles  58965   [] Biofeedback, first 15 minutes   06350  [] Biofeedback, additional 15 minutes   95387 [] Dry Needling, 3 or more muscles  34343     Treatment Charges: Mins Units   []? Modalities       []? Ther Exercise       []? Manual Therapy       [x]? Ther Activities 25 2    []? Aquatics     []? Other  25 2           Patient Status:     [x] Continue per initial plan of care. [] Additional visits necessary. [] Other:    Electronically signed by Aleshia Haile PT on 11/10/2021 at 9:59 AM      If you have any questions or concerns, please don't hesitate to call. Thank you for your referral.    Physician Signature:________________________________Date:__________________  By signing above or cosigning this note, I have reviewed this plan of care and certify a need for medically necessary rehabilitation services.      *PLEASE SIGN ABOVE AND FAX BACK ALL PAGES*

## 2021-11-12 ENCOUNTER — HOSPITAL ENCOUNTER (OUTPATIENT)
Dept: PHYSICAL THERAPY | Facility: CLINIC | Age: 76
Setting detail: THERAPIES SERIES
Discharge: HOME | End: 2021-11-12
Payer: MEDICARE

## 2021-11-12 PROCEDURE — 97113 AQUATIC THERAPY/EXERCISES: CPT

## 2021-11-12 NOTE — FLOWSHEET NOTE
[] [facility]       Physical Therapy MOB2       [facility] [address]        [address]       Phone: [phone]       Fax: [phone] [] [facility]       Promotion at [address] [facility]       Phone: [phone]       Fax: [phone] [x] Darryl. [address] [facility] Promotion  [address] [facility]   Phone: [phone]   Fax:  [phone]     Physical Therapy Daily  Aquatic Treatment Note    Date:  2021  Patient Name:  Melania Postal  \"Akil\"  :  1945  MRN: [id number]  Physician: Tish Mckeon               Insurance: Medicare/[employer]  Medical Diagnosis:  M25.511, G89.29, M25.512 (ICD-10-CM) - Chronic pain of both shoulders   M54.40 (ICD-10-CM) - Acute bilateral low back pain with sciatica, sciatica laterality unspecified   Rehab Codes: M54.2, M54.5, M54.9, R29.3, M62.81, R26.2 NEC, M79.1, M25.512 M25.612,M62.512, M25.511,M25.611,M62.511, M62.81, M25.551, M25.552  Onset Date:3/1/21                              Next 's appt. : 22    Visit# / total visits:  Cancels/No Shows:     Subjective:    Pain:  [x] Yes  [] No Location: LB/bilat shoulders Pain Rating: (0-10 scale) 4/10 LB  Pain altered Tx:  [] No  [x] Yes  Action:  Comments: Pt noted inc LB soreness from last tx. Agreeable to transition 1 pool tx, 1 land tx per week. Requested further HEP for strengthening and mobility.      **Pt requires assistance to get noodle around back and under arms**    Objective:     KEY  B = Belt G = Gloves N = Noodle   C = Cuffs K = Kickboard P = Paddles   CC = Cervical Collar L = Laps T = Theratube   DB = Dumbells M = Minutes W = Weights     Exercises/Activities  Warm-up/Amb 11/12 Dynamic Exercises    Forward 4L Arm swing March 4L   Sideways 4L Habd  Squat 4L   Backwards 4L Arm swing Retro HS curls 4L     Retro SLR    Stretches  Braiding    Gastroc/Soleus  Heel to Toe amb 4L   Hamstring 3x20\" (B) Toe amb    Hip flexor 3x20\" Heel amb    Piriformis 10x10\" LB standing     UT/Levator Scap S 3x30\" ea Jeannie     Pec Stretch held     ER Shoulder 3x30\" mod A Static Exercises UE    Bicep on railing  Shoulder flex/ext 20   Static Exercises LE Shallow end Shoulder abd/add 20   Heel/toe raises 20 Shoulder H.  abd/add 20   Marches 20 Shoulder ER 20   Mini-squats 20 Rowing    4-way hip  20 Arm Circles (post) 20   Hamstring curls 20 Post sh rolls 20   Hip Circles/Fig 8  Arm scissors  20   Ankle ROM  Open book on railing    Lunges   Elbow flex/ext      Shoulder PROM    Functional Exercise  ER Rotation stretch with ModA pectoral S    Step x20 B      Wall Push-ups  Deep H20/    SLS  Bike (end of rail) 3m   Breast Stroke on Noodle  Hip abd/add 3m    Noodle Twist  Hip flex/ext 3m    Noodle Push down x20 purple barbell  Hip IR/ER    Kickboard push/pull  Knee flex/ext    SLS  3x30\" Push/pull on Rail    Tandem stance   Hang 5m   Other:    Specific Instructions for next treatment:     Treatment Charges: Mins Units   []  Modalities     []  Ther Exercise     []  Manual Therapy     []  Ther Activities     [x]  Aquatics 30 2   []  Other       Assessment: [x] Progressing toward goals. Cont with current program focused towards improved core strength, LE strength and balance. Pt displayed good tolerance to all, held further progressions d/t mild exacerbation of sx after last tx. Finished with deep water hang and stretching to reduce soreness and improve mm extensibility. [] No change. [] Other:    STG: (to be met in 10 treatments)- 11/10/21  1. ? Pain: to <7/10 with activity for ease with ADLs. (Patient reports pain as 4 or lower that increases especially at night) (MET)    2. ?  AROM: All areas by 25% increase to improve ADLs & gait.  (ongoing)   3. ? BLE & BUE Strength by 1/2 to a full grade to improve lifting, gait & standing tolerance. (MET)  4. ? Function: Pt to be able to navigate up & down 4 steps independently, with no increase in sxs, to improve overall stair navigation within community. Gavin Luu reports he is able to negotiate stairs slowly with use of handrail, however symptoms continue to occur (ongoing)  5. Independent with Home Exercise Programs Patient reports completing HEP 3x/week (MET)  6. Demonstrate Knowledge of fall prevention (Baljinder completed 10/4/21. Fall prevention not needed).  (MET)  7. ? Balance Function: Pt to demo TUG score of <12 seconds, without AD, to improve gait speed and reduce risk of falls. Patient completed TUG in 10 sec (MET)  8. ? Balance Function: Pt to demonstrate  B SLS sec of >10sec to improve SLS balance & fall prevention.  (R) LE: 10 sec, (L) LE: 2 sec (Ongoing)   1.       LTG: (to be met in 18 treatments)  1. ? Pain: to <2-3/10 with activity for ease with ADLs. 2. AROM: All areas by 50% increase or more to improve ADLs & gait. 3. ? BLE & BUE Strength to 4+/5 to improve lifting, gait & standing tolerance. 4. Pt to ambulate independently with 50% improved upright posture for >500', to improve community navigation. 5.  Pt to verbalize being able to get in/out of car/bed/shower to improve functional mobility with ADLs. 6. Pt to demo <25% impairment on UEFS & Oswestry to improve overall functional mobility. 7.  Pt to demo TUG score of <10 seconds, without AD, to improve gait speed and reduce risk of falls. 8. ? Balance Function: Pt to demonstrate  B SLS sec of >20sec to improve SLS balance & fall prevention. Pt. Education:  [x] Yes  [] No  [] Reviewed Prior HEP/Ed  Method of Education: [x] Verbal  [] Demo  [] Written  Comprehension of Education:  [x] Verbalizes understanding. [] Demonstrates understanding. [] Needs review. [] Demonstrates/verbalizes HEP/Ed previously given. Plan: [x] Continue per plan of care.    [] Other:      Time In: 8:00 am           Time Out: 9:30 am    Electronically signed by:  Jelena Gooden PTA

## 2021-11-16 ENCOUNTER — TELEPHONE (OUTPATIENT)
Dept: FAMILY MEDICINE CLINIC | Age: 76
End: 2021-11-16

## 2021-11-17 ENCOUNTER — APPOINTMENT (OUTPATIENT)
Dept: PHYSICAL THERAPY | Facility: CLINIC | Age: 76
End: 2021-11-17
Payer: MEDICARE

## 2021-11-17 ENCOUNTER — HOSPITAL ENCOUNTER (OUTPATIENT)
Dept: PHYSICAL THERAPY | Facility: CLINIC | Age: 76
Setting detail: THERAPIES SERIES
Discharge: HOME | End: 2021-11-17
Payer: MEDICARE

## 2021-11-17 PROCEDURE — 97140 MANUAL THERAPY 1/> REGIONS: CPT

## 2021-11-17 PROCEDURE — 97110 THERAPEUTIC EXERCISES: CPT

## 2021-11-17 NOTE — FLOWSHEET NOTE
[] [facility]       Physical Therapy MOB[facility]       [facility] [address]        [address]       Phone: [phone]       Fax: [phone] [] [facility]       Promotion at [address] [facility]       Phone: [phone]       Fax: [phone] [x] Darryl. [address] [facility] Health Promotion  [address] [facility] [address]   Phone: [phone]   Fax:  [phone]     Physical Therapy Daily  Aquatic Treatment Note    Date:  2021  Patient Name:  Semaj Villatoro  \"Akil\"  :  1945  MRN: [id number]  Physician: Jackeline Cai               Insurance: Medicare/[employer] Supp  Medical Diagnosis:  M25.511, G89.29, M25.512 (ICD-10-CM) - Chronic pain of both shoulders   M54.40 (ICD-10-CM) - Acute bilateral low back pain with sciatica, sciatica laterality unspecified   Rehab Codes: M54.2, M54.5, M54.9, R29.3, M62.81, R26.2 NEC, M79.1, M25.512 M25.612,M62.512, M25.511,M25.611,M62.511, M62.81, M25.551, M25.552  Onset Date:3/1/21                              Next 's appt. : 22    Visit# / total visits:  Cancels/No Shows:     Subjective:    Pain:  [x] Yes  [] No Location: LB/bilat shoulders Pain Rating: (0-10 scale) 3/10 Shoulders  Pain altered Tx:  [] No  [x] Yes  Action:  Comments: Pt noted he has been more mindful of posture at home and feels posture is improving. Objective:  Modalities:   Precautions:  Exercises:  Exercise Reps/ Time Weight/ Level Comments   Shine SHORT  10x ea  Flexion, ER, HABD   Pulleys 3' ea  Flexion, Abd                     Other: Joint mobilization a-p, shoulder distraction,     Instructions for subsequent visits:     Treatment Charges: Mins Units   []  Modalities     [x]  Ther Exercise 10 1   [x]  Manual Therapy 35 2   []  Ther Activities     []  Aquatics 45 3   []  Other       Assessment: [x] Progressing toward goals. Initiated tx with DI via hypervolt to (B) LB, lat and scapular mm with good release noted. Performed (B) shoulder distraction, posterior joint mobilization, and DI to bicep and pectoral mm with PROM. Significant crepitus noted L>R. Good release and improved ROM noted. Finished with AAROM ex to further promote improved shoulder motion. [] No change. [] Other:    STG: (to be met in 10 treatments)- 11/10/21  1. ? Pain: to <7/10 with activity for ease with ADLs. (Patient reports pain as 4 or lower that increases especially at night) (MET)    2. ?  AROM: All areas by 25% increase to improve ADLs & gait. (ongoing)   3. ? BLE & BUE Strength by 1/2 to a full grade to improve lifting, gait & standing tolerance. (MET)  4. ? Function: Pt to be able to navigate up & down 4 steps independently, with no increase in sxs, to improve overall stair navigation within community. Dawit Canela reports he is able to negotiate stairs slowly with use of handrail, however symptoms continue to occur (ongoing)  5. Independent with Home Exercise Programs Patient reports completing HEP 3x/week (MET)  6. Demonstrate Knowledge of fall prevention (Gale completed 10/4/21. Fall prevention not needed).  (MET)  7. ? Balance Function: Pt to demo TUG score of <12 seconds, without AD, to improve gait speed and reduce risk of falls. Patient completed TUG in 10 sec (MET)  8. ? Balance Function: Pt to demonstrate  B SLS sec of >10sec to improve SLS balance & fall prevention.  (R) LE: 10 sec, (L) LE: 2 sec (Ongoing)   1.       LTG: (to be met in 18 treatments)  1. ? Pain: to <2-3/10 with activity for ease with ADLs. 2. AROM: All areas by 50% increase or more to improve ADLs & gait. 3. ? BLE & BUE Strength to 4+/5 to improve lifting, gait & standing tolerance. 4. Pt to ambulate independently with 50% improved upright posture for >500', to improve community navigation. 5.  Pt to verbalize being able to get in/out of car/bed/shower to improve functional mobility with ADLs.    6. Pt to demo <25% impairment on UEFS & Oswestry to improve overall functional mobility. 7.  Pt to demo TUG score of <10 seconds, without AD, to improve gait speed and reduce risk of falls. 8. ? Balance Function: Pt to demonstrate  B SLS sec of >20sec to improve SLS balance & fall prevention. Pt. Education:  [x] Yes  [] No  [] Reviewed Prior HEP/Ed  Method of Education: [x] Verbal  [] Demo  [] Written  Comprehension of Education:  [x] Verbalizes understanding. [] Demonstrates understanding. [] Needs review. [] Demonstrates/verbalizes HEP/Ed previously given. Plan: [x] Continue per plan of care.    [] Other:      Time In: 3:00 pm           Time Out: 4:00 pm    Electronically signed by:  Ethan Cuba PTA

## 2021-11-19 ENCOUNTER — HOSPITAL ENCOUNTER (OUTPATIENT)
Dept: PHYSICAL THERAPY | Facility: CLINIC | Age: 76
Setting detail: THERAPIES SERIES
Discharge: HOME | End: 2021-11-19
Payer: MEDICARE

## 2021-11-19 PROCEDURE — 97113 AQUATIC THERAPY/EXERCISES: CPT

## 2021-11-19 NOTE — FLOWSHEET NOTE
[] [facility]       Physical Therapy MOB2       [facility] [address]        [address]       Phone: [phone]       Fax: [phone] [] [facility]       Promotion at [address]       Phone: [phone]       Fax: [phone] [x] Darryl. [facility] Promotion  [address]   Phone: [phone]   Fax:  [phone]     Physical Therapy Daily  Aquatic Treatment Note    Date:  2021  Patient Name:  Woody Hoang  \"Akil\"  :  1945  MRN: [id number]  Physician: Wm Heading               Insurance: Medicare/MMO Supp  Medical Diagnosis:  M25.511, G89.29, M25.512 (ICD-10-CM) - Chronic pain of both shoulders   M54.40 (ICD-10-CM) - Acute bilateral low back pain with sciatica, sciatica laterality unspecified   Rehab Codes: M54.2, M54.5, M54.9, R29.3, M62.81, R26.2 NEC, M79.1, M25.512 M25.612,M62.512, M25.511,M25.611,M62.511, M62.81, M25.551, M25.552  Onset Date:3/1/21                              Next 's appt. : 22    Visit# / total visits:  Cancels/No Shows:     Subjective:    Pain:  [x] Yes  [] No Location: LB/bilat shoulders Pain Rating: (0-10 scale) -/10 LB, Shoulders  Pain altered Tx:  [] No  [x] Yes  Action:  Comments: Pt noted cont LB and some soreness in shoulders, no pain rating given. Pt notes inc mobility in shoulders after land tx.       **Pt requires assistance to get noodle around back and under arms**    Objective:     KEY  B = Belt G = Gloves N = Noodle   C = Cuffs K = Kickboard P = Paddles   CC = Cervical Collar L = Laps T = Theratube   DB = Dumbells M = Minutes W = Weights     Exercises/Activities  Warm-up/Amb  Dynamic Exercises    Forward 4L Arm swing March 4L   Sideways 4L Habd  Squat 4L   Backwards 4L Arm swing Retro HS curls 4L     Retro SLR    Stretches  Braiding    Gastroc/Soleus 3x20\" Heel to Toe amb 4L   Hamstring 3x20\" Toe amb    Hip flexor 3x20\" Heel amb    SKTC 3x20\"     UT/Levator Scap S 3x30\" UT Jeannie     Pec Stretch 3x20\" mod A     ER Shoulder 3x30\" mod A Static Exercises UE Blue Paddles (Open)    Bicep on railing  Shoulder flex/ext 20   Static Exercises LE Lg wt, shallow end Shoulder abd/add 20   Heel/toe raises 20 Shoulder H.  abd/add 20    Marches 20 Shoulder ER 20   Mini-squats 20 Rowing    4-way hip  20 Arm Circles (post) 20   Hamstring curls 20 Post sh rolls 20   Hip Circles/Fig 8  Arm scissors  20   Ankle ROM  Open book on railing    Lunges   Elbow flex/ext      Shoulder PROM    Functional Exercise  ER Rotation stretch with ModA pectoral S    Step x20 B lg wt       Wall Push-ups  Deep H20/    SLS  Bike (end of rail)    Breast Stroke on Noodle  Hip abd/add    Noodle Twist  Hip flex/ext    Noodle Push down  Hip IR/ER    Kickboard push/pull x30 Knee flex/ext    SLS  3x30\" Push/pull on Rail    Tandem stance   Hang 5m lg wt    Other:    Specific Instructions for next treatment:     Treatment Charges: Mins Units   []  Modalities     []  Ther Exercise     [x]  Manual Therapy 5 0   []  Ther Activities     [x]  Aquatics 30 2   []  Other       Assessment: [x] Progressing toward goals. Cont with current program focused towards improving TA engagement to reduce LBP. Able to inc resistance for UE ex, pt noted dec crepitus this date and displayed much less grimace during UE ex. Performed DI to (B) LB mm and lumbar-thoracic paraspinals with good release noted. Cont to VC/TC for reduced UT compensation with poor carryover. Held deep water ex d/t LB soreness. Concluded tx with deep water hang and stretching to improve mm extensibility and reduce soreness. [] No change. [] Other:    STG: (to be met in 10 treatments)- 11/10/21  1. ? Pain: to <7/10 with activity for ease with ADLs. (Patient reports pain as 4 or lower that increases especially at night) (MET)    2. ?  AROM: All areas by 25% increase to improve ADLs & gait.  (ongoing)   3. ? BLE & BUE Strength by 1/2 to a full grade to improve lifting, gait & standing tolerance. (MET)  4. ? Function: Pt to be able to navigate up & down 4 steps independently, with no increase in sxs, to improve overall stair navigation within community.  Patient reports he is able to negotiate stairs slowly with use of handrail, however symptoms continue to occur (ongoing)  5. Independent with Home Exercise Programs Patient reports completing HEP 3x/week (MET)  6. Demonstrate Knowledge of fall prevention (Baljinder completed 10/4/21. Fall prevention not needed).  (MET)  7. ? Balance Function: Pt to demo TUG score of <12 seconds, without AD, to improve gait speed and reduce risk of falls. Patient completed TUG in 10 sec (MET)  8. ? Balance Function: Pt to demonstrate  B SLS sec of >10sec to improve SLS balance & fall prevention.  (R) LE: 10 sec, (L) LE: 2 sec (Ongoing)   1.       LTG: (to be met in 18 treatments)  1. ? Pain: to <2-3/10 with activity for ease with ADLs. 2. AROM: All areas by 50% increase or more to improve ADLs & gait. 3. ? BLE & BUE Strength to 4+/5 to improve lifting, gait & standing tolerance. 4. Pt to ambulate independently with 50% improved upright posture for >500', to improve community navigation. 5.  Pt to verbalize being able to get in/out of car/bed/shower to improve functional mobility with ADLs. 6. Pt to demo <25% impairment on UEFS & Oswestry to improve overall functional mobility. 7.  Pt to demo TUG score of <10 seconds, without AD, to improve gait speed and reduce risk of falls. 8. ? Balance Function: Pt to demonstrate  B SLS sec of >20sec to improve SLS balance & fall prevention. Pt. Education:  [x] Yes  [] No  [] Reviewed Prior HEP/Ed  Method of Education: [x] Verbal  [] Demo  [] Written  Comprehension of Education:  [x] Verbalizes understanding. [] Demonstrates understanding. [] Needs review. [] Demonstrates/verbalizes HEP/Ed previously given. Plan: [x] Continue per plan of care.    [] Other:       Time In: 8:00 am           Time Out: 9:15 am    Electronically signed by:  Zach Rausch PTA

## 2021-11-22 ENCOUNTER — HOSPITAL ENCOUNTER (OUTPATIENT)
Dept: PHYSICAL THERAPY | Facility: CLINIC | Age: 76
Setting detail: THERAPIES SERIES
Discharge: HOME | End: 2021-11-22
Payer: MEDICARE

## 2021-11-22 NOTE — FLOWSHEET NOTE
[] [facility]) - [facility] &  Therapy  [address]    [phone]  F: [phone]   [] [address]  [facility] [address]   [address]  P: [phone]  F: [phone]  [] [address] &  Therapy  [address] [facility]  P: [phone]  F: [phone] [] [address]  P: [phone]  F: [phone]  [] [address]  [facility]   [address]   [place]: [phone]  F: [phone]   [] [address]  [place]: [phone]   F: [phone]     Physical Therapy Cancel/No Show note    Date: 2021  Patient: Danny Branch  : 1945  MRN: [id number]    Cancels/No Shows to date:     For today's appointment patient:    [x]  Cancelled    [] Rescheduled appointment    [] No-show     Reason given by patient:    []  Patient ill    []  Conflicting appointment    [] No transportation      [] Conflict with work    [] No reason given    [] Weather related    [] COVID-19    [x] Other:      Comments:  Family emergency      [] Next appointment was confirmed    Electronically signed by: Angela Cox

## 2021-11-24 ENCOUNTER — HOSPITAL ENCOUNTER (OUTPATIENT)
Dept: PHYSICAL THERAPY | Facility: CLINIC | Age: 76
Setting detail: THERAPIES SERIES
Discharge: HOME | End: 2021-11-24
Payer: MEDICARE

## 2021-11-24 PROCEDURE — 97113 AQUATIC THERAPY/EXERCISES: CPT

## 2021-11-24 NOTE — FLOWSHEET NOTE
[] [facility]       Physical Therapy MOB2       [place] [address]        [address]       Phone: [phone]       Fax: [phone] [] [facility]       Promotion at [address] [facility] [address]       Phone: [phone]       Fax: [phone] [x] Darryl. [facility]5 [facility] Promotion  [address] [facility]   Phone: [phone]   Fax:  [phone]     Physical Therapy Daily  Aquatic Treatment Note    Date:  2021  Patient Name:  Marybeth Atkinson  \"Corky\"  :  1945  MRN: [id number]  Physician: Fabiola Jung               Insurance: Medicare/MMO Supp  Medical Diagnosis:  M25.511, G89.29, M25.512 (ICD-10-CM) - Chronic pain of both shoulders   M54.40 (ICD-10-CM) - Acute bilateral low back pain with sciatica, sciatica laterality unspecified   Rehab Codes: M54.2, M54.5, M54.9, R29.3, M62.81, R26.2 NEC, M79.1, M25.512 M25.612,M62.512, M25.511,M25.611,M62.511, M62.81, M25.551, M25.552  Onset Date:3/1/21                              Next 's appt. : 22    Visit# / total visits:  Cancels/No Shows:     Subjective:    Pain:  [x] Yes  [] No Location: LB/bilat shoulders Pain Rating: (0-10 scale) 4/10 LB  Pain altered Tx:  [] No  [x] Yes  Action:  Comments: Pt notes overall shoulders are getting better, and he is able to reach higher overhead. Reaching back for toileting hygiene or behind the head for shampooing has not improved. LBP has gotten \"a little better\" but pt notes he cont to have difficulty with pain when walking and needed a wheelchair when attempting to ambulate around the hospital after his wife's accident Monday. Pt fell recently with small (B) wounds on anterior shins. Pt notes some LBP inc when standing upright, but cont to work on posture.      **Pt requires assistance to get noodle around back and under arms**    Objective:     KEY  B = Belt G = Gloves N = Noodle   C = Cuffs K = Kickboard P = Paddles   CC = Cervical Collar L = Laps T = Theratube   DB = Dumbells M = Minutes W = Weights     Exercises/Activities  Warm-up/Amb 11/24 Dynamic Exercises 11/24   Forward 4L Arm swing March Held d/t time    Sideways 4L Habd  Squat 3L    Backwards 4L Arm swing Retro HS curls Held d/t time      Retro SLR    Stretches  Braiding    Gastroc/Soleus  Heel to Toe amb 4L   Hamstring 3x30\" railing  Toe amb    Hip flexor HELD Heel amb    SKTC 3x30\"     UT/Levator Scap S 10x10\" 1/2 head [place]  10x10\" cervical rotat. Pec Stretch 10x10\" (B) railing      ER Shoulder HELD Static Exercises UE Blue Paddles (1/4 open)    Seated lumbar flexion  Shoulder flex/ext 20   Static Exercises LE Lg wt, shallow end, no UE support  Shoulder abd/add 20   Heel/toe raises 20 Shoulder H.  abd/add 20    Marches 20 Shoulder ER 20   Mini-squats 20 Rowing    4-way hip  20 Arm Circles (post) 20   Hamstring curls 20 Post sh rolls 20   Hip Circles/Fig 8  Arm scissors  20   Ankle ROM  Reach above/behind x20    Lunges   Elbow winging x20     Shoulder PROM    Functional Exercise  ER Rotation stretch with ModA pectoral S    Step x20 B      Wall Push-ups  Deep H20/    SLS  Bike (end of rail)    Breast Stroke on Noodle  Hip abd/add    Noodle Twist  Hip flex/ext    Noodle Push down  Hip IR/ER    Kickboard push/pull x30 Knee flex/ext    SLS  3x30\" shallow end  Push/pull on Rail    Tandem stance   Hang 5m lg wt    Other:    Specific Instructions for next treatment:     Treatment Charges: Mins Units   []  Modalities     []  Ther Exercise     [x]  Manual Therapy     []  Ther Activities     [x]  Aquatics 30 2   []  Other       Assessment: - Progressing toward goals. Cont with current program with cueing for posture and TA engagement. Held some dynamic laps to conserve time for further shoulder mobility to focus on functional deficits, and dynamic mobility to reduce cervical/pectoral mm tension.  Concluded tx with deep water hang and stretching to improve LB mm extensibility and reduce soreness. Encouraged pt to monitor wounds for signs of infection (purulent discharge, yellow wound bed, etc) and advised to keep the wound bed clean and moist to promote healing. Cont to reinforce education for stretching, nasreen hip flexors, to reduce LBP while standing upright.       [] No change. [] Other:    STG: (to be met in 10 treatments)- 11/10/21  1. ? Pain: to <7/10 with activity for ease with ADLs. (Patient reports pain as 4 or lower that increases especially at night) (MET)    2. ?  AROM: All areas by 25% increase to improve ADLs & gait. (ongoing)   3. ? BLE & BUE Strength by 1/2 to a full grade to improve lifting, gait & standing tolerance. (MET)  4. ? Function: Pt to be able to navigate up & down 4 steps independently, with no increase in sxs, to improve overall stair navigation within community. Birdie Fernando reports he is able to negotiate stairs slowly with use of handrail, however symptoms continue to occur (ongoing)  5. Independent with Home Exercise Programs Patient reports completing HEP 3x/week (MET)  6. Demonstrate Knowledge of fall prevention (Gale completed 10/4/21. Fall prevention not needed).  (MET)  7. ? Balance Function: Pt to demo TUG score of <12 seconds, without AD, to improve gait speed and reduce risk of falls. Patient completed TUG in 10 sec (MET)  8. ? Balance Function: Pt to demonstrate  B SLS sec of >10sec to improve SLS balance & fall prevention.  (R) LE: 10 sec, (L) LE: 2 sec (Ongoing)   1.       LTG: (to be met in 18 treatments)  1. ? Pain: to <2-3/10 with activity for ease with ADLs. 2. AROM: All areas by 50% increase or more to improve ADLs & gait. 3. ? BLE & BUE Strength to 4+/5 to improve lifting, gait & standing tolerance. 4. Pt to ambulate independently with 50% improved upright posture for >500', to improve community navigation. 5.  Pt to verbalize being able to get in/out of car/bed/shower to improve functional mobility with ADLs.    6. Pt to demo <25% impairment on UEFS & Oswestry to improve overall functional mobility. 7.  Pt to demo TUG score of <10 seconds, without AD, to improve gait speed and reduce risk of falls. 8. ? Balance Function: Pt to demonstrate  B SLS sec of >20sec to improve SLS balance & fall prevention. Pt. Education:  [x] Yes  [] No  [] Reviewed Prior HEP/Ed  Method of Education: [x] Verbal  [] Demo  [] Written  Comprehension of Education:  [x] Verbalizes understanding. [] Demonstrates understanding. [] Needs review. [] Demonstrates/verbalizes HEP/Ed previously given. Plan: [x] Continue per plan of care.    [] Other:      Time In: 8:00 am           Time Out: 9:30 am    Electronically signed by:  Deepti Pittman PTA

## 2021-11-29 RX ORDER — LEVOTHYROXINE SODIUM 0.03 MG/1
TABLET ORAL
Qty: 90 TABLET | Refills: 0 | Status: SHIPPED | OUTPATIENT
Start: 2021-11-29 | End: 2022-02-28

## 2021-11-30 ENCOUNTER — HOSPITAL ENCOUNTER (OUTPATIENT)
Dept: CARDIAC REHAB | Age: 76
Discharge: HOME | End: 2021-11-30
Payer: MEDICARE

## 2021-11-30 PROCEDURE — 9900000065 HC CARDIAC REHAB PHASE 3 - 1 VISIT

## 2021-12-01 ENCOUNTER — HOSPITAL ENCOUNTER (OUTPATIENT)
Dept: PHYSICAL THERAPY | Facility: CLINIC | Age: 76
Setting detail: THERAPIES SERIES
Discharge: HOME | End: 2021-12-01
Payer: MEDICARE

## 2021-12-01 PROCEDURE — 97110 THERAPEUTIC EXERCISES: CPT

## 2021-12-01 NOTE — FLOWSHEET NOTE
[] [facility]       Physical Therapy MOB2       [address]        [address]       Phone: [phone]       Fax: [phone] [] [facility]       Promotion at [address] [facility]       Phone: [phone]       Fax: [phone] [x] Darryl. [facility]5 [facility] Health Promotion  [address] [facility]   Phone: [phone]   Fax:  [phone]     Physical Therapy Daily Treatment Note    Date:  2021  Patient Name:  Nick Chen  \"Akil\"  :  1945  MRN: [id number]  Physician: Nanda Rodriguez               Insurance: Medicare/MMO Supp  Medical Diagnosis:  M25.511, G89.29, M25.512 (ICD-10-CM) - Chronic pain of both shoulders   M54.40 (ICD-10-CM) - Acute bilateral low back pain with sciatica, sciatica laterality unspecified   Rehab Codes: M54.2, M54.5, M54.9, R29.3, M62.81, R26.2 NEC, M79.1, M25.512 M25.612,M62.512, M25.511,M25.611,M62.511, M62.81, M25.551, M25.552  Onset Date:3/1/21                              Next 's appt. : 22    Visit# / total visits:  Cancels/No Shows: 4/0    Subjective: Pt arrives with morning with continues shoulder soreness and stiffness as well as a little stiffness into the LB. States that shoulders are most problematic d/t lack of ROM and soreness.     Pain:  [x] Yes  [] No Location: LB/bilat shoulders Pain Rating: (0-10 scale) 3/10 Shoulders  Pain altered Tx:  [] No  [x] Yes  Action:  Comments:     Objective:  Modalities:   Precautions:  Exercises:  Exercise Reps/ Time Weight/ Level Comments   Nustep 10' L5          Dowel AAROM  10x ea  Flexion, ER, HABD   Pulleys 3' ea  Flexion, Abd         Seated cervical AROM 10x ea     Seated Hs/calf Stretch 3x30\" ea     Seated PB flexion Stretch 10x10\"     Wall slides 10x5\"  BUE          Standing marches 15x ea     Mini squats 10x     Other: Joint mobilization a-p, shoulder distraction - held 21     Instructions for subsequent visits: Treatment Charges: Mins Units   []  Modalities     [x]  Ther Exercise 42 3   []  Manual Therapy     []  Ther Activities     []  Aquatics     []  Other       Assessment: [x] Progressing toward goals. Able to initiate treatment on nustep to progress overall activity tolerance and well as LE/UE ROM. Continued with pulleys as completed prior followed by instruction in seated stretches as noted above. Goal of stretches to improve pt flexibility to allow for improved function. Very limited shoulder ROM demonstrated bilaterally. Attempted cervical stretches but unable to utilize UE for over-pressure and advised pt to completed AROM only. Able to complete standing therex with cueing to maintain upright posture, fair follow through. Pt fatigued post ex but denying increased pain. [] No change. [] Other:    STG: (to be met in 10 treatments)- 11/10/21  1. ? Pain: to <7/10 with activity for ease with ADLs. (Patient reports pain as 4 or lower that increases especially at night) (MET)    2. ?  AROM: All areas by 25% increase to improve ADLs & gait. (ongoing)   3. ? BLE & BUE Strength by 1/2 to a full grade to improve lifting, gait & standing tolerance. (MET)  4. ? Function: Pt to be able to navigate up & down 4 steps independently, with no increase in sxs, to improve overall stair navigation within community. Ministerio Guzman reports he is able to negotiate stairs slowly with use of handrail, however symptoms continue to occur (ongoing)  5. Independent with Home Exercise Programs Patient reports completing HEP 3x/week (MET)  6. Demonstrate Knowledge of fall prevention (Baljinder completed 10/4/21. Fall prevention not needed).  (MET)  7. ? Balance Function: Pt to demo TUG score of <12 seconds, without AD, to improve gait speed and reduce risk of falls. Patient completed TUG in 10 sec (MET)  8. ? Balance Function: Pt to demonstrate  B SLS sec of >10sec to improve SLS balance & fall prevention.   (R) LE: 10 sec, (L) LE: 2 sec (Ongoing)   1.       LTG: (to be met in 18 treatments)  1. ? Pain: to <2-3/10 with activity for ease with ADLs. 2. AROM: All areas by 50% increase or more to improve ADLs & gait. 3. ? BLE & BUE Strength to 4+/5 to improve lifting, gait & standing tolerance. 4. Pt to ambulate independently with 50% improved upright posture for >500', to improve community navigation. 5.  Pt to verbalize being able to get in/out of car/bed/shower to improve functional mobility with ADLs. 6. Pt to demo <25% impairment on UEFS & Oswestry to improve overall functional mobility. 7.  Pt to demo TUG score of <10 seconds, without AD, to improve gait speed and reduce risk of falls. 8. ? Balance Function: Pt to demonstrate  B SLS sec of >20sec to improve SLS balance & fall prevention. Pt. Education:  [x] Yes  [] No  [] Reviewed Prior HEP/Ed  Method of Education: [x] Verbal  [] Demo  [] Written  Comprehension of Education:  [x] Verbalizes understanding. [] Demonstrates understanding. [] Needs review. [] Demonstrates/verbalizes HEP/Ed previously given. Plan: [x] Continue per plan of care.    [] Other:      Time In: 7:00 am           Time Out: 7:51 am    Electronically signed by:  Katie Victoria PTA

## 2021-12-03 ENCOUNTER — HOSPITAL ENCOUNTER (OUTPATIENT)
Dept: PHYSICAL THERAPY | Facility: CLINIC | Age: 76
Setting detail: THERAPIES SERIES
Discharge: HOME | End: 2021-12-03
Payer: MEDICARE

## 2021-12-03 PROCEDURE — 97113 AQUATIC THERAPY/EXERCISES: CPT

## 2021-12-03 NOTE — FLOWSHEET NOTE
[] [facility]       Physical Therapy MOB2       [facility] [address]        [address]       Phone: [phone]       Fax: [phone] [] [facility]       Promotion at [address] [facility]       Phone: [phone]       Fax: [phone] [x] Darryl. [address] [facility] Promotion  [address] [facility]   Phone: [phone]   Fax:  [phone]     Physical Therapy Daily  Aquatic Treatment Note    Date:  12/3/2021  Patient Name:  Marybeth Atkinson  \"Corky\"  :  1945  MRN: [id number]  Physician: Fabiola Jung               Insurance: Medicare/[employer]  Medical Diagnosis:  M25.511, G89.29, M25.512 (ICD-10-CM) - Chronic pain of both shoulders   M54.40 (ICD-10-CM) - Acute bilateral low back pain with sciatica, sciatica laterality unspecified   Rehab Codes: M54.2, M54.5, M54.9, R29.3, M62.81, R26.2 NEC, M79.1, M25.512 M25.612,M62.512, M25.511,M25.611,M62.511, M62.81, M25.551, M25.552  Onset Date:3/1/21                              Next 's appt. : 22    Visit# / total visits: 1718 Cancels/No Shows:     Subjective:    Pain:  [x] Yes  [] No Location: LB/bilat shoulders Pain Rating: (0-10 scale) 4/10 LB/Shoulders  Pain altered Tx:  [] No  [x] Yes  Action:  Comments: Pt noted inc stiffness over holiday weekend due to inc sitting, feels shoulders are beginning to \"loosen up\" again.     **Pt requires assistance to get noodle around back and under arms**    Objective:     KEY  B = Belt G = Gloves N = Noodle   C = Cuffs K = Kickboard P = Paddles   CC = Cervical Collar L = Laps T = Theratube   DB = Dumbells M = Minutes W = Weights     Exercises/Activities  Warm-up/Amb 12/3 Dynamic Exercises 12/3   Forward 4L Arm swing March Held d/t time    Sideways 4L Habd  Squat Held d/t time    Backwards 4L Arm swing Retro HS curls Held d/t time      Retro SLR    Stretches  Braiding    Gastroc/Soleus  Heel to Toe amb 4L   Hamstring 3x30\" railing Toe amb    Hip flexor 3x30\" Heel amb    SKTC 3x30\"     UT/Levator Scap S 10x10\" 1/2 head [place]  10x10\" cervical rotat. Pec Stretch 10x10\" (B) railing     ER Shoulder HELD Static Exercises UE Blue Paddles    Seated lumbar flexion  Shoulder flex/ext 20   Static Exercises LE  Shoulder abd/add 20   Heel/toe raises 20 (B), SLS, (U) UE Shoulder H.  abd/add 20    Marches 20   Shoulder ER 20   Mini-squats 20  Rowing 20   4-way hip  20  Arm Circles (post) 20   Hamstring curls 20 (B), SLS Post sh rolls 20   Hip Circles/Fig 8  Arm scissors  20   Ankle ROM  Reach above/behind 20   Lunges   Elbow winging 20     Shoulder PROM    Functional Exercise  ER Rotation stretch with ModA pectoral S    Step HELD d/t time      Wall Push-ups  Deep H20/    SLS  Bike (end of rail)    Breast Stroke on Noodle  Hip abd/add    Noodle Twist  Hip flex/ext    Noodle Push down  Hip IR/ER    Kickboard push/pull HELD d/t time  Knee flex/ext    SLS  3x30\"  Push/pull on Rail    Tandem stance   Hang 5m    Other:    Specific Instructions for next treatment:     Treatment Charges: Mins Units   []  Modalities     []  Ther Exercise     [x]  Manual Therapy     []  Ther Activities     [x]  Aquatics 30 2   []  Other       Assessment: - Progressing toward goals. Pt displays improved steadiness during tandem laps; able to inc resistance for UE ex this date. Cont to cue to avoid UT compensations with limited carryover noted. Modified LE ex to SLS for inc balance work with good ramya, cued for reduced UE support. Held further ex d/t time constraints, concluded tx with deep water hang and stretches to promote mm extensibility and pain relief. Pt noted reduction of pain overall at conclusion of tx.       [] No change. [] Other:    STG: (to be met in 10 treatments)- 11/10/21  1. ? Pain: to <7/10 with activity for ease with ADLs.  (Patient reports pain as 4 or lower that increases especially at night) (MET)    2. ?  AROM: All areas by 25% increase to improve ADLs & gait. (ongoing)   3. ? BLE & BUE Strength by 1/2 to a full grade to improve lifting, gait & standing tolerance. (MET)  4. ? Function: Pt to be able to navigate up & down 4 steps independently, with no increase in sxs, to improve overall stair navigation within community. Jeri Bahena reports he is able to negotiate stairs slowly with use of handrail, however symptoms continue to occur (ongoing)  5. Independent with Home Exercise Programs Patient reports completing HEP 3x/week (MET)  6. Demonstrate Knowledge of fall prevention (Gale completed 10/4/21. Fall prevention not needed).  (MET)  7. ? Balance Function: Pt to demo TUG score of <12 seconds, without AD, to improve gait speed and reduce risk of falls. Patient completed TUG in 10 sec (MET)  8. ? Balance Function: Pt to demonstrate  B SLS sec of >10sec to improve SLS balance & fall prevention.  (R) LE: 10 sec, (L) LE: 2 sec (Ongoing)   1.       LTG: (to be met in 18 treatments)  1. ? Pain: to <2-3/10 with activity for ease with ADLs. 2. AROM: All areas by 50% increase or more to improve ADLs & gait. 3. ? BLE & BUE Strength to 4+/5 to improve lifting, gait & standing tolerance. 4. Pt to ambulate independently with 50% improved upright posture for >500', to improve community navigation. 5.  Pt to verbalize being able to get in/out of car/bed/shower to improve functional mobility with ADLs. 6. Pt to demo <25% impairment on UEFS & Oswestry to improve overall functional mobility. 7.  Pt to demo TUG score of <10 seconds, without AD, to improve gait speed and reduce risk of falls. 8. ? Balance Function: Pt to demonstrate  B SLS sec of >20sec to improve SLS balance & fall prevention. Pt. Education:  [x] Yes  [] No  [] Reviewed Prior HEP/Ed  Method of Education: [x] Verbal  [] Demo  [] Written  Comprehension of Education:  [x] Verbalizes understanding. [] Demonstrates understanding. [] Needs review.   [] Demonstrates/verbalizes HEP/Ed previously given.     Plan: [x] Continue per plan of care.    [] Other:      Time In: 8:00 am           Time Out: 9:15 am    Electronically signed by:  Marco Jimenez PTA

## 2021-12-10 ENCOUNTER — HOSPITAL ENCOUNTER (OUTPATIENT)
Dept: PHYSICAL THERAPY | Facility: CLINIC | Age: 76
Setting detail: THERAPIES SERIES
Discharge: HOME | End: 2021-12-10
Payer: MEDICARE

## 2021-12-10 PROCEDURE — 97110 THERAPEUTIC EXERCISES: CPT

## 2021-12-10 PROCEDURE — 97530 THERAPEUTIC ACTIVITIES: CPT

## 2021-12-10 NOTE — DISCHARGE SUMMARY
Physical Therapy    [] [facility]) - [employer] [employer] [facility] [place] - [facility] &  Therapy  [address]  [phone]  F: [phone] [] [address]  [place] [address]   [address]  P: [phone]  F: [phone] [x] [address] &  Therapy  [address]  P: [phone]  F: [phone] [] [address]  P: [phone]  F: [phone] [] [address]  [facility]   [address]   [place]: [phone]  F: [phone]      Physical Therapy Discharge Summary    Date: 12/10/2021      Patient: Wilson Wilson  : 1945  MRN: [id number]    Physician: Izzy Márquez               Insurance: Medicare/[employer] Supp  Medical Diagnosis:  M25.511, G89.29, M25.512 (ICD-10-CM) - Chronic pain of both shoulders   M54.40 (ICD-10-CM) - Acute bilateral low back pain with sciatica, sciatica laterality unspecified   Rehab Codes: M54.2, M54.5, M54.9, R29.3, M62.81, R26.2 NEC, M79.1, M25.512 M25.612,M62.512, M25.511,M25.611,M62.511, M62.81, M25.551, M25.552  Onset Date: 3/1/21                              Next 's appt. : 22  Visit# / total visits:       Cancels/No Shows:    Date range of services: 10/4/21 to 12/10/21      Subjective:  Pain:  [x] Yes  [] No  Location: low back, (B) shoulders Pain Rating: (0-10 scale) 3-4/10 low back and shoulder  Pain altered Tx:  [] No  [] Yes  Action:  Comments: Patient reports that physical therapy has been beneficial.  Patient reports that he is able to reach above his head easier and can turn his head more when driving. Patient states that he continues to have pain, however is able to complete ex at home and will feel better the next day.  Patient states that he would like to be done with physical therapy today due to having a good set of exercises to complete at home. Objective:  AROM:  Cervical spine: limited by 50% in flexion, extension and SB  Lumbar: limited by 25% with SB, 75% with ext, 25% with rot  B Shoulders: limited by 25% in shoulder flexion,  50% in shoulder abduction,     B LE:  limited 25% (B) hip in all planes     MMT Strength  B UEs: B 4+/5 all planes grossly throughout within available ROM  B LEs: B 4+/5 all planes grossly throughout within available ROM; 3+/5 at L ankle    B 70lbs  Exercises  Exercise Reps/ Time Weight/ Level Comments    Nustep 10' L5                 Dowel AAROM  10x ea   Flexion, ER, HABD    Pulleys 3' ea   Flexion, Abd               Seated cervical AROM 10x ea        Seated Hs/calf Stretch 3x30\" ea        Seated PB flexion Stretch 10x10\"        Wall slides 10x5\"   BUE                Standing marches x20 ea     x   Mini squats x20    x   3-way hip x15ea   x   HS curls x20   x   HR/TR x20   x        Assessment:  Reassessed patient's goals today with good progress. Patient met 7/8 short term goals and 3/8 long term goals at this time. Patient demonstrates improvements in pain tolerance, tolerance to negotiating stairs, AROM in cervical, lumbar, UE and LE, gait speed and tolerance to performing ADLs. Patient to be discharged at this time and to continue with home program in order to continue to improve balance, walking tolerance and AROM. Updated patient's HEP this date and initiated HS curls, 3-way hip and heel raises this date in order to further improve (B) LE strength and exercise tolerance. Patient demonstrated increased fatigue with added ex at this time. Discussed increasing walking tolerance by walking throughout the home and then increasing distance or number of laps each time patient ambulates at home. Educated patient on transition program and provided patient with information on program. Patient verbalized good understanding at this time.     STG: (to be met in 10 treatments)- 12/10/21  1. ? Pain: to <7/10 with activity for ease with ADLs. (Patient reports pain as 4 or lower that increases especially at night) (MET)    2. ?  AROM: All areas by 25% increase to improve ADLs & gait. (MET)   3. ? BLE & BUE Strength by 1/2 to a full grade to improve lifting, gait & standing tolerance. (MET)  4. ? Function: Pt to be able to navigate up & down 4 steps independently, with no increase in sxs, to improve overall stair navigation within community.  Patient able to ambulate up/down 4 steps without increased low back pain (MET)  5. Independent with Home Exercise Programs Patient reports completing HEP 3x/week (MET)  6. Demonstrate Knowledge of fall prevention (Gale completed 10/4/21. Fall prevention not needed).  (MET)  7. ? Balance Function: Pt to demo TUG score of <12 seconds, without AD, to improve gait speed and reduce risk of falls. Patient completed TUG in 10 sec (MET)  8. ? Balance Function: Pt to demonstrate  B SLS sec of >10sec to improve SLS balance & fall prevention.  (R) LE: 10 sec, (L) LE: 2 sec (Ongoing)       LTG: (to be met in 18 treatments)- 12/10/21  1. ? Pain: to <2-3/10 with activity for ease with ADLs. Patient reports pain as 3-4/10 (NOT MET)    2. AROM: All areas by 50% increase or more to improve ADLs & gait. Improvements in ROM by 50% cervical rot, lumbar flexion, lumbar rotation and lumbar side bending (NOT MET)  3. ? BLE & BUE Strength to 4+/5 to improve lifting, gait & standing tolerance. (MET)  4. Pt to ambulate independently with 50% improved upright posture for >500', to improve community navigation. Patient able to ambulate up to 160 ft with improved upright posture before requiring a rest break due to shortness of breath, no increase in low back pain (NOT MET)   5.  Pt to verbalize being able to get in/out of car/bed/shower to improve functional mobility with ADLs. Patient states that he is able to get in/out of car/bed and shower easier than when beginning physical therapy (MET)  6.  Pt to demo <25% impairment on UEFS & Oswestry to improve overall functional mobility. UEFS- 49% impaired, STAN- 46% (NOT MET)  7.  Pt to demo TUG score of <10 seconds, without AD, to improve gait speed and reduce risk of falls. Tu sec (MET)  8. ? Balance Function: Pt to demonstrate  B SLS sec of >20sec to improve SLS balance & fall prevention.   (R) LE: 10 sec, (L) LE: 2 sec (NOT MET)    HEP:   Access Code: [email or web address]  URL: [email or web address]  Date: 12/10/2021  Prepared by: Karina Counts    Exercises  Seated Gentle Upper Trapezius Stretch - 2 x daily - 7 x weekly - 3 sets - 30 sec hold  Gentle Levator Scapulae Stretch - 2 x daily - 7 x weekly - 3 sets - 30 sec hold  Supine Shoulder Flexion Extension AAROM with Dowel - 2 x daily - 7 x weekly - 3 sets - 10 reps  Supine Shoulder Abduction AAROM with Dowel - 2 x daily - 7 x weekly - 3 sets - 10 reps  Supine Shoulder External Rotation with Dowel - 2 x daily - 7 x weekly - 3 sets - 10 reps  Seated Cervical Flexion AROM - 2 x daily - 7 x weekly - 3 sets - 10 reps  Seated Cervical Extension AROM - 2 x daily - 7 x weekly - 3 sets - 10 reps  Standing Cervical Sidebending AROM - 2 x daily - 7 x weekly - 3 sets - 10 reps  Seated Cervical Rotation AROM - 2 x daily - 7 x weekly - 3 sets - 10 reps  Seated Calf Stretch with Strap - 2 x daily - 7 x weekly - 3 sets - 30 sec hold  Seated Hamstring Stretch - 2 x daily - 7 x weekly - 3 sets - 30 sec hold  Shoulder Flexion Wall Slide with Towel - 2 x daily - 7 x weekly - 3 sets - 10 reps  Standing March with Counter Support - 2 x daily - 7 x weekly - 3 sets - 10 reps  Standing Knee Flexion AROM with Chair Support - 2 x daily - 7 x weekly - 3 sets - 10 reps  Heel rises with counter support - 2 x daily - 7 x weekly - 3 sets - 10 reps  Standing Hip Flexion with Counter Support - 2 x daily - 7 x weekly - 3 sets - 10 reps  Standing Hip Abduction with Counter Support - 2 x daily - 7 x weekly - 3 sets - 10 reps  Standing Hip Extension with Counter Support - 2 x daily - 7 x weekly - 3 sets - 10 reps  Mini Squat with Counter Support - 2 x daily - 7 x weekly - 3 sets - 10 reps       Treatment to Date:  [x] Therapeutic Exercise    [] Modalities:  [x] Therapeutic Activity    [] Ultrasound  [] Electrical Stimulation  [] Gait Training     [] Massage       [] Lumbar/Cervical Traction  [] Neuromuscular Re-education [] Cold/hotpack [] Iontophoresis: 4 mg/mL  [x] Instruction in Home Exercise Program                     Dexamethasone Sodium  [x] Manual Therapy             Phosphate 40-80 mAmin  [x] Aquatic Therapy                   [] Vasocompression/    [] Other:             Game Ready    Discharge Status:     [] Pt recovered from conditions. Treatment goals were met. [] Pt received maximum benefit. No further therapy indicated at this time. [x] Pt to continue exercise/home instructions independently. [] Therapy interrupted due to:    [] Pt has 2 or more no shows/cancels, is discontinued per our policy. [x] Pt has completed prescribed number of treatment sessions. [] Other:   Treatment Charges: Mins Units   []? Modalities       [x]? Ther Exercise  10 1    []? Manual Therapy       [x]? Ther Activities 45 3   []? Aquatics     []? Other 50  4            Electronically signed by Nella Branch PT on 12/10/2021 at 11:03 AM      If you have any questions or concerns, please don't hesitate to call. Thank you for your referral.    Physician Signature:________________________________Date:__________________  By signing above or cosigning this note, I have reviewed this plan of care and certify a need for medically necessary rehabilitation services.      *PLEASE SIGN ABOVE AND FAX BACK ALL PAGES*

## 2021-12-29 ENCOUNTER — OFFICE VISIT (OUTPATIENT)
Dept: INTERNAL MEDICINE CLINIC | Age: 76
End: 2021-12-29
Payer: MEDICARE

## 2021-12-29 VITALS
TEMPERATURE: 97.2 F | DIASTOLIC BLOOD PRESSURE: 72 MMHG | WEIGHT: 242.6 LBS | SYSTOLIC BLOOD PRESSURE: 136 MMHG | HEIGHT: 67 IN | RESPIRATION RATE: 15 BRPM | OXYGEN SATURATION: 100 % | HEART RATE: 58 BPM | BODY MASS INDEX: 38.08 KG/M2

## 2021-12-29 DIAGNOSIS — Z00.00 ROUTINE GENERAL MEDICAL EXAMINATION AT A HEALTH CARE FACILITY: Primary | ICD-10-CM

## 2021-12-29 PROCEDURE — G0447 BEHAVIOR COUNSEL OBESITY 15M: HCPCS | Performed by: INTERNAL MEDICINE

## 2021-12-29 PROCEDURE — 1123F ACP DISCUSS/DSCN MKR DOCD: CPT | Performed by: INTERNAL MEDICINE

## 2021-12-29 PROCEDURE — G8484 FLU IMMUNIZE NO ADMIN: HCPCS | Performed by: INTERNAL MEDICINE

## 2021-12-29 PROCEDURE — G0439 PPPS, SUBSEQ VISIT: HCPCS | Performed by: INTERNAL MEDICINE

## 2021-12-29 PROCEDURE — 4040F PNEUMOC VAC/ADMIN/RCVD: CPT | Performed by: INTERNAL MEDICINE

## 2021-12-29 ASSESSMENT — LIFESTYLE VARIABLES: HOW OFTEN DO YOU HAVE A DRINK CONTAINING ALCOHOL: 0

## 2021-12-29 ASSESSMENT — PATIENT HEALTH QUESTIONNAIRE - PHQ9
1. LITTLE INTEREST OR PLEASURE IN DOING THINGS: 0
2. FEELING DOWN, DEPRESSED OR HOPELESS: 0
SUM OF ALL RESPONSES TO PHQ QUESTIONS 1-9: 0
SUM OF ALL RESPONSES TO PHQ QUESTIONS 1-9: 0
SUM OF ALL RESPONSES TO PHQ9 QUESTIONS 1 & 2: 0
SUM OF ALL RESPONSES TO PHQ QUESTIONS 1-9: 0

## 2021-12-29 NOTE — PROGRESS NOTES
Medicare Annual Wellness Visit  Name: Isiah Jane Date: 2021   MRN: [id number] Sex: Male   Age: 67 y.o. Ethnicity: Non- / Non    : 1945 Race:       Nabeel Styles is here for Medicare AWV and Health Maintenance (hep c, tdap, shingles, d foot)    Screenings for behavioral, psychosocial and functional/safety risks, and cognitive dysfunction are all negative except as indicated below. These results, as well as other patient data from the [address] [facility] form, are documented in Flowsheets linked to this Encounter. No Known Allergies    Prior to Visit Medications    Medication Sig Taking? Authorizing Provider   furosemide (LASIX) 40 MG tablet TAKE 1 TABLET DAILY Yes Yissel Topete MD   levothyroxine (SYNTHROID) 25 MCG tablet TAKE 1 TABLET DAILY Yes Ronel EVERETT MD   metoprolol succinate (TOPROL XL) 50 MG extended release tablet TAKE 1 TABLET DAILY Yes Betty Yip MD   omeprazole (PRILOSEC) 40 MG delayed release capsule TAKE 1 CAPSULE DAILY Yes Betty Yip MD   atorvastatin (LIPITOR) 40 MG tablet TAKE 1 TABLET DAILY Yes Ronel EVERETT MD   montelukast (SINGULAIR) 10 MG tablet TAKE 1 TABLET DAILY Yes Ronel EVERETT MD   amLODIPine (NORVASC) 10 MG tablet TAKE 1 TABLET DAILY Yes Ronel EVERETT MD   insulin lispro prot & lispro (HUMALOG MIX 50/50 KWIKPEN) (50-50) 100 UNIT per ML SUPN injection pen INJECT 90 UNITS IN THE MORNING, 60 UNITS AT 6 P.M. AND 30 UNITS AT NIGHT  Patient taking differently: INJECT 90 UNITS IN THE MORNING, 60 UNITS AT 6 P.M.  AND 60 UNITS AT NIGHT Yes Betty Yip MD   Blood Glucose Monitoring Suppl ([address]) w/Device KIT as directed Yes Historical Provider, MD   BD PEN NEEDLE NATASHA U/F 32G X 4 MM MISC  Yes Historical Provider, MD   acetaminophen (TYLENOL) 325 MG tablet Take 650 mg by mouth every 6 hours as needed for Pain Yes Historical Provider, MD   diclofenac sodium (VOLTAREN) 1 % GEL Apply topically 4 times daily as needed for Pain Yes Blanquita Jung Blood, DO   Respiratory Therapy Supplies MISC Cpap supplies- tubing, mask, filter Yes Kipp Kocher, MD   aspirin 81 MG tablet Take 81 mg by mouth daily  Yes Historical Provider, MD   furosemide (LASIX) 20 MG tablet Take 3 tablets by mouth daily  Patient not taking: Reported on 12/29/2021  Tod Naik MD   [facility].  Devices [facility]) [address] 1 each by Does not apply route daily  Patient not taking: Reported on 12/29/2021  Kipp Kocher, MD       Past Medical History:   Diagnosis Date    Arthritis     BACK, BILATERAL SHOULDERS    Asthma     Back pain     BRCA2 positive 09/25/2018    CAD (coronary artery disease) 09/28/2015    CABG BYPASS X 3    Caffeine use     2 cans pop/day    Carpal tunnel syndrome     LEFT HAND    Cataracts, bilateral     Colon cancer ([address] 11/17/2011    A LYMPH NODE IN COLON WAS CANCEROUS    Cyst of pancreas     GETS THIS CHECKED ANNUALLY    Diabetes mellitus ([address]     Difficult intravenous access     PATIENT STATES USUALLY REQUIRES PICC TEAM.    GERD (gastroesophageal reflux disease)     Hay fever     HIGH CHOLESTEROL     History of blood transfusion     NO REACTION    History of chemotherapy 05/2013    Hodgkin's lymphoma ([address]     HTN (hypertension)     Hx of blood clots     AFTER BACK SURGERY, PATIENT HAD BLOOD CLOTS IN SURGICAL AREA AND NEEDED MORE SURGERY TO GET THEM REMOVED    Kidney stone     RIGHT KIDNEY    Lumbar herniated disc 1989    Microhematuria     Obesity     GUTIERREZ on CPAP     Pancreatitis     Pneumonia     Tinnitus     Use of cane as ambulatory aid     Uses brace     BACK BRACE AND LEFT HAND    Wears glasses     READING       Past Surgical History:   Procedure Laterality Date    ABDOMEN SURGERY  11/18/2011    RIGHT COLECTOMY    APPENDECTOMY  2011    with colectomy    BACK SURGERY  2014    LUMBAR LAMINECTOMY    BACK SURGERY  03/01/2014    EVACUATION OF SPINAL HEMATOMA    CATARACT REMOVAL Bilateral 01/2018    CATARACTS WITH IOL PLACED    COLONOSCOPY      Removal of colon polyp    CORONARY ARTERY BYPASS GRAFT  09/28/2015    CABG BYPASS X 3    CYSTOSCOPY Right 08/23/2018    CYSTOSCOPY, INSERTION OCCLUSIVE BALLOON, RIGHT RETROGRADE PYELOGRAM     CYSTOSCOPY Right 09/04/2018    CYSTOSCOPY STENT REMOVAL (Right )    IR NONTUNNELED VASCULAR CATHETER  2/24/2021    IR NONTUNNELED VASCULAR CATHETER 2/24/2021 Carlitos Cook MD [facility] SPECIAL PROCEDURES    KNEE SURGERY Right 2007    TENDON REPAIR    NEPHROSTOMY Right 08/23/2018    HOLMIUM - NEPHROLITHOTOMY PERCUTANEOUS    NERVE BLOCK      STEROID INJECTIONS STARTING 2015 - 04/2018    UT CYSTOSCOPY,REMV CALCULUS,COMPLIC Right 1/9/0222    CYSTOSCOPY STENT REMOVAL performed by Go Elder MD at [facility] Right 8/23/2018    CYSTOSCOPY, INSERTION OCCLUSIVE BALLOON, RIGHT RETROGRADE PYELOGRAM performed by Go Elder MD at [address] [place] [address] TO 2 CM Right 8/23/2018    HOLMIUM - NEPHROLITHOTOMY PERCUTANEOUS, C-ARM, LITHOCLAST,  PT COMING FROM INTERV RADIOLOGY  *SHORT STAY performed by Go Elder MD at [facility] [address]      AS A CHILD       Family History   Problem Relation Age of Onset    Diabetes Father     High Blood Pressure Father     Stroke Mother     Heart Failure Mother     Other Mother         pacemaker    High Blood Pressure Mother     Heart Disease Maternal Grandmother     Heart Disease Maternal Grandfather     Heart Disease Maternal Uncle        CareTeam (Including outside providers/suppliers regularly involved in providing care):   Patient Care Team:  Shannon Mitchell MD as PCP - General  Shannon Mitchell MD as PCP - [address] [place] [address] [facility]  Renae Bruno (Endocrinology)  Go Elder MD as Consulting Physician (Urology)  Michael Macias MD as Consulting Physician (Cardiology)  Shannan Elaine MD as Surgeon (Orthopedic Surgery)  Surjit Valdes MD as Surgeon (Orthopedic Surgery)  Thomas Hernandez MD (Dermatology)    Wt Readings from Last 3 Encounters:   12/29/21 242 lb 9.6 oz (110 kg)   09/28/21 244 lb (110.7 kg)   09/20/21 239 lb (108.4 kg)     Vitals:    12/29/21 1354   BP: 136/72   Site: Right Upper Arm   Position: Sitting   Cuff Size: Large Adult   Pulse: 58   Resp: 15   Temp: 97.2 °F (36.2 °C)   TempSrc: Temporal   SpO2: 100%   Weight: 242 lb 9.6 oz (110 kg)   Height: 5' 7.01\" (1.702 m)     Body mass index is 37.99 kg/m². Based upon direct observation of the patient, evaluation of cognition reveals recent and remote memory intact. General Appearance: alert and oriented to person, place and time, well developed and well- nourished, in no acute distress  Skin: warm and dry, no rash or erythema  Head: normocephalic and atraumatic  Eyes: pupils equal, round, and reactive to light, extraocular eye movements intact, conjunctivae normal  ENT: tympanic membrane, external ear and ear canal normal bilaterally, nose without deformity, nasal mucosa and turbinates normal without polyps  Neck: supple and non-tender without mass, no thyromegaly or thyroid nodules, no cervical lymphadenopathy  Pulmonary/Chest: clear to auscultation bilaterally- no wheezes, rales or rhonchi, normal air movement, no respiratory distress  Cardiovascular: normal rate, regular rhythm, normal S1 and S2, no murmurs, rubs, clicks, or gallops, distal pulses intact, no carotid bruits  Abdomen: soft, non-tender, non-distended, normal bowel sounds, no masses or organomegaly  Extremities: no cyanosis, clubbing or edema  Musculoskeletal: normal range of motion, no joint swelling, deformity or tenderness  Neurologic: reflexes normal and symmetric, no cranial nerve deficit, gait, coordination and speech normal    Patient's complete Health Risk Assessment and screening values have been reviewed and are found in Flowsheets.  The following problems were reviewed today and where indicated follow up appointments were made and/or referrals ordered. Positive Risk Factor Screenings with Interventions:            General Health and ACP:  General  In general, how would you say your health is?: Fair  In the past 7 days, have you experienced any of the following?  New or Increased Pain, New or Increased Fatigue, Loneliness, Social Isolation, Stress or Anger?: (!) New or Increased Pain  Do you get the social and emotional support that you need?: Yes  Do you have a Living Will?: Yes  Advance Directives     Power of  Living Will ACP-Advance Directive ACP-Power of     Not on File Filed on 02/12/13 Filed Not on File      General Health Risk Interventions:  · Patient has a living well   · Discussed about Shingrix and Tdap and patient advised to discuss with pharmacy and get that vaccination  · Patient following with endocrinology for his diabetes and seeing cardiology and also doing cardiac rehab on a regular basis and patient also following with nephrology for his chronic renal failure  · Patient had an eye examination done in 10/17/2021 and reports reviewed and see care everywhere [facility] section    Health Habits/Nutrition:  Health Habits/Nutrition  Do you exercise for at least 20 minutes 2-3 times per week?: Yes  Have you lost any weight without trying in the past 3 months?: No  Do you eat only one meal per day?: No  Have you seen the dentist within the past year?: Yes  Body mass index: (!) 37.98  Health Habits/Nutrition Interventions:  · Patient watching his diet and exercising     Safety:  Safety  Do you have working smoke detectors?: Yes  Have all throw rugs been removed or fastened?: (!) No  Do you have non-slip mats or surfaces in all bathtubs/showers?: Yes  Do all of your stairways have a railing or banister?: Yes  Are your doorways, halls and stairs free of clutter?: Yes  Do you always fasten your seatbelt when you are in a car?: series) 03/30/2022    A1C test (Diabetic or Prediabetic)  04/14/2022    Potassium monitoring  08/24/2022    Creatinine monitoring  08/24/2022    Flu vaccine  Completed    Pneumococcal 65+ yrs at Risk Vaccine  Completed    Hepatitis A vaccine  Aged Out    Hib vaccine  Aged Out    Meningococcal (ACWY) vaccine  Aged Out     Recommendations for [employer] Due: see orders and patient instructions/AVS.  . Recommended screening schedule for the next 5-10 years is provided to the patient in written form: see Patient Instructions/AVS.    Amy Trevizo was seen today for medicare awv and health maintenance. Diagnoses and all orders for this visit:    Routine general medical examination at a health care facility  -     [place] Behavior  obesity 15m []    Body mass index (BMI) 37.0-37.9, adult   -     [place] Behavior  obesity 15m []                   Advance Care Planning   Advanced Care Planning: Discussed the patients choices for care and treatment in case of a health event that adversely affects decision-making abilities. Also discussed the patients long-term treatment options. Reviewed with the patient the [address] [place] [address] Declaration forms  Reviewed the process of designating a competent adult as an Agent (or -in-fact) that could take make health care decisions for the patient if incompetent. Patient was asked to complete the declaration forms, either acknowledge the forms by a public notary or an eligible witness and provide a signed copy to the practice office.   Time spent (minutes): 10 minutes

## 2022-01-06 ENCOUNTER — HOSPITAL ENCOUNTER (OUTPATIENT)
Dept: GENERAL RADIOLOGY | Facility: CLINIC | Age: 77
Discharge: HOME | End: 2022-01-08
Payer: MEDICARE

## 2022-01-06 DIAGNOSIS — N20.0 KIDNEY STONE: ICD-10-CM

## 2022-01-06 PROCEDURE — 74018 RADEX ABDOMEN 1 VIEW: CPT

## 2022-01-07 RX ORDER — ATORVASTATIN CALCIUM 40 MG/1
TABLET, FILM COATED ORAL
Qty: 90 TABLET | Refills: 1 | Status: SHIPPED | OUTPATIENT
Start: 2022-01-07 | End: 2022-07-06

## 2022-01-07 RX ORDER — AMLODIPINE BESYLATE 10 MG/1
TABLET ORAL
Qty: 90 TABLET | Refills: 1 | Status: SHIPPED | OUTPATIENT
Start: 2022-01-07 | End: 2022-07-06

## 2022-01-07 RX ORDER — MONTELUKAST SODIUM 10 MG/1
TABLET ORAL
Qty: 90 TABLET | Refills: 1 | Status: SHIPPED | OUTPATIENT
Start: 2022-01-07 | End: 2022-07-06

## 2022-01-07 RX ORDER — OMEPRAZOLE 40 MG/1
CAPSULE, DELAYED RELEASE ORAL
Qty: 90 CAPSULE | Refills: 1 | Status: SHIPPED | OUTPATIENT
Start: 2022-01-07 | End: 2022-07-06

## 2022-01-07 NOTE — TELEPHONE ENCOUNTER
Devontereso Amira is calling to request a refill on the following medication(s):    Medication Request:    Last filled 7/12/21 #90 with 1 Rf    Requested Prescriptions     Pending Prescriptions Disp Refills    montelukast (SINGULAIR) 10 MG tablet [Pharmacy Med Name: MONTELUKAST SODIUM TABS 10MG] 90 tablet 3     Sig: TAKE 1 TABLET DAILY    omeprazole (PRILOSEC) 40 MG delayed release capsule [Pharmacy Med Name: OMEPRAZOLE DR CAPS 40MG] 90 capsule 3     Sig: TAKE 1 CAPSULE DAILY    amLODIPine (NORVASC) 10 MG tablet [Pharmacy Med Name: AMLODIPINE BESYLATE TABS 10MG] 90 tablet 3     Sig: TAKE 1 TABLET DAILY    atorvastatin (LIPITOR) 40 MG tablet [Pharmacy Med Name: ATORVASTATIN TABS 40MG] 90 tablet 3     Sig: TAKE 1 TABLET DAILY       Last Visit Date (If Applicable):  80/36/2995    Next Visit Date:    1/20/2022

## 2022-01-20 ENCOUNTER — OFFICE VISIT (OUTPATIENT)
Dept: INTERNAL MEDICINE CLINIC | Age: 77
End: 2022-01-20
Payer: MEDICARE

## 2022-01-20 VITALS
BODY MASS INDEX: 38.22 KG/M2 | HEART RATE: 62 BPM | DIASTOLIC BLOOD PRESSURE: 70 MMHG | SYSTOLIC BLOOD PRESSURE: 118 MMHG | OXYGEN SATURATION: 98 % | WEIGHT: 244 LBS | TEMPERATURE: 97.3 F

## 2022-01-20 DIAGNOSIS — C83.33 DIFFUSE LARGE B-CELL LYMPHOMA, INTRA-ABDOMINAL LYMPH NODES (HCC): ICD-10-CM

## 2022-01-20 DIAGNOSIS — K92.89 GAS BLOAT SYNDROME: ICD-10-CM

## 2022-01-20 DIAGNOSIS — E11.59 TYPE 2 DIABETES MELLITUS WITH OTHER CIRCULATORY COMPLICATION, WITH LONG-TERM CURRENT USE OF INSULIN (HCC): ICD-10-CM

## 2022-01-20 DIAGNOSIS — I10 ESSENTIAL HYPERTENSION: Primary | ICD-10-CM

## 2022-01-20 DIAGNOSIS — Z79.4 TYPE 2 DIABETES MELLITUS WITH OTHER CIRCULATORY COMPLICATION, WITH LONG-TERM CURRENT USE OF INSULIN (HCC): ICD-10-CM

## 2022-01-20 DIAGNOSIS — Z71.3 DIETARY COUNSELING: ICD-10-CM

## 2022-01-20 PROCEDURE — G8417 CALC BMI ABV UP PARAM F/U: HCPCS | Performed by: INTERNAL MEDICINE

## 2022-01-20 PROCEDURE — 1123F ACP DISCUSS/DSCN MKR DOCD: CPT | Performed by: INTERNAL MEDICINE

## 2022-01-20 PROCEDURE — 99214 OFFICE O/P EST MOD 30 MIN: CPT | Performed by: INTERNAL MEDICINE

## 2022-01-20 PROCEDURE — G8484 FLU IMMUNIZE NO ADMIN: HCPCS | Performed by: INTERNAL MEDICINE

## 2022-01-20 PROCEDURE — 4040F PNEUMOC VAC/ADMIN/RCVD: CPT | Performed by: INTERNAL MEDICINE

## 2022-01-20 PROCEDURE — G8427 DOCREV CUR MEDS BY ELIG CLIN: HCPCS | Performed by: INTERNAL MEDICINE

## 2022-01-20 PROCEDURE — 1036F TOBACCO NON-USER: CPT | Performed by: INTERNAL MEDICINE

## 2022-01-20 RX ORDER — ALPHA-D-GALACTOSIDASE
2 TABLET ORAL
Qty: 60 TABLET | Refills: 0 | Status: SHIPPED | OUTPATIENT
Start: 2022-01-20

## 2022-01-20 RX ORDER — ALPHA-D-GALACTOSIDASE
2 TABLET ORAL
Qty: 60 TABLET | Refills: 0 | Status: SHIPPED | OUTPATIENT
Start: 2022-01-20 | End: 2022-01-20

## 2022-01-20 NOTE — PROGRESS NOTES
Jade Thao is a 67 y.o. male who presents for   Chief Complaint   Patient presents with    1 Month Follow-Up     here for follow up, complains of constantly being [address] [place] [address]     refused vaccines    Diabetes     sees endocrine, dm eye exam done 6 months ago on [address]    and follow up of chronic medical problems.   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    Bilateral primary osteoarthritis of hip    Diffuse large cell lymphoma in remission ([address]    Iron deficiency anemia    IPMN (intraductal papillary mucinous neoplasm)    Hypocitraturia    Hyperkalemia    Acquired hypothyroidism    JANIE (acute kidney injury) ([address]    Low back pain    B-cell lymphoma ([address]    Vitamin D deficiency     HPI  Here for follow-up for diabetes and blood pressure denies any new complaints except gas    Current Outpatient Medications   Medication Sig Dispense Refill    alpha-galactosidase (BEANO) TABS chewable tablet Take 2 tablets by mouth 3 times daily (with meals) 60 tablet 0    ONETOUCH ULTRA strip use 1 TEST STRIP to TEST BLOOD SUGAR three times a day      meloxicam (MOBIC) 15 MG tablet       furosemide (LASIX) 20 MG tablet Take 3 tablets by mouth daily 180 tablet 3    losartan (COZAAR) 50 MG tablet Take 1 tablet by mouth daily 90 tablet 1    montelukast (SINGULAIR) 10 MG tablet TAKE 1 TABLET DAILY 90 tablet 1    omeprazole (PRILOSEC) 40 MG delayed release capsule TAKE 1 CAPSULE DAILY 90 capsule 1    amLODIPine (NORVASC) 10 MG tablet TAKE 1 TABLET DAILY 90 tablet 1    atorvastatin (LIPITOR) 40 MG tablet TAKE 1 TABLET DAILY 90 tablet 1    levothyroxine (SYNTHROID) 25 MCG tablet TAKE 1 TABLET DAILY 90 tablet 0    metoprolol succinate (TOPROL XL) 50 MG extended release tablet TAKE 1 TABLET DAILY 90 tablet 1    insulin lispro prot & lispro (HUMALOG MIX 50/50 KWIKPEN) (50-50) 100 UNIT per ML SUPN injection pen INJECT 90 UNITS IN THE MORNING, 60 UNITS AT 6 P.M. AND 30 UNITS AT NIGHT (Patient taking differently: INJECT 90 UNITS IN THE MORNING, 60 UNITS AT 6 P.M. AND 60 UNITS AT NIGHT) 165 mL 2    Blood Glucose Monitoring Suppl (ONETOUCH VERIO FLEX SYSTEM) w/Device KIT as directed      BD PEN NEEDLE NATASHA U/F 32G X 4 MM MISC       acetaminophen (TYLENOL) 325 MG tablet Take 650 mg by mouth every 6 hours as needed for Pain      diclofenac sodium (VOLTAREN) 1 % GEL Apply topically 4 times daily as needed for Pain (Patient taking differently: Apply topically 4 times daily as needed for Pain )      Respiratory Therapy Supplies MISC Cpap supplies- tubing, mask, filter 1 each 0    aspirin 81 MG tablet Take 81 mg by mouth daily        No current facility-administered medications for this visit.        No Known Allergies    Past Medical History:   Diagnosis Date    Arthritis     BACK, BILATERAL SHOULDERS    Asthma     Back pain     BRCA2 positive 09/25/2018    CAD (coronary artery disease) 09/28/2015    CABG BYPASS X 3    Caffeine use     2 cans pop/day    Carpal tunnel syndrome     LEFT HAND    Cataracts, bilateral     Colon cancer ([address] 11/17/2011    A LYMPH NODE IN COLON WAS CANCEROUS    Cyst of pancreas     GETS THIS CHECKED ANNUALLY    Diabetes mellitus ([address]     Difficult intravenous access     PATIENT STATES USUALLY REQUIRES PICC TEAM.    GERD (gastroesophageal reflux disease)     Hay fever     HIGH CHOLESTEROL  History of blood transfusion     NO REACTION    History of chemotherapy 05/2013    Hodgkin's lymphoma ([address]     HTN (hypertension)     Hx of blood clots     AFTER BACK SURGERY, PATIENT HAD BLOOD CLOTS IN SURGICAL AREA AND NEEDED MORE SURGERY TO GET THEM REMOVED    Kidney stone     RIGHT KIDNEY    Lumbar herniated disc 1989    Microhematuria     Obesity     GUTIERREZ on CPAP     Pancreatitis     Pneumonia     Tinnitus     Use of cane as ambulatory aid     Uses brace     BACK BRACE AND LEFT HAND    Wears glasses     READING       Past Surgical History:   Procedure Laterality Date    ABDOMEN SURGERY  11/18/2011    RIGHT COLECTOMY    APPENDECTOMY  2011    with colectomy    BACK SURGERY  2014    LUMBAR LAMINECTOMY    BACK SURGERY  03/01/2014    EVACUATION OF SPINAL HEMATOMA    CATARACT REMOVAL Bilateral 01/2018    CATARACTS WITH IOL PLACED    COLONOSCOPY      Removal of colon polyp    CORONARY ARTERY BYPASS GRAFT  09/28/2015    CABG BYPASS X 3    CYSTOSCOPY Right 08/23/2018    CYSTOSCOPY, INSERTION OCCLUSIVE BALLOON, RIGHT RETROGRADE PYELOGRAM     CYSTOSCOPY Right 09/04/2018    CYSTOSCOPY STENT REMOVAL (Right )    IR NONTUNNELED VASCULAR CATHETER  2/24/2021    IR NONTUNNELED VASCULAR CATHETER 2/24/2021 Libia Pardo MD [facility] SPECIAL PROCEDURES    KNEE SURGERY Right 2007    TENDON REPAIR    NEPHROSTOMY Right 08/23/2018    HOLMIUM - NEPHROLITHOTOMY PERCUTANEOUS    NERVE BLOCK      STEROID INJECTIONS STARTING 2015 - 04/2018    MS CYSTOSCOPY,REMV CALCULUS,COMPLIC Right 6/7/0791    CYSTOSCOPY STENT REMOVAL performed by Aleida De La Rosa MD at [facility] Right 8/23/2018    CYSTOSCOPY, INSERTION OCCLUSIVE BALLOON, RIGHT RETROGRADE PYELOGRAM performed by Aleida De La Rosa MD at [address] [place] [address] 2 CM Right 8/23/2018    HOLMIUM - NEPHROLITHOTOMY PERCUTANEOUS, C-ARM, LITHOCLAST,  PT COMING FROM INTERV RADIOLOGY  *SHORT STAY performed by Naomie Li Sherry Moss MD at [address] [place]      AS A CHILD       Family History   Problem Relation Age of Onset    Diabetes Father     High Blood Pressure Father     Stroke Mother     Heart Failure Mother     Other Mother         pacemaker    High Blood Pressure Mother     Heart Disease Maternal Grandmother     Heart Disease Maternal Grandfather     Heart Disease Maternal Uncle      ROS   Constitutional:  Negative for fatigue, loss of appetite and unexpected weight change   HEENT            : Negative for neck stiffness and pain, no congestion or sinus pressure   Eyes                : No visual disturbance or pain   Cardiovascular: No chest pain or palpitations or leg swelling   Respiratory      : Negative for cough, shortness of breath or wheezing   Gastrointestinal: Negative for abdominal pain, constipation or diarrhea but positive for bloating No nausea or vomiting   Genitourinary:     No urgency or frequency, no burning or hematuria   Musculoskeletal: No arthralgias, back pain or myalgias   Skin                  : Negative for rash or erythema   Neurological    : Negative for dizziness, weakness, tremors ,light headedness or syncope   Psychiatric       : Negative for dysphoric mood, sleep disturbances, nervous or anxious, or decreased concentration   All other review of systems was negative    Objective  Physical Examination:    Nursing note reviewed    /70   Pulse 62   Temp 97.3 °F (36.3 °C)   Wt 244 lb (110.7 kg)   SpO2 98%   BMI 38.22 kg/m²   BP Readings from Last 3 Encounters:   01/20/22 118/70   01/20/22 (!) 185/87   01/13/22 (!) 144/64         Constitutional:  Dimitry Xiao is oriented to place, person and time ,appears well-developed and well-nourished  HEENT:  Atraumatic and normocephalic, external ears normal bilaterally, nose normal no oropharyngeal exudate and is clear and moist  Eyes:  EOCM normal; conjunctivae normal; PERRLA bilaterally  Neck:  Normal range of motion, neck supple, no JVD and no thyromegaly  Cardiovascular:  RRR, normal heart sounds and intact distal pulses  Pulmonary:  effort normal and breath sounds normal bilaterally,no wheezes or rales, no respiratory distress  Abdominal:  Soft, non-tender; normal bowel sounds, no masses  Musculoskeletal:  Normal range of motion and no edema or tenderness bilaterally  No lymphadenopathy  Neurological:  alert, oriented, and normal reflexes bilaterally  Skin: warm and dry  Psychiatric:  normal mood and effect; behavior normal.    Labs:   Lab Results   Component Value Date    LABA1C 6.1 (H) 02/23/2021     Lab Results   Component Value Date    CHOL 68 02/23/2021    CHOL 68 02/23/2021     Lab Results   Component Value Date    HDL 34 (L) 02/23/2021    HDL 34 (L) 02/23/2021     Lab Results   Component Value Date    LDLCALC 35 10/17/2017     Lab Results   Component Value Date    TRIG 111 02/23/2021    TRIG 111 02/23/2021     No components found for: CHOLHDL  Lab Results   Component Value Date    WBC 7.50 01/06/2022    HGB 13.1 (A) 01/06/2022    HCT 39.4 (A) 01/06/2022    MCV 91.8 01/06/2022     01/06/2022     Lab Results   Component Value Date    INR 1.2 02/24/2021    PROTIME 14.6 (H) 02/24/2021     Lab Results   Component Value Date    GLUCOSE 237 01/06/2022    CREATININE 1.77 01/06/2022    BUN 30 01/06/2022     01/06/2022    K 4.3 01/06/2022     01/06/2022    CO2 24 01/06/2022     Lab Results   Component Value Date    ALT 14 05/18/2021    AST 24 05/18/2021    ALKPHOS 141 (H) 05/18/2021    BILITOT 0.46 05/18/2021     Lab Results   Component Value Date    LABPROT 7.0 02/08/2013    LABALBU 3.7 05/18/2021     Lab Results   Component Value Date    TSH 3.60 02/23/2021    TSH 3.60 02/23/2021     Assessment:  1. Essential hypertension    2. Severe obesity (BMI 35.0-35.9 with comorbidity) ([facility]    3. Diffuse large b-cell lymphoma, intra-abdominal lymph nodes ([facility]    4.  Type 2 diabetes mellitus with other circulatory complication, with long-term current use of insulin ([address]    5. Gas bloat syndrome        Plan:  Patient blood pressure stable on current medications and recently seen the nephrologist and is started on losartan 50 mg daily because of albuminuria and also his Lasix was increased to 60 mg daily and patient's creatinine is at 1.77 and is stable  Patient also saw the endocrinologist and his hemoglobin A1c is 7.0 and is off metformin and has increased from 6.7 and will get a copy of the report  Patient had a history of diffuse large B-cell lymphoma which is resolved  Patient complains of increased gas and flatus and advised him to try Beano and decrease the intake of the vegetables and foods  Strongly counseled about diet exercise and weight loss  Review in 4 months           1. Janet Chua received counseling on the following healthy behaviors: nutrition and exercise    2. Prior labs and health maintenance reviewed. 3.  Discussed use, benefit, and side effects of prescribed medications. Barriers to medication compliance addressed. All his questions were answered. Pt voiced understanding. Janet Chua will continue current medications, diet and exercise. Orders Placed This Encounter   Medications    alpha-galactosidase (BEANO) TABS chewable tablet     Sig: Take 2 tablets by mouth 3 times daily (with meals)     Dispense:  60 tablet     Refill:  0          Completed Refills               Requested Prescriptions     Signed Prescriptions Disp Refills    alpha-galactosidase (BEANO) TABS chewable tablet 60 tablet 0     Sig: Take 2 tablets by mouth 3 times daily (with meals)     4. Patient given educational materials - see patient instructions    5. Was a self-tracking handout given in paper form or via [employer]? NO    No orders of the defined types were placed in this encounter. Return in about 4 months (around 5/20/2022). Patient voiced understanding and agreed to treatment plan. Electronically signed by Varun Ding MD on 1/20/2022 at 10:51 AM    This note is created with a voice recognition program and while intend to generate a document that accurately reflects the content of the visit, no guarantee can be provided that every mistake has been identified and corrected by editing. BMI was elevated today, and weight loss plan recommended is : daily exercise regimen.

## 2022-01-21 NOTE — PATIENT INSTRUCTIONS

## 2022-02-07 RX ORDER — INSULIN LISPRO 100 [IU]/ML
INJECTION, SUSPENSION SUBCUTANEOUS
Qty: 165 ML | Refills: 2 | Status: SHIPPED | OUTPATIENT
Start: 2022-02-07 | End: 2022-10-14 | Stop reason: SDUPTHER

## 2022-02-07 NOTE — TELEPHONE ENCOUNTER
Edmond Hardy is calling to request a refill on the following medication(s):    Medication Request:    Last filled 6/7/21 165 ml with 2 RF    Requested Prescriptions     Pending Prescriptions Disp Refills    HUMALOG MIX 50/50 KWIKPEN (50-50) 100 UNIT/ML SUPN injection pen [Pharmacy Med Name: HUMALOG MIX 50/50 KWKPN 3ML 5 50/50] 165 mL 2     Sig: INJECT 90 UNITS IN THE MORNING, 60 UNITS AT 6 P.M.  AND 30 UNITS AT NIGHT       Last Visit Date (If Applicable):  4/68/8779    Next Visit Date:    5/19/2022

## 2022-02-22 ENCOUNTER — HOSPITAL ENCOUNTER (OUTPATIENT)
Age: 77
Setting detail: SPECIMEN
Discharge: HOME | End: 2022-02-22

## 2022-02-22 ENCOUNTER — HOSPITAL ENCOUNTER (OUTPATIENT)
Dept: CARDIAC REHAB | Age: 77
Discharge: HOME | End: 2022-02-22
Payer: MEDICARE

## 2022-02-22 DIAGNOSIS — N20.0 RENAL CALCULUS, LEFT: ICD-10-CM

## 2022-02-22 DIAGNOSIS — I12.9 BENIGN HYPERTENSION WITH CKD (CHRONIC KIDNEY DISEASE) STAGE III (HCC): ICD-10-CM

## 2022-02-22 DIAGNOSIS — N18.30 SECONDARY DIABETES MELLITUS WITH STAGE 3 CHRONIC KIDNEY DISEASE (HCC): ICD-10-CM

## 2022-02-22 DIAGNOSIS — N18.30 BENIGN HYPERTENSION WITH CKD (CHRONIC KIDNEY DISEASE) STAGE III (HCC): ICD-10-CM

## 2022-02-22 DIAGNOSIS — R31.29 OTHER MICROSCOPIC HEMATURIA: ICD-10-CM

## 2022-02-22 DIAGNOSIS — E13.22 SECONDARY DIABETES MELLITUS WITH STAGE 3 CHRONIC KIDNEY DISEASE (HCC): ICD-10-CM

## 2022-02-22 DIAGNOSIS — D63.1 ANEMIA IN STAGE 3B CHRONIC KIDNEY DISEASE (HCC): ICD-10-CM

## 2022-02-22 DIAGNOSIS — N18.32 ANEMIA IN STAGE 3B CHRONIC KIDNEY DISEASE (HCC): ICD-10-CM

## 2022-02-22 DIAGNOSIS — N18.32 STAGE 3B CHRONIC KIDNEY DISEASE (HCC): ICD-10-CM

## 2022-02-22 PROCEDURE — 9900000065 HC CARDIAC REHAB PHASE 3 - 1 VISIT

## 2022-02-23 LAB
ANION GAP SERPL CALCULATED.3IONS-SCNC: 16 MMOL/L (ref 9–17)
BUN BLDV-MCNC: 39 MG/DL (ref 8–23)
CALCIUM SERPL-MCNC: 8.9 MG/DL (ref 8.6–10.4)
CHLORIDE BLD-SCNC: 108 MMOL/L (ref 98–107)
CO2: 21 MMOL/L (ref 20–31)
CREAT SERPL-MCNC: 1.99 MG/DL (ref 0.7–1.2)
GFR AFRICAN AMERICAN: 40 ML/MIN
GFR NON-AFRICAN AMERICAN: 33 ML/MIN
GFR SERPL CREATININE-BSD FRML MDRD: ABNORMAL ML/MIN/{1.73_M2}
GLUCOSE BLD-MCNC: 122 MG/DL (ref 70–99)
POTASSIUM SERPL-SCNC: 4.4 MMOL/L (ref 3.7–5.3)
SODIUM BLD-SCNC: 145 MMOL/L (ref 135–144)

## 2022-02-28 RX ORDER — LEVOTHYROXINE SODIUM 0.03 MG/1
TABLET ORAL
Qty: 90 TABLET | Refills: 1 | Status: SHIPPED | OUTPATIENT
Start: 2022-02-28 | End: 2022-08-29

## 2022-02-28 NOTE — TELEPHONE ENCOUNTER
Alana Carroll is calling to request a refill on the following medication(s):    Medication Request:    Last filled 11/29/21 #90 with 0 RF    Requested Prescriptions     Pending Prescriptions Disp Refills    levothyroxine (SYNTHROID) 25 MCG tablet [Pharmacy Med Name: L-THYROXINE (SYNTHROID) TABS 25MCG] 90 tablet 3     Sig: TAKE 1 TABLET DAILY       Last Visit Date (If Applicable):  9/16/1978    Next Visit Date:    5/19/2022

## 2022-04-15 RX ORDER — METOPROLOL SUCCINATE 50 MG/1
TABLET, EXTENDED RELEASE ORAL
Qty: 90 TABLET | Refills: 3 | Status: SHIPPED | OUTPATIENT
Start: 2022-04-15

## 2022-04-15 NOTE — TELEPHONE ENCOUNTER
Derek Disla is calling to request a refill on the following medication(s):    Last Visit Date (If Applicable):  5/06/8227    Next Visit Date:    5/19/2022    Medication Request:  Requested Prescriptions     Pending Prescriptions Disp Refills    metoprolol succinate (TOPROL XL) 50 MG extended release tablet [Pharmacy Med Name: METOPROLOL SUCCINATE ER TABS 50MG] 90 tablet 3     Sig: TAKE 1 TABLET DAILY

## 2022-05-03 ENCOUNTER — HOSPITAL ENCOUNTER (OUTPATIENT)
Age: 77
Setting detail: SPECIMEN
Discharge: HOME | End: 2022-05-03

## 2022-05-03 DIAGNOSIS — N20.0 RENAL CALCULUS, LEFT: ICD-10-CM

## 2022-05-03 DIAGNOSIS — I12.9 BENIGN HYPERTENSION WITH CKD (CHRONIC KIDNEY DISEASE) STAGE III (HCC): ICD-10-CM

## 2022-05-03 DIAGNOSIS — E13.22 SECONDARY DIABETES MELLITUS WITH STAGE 3 CHRONIC KIDNEY DISEASE (HCC): ICD-10-CM

## 2022-05-03 DIAGNOSIS — N18.30 BENIGN HYPERTENSION WITH CKD (CHRONIC KIDNEY DISEASE) STAGE III (HCC): ICD-10-CM

## 2022-05-03 DIAGNOSIS — N18.30 SECONDARY DIABETES MELLITUS WITH STAGE 3 CHRONIC KIDNEY DISEASE (HCC): ICD-10-CM

## 2022-05-03 DIAGNOSIS — D63.1 ANEMIA IN STAGE 3B CHRONIC KIDNEY DISEASE (HCC): ICD-10-CM

## 2022-05-03 DIAGNOSIS — R31.29 OTHER MICROSCOPIC HEMATURIA: ICD-10-CM

## 2022-05-03 DIAGNOSIS — N18.32 STAGE 3B CHRONIC KIDNEY DISEASE (HCC): ICD-10-CM

## 2022-05-03 DIAGNOSIS — N18.32 ANEMIA IN STAGE 3B CHRONIC KIDNEY DISEASE (HCC): ICD-10-CM

## 2022-05-03 LAB
-: ABNORMAL
ABSOLUTE EOS #: 0.19 K/UL (ref 0–0.44)
ABSOLUTE IMMATURE GRANULOCYTE: 0.05 K/UL (ref 0–0.3)
ABSOLUTE LYMPH #: 2.58 K/UL (ref 1.1–3.7)
ABSOLUTE MONO #: 0.76 K/UL (ref 0.1–1.2)
ALBUMIN SERPL-MCNC: 3.8 G/DL (ref 3.5–5.2)
ALBUMIN/GLOBULIN RATIO: 1.2 (ref 1–2.5)
ALP BLD-CCNC: 138 U/L (ref 40–129)
ALT SERPL-CCNC: 20 U/L (ref 5–41)
ANION GAP SERPL CALCULATED.3IONS-SCNC: 12 MMOL/L (ref 9–17)
ANION GAP SERPL CALCULATED.3IONS-SCNC: 12 MMOL/L (ref 9–17)
AST SERPL-CCNC: 21 U/L
BASOPHILS # BLD: 1 % (ref 0–2)
BASOPHILS ABSOLUTE: 0.04 K/UL (ref 0–0.2)
BILIRUB SERPL-MCNC: 0.37 MG/DL (ref 0.3–1.2)
BILIRUBIN URINE: NEGATIVE
BUN BLDV-MCNC: 32 MG/DL (ref 8–23)
BUN BLDV-MCNC: 33 MG/DL (ref 8–23)
CALCIUM SERPL-MCNC: 8.9 MG/DL (ref 8.6–10.4)
CALCIUM SERPL-MCNC: 9 MG/DL (ref 8.6–10.4)
CHLORIDE BLD-SCNC: 107 MMOL/L (ref 98–107)
CHLORIDE BLD-SCNC: 109 MMOL/L (ref 98–107)
CHOLESTEROL/HDL RATIO: 3.2
CHOLESTEROL: 94 MG/DL
CO2: 22 MMOL/L (ref 20–31)
CO2: 22 MMOL/L (ref 20–31)
COLOR: YELLOW
CREAT SERPL-MCNC: 1.83 MG/DL (ref 0.7–1.2)
CREAT SERPL-MCNC: 1.89 MG/DL (ref 0.7–1.2)
EOSINOPHILS RELATIVE PERCENT: 2 % (ref 1–4)
EPITHELIAL CELLS UA: ABNORMAL /HPF (ref 0–5)
GFR AFRICAN AMERICAN: 42 ML/MIN
GFR AFRICAN AMERICAN: 44 ML/MIN
GFR NON-AFRICAN AMERICAN: 35 ML/MIN
GFR NON-AFRICAN AMERICAN: 36 ML/MIN
GFR SERPL CREATININE-BSD FRML MDRD: ABNORMAL ML/MIN/{1.73_M2}
GFR SERPL CREATININE-BSD FRML MDRD: ABNORMAL ML/MIN/{1.73_M2}
GLUCOSE BLD-MCNC: 117 MG/DL (ref 70–99)
GLUCOSE BLD-MCNC: 118 MG/DL (ref 70–99)
GLUCOSE URINE: NEGATIVE
HCT VFR BLD CALC: 40.5 % (ref 40.7–50.3)
HDLC SERPL-MCNC: 29 MG/DL
HEMOGLOBIN: 12.6 G/DL (ref 13–17)
IMMATURE GRANULOCYTES: 1 %
KETONES, URINE: NEGATIVE
LDL CHOLESTEROL: 25 MG/DL (ref 0–130)
LEUKOCYTE ESTERASE, URINE: NEGATIVE
LYMPHOCYTES # BLD: 32 % (ref 24–43)
MAGNESIUM: 2 MG/DL (ref 1.6–2.6)
MCH RBC QN AUTO: 30.1 PG (ref 25.2–33.5)
MCHC RBC AUTO-ENTMCNC: 31.1 G/DL (ref 28.4–34.8)
MCV RBC AUTO: 96.7 FL (ref 82.6–102.9)
MONOCYTES # BLD: 9 % (ref 3–12)
NITRITE, URINE: NEGATIVE
NRBC AUTOMATED: 0 PER 100 WBC
PDW BLD-RTO: 14.5 % (ref 11.8–14.4)
PH UA: 6.5 (ref 5–8)
PHOSPHORUS: 3.7 MG/DL (ref 2.5–4.5)
PLATELET # BLD: 156 K/UL (ref 138–453)
PMV BLD AUTO: 11.3 FL (ref 8.1–13.5)
POTASSIUM SERPL-SCNC: 3.9 MMOL/L (ref 3.7–5.3)
POTASSIUM SERPL-SCNC: 4.1 MMOL/L (ref 3.7–5.3)
PROTEIN UA: ABNORMAL
RBC # BLD: 4.19 M/UL (ref 4.21–5.77)
RBC # BLD: ABNORMAL 10*6/UL
RBC UA: ABNORMAL /HPF (ref 0–4)
SEG NEUTROPHILS: 55 % (ref 36–65)
SEGMENTED NEUTROPHILS ABSOLUTE COUNT: 4.46 K/UL (ref 1.5–8.1)
SODIUM BLD-SCNC: 141 MMOL/L (ref 135–144)
SODIUM BLD-SCNC: 143 MMOL/L (ref 135–144)
SPECIFIC GRAVITY UA: 1.01 (ref 1–1.03)
THYROXINE, FREE: 1.06 NG/DL (ref 0.93–1.7)
TOTAL PROTEIN: 6.9 G/DL (ref 6.4–8.3)
TRIGL SERPL-MCNC: 199 MG/DL
TSH SERPL DL<=0.05 MIU/L-ACNC: 3.94 UIU/ML (ref 0.3–5)
TURBIDITY: CLEAR
URINE HGB: ABNORMAL
UROBILINOGEN, URINE: NORMAL
VITAMIN D 25-HYDROXY: 21.3 NG/ML
WBC # BLD: 8.1 K/UL (ref 3.5–11.3)
WBC UA: ABNORMAL /HPF (ref 0–5)

## 2022-05-03 PROCEDURE — 9900000065 HC CARDIAC REHAB PHASE 3 - 1 VISIT

## 2022-05-04 LAB — VITAMIN B-12: 292 PG/ML (ref 232–1245)

## 2022-05-19 ENCOUNTER — OFFICE VISIT (OUTPATIENT)
Dept: INTERNAL MEDICINE CLINIC | Age: 77
End: 2022-05-19
Payer: MEDICARE

## 2022-05-19 VITALS
SYSTOLIC BLOOD PRESSURE: 140 MMHG | OXYGEN SATURATION: 98 % | HEIGHT: 67 IN | BODY MASS INDEX: 38.67 KG/M2 | HEART RATE: 54 BPM | WEIGHT: 246.4 LBS | TEMPERATURE: 97.3 F | DIASTOLIC BLOOD PRESSURE: 82 MMHG | RESPIRATION RATE: 16 BRPM

## 2022-05-19 DIAGNOSIS — E11.59 TYPE 2 DIABETES MELLITUS WITH OTHER CIRCULATORY COMPLICATION, WITH LONG-TERM CURRENT USE OF INSULIN (HCC): ICD-10-CM

## 2022-05-19 DIAGNOSIS — Z79.4 TYPE 2 DIABETES MELLITUS WITH OTHER CIRCULATORY COMPLICATION, WITH LONG-TERM CURRENT USE OF INSULIN (HCC): ICD-10-CM

## 2022-05-19 DIAGNOSIS — N18.2 CHRONIC RENAL FAILURE, STAGE 2 (MILD): ICD-10-CM

## 2022-05-19 DIAGNOSIS — I10 ESSENTIAL HYPERTENSION: Primary | ICD-10-CM

## 2022-05-19 DIAGNOSIS — E66.01 SEVERE OBESITY (BMI 35.0-39.9) WITH COMORBIDITY (HCC): ICD-10-CM

## 2022-05-19 PROCEDURE — G8427 DOCREV CUR MEDS BY ELIG CLIN: HCPCS | Performed by: INTERNAL MEDICINE

## 2022-05-19 PROCEDURE — G8417 CALC BMI ABV UP PARAM F/U: HCPCS | Performed by: INTERNAL MEDICINE

## 2022-05-19 PROCEDURE — 99214 OFFICE O/P EST MOD 30 MIN: CPT | Performed by: INTERNAL MEDICINE

## 2022-05-19 PROCEDURE — 1123F ACP DISCUSS/DSCN MKR DOCD: CPT | Performed by: INTERNAL MEDICINE

## 2022-05-19 PROCEDURE — 1036F TOBACCO NON-USER: CPT | Performed by: INTERNAL MEDICINE

## 2022-05-19 RX ORDER — OLOPATADINE HYDROCHLORIDE 1 MG/ML
1 SOLUTION/ DROPS OPHTHALMIC 2 TIMES DAILY
Qty: 1 EACH | Refills: 0 | Status: SHIPPED | OUTPATIENT
Start: 2022-05-19 | End: 2022-06-18

## 2022-05-19 SDOH — ECONOMIC STABILITY: FOOD INSECURITY: WITHIN THE PAST 12 MONTHS, YOU WORRIED THAT YOUR FOOD WOULD RUN OUT BEFORE YOU GOT MONEY TO BUY MORE.: NEVER TRUE

## 2022-05-19 SDOH — ECONOMIC STABILITY: FOOD INSECURITY: WITHIN THE PAST 12 MONTHS, THE FOOD YOU BOUGHT JUST DIDN'T LAST AND YOU DIDN'T HAVE MONEY TO GET MORE.: NEVER TRUE

## 2022-05-19 ASSESSMENT — PATIENT HEALTH QUESTIONNAIRE - PHQ9
SUM OF ALL RESPONSES TO PHQ QUESTIONS 1-9: 0
1. LITTLE INTEREST OR PLEASURE IN DOING THINGS: 0
SUM OF ALL RESPONSES TO PHQ9 QUESTIONS 1 & 2: 0
SUM OF ALL RESPONSES TO PHQ QUESTIONS 1-9: 0
SUM OF ALL RESPONSES TO PHQ QUESTIONS 1-9: 0
2. FEELING DOWN, DEPRESSED OR HOPELESS: 0
SUM OF ALL RESPONSES TO PHQ QUESTIONS 1-9: 0

## 2022-05-19 ASSESSMENT — SOCIAL DETERMINANTS OF HEALTH (SDOH): HOW HARD IS IT FOR YOU TO PAY FOR THE VERY BASICS LIKE FOOD, HOUSING, MEDICAL CARE, AND HEATING?: NOT HARD AT ALL

## 2022-05-19 NOTE — PROGRESS NOTES
Jo Ann Horn is a 67 y.o. male who presents for   Chief Complaint   Patient presents with    Diabetes    Hypertension    Hyperlipidemia    Other     pt has a cut on his leg that happened about 3 weeks ago.  Referral - General     pt would like to be referral to a  different kidney doctor thats closer to him. and follow up of chronic medical problems.   Patient Active Problem List   Diagnosis    HIGH CHOLESTEROL    Essential hypertension    Class 2 severe obesity due to excess calories with serious comorbidity and body mass index (BMI) of 39.0 to 39.9 in adult ([address]    GUTIERREZ (obstructive sleep apnea)    [facility]    Hay fever    Lumbar herniated disc    Epidural hematoma (HCC)    Microhematuria    Renal calculus, left    Urolithiasis    Stage 2 chronic kidney disease    Type 2 diabetes mellitus with stage 3b chronic kidney disease, with long-term current use of insulin (HCC)    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    Bilateral primary osteoarthritis of hip    Diffuse large cell lymphoma in remission ([address]    Iron deficiency anemia    IPMN (intraductal papillary mucinous neoplasm)    Hypocitraturia    Hyperkalemia    Acquired hypothyroidism    JANIE (acute kidney injury) ([address]    Low back pain    B-cell lymphoma (HCC)    Vitamin D deficiency     HPI  Here for follow-up on blood pressure and diabetes denies any new complaints other than a small scratch on the leg wants to get it checked    Current Outpatient Medications   Medication Sig Dispense Refill    olopatadine (PATANOL) 0.1 % ophthalmic solution Place 1 drop into both eyes 2 times daily 1 each 0    metoprolol succinate (TOPROL XL) 50 MG extended release tablet TAKE 1 TABLET DAILY 90 tablet 3    levothyroxine (SYNTHROID) 25 MCG tablet TAKE 1 TABLET DAILY 90 tablet 1    insulin lispro prot & lispro (HUMALOG MIX 50/50 KWIKPEN) (50-50) 100 UNIT per ML SUPN injection pen INJECT 90 UNITS IN THE MORNING, 60 UNITS AT 6 P.M. AND 60 UNITS AT NIGHT 165 mL 2    alpha-galactosidase (BEANO) TABS chewable tablet Take 2 tablets by mouth 3 times daily (with meals) 60 tablet 0    ONETOUCH ULTRA strip use 1 TEST STRIP to TEST BLOOD SUGAR three times a day      furosemide (LASIX) 20 MG tablet Take 3 tablets by mouth daily 180 tablet 3    losartan (COZAAR) 50 MG tablet Take 1 tablet by mouth daily 90 tablet 1    montelukast (SINGULAIR) 10 MG tablet TAKE 1 TABLET DAILY 90 tablet 1    omeprazole (PRILOSEC) 40 MG delayed release capsule TAKE 1 CAPSULE DAILY 90 capsule 1    amLODIPine (NORVASC) 10 MG tablet TAKE 1 TABLET DAILY 90 tablet 1    atorvastatin (LIPITOR) 40 MG tablet TAKE 1 TABLET DAILY 90 tablet 1    Blood Glucose Monitoring Suppl (ONETOUCH VERIO FLEX SYSTEM) w/Device KIT as directed      BD PEN NEEDLE NATASHA U/F 32G X 4 MM MISC       acetaminophen (TYLENOL) 325 MG tablet Take 650 mg by mouth every 6 hours as needed for Pain      diclofenac sodium (VOLTAREN) 1 % GEL Apply topically 4 times daily as needed for Pain (Patient taking differently: Apply topically 4 times daily as needed for Pain )      Respiratory Therapy Supplies MISC Cpap supplies- tubing, mask, filter 1 each 0    aspirin 81 MG tablet Take 81 mg by mouth daily        No current facility-administered medications for this visit.        No Known Allergies    Past Medical History:   Diagnosis Date    Arthritis     BACK, BILATERAL SHOULDERS    Asthma     Back pain     BRCA2 positive 09/25/2018    CAD (coronary artery disease) 09/28/2015    CABG BYPASS X 3    Caffeine use     2 cans pop/day    Carpal tunnel syndrome     LEFT HAND    Cataracts, bilateral     Colon cancer ([address] 11/17/2011    A LYMPH NODE IN COLON WAS CANCEROUS    Cyst of pancreas     GETS THIS CHECKED ANNUALLY    Diabetes mellitus ([address]     Difficult intravenous access     PATIENT STATES USUALLY REQUIRES PICC TEAM.    GERD (gastroesophageal reflux disease)     Hay fever     HIGH CHOLESTEROL     History of blood transfusion     NO REACTION    History of chemotherapy 05/2013    [facility]gkin's [facility]     HTN (hypertension)     Hx of blood clots     AFTER BACK SURGERY, PATIENT HAD BLOOD CLOTS IN SURGICAL AREA AND NEEDED MORE SURGERY TO GET THEM REMOVED    Kidney stone     RIGHT KIDNEY    Lumbar herniated disc 1989    Microhematuria     Obesity     GUTIERREZ on CPAP     Pancreatitis     Pneumonia     Tinnitus     Use of cane as ambulatory aid     Uses brace     BACK BRACE AND LEFT HAND    Wears glasses     READING       Past Surgical History:   Procedure Laterality Date    ABDOMEN SURGERY  11/18/2011    RIGHT COLECTOMY    APPENDECTOMY  2011    with colectomy    BACK SURGERY  2014    LUMBAR LAMINECTOMY    BACK SURGERY  03/01/2014    EVACUATION OF SPINAL HEMATOMA    CATARACT REMOVAL Bilateral 01/2018    CATARACTS WITH IOL PLACED    COLONOSCOPY      Removal of colon polyp    CORONARY ARTERY BYPASS GRAFT  09/28/2015    CABG BYPASS X 3    CYSTOSCOPY Right 08/23/2018    CYSTOSCOPY, INSERTION OCCLUSIVE BALLOON, RIGHT RETROGRADE PYELOGRAM     CYSTOSCOPY Right 09/04/2018    CYSTOSCOPY STENT REMOVAL (Right )    IR NONTUNNELED VASCULAR CATHETER  2/24/2021    IR NONTUNNELED VASCULAR CATHETER 2/24/2021 Meg Caceres MD [facility] SPECIAL PROCEDURES    KNEE SURGERY Right 2007    TENDON REPAIR    NEPHROSTOMY Right 08/23/2018    HOLMIUM - NEPHROLITHOTOMY PERCUTANEOUS    NERVE BLOCK      STEROID INJECTIONS STARTING 2015 - 04/2018    [place] CYSTOSCOPY,REMV CALCULUS,COMPLIC Right 9/2/6814    CYSTOSCOPY STENT REMOVAL performed by Demi Ruano MD at [facility] Right 8/23/2018    CYSTOSCOPY, INSERTION OCCLUSIVE BALLOON, RIGHT RETROGRADE PYELOGRAM performed by Demi Ruano MD at [address] [place] [address] TO 2 CM Right 8/23/2018    HOLMIUM - NEPHROLITHOTOMY PERCUTANEOUS, C-ARM, LITHOCLAST,  PT COMING FROM INTERV RADIOLOGY *SHORT STAY performed by Adam Alberto MD at [address] [place]      AS A CHILD       Family History   Problem Relation Age of Onset    Diabetes Father     High Blood Pressure Father     Stroke Mother     Heart Failure Mother     Other Mother         pacemaker    High Blood Pressure Mother     Heart Disease Maternal Grandmother     Heart Disease Maternal Grandfather     Heart Disease Maternal Uncle      ROS   Constitutional:  Negative for fatigue, loss of appetite and unexpected weight change   HEENT            : Negative for neck stiffness and pain, no congestion or sinus pressure   Eyes                : No visual disturbance or pain   Cardiovascular: No chest pain or palpitations or leg swelling   Respiratory      : Negative for cough, shortness of breath or wheezing   Gastrointestinal: Negative for abdominal pain, constipation or diarrhea and bloating No nausea or vomiting   Genitourinary:     No urgency or frequency, no burning or hematuria   Musculoskeletal: No arthralgias, back pain or myalgias   Skin                  : Negative for rash or erythema   Neurological    : Negative for dizziness, weakness, tremors ,light headedness or syncope   Psychiatric       : Negative for dysphoric mood, sleep disturbances, nervous or anxious, or decreased concentration   All other review of systems was negative    Objective  Physical Examination:    Nursing note reviewed    BP (!) 140/82 (Site: Left Upper Arm, Position: Sitting, Cuff Size: Large Adult)   Pulse 54   Temp 97.3 °F (36.3 °C) (Temporal)   Resp 16   Ht 5' 7.01\" (1.702 m)   Wt 246 lb 6.4 oz (111.8 kg)   SpO2 98%   BMI 38.58 kg/m²   BP Readings from Last 3 Encounters:   05/19/22 (!) 140/82   01/20/22 118/70   01/20/22 (!) 185/87         Constitutional:  Izabel Menezes is oriented to place, person and time ,appears well-developed and well-nourished  HEENT:  Atraumatic and normocephalic, external ears normal bilaterally, nose normal no oropharyngeal exudate and is clear and moist  Eyes:  EOCM normal; conjunctivae normal; PERRLA bilaterally  Neck:  Normal range of motion, neck supple, no JVD and no thyromegaly  Cardiovascular:  RRR, normal heart sounds and intact distal pulses  Pulmonary:  effort normal and breath sounds normal bilaterally,no wheezes or rales, no respiratory distress  Abdominal:  Soft, non-tender; normal bowel sounds, no masses  Musculoskeletal:  Normal range of motion and no edema or tenderness bilaterally  No lymphadenopathy  Neurological:  alert, oriented, and normal reflexes bilaterally  Skin: warm and dry  Psychiatric:  normal mood and effect; behavior normal.    Labs:   Lab Results   Component Value Date    LABA1C 6.1 (H) 02/23/2021     Lab Results   Component Value Date    CHOL 94 05/03/2022     Lab Results   Component Value Date    HDL 29 (L) 05/03/2022     Lab Results   Component Value Date    LDLCALC 35 10/17/2017     Lab Results   Component Value Date    TRIG 199 (H) 05/03/2022     No results found for: [place], [place]  Lab Results   Component Value Date    WBC 8.1 05/03/2022    HGB 12.6 (L) 05/03/2022    HCT 40.5 (L) 05/03/2022    MCV 96.7 05/03/2022     05/03/2022     Lab Results   Component Value Date    INR 1.2 02/24/2021    PROTIME 14.6 (H) 02/24/2021     Lab Results   Component Value Date    GLUCOSE 117 (H) 05/03/2022    GLUCOSE 118 (H) 05/03/2022    CREATININE 1.83 (H) 05/03/2022    CREATININE 1.89 (H) 05/03/2022    BUN 33 (H) 05/03/2022    BUN 32 (H) 05/03/2022     05/03/2022     05/03/2022    K 4.1 05/03/2022    K 3.9 05/03/2022     05/03/2022     (H) 05/03/2022    CO2 22 05/03/2022    CO2 22 05/03/2022     Lab Results   Component Value Date    ALT 20 05/03/2022    AST 21 05/03/2022    ALKPHOS 138 (H) 05/03/2022    BILITOT 0.37 05/03/2022     Lab Results   Component Value Date    LABPROT 7.0 02/08/2013    LABALBU 3.8 05/03/2022     Lab Results   Component Value Date    TSH 3.94 05/03/2022     Assessment:  1. Essential hypertension    2. Severe obesity (BMI 35.0-39. 9) with comorbidity ([facility])    3. Type 2 diabetes mellitus with other circulatory complication, with long-term current use of insulin ([facility])    4. Chronic renal failure, stage 2 (mild)        Plan:  According to patient his hemoglobin A1c was 6.8 and will get a copy of the report from the endocrinologist  Patient had recent lab work done for cholesterol and is stable and last LDL 35 and HDL 29 and patient has seen the cardiologist recently  Counseled about diet exercise and weight loss  Blood pressure is stable on current medications  Patient had a small abrasion on the front of the right leg which is healing well and it was all due to his scratching the dry skin and advised him to avoid scratching  Patient wants to see another nephrologist close to his home and [facility]  Patient also was complaining of watering of the eyes and so Patanol eyedrops given to the patient and advised him to call me back in 2 weeks  Review in 4 months           1. Adam Jhaveri received counseling on the following healthy behaviors: nutrition and exercise    2. Prior labs and health maintenance reviewed. 3.  Discussed use, benefit, and side effects of prescribed medications. Barriers to medication compliance addressed. All his questions were answered. Pt voiced understanding. Adam Jhaveri will continue current medications, diet and exercise. Orders Placed This Encounter   Medications    olopatadine (PATANOL) 0.1 % ophthalmic solution     Sig: Place 1 drop into both eyes 2 times daily     Dispense:  1 each     Refill:  0          Completed Refills               Requested Prescriptions     Signed Prescriptions Disp Refills    olopatadine (PATANOL) 0.1 % ophthalmic solution 1 each 0     Sig: Place 1 drop into both eyes 2 times daily     4. Patient given educational materials - see patient instructions    5.  Was a self-tracking handout given in paper form or via [employer]? NO    No orders of the defined types were placed in this encounter. Return in about 4 months (around 9/19/2022). Patient voiced understanding and agreed to treatment plan. Electronically signed by Cornell Smith MD on 5/19/2022 at 1:08 PM    This note is created with a voice recognition program and while intend to generate a document that accurately reflects the content of the visit, no guarantee can be provided that every mistake has been identified and corrected by editing.

## 2022-05-31 ENCOUNTER — HOSPITAL ENCOUNTER (OUTPATIENT)
Dept: CARDIAC REHAB | Age: 77
Discharge: HOME | End: 2022-05-31
Payer: MEDICARE

## 2022-05-31 ENCOUNTER — TELEPHONE (OUTPATIENT)
Dept: INTERNAL MEDICINE CLINIC | Age: 77
End: 2022-05-31

## 2022-05-31 DIAGNOSIS — E87.5 HYPERKALEMIA: Primary | ICD-10-CM

## 2022-05-31 DIAGNOSIS — N17.9 ACUTE KIDNEY INJURY (HCC): ICD-10-CM

## 2022-06-14 PROCEDURE — 9900000065 HC CARDIAC REHAB PHASE 3 - 1 VISIT

## 2022-06-14 PROCEDURE — 93798 PHYS/QHP OP CAR RHAB W/ECG: CPT

## 2022-06-28 ENCOUNTER — HOSPITAL ENCOUNTER (OUTPATIENT)
Dept: CARDIAC REHAB | Age: 77
Discharge: HOME | End: 2022-06-28
Payer: MEDICARE

## 2022-07-06 RX ORDER — AMLODIPINE BESYLATE 10 MG/1
TABLET ORAL
Qty: 90 TABLET | Refills: 0 | Status: SHIPPED | OUTPATIENT
Start: 2022-07-06 | End: 2022-10-04

## 2022-07-06 RX ORDER — MONTELUKAST SODIUM 10 MG/1
TABLET ORAL
Qty: 90 TABLET | Refills: 0 | Status: SHIPPED | OUTPATIENT
Start: 2022-07-06 | End: 2022-10-04

## 2022-07-06 RX ORDER — OMEPRAZOLE 40 MG/1
CAPSULE, DELAYED RELEASE ORAL
Qty: 90 CAPSULE | Refills: 0 | Status: SHIPPED | OUTPATIENT
Start: 2022-07-06 | End: 2022-10-04

## 2022-07-06 RX ORDER — ATORVASTATIN CALCIUM 40 MG/1
TABLET, FILM COATED ORAL
Qty: 90 TABLET | Refills: 0 | Status: SHIPPED | OUTPATIENT
Start: 2022-07-06 | End: 2022-10-04

## 2022-07-26 ENCOUNTER — HOSPITAL ENCOUNTER (OUTPATIENT)
Dept: CARDIAC REHAB | Age: 77
Setting detail: THERAPIES SERIES
Discharge: HOME | End: 2022-07-26

## 2022-08-09 PROCEDURE — 9900000065 HC CARDIAC REHAB PHASE 3 - 1 VISIT

## 2022-08-26 NOTE — TELEPHONE ENCOUNTER
Paulina Huggins is calling to request a refill on the following medication(s):    Medication Request:  Requested Prescriptions     Pending Prescriptions Disp Refills    levothyroxine (SYNTHROID) 25 MCG tablet [Pharmacy Med Name: L-THYROXINE (SYNTHROID) TABS 25MCG] 90 tablet 3     Sig: TAKE 1 TABLET DAILY       Last Visit Date (If Applicable):  2/37/1881    Next Visit Date:    9/22/2022

## 2022-08-29 RX ORDER — LEVOTHYROXINE SODIUM 0.03 MG/1
TABLET ORAL
Qty: 90 TABLET | Refills: 1 | Status: SHIPPED | OUTPATIENT
Start: 2022-08-29

## 2022-08-30 ENCOUNTER — HOSPITAL ENCOUNTER (OUTPATIENT)
Dept: CARDIAC REHAB | Age: 77
Discharge: HOME | End: 2022-08-30
Payer: MEDICARE

## 2022-09-22 ENCOUNTER — OFFICE VISIT (OUTPATIENT)
Dept: INTERNAL MEDICINE CLINIC | Age: 77
End: 2022-09-22
Payer: MEDICARE

## 2022-09-22 VITALS
OXYGEN SATURATION: 98 % | DIASTOLIC BLOOD PRESSURE: 68 MMHG | RESPIRATION RATE: 15 BRPM | BODY MASS INDEX: 37.45 KG/M2 | HEIGHT: 67 IN | SYSTOLIC BLOOD PRESSURE: 126 MMHG | TEMPERATURE: 98 F | WEIGHT: 238.6 LBS | HEART RATE: 58 BPM

## 2022-09-22 DIAGNOSIS — I10 ESSENTIAL HYPERTENSION: ICD-10-CM

## 2022-09-22 DIAGNOSIS — Z79.4 TYPE 2 DIABETES MELLITUS WITH OTHER CIRCULATORY COMPLICATION, WITH LONG-TERM CURRENT USE OF INSULIN (HCC): ICD-10-CM

## 2022-09-22 DIAGNOSIS — E11.59 TYPE 2 DIABETES MELLITUS WITH OTHER CIRCULATORY COMPLICATION, WITH LONG-TERM CURRENT USE OF INSULIN (HCC): ICD-10-CM

## 2022-09-22 DIAGNOSIS — N18.2 CHRONIC RENAL FAILURE, STAGE 2 (MILD): ICD-10-CM

## 2022-09-22 DIAGNOSIS — K86.2 PANCREATIC CYST: Primary | ICD-10-CM

## 2022-09-22 PROCEDURE — 99214 OFFICE O/P EST MOD 30 MIN: CPT | Performed by: INTERNAL MEDICINE

## 2022-09-22 PROCEDURE — 90694 VACC AIIV4 NO PRSRV 0.5ML IM: CPT | Performed by: INTERNAL MEDICINE

## 2022-09-22 PROCEDURE — 1036F TOBACCO NON-USER: CPT | Performed by: INTERNAL MEDICINE

## 2022-09-22 PROCEDURE — G0008 ADMIN INFLUENZA VIRUS VAC: HCPCS | Performed by: INTERNAL MEDICINE

## 2022-09-22 PROCEDURE — 1123F ACP DISCUSS/DSCN MKR DOCD: CPT | Performed by: INTERNAL MEDICINE

## 2022-09-22 PROCEDURE — G8417 CALC BMI ABV UP PARAM F/U: HCPCS | Performed by: INTERNAL MEDICINE

## 2022-09-22 PROCEDURE — G8427 DOCREV CUR MEDS BY ELIG CLIN: HCPCS | Performed by: INTERNAL MEDICINE

## 2022-09-22 ASSESSMENT — PATIENT HEALTH QUESTIONNAIRE - PHQ9
SUM OF ALL RESPONSES TO PHQ QUESTIONS 1-9: 0
SUM OF ALL RESPONSES TO PHQ QUESTIONS 1-9: 0
1. LITTLE INTEREST OR PLEASURE IN DOING THINGS: 0
SUM OF ALL RESPONSES TO PHQ9 QUESTIONS 1 & 2: 0
SUM OF ALL RESPONSES TO PHQ QUESTIONS 1-9: 0
2. FEELING DOWN, DEPRESSED OR HOPELESS: 0
SUM OF ALL RESPONSES TO PHQ QUESTIONS 1-9: 0

## 2022-09-22 NOTE — PROGRESS NOTES
Tahir Cary is a 65 y.o. male who presents for   Chief Complaint   Patient presents with    Hypertension     4 month follow up    Health Maintenance     Hep c, d foot, covid, shingles, flu    and follow up of chronic medical problems.   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    Bilateral primary osteoarthritis of hip    Diffuse large cell lymphoma in remission (HCC)    Iron deficiency anemia    IPMN (intraductal papillary mucinous neoplasm)    Hypocitraturia    Hyperkalemia    Acquired hypothyroidism    JANIE (acute kidney injury) ([facility] [address] [facility]    Low back pain    B-cell lymphoma (HCC)    Vitamin D deficiency     HPI  Here for follow-up on blood pressure and diabetes denies any new complaints    Current Outpatient Medications   Medication Sig Dispense Refill    levothyroxine (SYNTHROID) 25 MCG tablet TAKE 1 TABLET DAILY 90 tablet 1    furosemide (LASIX) 20 MG tablet TAKE 3 TABLETS DAILY 180 tablet 5    omeprazole (PRILOSEC) 40 MG delayed release capsule TAKE 1 CAPSULE DAILY 90 capsule 0    atorvastatin (LIPITOR) 40 MG tablet TAKE 1 TABLET DAILY 90 tablet 0    montelukast (SINGULAIR) 10 MG tablet TAKE 1 TABLET DAILY 90 tablet 0    amLODIPine (NORVASC) 10 MG tablet TAKE 1 TABLET DAILY 90 tablet 0    losartan (COZAAR) 50 MG tablet TAKE 1 TABLET DAILY 90 tablet 3    vitamin D3 (CHOLECALCIFEROL) 10 MCG (400 UNIT) TABS tablet Take 400 Units by mouth daily      metoprolol succinate (TOPROL XL) 50 MG extended release tablet TAKE 1 TABLET DAILY 90 tablet 3    insulin lispro prot & lispro (HUMALOG MIX 50/50 KWIKPEN) (50-50) 100 UNIT per ML SUPN injection pen INJECT 90 UNITS IN THE MORNING, 60 UNITS AT 6 P.M. AND 60 UNITS AT NIGHT (Patient taking differently: INJECT 90 UNITS IN THE MORNING, 60 UNITS AT 6 P.M. AND 30 UNITS AT NIGHT) 165 mL 2    alpha-galactosidase (BEANO) TABS chewable tablet Take 2 tablets by mouth 3 times daily (with meals) 60 tablet 0    ONETOUCH ULTRA strip use 1 TEST STRIP to TEST BLOOD SUGAR three times a day      Blood Glucose Monitoring Suppl (ONETOUCH VERIO FLEX SYSTEM) w/Device KIT as directed      BD PEN NEEDLE NATASHA U/F 32G X 4 MM MISC       acetaminophen (TYLENOL) 325 MG tablet Take 650 mg by mouth every 6 hours as needed for Pain      diclofenac sodium (VOLTAREN) 1 % GEL Apply topically 4 times daily as needed for Pain      Respiratory Therapy Supplies MISC Cpap supplies- tubing, mask, filter 1 each 0    aspirin 81 MG tablet Take 81 mg by mouth daily        No current facility-administered medications for this visit.        No Known Allergies    Past Medical History:   Diagnosis Date    Arthritis     BACK, BILATERAL SHOULDERS    Asthma     Back pain     BRCA2 positive 09/25/2018    CAD (coronary artery disease) 09/28/2015    CABG BYPASS X 3    Caffeine use     2 cans pop/day    Carpal tunnel syndrome     LEFT HAND    Cataracts, bilateral     Colon cancer ([address] 11/17/2011    A LYMPH NODE IN COLON WAS CANCEROUS    Cyst of pancreas     GETS THIS CHECKED ANNUALLY    Diabetes mellitus ([address]     Difficult intravenous access     PATIENT STATES USUALLY REQUIRES PICC TEAM.    GERD (gastroesophageal reflux disease)     Hay fever     HIGH CHOLESTEROL     History of blood transfusion     NO REACTION    History of chemotherapy 05/2013    Hodgkin's lymphoma ([address]     HTN (hypertension)     Hx of blood clots     AFTER BACK SURGERY, PATIENT HAD BLOOD CLOTS IN SURGICAL AREA AND NEEDED MORE SURGERY TO GET THEM REMOVED    Kidney stone     RIGHT KIDNEY    Lumbar herniated disc 1989    Microhematuria     Obesity     GUTIERREZ on CPAP     Pancreatitis     Pneumonia     Tinnitus     Use of cane as ambulatory aid     Uses brace     BACK BRACE AND LEFT HAND    Wears glasses     READING       Past Surgical History:   Procedure Laterality Date    ABDOMEN SURGERY  11/18/2011    RIGHT COLECTOMY    APPENDECTOMY  2011    with colectomy    BACK SURGERY  2014    LUMBAR LAMINECTOMY    BACK SURGERY  03/01/2014    EVACUATION OF SPINAL HEMATOMA    CATARACT REMOVAL Bilateral 01/2018    CATARACTS WITH IOL PLACED    COLONOSCOPY      Removal of colon polyp    CORONARY ARTERY BYPASS GRAFT  09/28/2015    CABG BYPASS X 3    CYSTOSCOPY Right 08/23/2018    CYSTOSCOPY, INSERTION OCCLUSIVE BALLOON, RIGHT RETROGRADE PYELOGRAM     CYSTOSCOPY Right 09/04/2018    CYSTOSCOPY STENT REMOVAL (Right )    IR NONTUNNELED VASCULAR CATHETER  2/24/2021    IR NONTUNNELED VASCULAR CATHETER 2/24/2021 Maria Del Rosario Abad MD [facility] SPECIAL PROCEDURES    KNEE SURGERY Right 2007    TENDON REPAIR    NEPHROSTOMY Right 08/23/2018    HOLMIUM - NEPHROLITHOTOMY PERCUTANEOUS    NERVE BLOCK      STEROID INJECTIONS STARTING 2015 - 04/2018    [place] CYSTOSCOPY,REMV CALCULUS,COMPLIC Right 7/1/7026    CYSTOSCOPY STENT REMOVAL performed by Kayce Becerril MD at [facility] CYSTOURETHROSCOPY Right 8/23/2018    CYSTOSCOPY, INSERTION OCCLUSIVE BALLOON, RIGHT RETROGRADE PYELOGRAM performed by Kayce Becerril MD at [address] TO 2 CM Right 8/23/2018    HOLMIUM - NEPHROLITHOTOMY PERCUTANEOUS, C-ARM, LITHOCLAST,  PT COMING FROM INTERV RADIOLOGY  *SHORT STAY performed by Kayce Becerril MD at [address] [place] [address]      AS A CHILD       Family History   Problem Relation Age of Onset    Diabetes Father     High Blood Pressure Father     Stroke Mother     Heart Failure Mother     Other Mother bilaterally,no wheezes or rales, no respiratory distress  Abdominal:  Soft, non-tender; normal bowel sounds, no masses  Musculoskeletal:  Normal range of motion and no edema or tenderness bilaterally  No lymphadenopathy  Neurological:  alert, oriented, and normal reflexes bilaterally  Skin: warm and dry  Psychiatric:  normal mood and effect; behavior normal.    Labs:   Lab Results   Component Value Date    LABA1C 6.1 (H) 02/23/2021     Lab Results   Component Value Date    CHOL 94 05/03/2022     Lab Results   Component Value Date    HDL 29 (L) 05/03/2022     Lab Results   Component Value Date    LDLCALC 35 10/17/2017     Lab Results   Component Value Date    TRIG 199 (H) 05/03/2022     No results found for: [place], [place]  Lab Results   Component Value Date    WBC 8.1 05/03/2022    HGB 12.6 (L) 05/03/2022    HCT 40.5 (L) 05/03/2022    MCV 96.7 05/03/2022     05/03/2022     Lab Results   Component Value Date    INR 1.2 02/24/2021    PROTIME 14.6 (H) 02/24/2021     Lab Results   Component Value Date    GLUCOSE 117 (H) 05/03/2022    GLUCOSE 118 (H) 05/03/2022    CREATININE 1.83 (H) 05/03/2022    CREATININE 1.89 (H) 05/03/2022    BUN 33 (H) 05/03/2022    BUN 32 (H) 05/03/2022     05/03/2022     05/03/2022    K 4.1 05/03/2022    K 3.9 05/03/2022     05/03/2022     (H) 05/03/2022    CO2 22 05/03/2022    CO2 22 05/03/2022     Lab Results   Component Value Date    ALT 20 05/03/2022    AST 21 05/03/2022    ALKPHOS 138 (H) 05/03/2022    BILITOT 0.37 05/03/2022     Lab Results   Component Value Date    LABPROT 7.0 02/08/2013    LABALBU 3.8 05/03/2022     Lab Results   Component Value Date    TSH 3.94 05/03/2022     Assessment:  1. Pancreatic cyst    2. Essential hypertension    3. Type 2 diabetes mellitus with other circulatory complication, with long-term current use of insulin ([facility]    4.  Chronic renal failure, stage 2 (mild)        Plan:  Patient's recent hemoglobin A1c was 7.0 at endocrinology created with a voice recognition program and while intend to generate a document that accurately reflects the content of the visit, no guarantee can be provided that every mistake has been identified and corrected by editing.

## 2022-09-27 ENCOUNTER — HOSPITAL ENCOUNTER (OUTPATIENT)
Dept: CARDIAC REHAB | Age: 77
Setting detail: THERAPIES SERIES
Discharge: HOME | End: 2022-09-27

## 2022-09-30 ENCOUNTER — TELEPHONE (OUTPATIENT)
Dept: INTERNAL MEDICINE CLINIC | Age: 77
End: 2022-09-30

## 2022-10-04 RX ORDER — MONTELUKAST SODIUM 10 MG/1
TABLET ORAL
Qty: 90 TABLET | Refills: 1 | Status: SHIPPED | OUTPATIENT
Start: 2022-10-04

## 2022-10-04 RX ORDER — ATORVASTATIN CALCIUM 40 MG/1
TABLET, FILM COATED ORAL
Qty: 90 TABLET | Refills: 1 | Status: SHIPPED | OUTPATIENT
Start: 2022-10-04

## 2022-10-04 RX ORDER — OMEPRAZOLE 40 MG/1
CAPSULE, DELAYED RELEASE ORAL
Qty: 90 CAPSULE | Refills: 1 | Status: SHIPPED | OUTPATIENT
Start: 2022-10-04

## 2022-10-04 RX ORDER — AMLODIPINE BESYLATE 10 MG/1
TABLET ORAL
Qty: 90 TABLET | Refills: 1 | Status: SHIPPED | OUTPATIENT
Start: 2022-10-04

## 2022-10-04 NOTE — TELEPHONE ENCOUNTER
[facility] is calling to request a refill on the following medication(s):    Medication Request:  Requested Prescriptions     Pending Prescriptions Disp Refills    amLODIPine (NORVASC) 10 MG tablet [Pharmacy Med Name: AMLODIPINE BESYLATE TABS 10MG] 90 tablet 1     Sig: TAKE 1 TABLET DAILY    montelukast (SINGULAIR) 10 MG tablet [Pharmacy Med Name: MONTELUKAST SODIUM TABS 10MG] 90 tablet 1     Sig: TAKE 1 TABLET DAILY    atorvastatin (LIPITOR) 40 MG tablet [Pharmacy Med Name: ATORVASTATIN TABS 40MG] 90 tablet 1     Sig: TAKE 1 TABLET DAILY    omeprazole (PRILOSEC) 40 MG delayed release capsule [Pharmacy Med Name: OMEPRAZOLE DR CAPS 40MG] 90 capsule 1     Sig: TAKE 1 CAPSULE DAILY       Last Visit Date (If Applicable):  2/09/2308    Next Visit Date:    1/23/2023

## 2022-10-14 RX ORDER — INSULIN LISPRO 100 [IU]/ML
INJECTION, SUSPENSION SUBCUTANEOUS
Qty: 165 ML | Refills: 3 | Status: SHIPPED | OUTPATIENT
Start: 2022-10-14

## 2022-10-14 NOTE — TELEPHONE ENCOUNTER
Keerthi Cameron is calling to request a refill on the following medication(s):    Medication Request:  Requested Prescriptions     Pending Prescriptions Disp Refills    insulin lispro prot & lispro (HUMALOG MIX 50/50 KWIKPEN) (50-50) 100 UNIT per ML SUPN injection pen [Pharmacy Med Name: HUMALOG MIX 50/50 KWKPN 3ML 5 50/50] 165 mL 3     Sig: INJECT 90 UNITS IN THE MORNING, 60 UNITS AT 6 P.M.  AND 30 UNITS AT NIGHT     Last fill 2/7/22  Last Visit Date (If Applicable):  9/12/8798    Next Visit Date:    1/23/2023

## 2022-10-18 ENCOUNTER — HOSPITAL ENCOUNTER (OUTPATIENT)
Age: 77
Setting detail: SPECIMEN
Discharge: HOME | End: 2022-10-18

## 2022-10-18 DIAGNOSIS — R31.29 OTHER MICROSCOPIC HEMATURIA: ICD-10-CM

## 2022-10-18 DIAGNOSIS — D63.1 ANEMIA IN STAGE 3B CHRONIC KIDNEY DISEASE (HCC): ICD-10-CM

## 2022-10-18 DIAGNOSIS — N18.32 STAGE 3B CHRONIC KIDNEY DISEASE (HCC): ICD-10-CM

## 2022-10-18 DIAGNOSIS — I12.9 BENIGN HYPERTENSION WITH CKD (CHRONIC KIDNEY DISEASE) STAGE III (HCC): ICD-10-CM

## 2022-10-18 DIAGNOSIS — N18.32 ANEMIA IN STAGE 3B CHRONIC KIDNEY DISEASE (HCC): ICD-10-CM

## 2022-10-18 DIAGNOSIS — E55.9 VITAMIN D DEFICIENCY: ICD-10-CM

## 2022-10-18 DIAGNOSIS — E13.22 SECONDARY DIABETES MELLITUS WITH STAGE 3 CHRONIC KIDNEY DISEASE (HCC): ICD-10-CM

## 2022-10-18 DIAGNOSIS — N18.30 BENIGN HYPERTENSION WITH CKD (CHRONIC KIDNEY DISEASE) STAGE III (HCC): ICD-10-CM

## 2022-10-18 DIAGNOSIS — R80.8 OTHER PROTEINURIA: ICD-10-CM

## 2022-10-18 DIAGNOSIS — N18.30 SECONDARY DIABETES MELLITUS WITH STAGE 3 CHRONIC KIDNEY DISEASE (HCC): ICD-10-CM

## 2022-10-18 LAB
ABSOLUTE EOS #: 0.24 K/UL (ref 0–0.44)
ABSOLUTE IMMATURE GRANULOCYTE: 0.05 K/UL (ref 0–0.3)
ABSOLUTE LYMPH #: 2.13 K/UL (ref 1.1–3.7)
ABSOLUTE MONO #: 0.64 K/UL (ref 0.1–1.2)
ANION GAP SERPL CALCULATED.3IONS-SCNC: 14 MMOL/L (ref 9–17)
BASOPHILS # BLD: 1 % (ref 0–2)
BASOPHILS ABSOLUTE: 0.04 K/UL (ref 0–0.2)
BILIRUBIN URINE: NEGATIVE
BUN BLDV-MCNC: 36 MG/DL (ref 8–23)
CALCIUM SERPL-MCNC: 8.9 MG/DL (ref 8.6–10.4)
CHLORIDE BLD-SCNC: 107 MMOL/L (ref 98–107)
CO2: 21 MMOL/L (ref 20–31)
COLOR: YELLOW
CREAT SERPL-MCNC: 2 MG/DL (ref 0.7–1.2)
EOSINOPHILS RELATIVE PERCENT: 3 % (ref 1–4)
EPITHELIAL CELLS UA: ABNORMAL /HPF (ref 0–5)
GFR SERPL CREATININE-BSD FRML MDRD: 34 ML/MIN/1.73M2
GLUCOSE BLD-MCNC: 168 MG/DL (ref 70–99)
GLUCOSE URINE: ABNORMAL
HCT VFR BLD CALC: 39.7 % (ref 40.7–50.3)
HEMOGLOBIN: 12.4 G/DL (ref 13–17)
IMMATURE GRANULOCYTES: 1 %
KETONES, URINE: NEGATIVE
LEUKOCYTE ESTERASE, URINE: NEGATIVE
LYMPHOCYTES # BLD: 30 % (ref 24–43)
MAGNESIUM: 2 MG/DL (ref 1.6–2.6)
MCH RBC QN AUTO: 30.5 PG (ref 25.2–33.5)
MCHC RBC AUTO-ENTMCNC: 31.2 G/DL (ref 28.4–34.8)
MCV RBC AUTO: 97.5 FL (ref 82.6–102.9)
MONOCYTES # BLD: 9 % (ref 3–12)
NITRITE, URINE: NEGATIVE
NRBC AUTOMATED: 0 PER 100 WBC
PDW BLD-RTO: 13.9 % (ref 11.8–14.4)
PH UA: 6.5 (ref 5–8)
PHOSPHORUS: 3.2 MG/DL (ref 2.5–4.5)
PLATELET # BLD: 161 K/UL (ref 138–453)
PMV BLD AUTO: 11.5 FL (ref 8.1–13.5)
POTASSIUM SERPL-SCNC: 4.2 MMOL/L (ref 3.7–5.3)
PROTEIN UA: ABNORMAL
RBC # BLD: 4.07 M/UL (ref 4.21–5.77)
RBC UA: ABNORMAL /HPF (ref 0–4)
SEG NEUTROPHILS: 56 % (ref 36–65)
SEGMENTED NEUTROPHILS ABSOLUTE COUNT: 3.97 K/UL (ref 1.5–8.1)
SODIUM BLD-SCNC: 142 MMOL/L (ref 135–144)
SPECIFIC GRAVITY UA: 1.01 (ref 1–1.03)
TURBIDITY: CLEAR
URINE HGB: ABNORMAL
UROBILINOGEN, URINE: NORMAL
WBC # BLD: 7.1 K/UL (ref 3.5–11.3)
WBC UA: ABNORMAL /HPF (ref 0–5)

## 2022-10-18 PROCEDURE — 9900000065 HC CARDIAC REHAB PHASE 3 - 1 VISIT

## 2022-10-25 ENCOUNTER — HOSPITAL ENCOUNTER (OUTPATIENT)
Dept: CARDIAC REHAB | Age: 77
Discharge: HOME | End: 2022-10-25
Payer: MEDICARE

## 2022-11-29 ENCOUNTER — HOSPITAL ENCOUNTER (OUTPATIENT)
Dept: CARDIAC REHAB | Age: 77
Setting detail: THERAPIES SERIES
Discharge: HOME | End: 2022-11-29

## 2023-01-10 ENCOUNTER — HOSPITAL ENCOUNTER (OUTPATIENT)
Age: 78
Setting detail: SPECIMEN
Discharge: HOME | End: 2023-01-10

## 2023-01-10 DIAGNOSIS — E13.22 SECONDARY DIABETES MELLITUS WITH STAGE 3 CHRONIC KIDNEY DISEASE (HCC): ICD-10-CM

## 2023-01-10 DIAGNOSIS — R80.8 OTHER PROTEINURIA: ICD-10-CM

## 2023-01-10 DIAGNOSIS — N18.32 ANEMIA IN STAGE 3B CHRONIC KIDNEY DISEASE (HCC): ICD-10-CM

## 2023-01-10 DIAGNOSIS — N18.30 BENIGN HYPERTENSION WITH CKD (CHRONIC KIDNEY DISEASE) STAGE III (HCC): ICD-10-CM

## 2023-01-10 DIAGNOSIS — I12.9 BENIGN HYPERTENSION WITH CKD (CHRONIC KIDNEY DISEASE) STAGE III (HCC): ICD-10-CM

## 2023-01-10 DIAGNOSIS — N18.30 SECONDARY DIABETES MELLITUS WITH STAGE 3 CHRONIC KIDNEY DISEASE (HCC): ICD-10-CM

## 2023-01-10 DIAGNOSIS — N18.32 STAGE 3B CHRONIC KIDNEY DISEASE (HCC): ICD-10-CM

## 2023-01-10 DIAGNOSIS — D63.1 ANEMIA IN STAGE 3B CHRONIC KIDNEY DISEASE (HCC): ICD-10-CM

## 2023-01-10 LAB
ABSOLUTE EOS #: 0.24 K/UL (ref 0–0.44)
ABSOLUTE IMMATURE GRANULOCYTE: 0.06 K/UL (ref 0–0.3)
ABSOLUTE LYMPH #: 2.23 K/UL (ref 1.1–3.7)
ABSOLUTE MONO #: 0.8 K/UL (ref 0.1–1.2)
ANION GAP SERPL CALCULATED.3IONS-SCNC: 17 MMOL/L (ref 9–17)
BASOPHILS # BLD: 1 % (ref 0–2)
BASOPHILS ABSOLUTE: 0.04 K/UL (ref 0–0.2)
BILIRUBIN URINE: NEGATIVE
BUN BLDV-MCNC: 39 MG/DL (ref 8–23)
CALCIUM SERPL-MCNC: 9.1 MG/DL (ref 8.6–10.4)
CASTS UA: ABNORMAL /LPF (ref 0–8)
CHLORIDE BLD-SCNC: 110 MMOL/L (ref 98–107)
CO2: 17 MMOL/L (ref 20–31)
COLOR: YELLOW
CREAT SERPL-MCNC: 2.44 MG/DL (ref 0.7–1.2)
EOSINOPHILS RELATIVE PERCENT: 3 % (ref 1–4)
EPITHELIAL CELLS UA: ABNORMAL /HPF (ref 0–5)
GFR SERPL CREATININE-BSD FRML MDRD: 26 ML/MIN/1.73M2
GLUCOSE BLD-MCNC: 110 MG/DL (ref 70–99)
GLUCOSE URINE: NEGATIVE
HCT VFR BLD CALC: 37.7 % (ref 40.7–50.3)
HEMOGLOBIN: 11.8 G/DL (ref 13–17)
IMMATURE GRANULOCYTES: 1 %
KETONES, URINE: NEGATIVE
LEUKOCYTE ESTERASE, URINE: NEGATIVE
LYMPHOCYTES # BLD: 30 % (ref 24–43)
MAGNESIUM: 2 MG/DL (ref 1.6–2.6)
MCH RBC QN AUTO: 29.9 PG (ref 25.2–33.5)
MCHC RBC AUTO-ENTMCNC: 31.3 G/DL (ref 28.4–34.8)
MCV RBC AUTO: 95.7 FL (ref 82.6–102.9)
MONOCYTES # BLD: 11 % (ref 3–12)
NITRITE, URINE: NEGATIVE
NRBC AUTOMATED: 0 PER 100 WBC
PDW BLD-RTO: 14.1 % (ref 11.8–14.4)
PH UA: 5.5 (ref 5–8)
PHOSPHORUS: 3.4 MG/DL (ref 2.5–4.5)
PLATELET # BLD: 172 K/UL (ref 138–453)
PMV BLD AUTO: 11.1 FL (ref 8.1–13.5)
POTASSIUM SERPL-SCNC: 4.5 MMOL/L (ref 3.7–5.3)
PROTEIN UA: ABNORMAL
RBC # BLD: 3.94 M/UL (ref 4.21–5.77)
RBC UA: ABNORMAL /HPF (ref 0–4)
SEG NEUTROPHILS: 54 % (ref 36–65)
SEGMENTED NEUTROPHILS ABSOLUTE COUNT: 4.03 K/UL (ref 1.5–8.1)
SODIUM BLD-SCNC: 144 MMOL/L (ref 135–144)
SPECIFIC GRAVITY UA: 1.01 (ref 1–1.03)
TURBIDITY: CLEAR
URINE HGB: ABNORMAL
UROBILINOGEN, URINE: NORMAL
WBC # BLD: 7.4 K/UL (ref 3.5–11.3)
WBC UA: ABNORMAL /HPF (ref 0–5)

## 2023-01-10 PROCEDURE — 9900000065 HC CARDIAC REHAB PHASE 3 - 1 VISIT

## 2023-01-23 ENCOUNTER — OFFICE VISIT (OUTPATIENT)
Dept: INTERNAL MEDICINE CLINIC | Age: 78
End: 2023-01-23
Payer: MEDICARE

## 2023-01-23 VITALS
DIASTOLIC BLOOD PRESSURE: 70 MMHG | WEIGHT: 243 LBS | HEIGHT: 67 IN | SYSTOLIC BLOOD PRESSURE: 138 MMHG | TEMPERATURE: 97.2 F | HEART RATE: 62 BPM | OXYGEN SATURATION: 98 % | BODY MASS INDEX: 38.14 KG/M2 | RESPIRATION RATE: 18 BRPM

## 2023-01-23 DIAGNOSIS — N18.2 CHRONIC RENAL FAILURE, STAGE 2 (MILD): ICD-10-CM

## 2023-01-23 DIAGNOSIS — K21.9 GASTROESOPHAGEAL REFLUX DISEASE WITHOUT ESOPHAGITIS: ICD-10-CM

## 2023-01-23 DIAGNOSIS — C83.33 DIFFUSE LARGE B-CELL LYMPHOMA, INTRA-ABDOMINAL LYMPH NODES (HCC): ICD-10-CM

## 2023-01-23 DIAGNOSIS — E66.01 SEVERE OBESITY (BMI 35.0-39.9) WITH COMORBIDITY (HCC): ICD-10-CM

## 2023-01-23 DIAGNOSIS — I10 ESSENTIAL HYPERTENSION: Primary | ICD-10-CM

## 2023-01-23 DIAGNOSIS — E11.59 TYPE 2 DIABETES MELLITUS WITH OTHER CIRCULATORY COMPLICATION, WITH LONG-TERM CURRENT USE OF INSULIN (HCC): ICD-10-CM

## 2023-01-23 DIAGNOSIS — Z79.4 TYPE 2 DIABETES MELLITUS WITH OTHER CIRCULATORY COMPLICATION, WITH LONG-TERM CURRENT USE OF INSULIN (HCC): ICD-10-CM

## 2023-01-23 PROCEDURE — G8484 FLU IMMUNIZE NO ADMIN: HCPCS | Performed by: INTERNAL MEDICINE

## 2023-01-23 PROCEDURE — 1123F ACP DISCUSS/DSCN MKR DOCD: CPT | Performed by: INTERNAL MEDICINE

## 2023-01-23 PROCEDURE — G8427 DOCREV CUR MEDS BY ELIG CLIN: HCPCS | Performed by: INTERNAL MEDICINE

## 2023-01-23 PROCEDURE — 1036F TOBACCO NON-USER: CPT | Performed by: INTERNAL MEDICINE

## 2023-01-23 PROCEDURE — G8417 CALC BMI ABV UP PARAM F/U: HCPCS | Performed by: INTERNAL MEDICINE

## 2023-01-23 PROCEDURE — 3078F DIAST BP <80 MM HG: CPT | Performed by: INTERNAL MEDICINE

## 2023-01-23 PROCEDURE — 99214 OFFICE O/P EST MOD 30 MIN: CPT | Performed by: INTERNAL MEDICINE

## 2023-01-23 PROCEDURE — 3074F SYST BP LT 130 MM HG: CPT | Performed by: INTERNAL MEDICINE

## 2023-01-23 PROCEDURE — G0444 DEPRESSION SCREEN ANNUAL: HCPCS | Performed by: INTERNAL MEDICINE

## 2023-01-23 ASSESSMENT — PATIENT HEALTH QUESTIONNAIRE - PHQ9
SUM OF ALL RESPONSES TO PHQ QUESTIONS 1-9: 0
SUM OF ALL RESPONSES TO PHQ9 QUESTIONS 1 & 2: 0
SUM OF ALL RESPONSES TO PHQ QUESTIONS 1-9: 0
2. FEELING DOWN, DEPRESSED OR HOPELESS: 0
SUM OF ALL RESPONSES TO PHQ QUESTIONS 1-9: 0
1. LITTLE INTEREST OR PLEASURE IN DOING THINGS: 0
SUM OF ALL RESPONSES TO PHQ QUESTIONS 1-9: 0

## 2023-01-23 NOTE — PROGRESS NOTES
Leandro Wagner is a 65 y.o. male who presents for   Chief Complaint   Patient presents with    Health Maintenance     Pt states he is having problems with gastric reflux   Hep c, AWV    Diabetes     Foot and eye exam due   Last a1c was 7.0    Discuss Medications     No refills needed     and follow up of chronic medical problems. 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    Bilateral primary osteoarthritis of hip    Diffuse large cell lymphoma in remission ([facility] [address]    Iron deficiency anemia    IPMN (intraductal papillary mucinous neoplasm)    Hypocitraturia    Hyperkalemia    Acquired hypothyroidism    JANIE (acute kidney injury) ([facility] [address]    Low back pain    B-cell lymphoma (HCC)    Vitamin D deficiency     HPI  Here for follow-up on blood pressure and diabetes denies any new complaints    Current Outpatient Medications   Medication Sig Dispense Refill    insulin lispro prot & lispro (HUMALOG MIX 50/50 KWIKPEN) (50-50) 100 UNIT per ML SUPN injection pen INJECT 90 UNITS IN THE MORNING, 60 UNITS AT 6 P.M.  AND 30 UNITS AT NIGHT 165 mL 3    amLODIPine (NORVASC) 10 MG tablet TAKE 1 TABLET DAILY 90 tablet 1    montelukast (SINGULAIR) 10 MG tablet TAKE 1 TABLET DAILY 90 tablet 1    atorvastatin (LIPITOR) 40 MG tablet TAKE 1 TABLET DAILY 90 tablet 1    omeprazole (PRILOSEC) 40 MG delayed release capsule TAKE 1 CAPSULE DAILY 90 capsule 1    levothyroxine (SYNTHROID) 25 MCG tablet TAKE 1 TABLET DAILY 90 tablet 1    furosemide (LASIX) 20 MG tablet TAKE 3 TABLETS DAILY 180 tablet 5    losartan (COZAAR) 50 MG tablet TAKE 1 TABLET DAILY 90 tablet 3    vitamin D3 (CHOLECALCIFEROL) 10 MCG (400 UNIT) TABS tablet Take 400 Units by mouth daily      metoprolol succinate (TOPROL XL) 50 MG extended release tablet TAKE 1 TABLET DAILY 90 tablet 3    alpha-galactosidase (BEANO) TABS chewable tablet Take 2 tablets by mouth 3 times daily (with meals) 60 tablet 0    ONETOUCH ULTRA strip use 1 TEST STRIP to TEST BLOOD SUGAR three times a day      Blood Glucose Monitoring Suppl (ONETOUCH VERIO FLEX SYSTEM) w/Device KIT as directed      BD PEN NEEDLE NATASHA U/F 32G X 4 MM MISC       acetaminophen (TYLENOL) 325 MG tablet Take 650 mg by mouth every 6 hours as needed for Pain      diclofenac sodium (VOLTAREN) 1 % GEL Apply topically 4 times daily as needed for Pain      Respiratory Therapy Supplies MISC Cpap supplies- tubing, mask, filter 1 each 0    aspirin 81 MG tablet Take 81 mg by mouth daily        No current facility-administered medications for this visit.        No Known Allergies    Past Medical History:   Diagnosis Date    Arthritis     BACK, BILATERAL SHOULDERS    Asthma     Back pain     BRCA2 positive 09/25/2018    CAD (coronary artery disease) 09/28/2015    CABG BYPASS X 3    Caffeine use     2 cans pop/day    Carpal tunnel syndrome     LEFT HAND    Cataracts, bilateral     Colon cancer ([address] 11/17/2011    A LYMPH NODE IN COLON WAS CANCEROUS    Cyst of pancreas     GETS THIS CHECKED ANNUALLY    Diabetes mellitus ([address]     Difficult intravenous access     PATIENT STATES USUALLY REQUIRES PICC TEAM.    GERD (gastroesophageal reflux disease)     Hay fever     HIGH CHOLESTEROL     History of blood transfusion     NO REACTION    History of chemotherapy 05/2013    Hodgkin's lymphoma ([address]     HTN (hypertension)     Hx of blood clots     AFTER BACK SURGERY, PATIENT HAD BLOOD CLOTS IN SURGICAL AREA AND NEEDED MORE SURGERY TO GET THEM REMOVED    Kidney stone     RIGHT KIDNEY    Lumbar herniated disc 1989    Microhematuria     Obesity     GUTIERREZ on CPAP     Pancreatitis     Pneumonia     Tinnitus     Use of cane as ambulatory aid     Uses brace     BACK BRACE AND LEFT HAND    Wears glasses     READING       Past Surgical History:   Procedure Laterality Date    ABDOMEN SURGERY  11/18/2011    RIGHT COLECTOMY    APPENDECTOMY  2011    with colectomy    BACK SURGERY  2014    LUMBAR LAMINECTOMY    BACK SURGERY  03/01/2014    EVACUATION OF SPINAL HEMATOMA    CATARACT REMOVAL Bilateral 01/2018    CATARACTS WITH IOL PLACED    COLONOSCOPY      Removal of colon polyp    CORONARY ARTERY BYPASS GRAFT  09/28/2015    CABG BYPASS X 3    CYSTOSCOPY Right 08/23/2018    CYSTOSCOPY, INSERTION OCCLUSIVE BALLOON, RIGHT RETROGRADE PYELOGRAM     CYSTOSCOPY Right 09/04/2018    CYSTOSCOPY STENT REMOVAL (Right )    IR NONTUNNELED VASCULAR CATHETER  2/24/2021    IR NONTUNNELED VASCULAR CATHETER 2/24/2021 Rakesh Flores MD [facility] SPECIAL PROCEDURES    KNEE SURGERY Right 2007    TENDON REPAIR    NEPHROSTOMY Right 08/23/2018    HOLMIUM - NEPHROLITHOTOMY PERCUTANEOUS    NERVE BLOCK      STEROID INJECTIONS STARTING 2015 - 04/2018    NC CYSTO W/COMPLEX REMOVAL STONE & STENT Right 9/4/2018    CYSTOSCOPY STENT REMOVAL performed by Amaris Hull MD at [address] Right 8/23/2018    CYSTOSCOPY, INSERTION OCCLUSIVE BALLOON, RIGHT RETROGRADE PYELOGRAM performed by Amaris Hull MD at [facility] NL/PL LITHOTRP SIMPLE UP TO 2 CM 1 LOCATION Right 8/23/2018    HOLMIUM - NEPHROLITHOTOMY PERCUTANEOUS, C-ARM, LITHOCLAST,  PT COMING FROM INTERV RADIOLOGY  *SHORT STAY performed by Amaris Hull MD at [address]      AS A CHILD       Family History   Problem Relation Age of Onset    Diabetes Father     High Blood Pressure Father     Stroke Mother     Heart Failure Mother     Other Mother pacemaker    High Blood Pressure Mother     Heart Disease Maternal Grandmother     Heart Disease Maternal Grandfather     Heart Disease Maternal Uncle      ROS  Constitutional:  Negative for fatigue, loss of appetite and unexpected weight change  HEENT            : Negative for neck stiffness and pain, no congestion or sinus pressure  Eyes                : No visual disturbance or pain  Cardiovascular: No chest pain or palpitations or leg swelling  Respiratory      : Negative for cough, shortness of breath or wheezing  Gastrointestinal: Negative for abdominal pain, constipation or diarrhea and bloating No nausea or vomiting  Genitourinary:     No urgency or frequency, no burning or hematuria  Musculoskeletal: No arthralgias, back pain or myalgias  Skin                  : Negative for rash or erythema  Neurological    : Negative for dizziness, weakness, tremors ,light headedness or syncope  Psychiatric       : Negative for dysphoric mood, sleep disturbances, nervous or anxious, or decreased concentration  All other review of systems was negative    Objective  Physical Examination:    Nursing note reviewed    /70   Pulse 62   Temp 97.2 °F (36.2 °C) (Temporal)   Resp 18   Ht 5' 7\" (1.702 m)   Wt 243 lb (110.2 kg)   SpO2 98%   BMI 38.06 kg/m²   BP Readings from Last 3 Encounters:   01/23/23 138/70   01/18/23 (!) 150/70   09/22/22 126/68         Constitutional:  Manolo Hoffman is oriented to place, person and time ,appears well-developed and well-nourished  HEENT:  Atraumatic and normocephalic, external ears normal bilaterally, nose normal no oropharyngeal exudate and is clear and moist  Eyes:  EOCM normal; conjunctivae normal; PERRLA bilaterally  Neck:  Normal range of motion, neck supple, no JVD and no thyromegaly  Cardiovascular:  RRR, normal heart sounds and intact distal pulses  Pulmonary:  effort normal and breath sounds normal bilaterally,no wheezes or rales, no respiratory distress  Abdominal:  Soft, non-tender; normal bowel sounds, no masses  Musculoskeletal:  Normal range of motion and no edema or tenderness bilaterally  No lymphadenopathy  Neurological:  alert, oriented, and normal reflexes bilaterally  Skin: warm and dry  Psychiatric:  normal mood and effect; behavior normal.    Labs:   Lab Results   Component Value Date    LABA1C 6.1 (H) 02/23/2021     Lab Results   Component Value Date    CHOL 94 05/03/2022     Lab Results   Component Value Date    HDL 29 (L) 05/03/2022     Lab Results   Component Value Date    LDLCALC 35 10/17/2017     Lab Results   Component Value Date    TRIG 199 (H) 05/03/2022     No results found for: [place], [place]  Lab Results   Component Value Date    WBC 7.4 01/10/2023    HGB 11.8 (L) 01/10/2023    HCT 37.7 (L) 01/10/2023    MCV 95.7 01/10/2023     01/10/2023     Lab Results   Component Value Date    INR 1.2 02/24/2021    PROTIME 14.6 (H) 02/24/2021     Lab Results   Component Value Date    GLUCOSE 110 (H) 01/10/2023    CREATININE 2.44 (H) 01/10/2023    BUN 39 (H) 01/10/2023     01/10/2023    K 4.5 01/10/2023     (H) 01/10/2023    CO2 17 (L) 01/10/2023     Lab Results   Component Value Date    ALT 20 05/03/2022    AST 21 05/03/2022    ALKPHOS 138 (H) 05/03/2022    BILITOT 0.37 05/03/2022     Lab Results   Component Value Date    LABPROT 7.0 02/08/2013    LABALBU 3.8 05/03/2022     Lab Results   Component Value Date    TSH 3.94 05/03/2022     Assessment:  1. Essential hypertension    2. Severe obesity (BMI 35.0-39. 9) with comorbidity ([address]    3. Diffuse large b-cell lymphoma, intra-abdominal lymph nodes ([address]    4. Type 2 diabetes mellitus with other circulatory complication, with long-term current use of insulin ([address]    5. Gastroesophageal reflux disease without esophagitis    6.  Chronic renal failure, stage 2 (mild)        Plan:  Patient blood pressure initially slightly elevated but returned to normal and advised patient continue current treatment monitor blood pressure and also patient going to cardiac rehab once in 2 weeks with the monitor his blood pressure  Patient's last hemoglobin A1c was 6.4 in November 2022 and following with endocrinology and will see him again in 2 months  Patient had MRI MRCP done for his a pancreatic cyst and is stable and repeat in 2 years and reports reviewed and explained to the patient  Patient is on omeprazole 40 mg daily and advised patient to double it up for 1 week and call me back if his sore throat improved or not if not will refer back to GI/ENT  He had a diabetic foot exam today  Patient had an eye examination on 10/7/2022 and is in care everywhere  Review in 6 months               1.  Serg received counseling on the following healthy behaviors: nutrition and exercise    2. Prior labs and health maintenance reviewed.     3.  Discussed use, benefit, and side effects of prescribed medications.  Barriers to medication compliance addressed.  All his questions were answered.  Pt voiced understanding.   Serg will continue current medications, diet and exercise.            No orders of the defined types were placed in this encounter.         Completed Refills               Requested Prescriptions      No prescriptions requested or ordered in this encounter     4. Patient given educational materials - see patient instructions    5. Was a self-tracking handout given in paper form or via [employer]?  NO    No orders of the defined types were placed in this encounter.    Return in about 4 months (around 5/23/2023).  Patient voiced understanding and agreed to treatment plan.     Electronically signed by Shamir Esposito MD on 1/23/2023 at 11:27 AM    This note is created with a voice recognition program and while intend to generate a document that accurately reflects the content of the visit, no guarantee can be provided that every mistake has been identified and corrected by editing.

## 2023-01-31 ENCOUNTER — HOSPITAL ENCOUNTER (OUTPATIENT)
Dept: CARDIAC REHAB | Age: 78
Discharge: HOME | End: 2023-01-31

## 2023-02-22 RX ORDER — LEVOTHYROXINE SODIUM 0.03 MG/1
TABLET ORAL
Qty: 90 TABLET | Refills: 3 | Status: SHIPPED | OUTPATIENT
Start: 2023-02-22

## 2023-02-22 NOTE — TELEPHONE ENCOUNTER
Anastasiia Rich is calling to request a refill on the following medication(s):    Medication Request:  Requested Prescriptions     Pending Prescriptions Disp Refills    levothyroxine (SYNTHROID) 25 MCG tablet [Pharmacy Med Name: L-THYROXINE (SYNTHROID) TABS 25MCG] 90 tablet 1     Sig: TAKE 1 TABLET DAILY       Last Visit Date (If Applicable):  4/14/1483    Next Visit Date:    5/23/2023

## 2023-02-28 ENCOUNTER — HOSPITAL ENCOUNTER (OUTPATIENT)
Dept: CARDIAC REHAB | Age: 78
Discharge: HOME | End: 2023-02-28

## 2023-03-07 ENCOUNTER — HOSPITAL ENCOUNTER (OUTPATIENT)
Dept: CARDIAC REHAB | Age: 78
Discharge: HOME | End: 2023-03-07

## 2023-03-07 PROCEDURE — 9900000065 HC CARDIAC REHAB PHASE 3 - 1 VISIT

## 2023-03-16 ENCOUNTER — OFFICE VISIT (OUTPATIENT)
Dept: INTERNAL MEDICINE CLINIC | Age: 78
End: 2023-03-16

## 2023-03-16 VITALS
BODY MASS INDEX: 37.7 KG/M2 | TEMPERATURE: 97.5 F | OXYGEN SATURATION: 97 % | SYSTOLIC BLOOD PRESSURE: 140 MMHG | DIASTOLIC BLOOD PRESSURE: 60 MMHG | RESPIRATION RATE: 18 BRPM | WEIGHT: 240.2 LBS | HEART RATE: 63 BPM | HEIGHT: 67 IN

## 2023-03-16 DIAGNOSIS — H93.8X2 PLUGGED FEELING IN EAR, LEFT: Primary | ICD-10-CM

## 2023-03-16 DIAGNOSIS — H65.02 NON-RECURRENT ACUTE SEROUS OTITIS MEDIA OF LEFT EAR: ICD-10-CM

## 2023-03-16 RX ORDER — FLUTICASONE PROPIONATE 50 MCG
2 SPRAY, SUSPENSION (ML) NASAL DAILY
Qty: 16 G | Refills: 0 | Status: SHIPPED | OUTPATIENT
Start: 2023-03-16

## 2023-03-16 RX ORDER — PREDNISONE 1 MG/1
5 TABLET ORAL 2 TIMES DAILY
Qty: 10 TABLET | Refills: 0 | Status: SHIPPED | OUTPATIENT
Start: 2023-03-16 | End: 2023-03-21

## 2023-03-16 SDOH — ECONOMIC STABILITY: FOOD INSECURITY: WITHIN THE PAST 12 MONTHS, YOU WORRIED THAT YOUR FOOD WOULD RUN OUT BEFORE YOU GOT MONEY TO BUY MORE.: NEVER TRUE

## 2023-03-16 SDOH — ECONOMIC STABILITY: INCOME INSECURITY: HOW HARD IS IT FOR YOU TO PAY FOR THE VERY BASICS LIKE FOOD, HOUSING, MEDICAL CARE, AND HEATING?: NOT HARD AT ALL

## 2023-03-16 SDOH — ECONOMIC STABILITY: FOOD INSECURITY: WITHIN THE PAST 12 MONTHS, THE FOOD YOU BOUGHT JUST DIDN'T LAST AND YOU DIDN'T HAVE MONEY TO GET MORE.: NEVER TRUE

## 2023-03-16 SDOH — ECONOMIC STABILITY: HOUSING INSECURITY
IN THE LAST 12 MONTHS, WAS THERE A TIME WHEN YOU DID NOT HAVE A STEADY PLACE TO SLEEP OR SLEPT IN A SHELTER (INCLUDING NOW)?: NO

## 2023-03-16 NOTE — PROGRESS NOTES
Robinson Abdi is a 65 y.o. male who presents for   Chief Complaint   Patient presents with    Health Maintenance     Hep c screen    Diabetes     Sees diabetic dr. Gevoany Maxwell   Diabetic foot and retinal exam are due     Ear Fullness     Pt states he is having trouble hearing out of his left ear     and follow up of chronic medical problems. 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    Bilateral primary osteoarthritis of hip    Diffuse large cell lymphoma in remission ([facility] [address]    Iron deficiency anemia    IPMN (intraductal papillary mucinous neoplasm)    Hypocitraturia    Hyperkalemia    Acquired hypothyroidism    JANIE (acute kidney injury) ([facility] [address]    Low back pain    B-cell lymphoma (HCC)    Vitamin D deficiency     HPI  Here for evaluation of plugged feeling in the left ear going on for the last week and happened suddenly    Current Outpatient Medications   Medication Sig Dispense Refill    predniSONE (DELTASONE) 5 MG tablet Take 1 tablet by mouth 2 times daily for 5 days 10 tablet 0    fluticasone (FLONASE) 50 MCG/ACT nasal spray 2 sprays by Each Nostril route daily 16 g 0    levothyroxine (SYNTHROID) 25 MCG tablet TAKE 1 TABLET DAILY 90 tablet 3    insulin lispro prot & lispro (HUMALOG MIX 50/50 KWIKPEN) (50-50) 100 UNIT per ML SUPN injection pen INJECT 90 UNITS IN THE MORNING, 60 UNITS AT 6 P.M.  AND 30 UNITS AT NIGHT 165 mL 3    amLODIPine (NORVASC) 10 MG tablet TAKE 1 TABLET DAILY 90 tablet 1    montelukast (SINGULAIR) 10 MG tablet TAKE 1 TABLET DAILY 90 tablet 1    atorvastatin (LIPITOR) 40 MG tablet TAKE 1 TABLET DAILY 90 tablet 1    omeprazole (PRILOSEC) 40 MG delayed release capsule TAKE 1 CAPSULE DAILY 90 capsule 1    furosemide (LASIX) 20 MG tablet TAKE 3 TABLETS DAILY 180 tablet 5    losartan (COZAAR) 50 MG tablet TAKE 1 TABLET DAILY 90 tablet 3    vitamin D3 (CHOLECALCIFEROL) 10 MCG (400 UNIT) TABS tablet Take 400 Units by mouth daily      metoprolol succinate (TOPROL XL) 50 MG extended release tablet TAKE 1 TABLET DAILY 90 tablet 3    alpha-galactosidase (BEANO) TABS chewable tablet Take 2 tablets by mouth 3 times daily (with meals) 60 tablet 0    ONETOUCH ULTRA strip use 1 TEST STRIP to TEST BLOOD SUGAR three times a day      Blood Glucose Monitoring Suppl (ONETOUCH VERIO FLEX SYSTEM) w/Device KIT as directed      BD PEN NEEDLE NATASHA U/F 32G X 4 MM MISC       acetaminophen (TYLENOL) 325 MG tablet Take 650 mg by mouth every 6 hours as needed for Pain      diclofenac sodium (VOLTAREN) 1 % GEL Apply topically 4 times daily as needed for Pain      Respiratory Therapy Supplies MISC Cpap supplies- tubing, mask, filter 1 each 0    aspirin 81 MG tablet Take 81 mg by mouth daily        No current facility-administered medications for this visit.        No Known Allergies    Past Medical History:   Diagnosis Date    Arthritis     BACK, BILATERAL SHOULDERS    Asthma     Back pain     BRCA2 positive 09/25/2018    CAD (coronary artery disease) 09/28/2015    CABG BYPASS X 3    Caffeine use     2 cans pop/day    Carpal tunnel syndrome     LEFT HAND    Cataracts, bilateral     Colon cancer ([address] 11/17/2011    A LYMPH NODE IN COLON WAS CANCEROUS    Cyst of pancreas     GETS THIS CHECKED ANNUALLY    Diabetes mellitus ([address]     Difficult intravenous access     PATIENT STATES USUALLY REQUIRES PICC TEAM.    GERD (gastroesophageal reflux disease)     Hay fever     HIGH CHOLESTEROL     History of blood transfusion     NO REACTION    History of chemotherapy 05/2013    Hodgkin's lymphoma ([address]     HTN (hypertension)     Hx of blood clots     AFTER BACK SURGERY, PATIENT HAD BLOOD CLOTS IN SURGICAL AREA AND NEEDED MORE SURGERY TO GET THEM REMOVED    Kidney stone     RIGHT KIDNEY    Lumbar herniated disc 1989    Microhematuria     Obesity     GUTIERREZ on CPAP     Pancreatitis     Pneumonia     Tinnitus     Use of cane as ambulatory aid     Uses brace     BACK BRACE AND LEFT HAND    Wears glasses     READING       Past Surgical History:   Procedure Laterality Date    ABDOMEN SURGERY  11/18/2011    RIGHT COLECTOMY    APPENDECTOMY  2011    with colectomy    BACK SURGERY  2014    LUMBAR LAMINECTOMY    BACK SURGERY  03/01/2014    EVACUATION OF SPINAL HEMATOMA    CATARACT REMOVAL Bilateral 01/2018    CATARACTS WITH IOL PLACED    COLONOSCOPY      Removal of colon polyp    CORONARY ARTERY BYPASS GRAFT  09/28/2015    CABG BYPASS X 3    CYSTOSCOPY Right 08/23/2018    CYSTOSCOPY, INSERTION OCCLUSIVE BALLOON, RIGHT RETROGRADE PYELOGRAM     CYSTOSCOPY Right 09/04/2018    CYSTOSCOPY STENT REMOVAL (Right )    IR NONTUNNELED VASCULAR CATHETER  2/24/2021    IR NONTUNNELED VASCULAR CATHETER 2/24/2021 Torri Black MD [facility] SPECIAL PROCEDURES    KNEE SURGERY Right 2007    TENDON REPAIR    NEPHROSTOMY Right 08/23/2018    HOLMIUM - NEPHROLITHOTOMY PERCUTANEOUS    NERVE BLOCK      STEROID INJECTIONS STARTING 2015 - 04/2018    NH CYSTO W/COMPLEX REMOVAL STONE & STENT Right 9/4/2018    CYSTOSCOPY STENT REMOVAL performed by Rachael Villegas MD at [facility] Right 8/23/2018    CYSTOSCOPY, INSERTION OCCLUSIVE BALLOON, RIGHT RETROGRADE PYELOGRAM performed by Rachael Villegas MD at [address] [place] [address] NL/PL LITHOTRP SIMPLE UP TO 2 CM 1 LOCATION Right 8/23/2018    HOLMIUM - NEPHROLITHOTOMY PERCUTANEOUS, C-ARM, LITHOCLAST,  PT COMING FROM INTERV RADIOLOGY  *SHORT STAY performed by Rachael Villegas MD at [facility] OR    TONSILLECTOMY AND ADENOIDECTOMY      AS A CHILD       Family History   Problem Relation Age of Onset    Diabetes Father     High Blood Pressure Father     Stroke Mother     Heart Failure Mother     Other Mother         pacemaker    High Blood Pressure Mother     Heart Disease Maternal Grandmother     Heart Disease Maternal Grandfather     Heart Disease Maternal Uncle      ROS  Constitutional:  Negative for fatigue, loss of appetite and unexpected weight change  HEENT            : Negative for neck stiffness and pain, no congestion or sinus pressure  Eyes                : No visual disturbance or pain  Cardiovascular: No chest pain or palpitations or leg swelling  Respiratory      : Negative for cough, shortness of breath or wheezing  Gastrointestinal: Negative for abdominal pain, constipation or diarrhea and bloating No nausea or vomiting  Genitourinary:     No urgency or frequency, no burning or hematuria  Musculoskeletal: No arthralgias, back pain or myalgias  Skin                  : Negative for rash or erythema  Neurological    : Negative for dizziness, weakness, tremors ,light headedness or syncope  Psychiatric       : Negative for dysphoric mood, sleep disturbances, nervous or anxious, or decreased concentration  All other review of systems was negative    Objective  Physical Examination:    Nursing note reviewed    BP (!) 140/60 (Site: Right Upper Arm, Position: Sitting, Cuff Size: Large Adult)   Pulse 63   Temp 97.5 °F (36.4 °C) (Temporal)   Resp 18   Ht 5' 7\" (1.702 m)   Wt 240 lb 3.2 oz (109 kg)   SpO2 97%   BMI 37.62 kg/m²   BP Readings from Last 3 Encounters:   03/16/23 (!) 140/60   01/23/23 138/70   01/18/23 (!) 150/70         Constitutional:  Mya Azul is oriented to place, person and time ,appears well-developed and well-nourished  HEENT:  Atraumatic and normocephalic, external ears normal bilaterally, nose normal no oropharyngeal exudate and is clear and moist  Eyes:  EOCM normal; conjunctivae normal; PERRLA bilaterally  Neck:  Normal range of motion, neck supple, no JVD and no thyromegaly  Cardiovascular:  RRR, normal heart sounds and intact distal pulses  Pulmonary:  effort normal and breath sounds normal bilaterally,no wheezes or rales, no respiratory distress  Abdominal:  Soft, non-tender; normal bowel sounds, no masses  Musculoskeletal:  Normal range of motion and no edema or tenderness bilaterally  No lymphadenopathy  Neurological:  alert, oriented, and normal reflexes bilaterally  Skin: warm and dry  Psychiatric:  normal mood and effect; behavior normal.    Labs:   Lab Results   Component Value Date    LABA1C 6.1 (H) 02/23/2021     Lab Results   Component Value Date    CHOL 94 05/03/2022     Lab Results   Component Value Date    HDL 29 (L) 05/03/2022     Lab Results   Component Value Date    LDLCALC 35 10/17/2017     Lab Results   Component Value Date    TRIG 199 (H) 05/03/2022     No results found for: [place], [place]  Lab Results   Component Value Date    WBC 7.4 01/10/2023    HGB 11.8 (L) 01/10/2023    HCT 37.7 (L) 01/10/2023    MCV 95.7 01/10/2023     01/10/2023     Lab Results   Component Value Date    INR 1.2 02/24/2021    PROTIME 14.6 (H) 02/24/2021     Lab Results   Component Value Date    GLUCOSE 110 (H) 01/10/2023    CREATININE 2.44 (H) 01/10/2023    BUN 39 (H) 01/10/2023     01/10/2023    K 4.5 01/10/2023     (H) 01/10/2023    CO2 17 (L) 01/10/2023     Lab Results   Component Value Date    ALT 20 05/03/2022    AST 21 05/03/2022    ALKPHOS 138 (H) 05/03/2022    BILITOT 0.37 05/03/2022     Lab Results   Component Value Date    LABPROT 7.0 02/08/2013    LABALBU 3.8 05/03/2022     Lab Results   Component Value Date    TSH 3.94 05/03/2022     Assessment:   Diagnosis Orders   1. Plugged feeling in ear, left        2.  Non-recurrent acute serous otitis media of left ear              Plan:  Wax removed from the left ear and patient already feeling better and hearing better and I advised patient to use the Flonase nasal spray and do not start on the prednisone and call me back next week and if no improvement will refer to ENT  Review as scheduled           1. Frandy Trinidad received counseling on the following healthy behaviors: nutrition and exercise    2. Prior labs and health maintenance reviewed. 3.  Discussed use, benefit, and side effects of prescribed medications. Barriers to medication compliance addressed. All his questions were answered. Pt voiced understanding. Frandy Trinidad will continue current medications, diet and exercise. Orders Placed This Encounter   Medications    predniSONE (DELTASONE) 5 MG tablet     Sig: Take 1 tablet by mouth 2 times daily for 5 days     Dispense:  10 tablet     Refill:  0    fluticasone (FLONASE) 50 MCG/ACT nasal spray     Si sprays by Each Nostril route daily     Dispense:  16 g     Refill:  0          Completed Refills               Requested Prescriptions     Signed Prescriptions Disp Refills    predniSONE (DELTASONE) 5 MG tablet 10 tablet 0     Sig: Take 1 tablet by mouth 2 times daily for 5 days    fluticasone (FLONASE) 50 MCG/ACT nasal spray 16 g 0     Si sprays by Each Nostril route daily     4. Patient given educational materials - see patient instructions    5. Was a self-tracking handout given in paper form or via [employer]? NO    No orders of the defined types were placed in this encounter. No follow-ups on file. Patient voiced understanding and agreed to treatment plan. Electronically signed by Sean Osorio MD on 3/16/2023 at 4:30 PM    This note is created with a voice recognition program and while intend to generate a document that accurately reflects the content of the visit, no guarantee can be provided that every mistake has been identified and corrected by editing.

## 2023-03-21 ENCOUNTER — HOSPITAL ENCOUNTER (OUTPATIENT)
Dept: CARDIAC REHAB | Age: 78
Setting detail: THERAPIES SERIES
Discharge: HOME | End: 2023-03-21

## 2023-04-03 RX ORDER — MONTELUKAST SODIUM 10 MG/1
TABLET ORAL
Qty: 90 TABLET | Refills: 1 | Status: SHIPPED | OUTPATIENT
Start: 2023-04-03

## 2023-04-03 RX ORDER — AMLODIPINE BESYLATE 10 MG/1
TABLET ORAL
Qty: 90 TABLET | Refills: 1 | Status: SHIPPED | OUTPATIENT
Start: 2023-04-03

## 2023-04-03 RX ORDER — ATORVASTATIN CALCIUM 40 MG/1
TABLET, FILM COATED ORAL
Qty: 90 TABLET | Refills: 1 | Status: SHIPPED | OUTPATIENT
Start: 2023-04-03

## 2023-04-03 RX ORDER — OMEPRAZOLE 40 MG/1
CAPSULE, DELAYED RELEASE ORAL
Qty: 90 CAPSULE | Refills: 1 | Status: SHIPPED | OUTPATIENT
Start: 2023-04-03

## 2023-04-03 NOTE — TELEPHONE ENCOUNTER
Arjun Haskins is calling to request a refill on the following medication(s):    Medication Request:  Requested Prescriptions     Pending Prescriptions Disp Refills    omeprazole (PRILOSEC) 40 MG delayed release capsule [Pharmacy Med Name: OMEPRAZOLE DR CAPS 40MG] 90 capsule 1     Sig: TAKE 1 CAPSULE DAILY    atorvastatin (LIPITOR) 40 MG tablet [Pharmacy Med Name: ATORVASTATIN TABS 40MG] 90 tablet 1     Sig: TAKE 1 TABLET DAILY    montelukast (SINGULAIR) 10 MG tablet [Pharmacy Med Name: MONTELUKAST SODIUM TABS 10MG] 90 tablet 1     Sig: TAKE 1 TABLET DAILY    amLODIPine (NORVASC) 10 MG tablet [Pharmacy Med Name: AMLODIPINE BESYLATE TABS 10MG] 90 tablet 1     Sig: TAKE 1 TABLET DAILY       Last Visit Date (If Applicable):  7/23/5755    Next Visit Date:    5/23/2023

## 2023-04-20 PROBLEM — R35.1 NOCTURIA: Status: ACTIVE | Noted: 2023-04-20

## 2023-05-16 ENCOUNTER — HOSPITAL ENCOUNTER (OUTPATIENT)
Age: 78
Setting detail: SPECIMEN
Discharge: HOME | End: 2023-05-16

## 2023-05-16 ENCOUNTER — HOSPITAL ENCOUNTER (OUTPATIENT)
Dept: GENERAL RADIOLOGY | Facility: CLINIC | Age: 78
Discharge: HOME | End: 2023-05-18
Payer: MEDICARE

## 2023-05-16 DIAGNOSIS — N18.4 SECONDARY DIABETES MELLITUS WITH STAGE 4 CHRONIC KIDNEY DISEASE (HCC): ICD-10-CM

## 2023-05-16 DIAGNOSIS — N18.4 ANEMIA IN STAGE 4 CHRONIC KIDNEY DISEASE (HCC): ICD-10-CM

## 2023-05-16 DIAGNOSIS — D63.1 ANEMIA IN STAGE 4 CHRONIC KIDNEY DISEASE (HCC): ICD-10-CM

## 2023-05-16 DIAGNOSIS — E13.22 SECONDARY DIABETES MELLITUS WITH STAGE 4 CHRONIC KIDNEY DISEASE (HCC): ICD-10-CM

## 2023-05-16 DIAGNOSIS — R80.8 OTHER PROTEINURIA: ICD-10-CM

## 2023-05-16 DIAGNOSIS — I12.9 BENIGN HYPERTENSION WITH CKD (CHRONIC KIDNEY DISEASE) STAGE IV (HCC): ICD-10-CM

## 2023-05-16 DIAGNOSIS — N18.4 CKD (CHRONIC KIDNEY DISEASE) STAGE 4, GFR 15-29 ML/MIN (HCC): ICD-10-CM

## 2023-05-16 DIAGNOSIS — R31.29 OTHER MICROSCOPIC HEMATURIA: ICD-10-CM

## 2023-05-16 DIAGNOSIS — N20.0 RENAL CALCULUS, LEFT: ICD-10-CM

## 2023-05-16 DIAGNOSIS — N18.4 BENIGN HYPERTENSION WITH CKD (CHRONIC KIDNEY DISEASE) STAGE IV (HCC): ICD-10-CM

## 2023-05-16 LAB
ALBUMIN SERPL-MCNC: 3.7 G/DL (ref 3.5–5.2)
ALBUMIN/GLOB SERPL: 1.1 {RATIO} (ref 1–2.5)
ALP SERPL-CCNC: 149 U/L (ref 40–129)
ALT SERPL-CCNC: 17 U/L (ref 5–41)
ANION GAP SERPL CALCULATED.3IONS-SCNC: 13 MMOL/L (ref 9–17)
ANION GAP SERPL CALCULATED.3IONS-SCNC: 14 MMOL/L (ref 9–17)
AST SERPL-CCNC: 22 U/L
BASOPHILS # BLD: 0.06 K/UL (ref 0–0.2)
BASOPHILS # BLD: 1 % (ref 0–2)
BILIRUB SERPL-MCNC: 0.3 MG/DL (ref 0.3–1.2)
BILIRUB UR QL STRIP: NEGATIVE
BUN SERPL-MCNC: 41 MG/DL (ref 8–23)
BUN SERPL-MCNC: 42 MG/DL (ref 8–23)
CALCIUM SERPL-MCNC: 9.4 MG/DL (ref 8.6–10.4)
CALCIUM SERPL-MCNC: 9.4 MG/DL (ref 8.6–10.4)
CASTS #/AREA URNS LPF: ABNORMAL /LPF (ref 0–8)
CHLORIDE SERPL-SCNC: 107 MMOL/L (ref 98–107)
CHLORIDE SERPL-SCNC: 108 MMOL/L (ref 98–107)
CHOLEST SERPL-MCNC: 104 MG/DL
CHOLESTEROL/HDL RATIO: 3.5
CLARITY UR: CLEAR
CO2 SERPL-SCNC: 21 MMOL/L (ref 20–31)
CO2 SERPL-SCNC: 22 MMOL/L (ref 20–31)
COLOR UR: YELLOW
CREAT SERPL-MCNC: 2.18 MG/DL (ref 0.7–1.2)
CREAT SERPL-MCNC: 2.28 MG/DL (ref 0.7–1.2)
CREAT UR-MCNC: 168 MG/DL (ref 39–259)
CREAT UR-MCNC: 168.4 MG/DL (ref 39–259)
EOSINOPHIL # BLD: 0.22 K/UL (ref 0–0.44)
EOSINOPHILS RELATIVE PERCENT: 3 % (ref 1–4)
EPI CELLS #/AREA URNS HPF: ABNORMAL /HPF (ref 0–5)
ERYTHROCYTE [DISTWIDTH] IN BLOOD BY AUTOMATED COUNT: 13.8 % (ref 11.8–14.4)
GFR SERPL CREATININE-BSD FRML MDRD: 29 ML/MIN/1.73M2
GFR SERPL CREATININE-BSD FRML MDRD: 30 ML/MIN/1.73M2
GLUCOSE SERPL-MCNC: 90 MG/DL (ref 70–99)
GLUCOSE SERPL-MCNC: 96 MG/DL (ref 70–99)
GLUCOSE UR STRIP.AUTO-MCNC: NEGATIVE MG/DL
HCT VFR BLD AUTO: 39.5 % (ref 40.7–50.3)
HDLC SERPL-MCNC: 30 MG/DL
HGB BLD-MCNC: 12.8 G/DL (ref 13–17)
HGB UR QL STRIP.AUTO: ABNORMAL
IMM GRANULOCYTES # BLD AUTO: 0.03 K/UL (ref 0–0.3)
IMM GRANULOCYTES NFR BLD: 0 %
KETONES UR STRIP.AUTO-MCNC: NEGATIVE MG/DL
LDLC SERPL CALC-MCNC: 34 MG/DL (ref 0–130)
LEUKOCYTE ESTERASE UR QL STRIP: NEGATIVE
LYMPHOCYTES # BLD: 33 % (ref 24–43)
LYMPHOCYTES NFR BLD: 2.71 K/UL (ref 1.1–3.7)
MAGNESIUM SERPL-MCNC: 2.3 MG/DL (ref 1.6–2.6)
MCH RBC QN AUTO: 30.4 PG (ref 25.2–33.5)
MCHC RBC AUTO-ENTMCNC: 32.4 G/DL (ref 28.4–34.8)
MCV RBC AUTO: 93.8 FL (ref 82.6–102.9)
MICROALBUMIN UR-MCNC: 1444 MG/L
MICROALBUMIN/CREAT UR-RTO: 860 MCG/MG CREAT
MONOCYTES NFR BLD: 0.83 K/UL (ref 0.1–1.2)
MONOCYTES NFR BLD: 10 % (ref 3–12)
NEUTROPHILS NFR BLD: 53 % (ref 36–65)
NEUTS SEG NFR BLD: 4.46 K/UL (ref 1.5–8.1)
NITRITE UR QL STRIP: NEGATIVE
NRBC AUTOMATED: 0 PER 100 WBC
PHOSPHATE SERPL-MCNC: 5.1 MG/DL (ref 2.5–4.5)
PLATELET # BLD AUTO: 180 K/UL (ref 138–453)
PMV BLD AUTO: 10.8 FL (ref 8.1–13.5)
POTASSIUM SERPL-SCNC: 4.4 MMOL/L (ref 3.7–5.3)
POTASSIUM SERPL-SCNC: 4.5 MMOL/L (ref 3.7–5.3)
PROT SERPL-MCNC: 7 G/DL (ref 6.4–8.3)
PROT UR STRIP-MCNC: ABNORMAL MG/DL
PROT UR STRIP.AUTO-MCNC: 5.5 MG/DL (ref 5–8)
RBC # BLD AUTO: 4.21 M/UL (ref 4.21–5.77)
RBC #/AREA URNS HPF: ABNORMAL /HPF (ref 0–4)
SODIUM SERPL-SCNC: 141 MMOL/L (ref 135–144)
SODIUM SERPL-SCNC: 144 MMOL/L (ref 135–144)
SP GR UR STRIP.AUTO: 1.02 (ref 1–1.03)
T4 FREE SERPL-MCNC: 1.3 NG/DL (ref 0.9–1.7)
TOTAL PROTEIN, URINE: 212 MG/DL
TRIGL SERPL-MCNC: 202 MG/DL
TSH SERPL-ACNC: 3.78 UIU/ML (ref 0.3–5)
URINE TOTAL PROTEIN CREATININE RATIO: 1.26 (ref 0–0.2)
UROBILINOGEN UR STRIP-ACNC: NORMAL
VIT B12 SERPL-MCNC: 345 PG/ML (ref 232–1245)
WBC #/AREA URNS HPF: ABNORMAL /HPF (ref 0–5)
WBC OTHER # BLD: 8.3 K/UL (ref 3.5–11.3)

## 2023-05-16 PROCEDURE — 74018 RADEX ABDOMEN 1 VIEW: CPT

## 2023-05-23 ENCOUNTER — OFFICE VISIT (OUTPATIENT)
Dept: INTERNAL MEDICINE CLINIC | Age: 78
End: 2023-05-23
Payer: MEDICARE

## 2023-05-23 VITALS
OXYGEN SATURATION: 99 % | BODY MASS INDEX: 37.42 KG/M2 | HEIGHT: 67 IN | TEMPERATURE: 97.4 F | SYSTOLIC BLOOD PRESSURE: 118 MMHG | RESPIRATION RATE: 18 BRPM | DIASTOLIC BLOOD PRESSURE: 70 MMHG | WEIGHT: 238.4 LBS | HEART RATE: 67 BPM

## 2023-05-23 DIAGNOSIS — I10 ESSENTIAL HYPERTENSION: Primary | ICD-10-CM

## 2023-05-23 DIAGNOSIS — E11.59 TYPE 2 DIABETES MELLITUS WITH OTHER CIRCULATORY COMPLICATION, WITH LONG-TERM CURRENT USE OF INSULIN (HCC): ICD-10-CM

## 2023-05-23 DIAGNOSIS — E78.00 HIGH CHOLESTEROL: ICD-10-CM

## 2023-05-23 DIAGNOSIS — N18.2 CHRONIC RENAL FAILURE, STAGE 2 (MILD): ICD-10-CM

## 2023-05-23 DIAGNOSIS — Z79.4 TYPE 2 DIABETES MELLITUS WITH OTHER CIRCULATORY COMPLICATION, WITH LONG-TERM CURRENT USE OF INSULIN (HCC): ICD-10-CM

## 2023-05-23 PROCEDURE — 1036F TOBACCO NON-USER: CPT | Performed by: INTERNAL MEDICINE

## 2023-05-23 PROCEDURE — G8417 CALC BMI ABV UP PARAM F/U: HCPCS | Performed by: INTERNAL MEDICINE

## 2023-05-23 PROCEDURE — 3078F DIAST BP <80 MM HG: CPT | Performed by: INTERNAL MEDICINE

## 2023-05-23 PROCEDURE — G8427 DOCREV CUR MEDS BY ELIG CLIN: HCPCS | Performed by: INTERNAL MEDICINE

## 2023-05-23 PROCEDURE — 1123F ACP DISCUSS/DSCN MKR DOCD: CPT | Performed by: INTERNAL MEDICINE

## 2023-05-23 PROCEDURE — 99214 OFFICE O/P EST MOD 30 MIN: CPT | Performed by: INTERNAL MEDICINE

## 2023-05-23 PROCEDURE — 3074F SYST BP LT 130 MM HG: CPT | Performed by: INTERNAL MEDICINE

## 2023-05-23 NOTE — PROGRESS NOTES
insulin ([facility] [address]    3. Chronic renal failure, stage 2 (mild)    4. High cholesterol        Plan:  Blood pressure stable on current medications and continue treatment and patient visit with nephrologist regarding chronic renal failure reviewed and patient's current creatinine stands at 2.18 and currently patient takes Lasix 40 mg daily instead of 60 mg daily  Patient's visit to the endocrinologist office notes reviewed and patient's hemoglobin A1c is 6.9 compared to 6.3 last time and medications reviewed  Patient's cholesterol profile is stable and last LDL was 34 and continue atorvastatin 40 mg daily and patient will be seeing cardiology  Patient visit with the urologist and office notes reviewed  Counseled about diet and exercise  Handicap parking permit given the patient  Review in 6 months           1. Renaldo Styles received counseling on the following healthy behaviors: nutrition and exercise    2. Prior labs and health maintenance reviewed. 3.  Discussed use, benefit, and side effects of prescribed medications. Barriers to medication compliance addressed. All his questions were answered. Pt voiced understanding. Renaldo Styles will continue current medications, diet and exercise. No orders of the defined types were placed in this encounter. Completed Refills               Requested Prescriptions      No prescriptions requested or ordered in this encounter     4. Patient given educational materials - see patient instructions    5. Was a self-tracking handout given in paper form or via [employer]? NO    No orders of the defined types were placed in this encounter. Return in about 6 months (around 11/23/2023). Patient voiced understanding and agreed to treatment plan.      Electronically signed by Allan Garner MD on 5/23/2023 at 10:45 AM    This note is created with a voice recognition program and while intend to generate a document that accurately reflects the content of the visit, no guarantee

## 2023-09-13 RX ORDER — INSULIN LISPRO 100 [IU]/ML
INJECTION, SUSPENSION SUBCUTANEOUS
Qty: 165 ML | Refills: 5 | Status: SHIPPED | OUTPATIENT
Start: 2023-09-13

## 2023-09-13 NOTE — TELEPHONE ENCOUNTER
Brendon Loera is calling to request a refill on the following medication(s):    Medication Request:  Requested Prescriptions     Pending Prescriptions Disp Refills    HUMALOG MIX 50/50 KWIKPEN (50-50) 100 UNIT/ML SUPN injection pen [Pharmacy Med Name: HUMALOG MIX 50/50 KWKPN 3ML 5 50/50] 165 mL 2     Sig: INJECT 90 UNITS IN THE MORNING, 60 UNITS AT 6 P.M. AND 30 UNITS AT NIGHT       Last Visit Date (If Applicable):  6/87/8799    Next Visit Date:    11/20/2023        Last refill 10/14/22. Prescription pending.

## 2023-09-20 ENCOUNTER — HOSPITAL ENCOUNTER (OUTPATIENT)
Age: 78
Setting detail: SPECIMEN
Discharge: HOME | End: 2023-09-20

## 2023-09-20 DIAGNOSIS — N20.0 RENAL CALCULUS, LEFT: ICD-10-CM

## 2023-09-20 DIAGNOSIS — I12.9 BENIGN HYPERTENSION WITH CKD (CHRONIC KIDNEY DISEASE) STAGE IV (HCC): ICD-10-CM

## 2023-09-20 DIAGNOSIS — E13.22 SECONDARY DIABETES MELLITUS WITH STAGE 4 CHRONIC KIDNEY DISEASE (HCC): ICD-10-CM

## 2023-09-20 DIAGNOSIS — N18.4 CKD (CHRONIC KIDNEY DISEASE) STAGE 4, GFR 15-29 ML/MIN (HCC): ICD-10-CM

## 2023-09-20 DIAGNOSIS — N18.4 SECONDARY DIABETES MELLITUS WITH STAGE 4 CHRONIC KIDNEY DISEASE (HCC): ICD-10-CM

## 2023-09-20 DIAGNOSIS — R80.8 OTHER PROTEINURIA: ICD-10-CM

## 2023-09-20 DIAGNOSIS — E83.39 HYPERPHOSPHATEMIA: ICD-10-CM

## 2023-09-20 DIAGNOSIS — R80.9 MICROALBUMINURIA: ICD-10-CM

## 2023-09-20 DIAGNOSIS — N18.4 BENIGN HYPERTENSION WITH CKD (CHRONIC KIDNEY DISEASE) STAGE IV (HCC): ICD-10-CM

## 2023-09-20 LAB
ANION GAP SERPL CALCULATED.3IONS-SCNC: 15 MMOL/L (ref 9–17)
BASOPHILS # BLD: 0.05 K/UL (ref 0–0.2)
BASOPHILS NFR BLD: 1 % (ref 0–2)
BILIRUB UR QL STRIP: NEGATIVE
BUN SERPL-MCNC: 30 MG/DL (ref 8–23)
CALCIUM SERPL-MCNC: 9 MG/DL (ref 8.6–10.4)
CASTS #/AREA URNS LPF: ABNORMAL /LPF (ref 0–8)
CHLORIDE SERPL-SCNC: 106 MMOL/L (ref 98–107)
CLARITY UR: CLEAR
CO2 SERPL-SCNC: 20 MMOL/L (ref 20–31)
COLOR UR: YELLOW
CREAT SERPL-MCNC: 2 MG/DL (ref 0.7–1.2)
CREAT UR-MCNC: 117.5 MG/DL (ref 39–259)
CREAT UR-MCNC: 117.5 MG/DL (ref 39–259)
EOSINOPHIL # BLD: 0.22 K/UL (ref 0–0.44)
EOSINOPHILS RELATIVE PERCENT: 3 % (ref 1–4)
EPI CELLS #/AREA URNS HPF: ABNORMAL /HPF (ref 0–5)
ERYTHROCYTE [DISTWIDTH] IN BLOOD BY AUTOMATED COUNT: 14.4 % (ref 11.8–14.4)
GFR SERPL CREATININE-BSD FRML MDRD: 33 ML/MIN/1.73M2
GLUCOSE SERPL-MCNC: 128 MG/DL (ref 70–99)
GLUCOSE UR STRIP-MCNC: NEGATIVE MG/DL
HCT VFR BLD AUTO: 37.4 % (ref 40.7–50.3)
HGB BLD-MCNC: 11.9 G/DL (ref 13–17)
HGB UR QL STRIP.AUTO: NEGATIVE
IMM GRANULOCYTES # BLD AUTO: 0.05 K/UL (ref 0–0.3)
IMM GRANULOCYTES NFR BLD: 1 %
KETONES UR STRIP-MCNC: NEGATIVE MG/DL
LEUKOCYTE ESTERASE UR QL STRIP: NEGATIVE
LYMPHOCYTES NFR BLD: 2.09 K/UL (ref 1.1–3.7)
LYMPHOCYTES RELATIVE PERCENT: 28 % (ref 24–43)
MAGNESIUM SERPL-MCNC: 2.1 MG/DL (ref 1.6–2.6)
MCH RBC QN AUTO: 30.3 PG (ref 25.2–33.5)
MCHC RBC AUTO-ENTMCNC: 31.8 G/DL (ref 28.4–34.8)
MCV RBC AUTO: 95.2 FL (ref 82.6–102.9)
MICROALBUMIN UR-MCNC: 2166 MG/L
MICROALBUMIN/CREAT UR-RTO: 1843 MCG/MG CREAT
MONOCYTES NFR BLD: 0.79 K/UL (ref 0.1–1.2)
MONOCYTES NFR BLD: 11 % (ref 3–12)
NEUTROPHILS NFR BLD: 56 % (ref 36–65)
NEUTS SEG NFR BLD: 4.29 K/UL (ref 1.5–8.1)
NITRITE UR QL STRIP: NEGATIVE
NRBC BLD-RTO: 0 PER 100 WBC
PH UR STRIP: 6.5 [PH] (ref 5–8)
PHOSPHATE SERPL-MCNC: 3.2 MG/DL (ref 2.5–4.5)
PLATELET # BLD AUTO: 161 K/UL (ref 138–453)
PMV BLD AUTO: 11.7 FL (ref 8.1–13.5)
POTASSIUM SERPL-SCNC: 4.3 MMOL/L (ref 3.7–5.3)
PROT UR STRIP-MCNC: ABNORMAL MG/DL
PTH-INTACT SERPL-MCNC: 65.3 PG/ML (ref 14–72)
RBC # BLD AUTO: 3.93 M/UL (ref 4.21–5.77)
RBC #/AREA URNS HPF: ABNORMAL /HPF (ref 0–4)
SODIUM SERPL-SCNC: 141 MMOL/L (ref 135–144)
SP GR UR STRIP: 1.02 (ref 1–1.03)
TOTAL PROTEIN, URINE: 305 MG/DL
URINE TOTAL PROTEIN CREATININE RATIO: 2.6 (ref 0–0.2)
UROBILINOGEN UR STRIP-ACNC: NORMAL EU/DL (ref 0–1)
WBC #/AREA URNS HPF: ABNORMAL /HPF (ref 0–5)
WBC OTHER # BLD: 7.5 K/UL (ref 3.5–11.3)

## 2023-09-29 NOTE — TELEPHONE ENCOUNTER
Michael Coronel is calling to request a refill on the following medication(s):    Medication Request:  Requested Prescriptions     Pending Prescriptions Disp Refills    amLODIPine (NORVASC) 10 MG tablet [Pharmacy Med Name: AMLODIPINE BESYLATE TABS 10MG] 90 tablet 3     Sig: TAKE 1 TABLET DAILY    montelukast (SINGULAIR) 10 MG tablet [Pharmacy Med Name: MONTELUKAST SODIUM TABS 10MG] 90 tablet 3     Sig: TAKE 1 TABLET DAILY    atorvastatin (LIPITOR) 40 MG tablet [Pharmacy Med Name: ATORVASTATIN TABS 40MG] 90 tablet 3     Sig: TAKE 1 TABLET DAILY    omeprazole (PRILOSEC) 40 MG delayed release capsule [Pharmacy Med Name: OMEPRAZOLE DR CAPS 40MG] 90 capsule 3     Sig: TAKE 1 CAPSULE DAILY       Last Visit Date (If Applicable):  8/84/2530    Next Visit Date:    11/20/2023

## 2023-09-30 RX ORDER — ATORVASTATIN CALCIUM 40 MG/1
TABLET, FILM COATED ORAL
Qty: 90 TABLET | Refills: 3 | Status: SHIPPED | OUTPATIENT
Start: 2023-09-30

## 2023-09-30 RX ORDER — AMLODIPINE BESYLATE 10 MG/1
TABLET ORAL
Qty: 90 TABLET | Refills: 3 | Status: SHIPPED | OUTPATIENT
Start: 2023-09-30

## 2023-09-30 RX ORDER — MONTELUKAST SODIUM 10 MG/1
TABLET ORAL
Qty: 90 TABLET | Refills: 3 | Status: SHIPPED | OUTPATIENT
Start: 2023-09-30

## 2023-09-30 RX ORDER — OMEPRAZOLE 40 MG/1
CAPSULE, DELAYED RELEASE ORAL
Qty: 90 CAPSULE | Refills: 3 | Status: SHIPPED | OUTPATIENT
Start: 2023-09-30

## 2023-11-20 ENCOUNTER — OFFICE VISIT (OUTPATIENT)
Dept: INTERNAL MEDICINE CLINIC | Age: 78
End: 2023-11-20
Payer: MEDICARE

## 2023-11-20 VITALS
OXYGEN SATURATION: 98 % | HEIGHT: 67 IN | TEMPERATURE: 97.2 F | DIASTOLIC BLOOD PRESSURE: 66 MMHG | SYSTOLIC BLOOD PRESSURE: 128 MMHG | WEIGHT: 235 LBS | HEART RATE: 67 BPM | BODY MASS INDEX: 36.88 KG/M2 | RESPIRATION RATE: 16 BRPM

## 2023-11-20 DIAGNOSIS — Z72.89 OTHER PROBLEMS RELATED TO LIFESTYLE: ICD-10-CM

## 2023-11-20 DIAGNOSIS — Z11.59 NEED FOR HEPATITIS C SCREENING TEST: ICD-10-CM

## 2023-11-20 DIAGNOSIS — Z00.00 MEDICARE ANNUAL WELLNESS VISIT, SUBSEQUENT: Primary | ICD-10-CM

## 2023-11-20 PROCEDURE — G0439 PPPS, SUBSEQ VISIT: HCPCS | Performed by: INTERNAL MEDICINE

## 2023-11-20 PROCEDURE — G8484 FLU IMMUNIZE NO ADMIN: HCPCS | Performed by: INTERNAL MEDICINE

## 2023-11-20 PROCEDURE — 1123F ACP DISCUSS/DSCN MKR DOCD: CPT | Performed by: INTERNAL MEDICINE

## 2023-11-20 PROCEDURE — 3078F DIAST BP <80 MM HG: CPT | Performed by: INTERNAL MEDICINE

## 2023-11-20 PROCEDURE — 3074F SYST BP LT 130 MM HG: CPT | Performed by: INTERNAL MEDICINE

## 2023-11-20 ASSESSMENT — PATIENT HEALTH QUESTIONNAIRE - PHQ9
SUM OF ALL RESPONSES TO PHQ QUESTIONS 1-9: 0
SUM OF ALL RESPONSES TO PHQ9 QUESTIONS 1 & 2: 0
2. FEELING DOWN, DEPRESSED OR HOPELESS: 0
SUM OF ALL RESPONSES TO PHQ QUESTIONS 1-9: 0
1. LITTLE INTEREST OR PLEASURE IN DOING THINGS: 0

## 2023-11-20 ASSESSMENT — LIFESTYLE VARIABLES: HOW MANY STANDARD DRINKS CONTAINING ALCOHOL DO YOU HAVE ON A TYPICAL DAY: PATIENT DOES NOT DRINK

## 2023-11-20 NOTE — PATIENT INSTRUCTIONS
Care list are included within your After Visit Summary for your review. Other Preventive Recommendations:    A preventive eye exam performed by an eye specialist is recommended every 1-2 years to screen for glaucoma; cataracts, macular degeneration, and other eye disorders. A preventive dental visit is recommended every 6 months. Try to get at least 150 minutes of exercise per week or 10,000 steps per day on a pedometer . Order or download the FREE \"Exercise & Physical Activity: Your Everyday Guide\" from [employer]. Call 1-901.449.5882 or search [employer] online. You need 2999-5211 mg of calcium and 8358-0995 IU of vitamin D per day. It is possible to meet your calcium requirement with diet alone, but a vitamin D supplement is usually necessary to meet this goal.  When exposed to the sun, use a sunscreen that protects against both UVA and UVB radiation with an SPF of 30 or greater. Reapply every 2 to 3 hours or after sweating, drying off with a towel, or swimming. Always wear a seat belt when traveling in a car. Always wear a helmet when riding a bicycle or motorcycle.

## 2023-11-21 ENCOUNTER — TELEPHONE (OUTPATIENT)
Dept: INTERNAL MEDICINE CLINIC | Age: 78
End: 2023-11-21

## 2023-11-21 NOTE — TELEPHONE ENCOUNTER
Patient stated that the Debrox was not covered by insurance and would like something else prescribed in its place. Please advise.

## 2024-01-09 ENCOUNTER — HOSPITAL ENCOUNTER (OUTPATIENT)
Age: 79
Setting detail: SPECIMEN
Discharge: HOME | End: 2024-01-09

## 2024-01-09 LAB
ANION GAP SERPL CALCULATED.3IONS-SCNC: 13 MMOL/L (ref 9–17)
BASOPHILS # BLD: 0.06 K/UL (ref 0–0.2)
BASOPHILS NFR BLD: 1 % (ref 0–2)
BUN SERPL-MCNC: 30 MG/DL (ref 8–23)
CALCIUM SERPL-MCNC: 9 MG/DL (ref 8.6–10.4)
CHLORIDE SERPL-SCNC: 105 MMOL/L (ref 98–107)
CO2 SERPL-SCNC: 20 MMOL/L (ref 20–31)
CREAT SERPL-MCNC: 2.2 MG/DL (ref 0.7–1.2)
EOSINOPHIL # BLD: 0.24 K/UL (ref 0–0.44)
EOSINOPHILS RELATIVE PERCENT: 3 % (ref 1–4)
ERYTHROCYTE [DISTWIDTH] IN BLOOD BY AUTOMATED COUNT: 15.6 % (ref 11.8–14.4)
GFR SERPL CREATININE-BSD FRML MDRD: 30 ML/MIN/1.73M2
GLUCOSE SERPL-MCNC: 128 MG/DL (ref 70–99)
HCT VFR BLD AUTO: 37.6 % (ref 40.7–50.3)
HGB BLD-MCNC: 11.7 G/DL (ref 13–17)
IMM GRANULOCYTES # BLD AUTO: 0.08 K/UL (ref 0–0.3)
IMM GRANULOCYTES NFR BLD: 1 %
INR PPP: 1
LYMPHOCYTES NFR BLD: 2.25 K/UL (ref 1.1–3.7)
LYMPHOCYTES RELATIVE PERCENT: 25 % (ref 24–43)
MCH RBC QN AUTO: 29.9 PG (ref 25.2–33.5)
MCHC RBC AUTO-ENTMCNC: 31.1 G/DL (ref 28.4–34.8)
MCV RBC AUTO: 96.2 FL (ref 82.6–102.9)
MONOCYTES NFR BLD: 0.99 K/UL (ref 0.1–1.2)
MONOCYTES NFR BLD: 11 % (ref 3–12)
NEUTROPHILS NFR BLD: 59 % (ref 36–65)
NEUTS SEG NFR BLD: 5.24 K/UL (ref 1.5–8.1)
NRBC BLD-RTO: 0 PER 100 WBC
PLATELET # BLD AUTO: 208 K/UL (ref 138–453)
PMV BLD AUTO: 10.9 FL (ref 8.1–13.5)
POTASSIUM SERPL-SCNC: 4.9 MMOL/L (ref 3.7–5.3)
PROTHROMBIN TIME: 12.9 SEC (ref 11.7–14.9)
RBC # BLD AUTO: 3.91 M/UL (ref 4.21–5.77)
RBC # BLD: ABNORMAL 10*6/UL
SODIUM SERPL-SCNC: 138 MMOL/L (ref 135–144)
WBC OTHER # BLD: 8.9 K/UL (ref 3.5–11.3)

## 2024-01-22 ENCOUNTER — HOSPITAL ENCOUNTER (OUTPATIENT)
Age: 79
Setting detail: SPECIMEN
Discharge: HOME | End: 2024-01-22

## 2024-01-22 DIAGNOSIS — N18.4 SECONDARY DIABETES MELLITUS WITH STAGE 4 CHRONIC KIDNEY DISEASE (HCC): ICD-10-CM

## 2024-01-22 DIAGNOSIS — E13.22 SECONDARY DIABETES MELLITUS WITH STAGE 4 CHRONIC KIDNEY DISEASE (HCC): ICD-10-CM

## 2024-01-22 DIAGNOSIS — N18.4 CKD (CHRONIC KIDNEY DISEASE) STAGE 4, GFR 15-29 ML/MIN (HCC): ICD-10-CM

## 2024-01-22 DIAGNOSIS — N18.4 BENIGN HYPERTENSION WITH CKD (CHRONIC KIDNEY DISEASE) STAGE IV (HCC): ICD-10-CM

## 2024-01-22 DIAGNOSIS — N18.4 ANEMIA IN STAGE 4 CHRONIC KIDNEY DISEASE (HCC): ICD-10-CM

## 2024-01-22 DIAGNOSIS — I12.9 BENIGN HYPERTENSION WITH CKD (CHRONIC KIDNEY DISEASE) STAGE IV (HCC): ICD-10-CM

## 2024-01-22 DIAGNOSIS — E55.9 VITAMIN D DEFICIENCY: ICD-10-CM

## 2024-01-22 DIAGNOSIS — D63.1 ANEMIA IN STAGE 4 CHRONIC KIDNEY DISEASE (HCC): ICD-10-CM

## 2024-01-22 LAB
ANION GAP SERPL CALCULATED.3IONS-SCNC: 11 MMOL/L (ref 9–16)
BUN SERPL-MCNC: 42 MG/DL (ref 8–23)
CALCIUM SERPL-MCNC: 8.9 MG/DL (ref 8.6–10.4)
CHLORIDE SERPL-SCNC: 109 MMOL/L (ref 98–107)
CO2 SERPL-SCNC: 19 MMOL/L (ref 20–31)
CREAT SERPL-MCNC: 1.9 MG/DL (ref 0.7–1.2)
GFR SERPL CREATININE-BSD FRML MDRD: 36 ML/MIN/1.73M2
GLUCOSE SERPL-MCNC: 142 MG/DL (ref 74–99)
POTASSIUM SERPL-SCNC: 4.7 MMOL/L (ref 3.7–5.3)
SODIUM SERPL-SCNC: 139 MMOL/L (ref 136–145)

## 2024-02-01 ENCOUNTER — TELEPHONE (OUTPATIENT)
Dept: INTERNAL MEDICINE CLINIC | Age: 79
End: 2024-02-01

## 2024-02-01 LAB
ESTIMATED AVERAGE GLUCOSE: NORMAL
HBA1C MFR BLD: 5.8 %

## 2024-02-01 NOTE — TELEPHONE ENCOUNTER
Letter for handicapped placard is to old that was wrote in May    Is asking for a new one?    Please mail when ready

## 2024-02-14 RX ORDER — LEVOTHYROXINE SODIUM 0.03 MG/1
TABLET ORAL
Qty: 90 TABLET | Refills: 1 | Status: SHIPPED | OUTPATIENT
Start: 2024-02-14

## 2024-02-14 NOTE — TELEPHONE ENCOUNTER
Serg Garcia is calling to request a refill on the following medication(s):    Medication Request:  Requested Prescriptions     Pending Prescriptions Disp Refills    levothyroxine (SYNTHROID) 25 MCG tablet [Pharmacy Med Name: L-THYROXINE (SYNTHROID) TABS 25MCG] 90 tablet 3     Sig: TAKE 1 TABLET DAILY       Last Visit Date (If Applicable):  11/20/2023    Next Visit Date:    5/20/2024      Last refill 2/22/23.

## 2024-04-02 ENCOUNTER — HOSPITAL ENCOUNTER (OUTPATIENT)
Age: 79
Setting detail: SPECIMEN
Discharge: HOME | End: 2024-04-02

## 2024-04-02 DIAGNOSIS — N18.30 SECONDARY DIABETES MELLITUS WITH STAGE 3 CHRONIC KIDNEY DISEASE (HCC): ICD-10-CM

## 2024-04-02 DIAGNOSIS — R80.8 OTHER PROTEINURIA: ICD-10-CM

## 2024-04-02 DIAGNOSIS — N18.30 BENIGN HYPERTENSION WITH CKD (CHRONIC KIDNEY DISEASE) STAGE III (HCC): ICD-10-CM

## 2024-04-02 DIAGNOSIS — N18.32 ANEMIA IN STAGE 3B CHRONIC KIDNEY DISEASE (HCC): ICD-10-CM

## 2024-04-02 DIAGNOSIS — I12.9 BENIGN HYPERTENSION WITH CKD (CHRONIC KIDNEY DISEASE) STAGE III (HCC): ICD-10-CM

## 2024-04-02 DIAGNOSIS — N20.0 RENAL CALCULUS, LEFT: ICD-10-CM

## 2024-04-02 DIAGNOSIS — R80.9 MICROALBUMINURIA: ICD-10-CM

## 2024-04-02 DIAGNOSIS — E13.22 SECONDARY DIABETES MELLITUS WITH STAGE 3 CHRONIC KIDNEY DISEASE (HCC): ICD-10-CM

## 2024-04-02 DIAGNOSIS — N18.32 STAGE 3B CHRONIC KIDNEY DISEASE (HCC): ICD-10-CM

## 2024-04-02 DIAGNOSIS — D63.1 ANEMIA IN STAGE 3B CHRONIC KIDNEY DISEASE (HCC): ICD-10-CM

## 2024-04-02 LAB
ANION GAP SERPL CALCULATED.3IONS-SCNC: 13 MMOL/L (ref 9–16)
BACTERIA URNS QL MICRO: ABNORMAL
BASOPHILS # BLD: 0.05 K/UL (ref 0–0.2)
BASOPHILS NFR BLD: 1 % (ref 0–2)
BILIRUB UR QL STRIP: NEGATIVE
BUN SERPL-MCNC: 35 MG/DL (ref 8–23)
CALCIUM SERPL-MCNC: 8.7 MG/DL (ref 8.6–10.4)
CASTS #/AREA URNS LPF: ABNORMAL /LPF (ref 0–8)
CHLORIDE SERPL-SCNC: 110 MMOL/L (ref 98–107)
CLARITY UR: CLEAR
CO2 SERPL-SCNC: 20 MMOL/L (ref 20–31)
COLOR UR: YELLOW
CREAT SERPL-MCNC: 2.2 MG/DL (ref 0.7–1.2)
CREAT UR-MCNC: 110 MG/DL (ref 39–259)
EOSINOPHIL # BLD: 0.24 K/UL (ref 0–0.44)
EOSINOPHILS RELATIVE PERCENT: 4 % (ref 1–4)
EPI CELLS #/AREA URNS HPF: ABNORMAL /HPF (ref 0–5)
ERYTHROCYTE [DISTWIDTH] IN BLOOD BY AUTOMATED COUNT: 14.9 % (ref 11.8–14.4)
GFR SERPL CREATININE-BSD FRML MDRD: 30 ML/MIN/1.73M2
GLUCOSE SERPL-MCNC: 158 MG/DL (ref 74–99)
GLUCOSE UR STRIP-MCNC: ABNORMAL MG/DL
HCT VFR BLD AUTO: 35.7 % (ref 40.7–50.3)
HGB BLD-MCNC: 11.4 G/DL (ref 13–17)
HGB UR QL STRIP.AUTO: ABNORMAL
IMM GRANULOCYTES # BLD AUTO: 0.04 K/UL (ref 0–0.3)
IMM GRANULOCYTES NFR BLD: 1 %
KETONES UR STRIP-MCNC: NEGATIVE MG/DL
LEUKOCYTE ESTERASE UR QL STRIP: NEGATIVE
LYMPHOCYTES NFR BLD: 2.07 K/UL (ref 1.1–3.7)
LYMPHOCYTES RELATIVE PERCENT: 31 % (ref 24–43)
MAGNESIUM SERPL-MCNC: 1.9 MG/DL (ref 1.6–2.4)
MCH RBC QN AUTO: 29.2 PG (ref 25.2–33.5)
MCHC RBC AUTO-ENTMCNC: 31.9 G/DL (ref 28.4–34.8)
MCV RBC AUTO: 91.3 FL (ref 82.6–102.9)
MONOCYTES NFR BLD: 0.63 K/UL (ref 0.1–1.2)
MONOCYTES NFR BLD: 10 % (ref 3–12)
NEUTROPHILS NFR BLD: 53 % (ref 36–65)
NEUTS SEG NFR BLD: 3.56 K/UL (ref 1.5–8.1)
NITRITE UR QL STRIP: NEGATIVE
NRBC BLD-RTO: 0 PER 100 WBC
PH UR STRIP: 6 [PH] (ref 5–8)
PHOSPHATE SERPL-MCNC: 3.8 MG/DL (ref 2.5–4.5)
PLATELET # BLD AUTO: 149 K/UL (ref 138–453)
PMV BLD AUTO: 11.5 FL (ref 8.1–13.5)
POTASSIUM SERPL-SCNC: 4.6 MMOL/L (ref 3.7–5.3)
PROT UR STRIP-MCNC: ABNORMAL MG/DL
RBC # BLD AUTO: 3.91 M/UL (ref 4.21–5.77)
RBC # BLD: ABNORMAL 10*6/UL
RBC #/AREA URNS HPF: ABNORMAL /HPF (ref 0–4)
SODIUM SERPL-SCNC: 143 MMOL/L (ref 136–145)
SP GR UR STRIP: 1.02 (ref 1–1.03)
TOTAL PROTEIN, URINE: 525 MG/DL
URINE TOTAL PROTEIN CREATININE RATIO: 4.77
UROBILINOGEN UR STRIP-ACNC: NORMAL EU/DL (ref 0–1)
WBC #/AREA URNS HPF: ABNORMAL /HPF (ref 0–5)
WBC OTHER # BLD: 6.6 K/UL (ref 3.5–11.3)

## 2024-04-04 RX ORDER — METOPROLOL SUCCINATE 50 MG/1
TABLET, EXTENDED RELEASE ORAL
Qty: 90 TABLET | Refills: 1 | Status: SHIPPED | OUTPATIENT
Start: 2024-04-04

## 2024-04-04 NOTE — TELEPHONE ENCOUNTER
Serg Garcia is calling to request a refill on the following medication(s):    Medication Request:  Requested Prescriptions     Pending Prescriptions Disp Refills    metoprolol succinate (TOPROL XL) 50 MG extended release tablet [Pharmacy Med Name: METOPROLOL SUCCINATE ER TABS 50MG] 90 tablet 3     Sig: TAKE 1 TABLET DAILY       Last Visit Date (If Applicable):  11/20/2023    Next Visit Date:    5/20/2024      Last refill 4/11/23. Prescription pending.

## 2024-05-01 LAB
ESTIMATED AVERAGE GLUCOSE: NORMAL
HBA1C MFR BLD: 6.8 %

## 2024-05-06 ENCOUNTER — OFFICE VISIT (OUTPATIENT)
Age: 79
End: 2024-05-06
Payer: MEDICARE

## 2024-05-06 DIAGNOSIS — R31.29 MICROHEMATURIA: Primary | ICD-10-CM

## 2024-05-06 DIAGNOSIS — R82.991 HYPOCITRATURIA: ICD-10-CM

## 2024-05-06 DIAGNOSIS — R35.1 NOCTURIA: ICD-10-CM

## 2024-05-06 DIAGNOSIS — N20.0 RENAL CALCULUS, LEFT: ICD-10-CM

## 2024-05-06 LAB
BILIRUBIN, POC: NORMAL
BLOOD URINE, POC: NORMAL
CLARITY, POC: CLEAR
COLOR, POC: YELLOW
GLUCOSE URINE, POC: NORMAL
KETONES, POC: NORMAL
NITRITE, POC: NORMAL
PH, POC: NORMAL
PROTEIN, POC: NORMAL
SPECIFIC GRAVITY, POC: NORMAL
UROBILINOGEN, POC: NORMAL

## 2024-05-06 PROCEDURE — G8427 DOCREV CUR MEDS BY ELIG CLIN: HCPCS | Performed by: SPECIALIST

## 2024-05-06 PROCEDURE — 99214 OFFICE O/P EST MOD 30 MIN: CPT | Performed by: SPECIALIST

## 2024-05-06 PROCEDURE — G8417 CALC BMI ABV UP PARAM F/U: HCPCS | Performed by: SPECIALIST

## 2024-05-06 PROCEDURE — 1036F TOBACCO NON-USER: CPT | Performed by: SPECIALIST

## 2024-05-06 PROCEDURE — 81003 URINALYSIS AUTO W/O SCOPE: CPT | Performed by: SPECIALIST

## 2024-05-06 PROCEDURE — 1123F ACP DISCUSS/DSCN MKR DOCD: CPT | Performed by: SPECIALIST

## 2024-05-06 NOTE — PROGRESS NOTES
Jayy Myles MD FACS    [facility] Urology Office Progress Note    Patient:  Serg Garcia  YOB: 1945  Date: 5/6/2024    HISTORY OF PRESENT ILLNESS:   The patient is a 78 y.o. male  Stable microhematuria on today's UA with negative workup in the past.  Thus, no further workup required.    No flank pain or gross hematuria to suggest recurrent kidney stones.   Patient's 5/16/23 KUB was negative.  Patient told to take his Lasix at 3 pm instead of dinner time to minimize his nocturia.  Lower urinary tract symptoms: urgency, frequency, hesitancy, decreased urinary stream, nocturia, 3 times per night, and feelings of CAMI.    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  Urgency, frequency due to Lasix 60 mg qd    Additional History: none    Procedures Today: N/A    Urinalysis today:  Results for POC orders placed in visit on 05/06/24   POCT Urinalysis No Micro (Auto)   Result Value Ref Range    Color, UA yellow     Clarity, UA clear     Glucose, UA POC 100mg/dL     Bilirubin, UA      Ketones, UA      Spec Grav, UA      Blood, UA POC small     pH, UA      Protein, UA POC >=300mg/dL     Urobilinogen, UA      Leukocytes, UA neg     Nitrite, UA neg        Last several PSA's:  Lab Results   Component Value Date    PSA 0.31 02/26/2014    PSA 0.24 10/06/2011       Last total testosterone:  No components found for: \"TESTOSTERONE\"    Last BUN and creatinine:  Lab Results   Component Value Date    BUN 35 (H) 04/02/2024     Lab Results   Component Value Date

## 2024-05-18 SDOH — ECONOMIC STABILITY: FOOD INSECURITY: WITHIN THE PAST 12 MONTHS, THE FOOD YOU BOUGHT JUST DIDN'T LAST AND YOU DIDN'T HAVE MONEY TO GET MORE.: NEVER TRUE

## 2024-05-18 SDOH — ECONOMIC STABILITY: FOOD INSECURITY: WITHIN THE PAST 12 MONTHS, YOU WORRIED THAT YOUR FOOD WOULD RUN OUT BEFORE YOU GOT MONEY TO BUY MORE.: NEVER TRUE

## 2024-05-18 SDOH — ECONOMIC STABILITY: INCOME INSECURITY: HOW HARD IS IT FOR YOU TO PAY FOR THE VERY BASICS LIKE FOOD, HOUSING, MEDICAL CARE, AND HEATING?: NOT HARD AT ALL

## 2024-05-18 SDOH — ECONOMIC STABILITY: TRANSPORTATION INSECURITY
IN THE PAST 12 MONTHS, HAS LACK OF TRANSPORTATION KEPT YOU FROM MEETINGS, WORK, OR FROM GETTING THINGS NEEDED FOR DAILY LIVING?: NO

## 2024-05-18 ASSESSMENT — PATIENT HEALTH QUESTIONNAIRE - PHQ9
1. LITTLE INTEREST OR PLEASURE IN DOING THINGS: NOT AT ALL
SUM OF ALL RESPONSES TO PHQ9 QUESTIONS 1 & 2: 0
SUM OF ALL RESPONSES TO PHQ QUESTIONS 1-9: 0
1. LITTLE INTEREST OR PLEASURE IN DOING THINGS: NOT AT ALL
SUM OF ALL RESPONSES TO PHQ9 QUESTIONS 1 & 2: 0
2. FEELING DOWN, DEPRESSED OR HOPELESS: NOT AT ALL
SUM OF ALL RESPONSES TO PHQ QUESTIONS 1-9: 0
2. FEELING DOWN, DEPRESSED OR HOPELESS: NOT AT ALL
SUM OF ALL RESPONSES TO PHQ QUESTIONS 1-9: 0
SUM OF ALL RESPONSES TO PHQ QUESTIONS 1-9: 0

## 2024-05-20 ENCOUNTER — OFFICE VISIT (OUTPATIENT)
Dept: INTERNAL MEDICINE CLINIC | Age: 79
End: 2024-05-20
Payer: COMMERCIAL

## 2024-05-20 VITALS
HEIGHT: 68 IN | TEMPERATURE: 97.4 F | HEART RATE: 50 BPM | WEIGHT: 238.4 LBS | BODY MASS INDEX: 36.13 KG/M2 | SYSTOLIC BLOOD PRESSURE: 124 MMHG | DIASTOLIC BLOOD PRESSURE: 72 MMHG | OXYGEN SATURATION: 98 % | RESPIRATION RATE: 14 BRPM

## 2024-05-20 DIAGNOSIS — C83.33 DIFFUSE LARGE B-CELL LYMPHOMA, INTRA-ABDOMINAL LYMPH NODES (HCC): ICD-10-CM

## 2024-05-20 DIAGNOSIS — S06.4XAA EPIDURAL HEMATOMA (HCC): ICD-10-CM

## 2024-05-20 DIAGNOSIS — E03.9 HYPOTHYROIDISM, UNSPECIFIED TYPE: ICD-10-CM

## 2024-05-20 DIAGNOSIS — Z79.4 TYPE 2 DIABETES MELLITUS WITH OTHER CIRCULATORY COMPLICATION, WITH LONG-TERM CURRENT USE OF INSULIN (HCC): ICD-10-CM

## 2024-05-20 DIAGNOSIS — E11.59 TYPE 2 DIABETES MELLITUS WITH OTHER CIRCULATORY COMPLICATION, WITH LONG-TERM CURRENT USE OF INSULIN (HCC): ICD-10-CM

## 2024-05-20 DIAGNOSIS — E78.00 HIGH CHOLESTEROL: Primary | ICD-10-CM

## 2024-05-20 DIAGNOSIS — N18.4 CKD (CHRONIC KIDNEY DISEASE) STAGE 4, GFR 15-29 ML/MIN (HCC): ICD-10-CM

## 2024-05-20 DIAGNOSIS — E66.01 SEVERE OBESITY (BMI 35.0-39.9) WITH COMORBIDITY (HCC): ICD-10-CM

## 2024-05-20 PROCEDURE — 1036F TOBACCO NON-USER: CPT | Performed by: INTERNAL MEDICINE

## 2024-05-20 PROCEDURE — 3078F DIAST BP <80 MM HG: CPT | Performed by: INTERNAL MEDICINE

## 2024-05-20 PROCEDURE — G8427 DOCREV CUR MEDS BY ELIG CLIN: HCPCS | Performed by: INTERNAL MEDICINE

## 2024-05-20 PROCEDURE — 1123F ACP DISCUSS/DSCN MKR DOCD: CPT | Performed by: INTERNAL MEDICINE

## 2024-05-20 PROCEDURE — 3074F SYST BP LT 130 MM HG: CPT | Performed by: INTERNAL MEDICINE

## 2024-05-20 PROCEDURE — G8417 CALC BMI ABV UP PARAM F/U: HCPCS | Performed by: INTERNAL MEDICINE

## 2024-05-20 PROCEDURE — 99214 OFFICE O/P EST MOD 30 MIN: CPT | Performed by: INTERNAL MEDICINE

## 2024-05-20 NOTE — PROGRESS NOTES
Serg Garcia is a 78 y.o. male who presents for   Chief Complaint   Patient presents with    Hypertension     Follow up    Diabetes    Health Maintenance     Rsv, d eye, A1c, covid    Discuss Labs     Labs in [facility] by Nephrology     and follow up of chronic medical problems.  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    Bilateral primary osteoarthritis of hip    Diffuse large cell lymphoma in remission (HCC)    Iron deficiency anemia    IPMN (intraductal papillary mucinous neoplasm)    Hypocitraturia    Hyperkalemia    Acquired hypothyroidism    JANIE (acute kidney injury) ([facility])    Low back pain    B-cell lymphoma (HCC)    Vitamin D deficiency    Nocturia    CKD (chronic kidney disease) stage 4, GFR 15-29 ml/min ([facility])     HPI  Here for follow-up on blood pressure and diabetes denies any new complaints have questions about few medications including thyroid and Singulair    Current Outpatient Medications   Medication Sig Dispense Refill    metoprolol succinate (TOPROL XL) 50 MG extended release tablet TAKE 1 TABLET DAILY 90 tablet 1    HUMALOG KWIKPEN 100 UNIT/ML SOPN Inject into the skin 2 times daily (before meals)      Insulin Degludec 100 UNIT/ML SOPN 40 units in the morning      levothyroxine (SYNTHROID) 25 MCG tablet TAKE 1 TABLET DAILY 90 tablet 1    amLODIPine (NORVASC) 10 MG tablet TAKE 1 TABLET DAILY 90 tablet 3    atorvastatin (LIPITOR) 40 MG tablet

## 2024-08-12 RX ORDER — LEVOTHYROXINE SODIUM 0.03 MG/1
TABLET ORAL
Qty: 90 TABLET | Refills: 3 | Status: SHIPPED | OUTPATIENT
Start: 2024-08-12

## 2024-08-12 NOTE — TELEPHONE ENCOUNTER
Serg Garcia is calling to request a refill on the following medication(s):    Medication Request:  Requested Prescriptions     Pending Prescriptions Disp Refills    levothyroxine (SYNTHROID) 25 MCG tablet [Pharmacy Med Name: L-THYROXINE (SYNTHROID) TABS 25MCG] 90 tablet 3     Sig: TAKE 1 TABLET DAILY       Last Visit Date (If Applicable):  5/20/2024    Next Visit Date:    11/25/2024

## 2024-08-20 LAB
ESTIMATED AVERAGE GLUCOSE: NORMAL
HBA1C MFR BLD: 6 %

## 2024-09-09 ENCOUNTER — HOSPITAL ENCOUNTER (OUTPATIENT)
Age: 79
Setting detail: SPECIMEN
Discharge: HOME | End: 2024-09-09

## 2024-09-09 DIAGNOSIS — D63.1 ANEMIA IN STAGE 3B CHRONIC KIDNEY DISEASE (HCC): ICD-10-CM

## 2024-09-09 DIAGNOSIS — E03.9 HYPOTHYROIDISM, UNSPECIFIED TYPE: ICD-10-CM

## 2024-09-09 DIAGNOSIS — N18.32 STAGE 3B CHRONIC KIDNEY DISEASE (HCC): ICD-10-CM

## 2024-09-09 DIAGNOSIS — I12.9 BENIGN HYPERTENSION WITH CKD (CHRONIC KIDNEY DISEASE) STAGE III (HCC): ICD-10-CM

## 2024-09-09 DIAGNOSIS — N18.30 SECONDARY DIABETES MELLITUS WITH STAGE 3 CHRONIC KIDNEY DISEASE (HCC): ICD-10-CM

## 2024-09-09 DIAGNOSIS — E13.22 SECONDARY DIABETES MELLITUS WITH STAGE 3 CHRONIC KIDNEY DISEASE (HCC): ICD-10-CM

## 2024-09-09 DIAGNOSIS — N18.32 ANEMIA IN STAGE 3B CHRONIC KIDNEY DISEASE (HCC): ICD-10-CM

## 2024-09-09 DIAGNOSIS — N20.0 RENAL CALCULUS, LEFT: ICD-10-CM

## 2024-09-09 DIAGNOSIS — N18.30 BENIGN HYPERTENSION WITH CKD (CHRONIC KIDNEY DISEASE) STAGE III (HCC): ICD-10-CM

## 2024-09-09 DIAGNOSIS — R80.8 OTHER PROTEINURIA: ICD-10-CM

## 2024-09-09 DIAGNOSIS — E78.00 HIGH CHOLESTEROL: ICD-10-CM

## 2024-09-09 LAB
ALBUMIN SERPL-MCNC: 3.8 G/DL (ref 3.5–5.2)
ALBUMIN/GLOB SERPL: 1 {RATIO} (ref 1–2.5)
ALP SERPL-CCNC: 127 U/L (ref 40–129)
ALT SERPL-CCNC: 18 U/L (ref 10–50)
ANION GAP SERPL CALCULATED.3IONS-SCNC: 11 MMOL/L (ref 9–16)
ANION GAP SERPL CALCULATED.3IONS-SCNC: 12 MMOL/L (ref 9–16)
AST SERPL-CCNC: 27 U/L (ref 10–50)
BACTERIA URNS QL MICRO: ABNORMAL
BASOPHILS # BLD: 0.06 K/UL (ref 0–0.2)
BASOPHILS NFR BLD: 1 % (ref 0–2)
BILIRUB SERPL-MCNC: 0.5 MG/DL (ref 0–1.2)
BILIRUB UR QL STRIP: NEGATIVE
BUN SERPL-MCNC: 47 MG/DL (ref 8–23)
BUN SERPL-MCNC: 48 MG/DL (ref 8–23)
CALCIUM SERPL-MCNC: 8.8 MG/DL (ref 8.6–10.4)
CALCIUM SERPL-MCNC: 9 MG/DL (ref 8.6–10.4)
CASTS #/AREA URNS LPF: ABNORMAL /LPF (ref 0–8)
CHLORIDE SERPL-SCNC: 109 MMOL/L (ref 98–107)
CHLORIDE SERPL-SCNC: 110 MMOL/L (ref 98–107)
CHOLEST SERPL-MCNC: 107 MG/DL (ref 0–199)
CHOLEST SERPL-MCNC: 110 MG/DL (ref 0–199)
CHOLESTEROL/HDL RATIO: 3
CHOLESTEROL/HDL RATIO: 4
CLARITY UR: CLEAR
CO2 SERPL-SCNC: 21 MMOL/L (ref 20–31)
CO2 SERPL-SCNC: 23 MMOL/L (ref 20–31)
COLOR UR: YELLOW
CREAT SERPL-MCNC: 2.7 MG/DL (ref 0.7–1.2)
CREAT SERPL-MCNC: 2.8 MG/DL (ref 0.7–1.2)
CREAT UR-MCNC: 153 MG/DL (ref 39–259)
EOSINOPHIL # BLD: 0.23 K/UL (ref 0–0.44)
EOSINOPHILS RELATIVE PERCENT: 3 % (ref 1–4)
EPI CELLS #/AREA URNS HPF: ABNORMAL /HPF (ref 0–5)
ERYTHROCYTE [DISTWIDTH] IN BLOOD BY AUTOMATED COUNT: 14.4 % (ref 11.8–14.4)
GFR, ESTIMATED: 22 ML/MIN/1.73M2
GFR, ESTIMATED: 23 ML/MIN/1.73M2
GLUCOSE SERPL-MCNC: 80 MG/DL (ref 74–99)
GLUCOSE SERPL-MCNC: 81 MG/DL (ref 74–99)
GLUCOSE UR STRIP-MCNC: NEGATIVE MG/DL
HCT VFR BLD AUTO: 37.7 % (ref 40.7–50.3)
HDLC SERPL-MCNC: 31 MG/DL
HDLC SERPL-MCNC: 31 MG/DL
HGB BLD-MCNC: 12.3 G/DL (ref 13–17)
HGB UR QL STRIP.AUTO: NEGATIVE
IMM GRANULOCYTES # BLD AUTO: 0.03 K/UL (ref 0–0.3)
IMM GRANULOCYTES NFR BLD: 0 %
KETONES UR STRIP-MCNC: NEGATIVE MG/DL
LDLC SERPL CALC-MCNC: 48 MG/DL (ref 0–100)
LDLC SERPL CALC-MCNC: 50 MG/DL (ref 0–100)
LEUKOCYTE ESTERASE UR QL STRIP: NEGATIVE
LYMPHOCYTES NFR BLD: 2.46 K/UL (ref 1.1–3.7)
LYMPHOCYTES RELATIVE PERCENT: 33 % (ref 24–43)
MAGNESIUM SERPL-MCNC: 2.3 MG/DL (ref 1.6–2.4)
MCH RBC QN AUTO: 30.7 PG (ref 25.2–33.5)
MCHC RBC AUTO-ENTMCNC: 32.6 G/DL (ref 28.4–34.8)
MCV RBC AUTO: 94 FL (ref 82.6–102.9)
MONOCYTES NFR BLD: 0.75 K/UL (ref 0.1–1.2)
MONOCYTES NFR BLD: 10 % (ref 3–12)
NEUTROPHILS NFR BLD: 53 % (ref 36–65)
NEUTS SEG NFR BLD: 4.05 K/UL (ref 1.5–8.1)
NITRITE UR QL STRIP: NEGATIVE
NRBC BLD-RTO: 0 PER 100 WBC
PH UR STRIP: 5.5 [PH] (ref 5–8)
PHOSPHATE SERPL-MCNC: 4.7 MG/DL (ref 2.5–4.5)
PLATELET # BLD AUTO: 167 K/UL (ref 138–453)
PMV BLD AUTO: 11.1 FL (ref 8.1–13.5)
POTASSIUM SERPL-SCNC: 4.7 MMOL/L (ref 3.7–5.3)
POTASSIUM SERPL-SCNC: 4.7 MMOL/L (ref 3.7–5.3)
PROT SERPL-MCNC: 7 G/DL (ref 6.6–8.7)
PROT UR STRIP-MCNC: ABNORMAL MG/DL
RBC # BLD AUTO: 4.01 M/UL (ref 4.21–5.77)
RBC #/AREA URNS HPF: ABNORMAL /HPF (ref 0–4)
SODIUM SERPL-SCNC: 143 MMOL/L (ref 136–145)
SODIUM SERPL-SCNC: 143 MMOL/L (ref 136–145)
SP GR UR STRIP: 1.02 (ref 1–1.03)
T4 FREE SERPL-MCNC: 1.2 NG/DL (ref 0.92–1.68)
T4 FREE SERPL-MCNC: 1.2 NG/DL (ref 0.92–1.68)
TOTAL PROTEIN, URINE: 441 MG/DL
TRIGL SERPL-MCNC: 144 MG/DL
TRIGL SERPL-MCNC: 144 MG/DL
TSH SERPL DL<=0.05 MIU/L-ACNC: 4.2 UIU/ML (ref 0.27–4.2)
TSH SERPL DL<=0.05 MIU/L-ACNC: 4.28 UIU/ML (ref 0.27–4.2)
URINE TOTAL PROTEIN CREATININE RATIO: 2.88
UROBILINOGEN UR STRIP-ACNC: NORMAL EU/DL (ref 0–1)
VIT B12 SERPL-MCNC: 285 PG/ML (ref 232–1245)
VLDLC SERPL CALC-MCNC: 29 MG/DL
VLDLC SERPL CALC-MCNC: 29 MG/DL
WBC #/AREA URNS HPF: ABNORMAL /HPF (ref 0–5)
WBC OTHER # BLD: 7.6 K/UL (ref 3.5–11.3)

## 2024-09-11 PROBLEM — Z79.4 TYPE 2 DIABETES MELLITUS WITH CHRONIC KIDNEY DISEASE, WITH LONG-TERM CURRENT USE OF INSULIN (HCC): Status: ACTIVE | Noted: 2023-05-16

## 2024-09-11 PROBLEM — E66.01 SEVERE OBESITY (HCC): Status: ACTIVE | Noted: 2019-02-04

## 2024-09-11 PROBLEM — E11.9 DIABETES MELLITUS (HCC): Status: ACTIVE | Noted: 2024-02-01

## 2024-09-11 PROBLEM — E11.65 TYPE 2 DIABETES MELLITUS WITH HYPERGLYCEMIA (HCC): Status: ACTIVE | Noted: 2023-05-16

## 2024-09-11 PROBLEM — E11.22 TYPE 2 DIABETES MELLITUS WITH CHRONIC KIDNEY DISEASE, WITH LONG-TERM CURRENT USE OF INSULIN (HCC): Status: ACTIVE | Noted: 2023-05-16

## 2024-09-12 ENCOUNTER — TELEPHONE (OUTPATIENT)
Dept: FAMILY MEDICINE CLINIC | Age: 79
End: 2024-09-12

## 2024-09-12 DIAGNOSIS — R79.89 ELEVATED TSH: Primary | ICD-10-CM

## 2024-09-12 DIAGNOSIS — E03.9 HYPOTHYROIDISM, UNSPECIFIED TYPE: ICD-10-CM

## 2024-09-23 RX ORDER — ATORVASTATIN CALCIUM 40 MG/1
TABLET, FILM COATED ORAL
Qty: 90 TABLET | Refills: 3 | Status: SHIPPED | OUTPATIENT
Start: 2024-09-23

## 2024-09-23 RX ORDER — AMLODIPINE BESYLATE 10 MG/1
TABLET ORAL
Qty: 90 TABLET | Refills: 3 | Status: SHIPPED | OUTPATIENT
Start: 2024-09-23

## 2024-09-23 RX ORDER — OMEPRAZOLE 40 MG/1
CAPSULE, DELAYED RELEASE ORAL
Qty: 90 CAPSULE | Refills: 3 | Status: SHIPPED | OUTPATIENT
Start: 2024-09-23

## 2024-09-23 RX ORDER — MONTELUKAST SODIUM 10 MG/1
TABLET ORAL
Qty: 90 TABLET | Refills: 3 | Status: SHIPPED | OUTPATIENT
Start: 2024-09-23

## 2024-10-01 RX ORDER — METOPROLOL SUCCINATE 50 MG/1
TABLET, EXTENDED RELEASE ORAL
Qty: 90 TABLET | Refills: 3 | Status: SHIPPED | OUTPATIENT
Start: 2024-10-01

## 2024-10-01 NOTE — TELEPHONE ENCOUNTER
Serg Garcia is calling to request a refill on the following medication(s):    Medication Request:  Requested Prescriptions     Pending Prescriptions Disp Refills    metoprolol succinate (TOPROL XL) 50 MG extended release tablet [Pharmacy Med Name: METOPROLOL SUCCINATE ER TABS 50MG] 90 tablet 3     Sig: TAKE 1 TABLET DAILY       Last Visit Date (If Applicable):  5/20/2024    Next Visit Date:    11/25/2024

## 2024-11-01 ENCOUNTER — HOSPITAL ENCOUNTER (OUTPATIENT)
Dept: GENERAL RADIOLOGY | Facility: CLINIC | Age: 79
Discharge: HOME | End: 2024-11-03
Attending: FAMILY MEDICINE
Payer: MEDICARE

## 2024-11-01 ENCOUNTER — OFFICE VISIT (OUTPATIENT)
Dept: FAMILY MEDICINE CLINIC | Age: 79
End: 2024-11-01

## 2024-11-01 VITALS
WEIGHT: 235.6 LBS | TEMPERATURE: 97.2 F | DIASTOLIC BLOOD PRESSURE: 64 MMHG | OXYGEN SATURATION: 97 % | HEIGHT: 67 IN | SYSTOLIC BLOOD PRESSURE: 154 MMHG | HEART RATE: 58 BPM | RESPIRATION RATE: 18 BRPM | BODY MASS INDEX: 36.98 KG/M2

## 2024-11-01 DIAGNOSIS — M51.26 LUMBAR HERNIATED DISC: ICD-10-CM

## 2024-11-01 DIAGNOSIS — S80.11XA CONTUSION OF LEG, RIGHT, INITIAL ENCOUNTER: ICD-10-CM

## 2024-11-01 DIAGNOSIS — E66.01 SEVERE OBESITY: ICD-10-CM

## 2024-11-01 DIAGNOSIS — C18.9 MALIGNANT NEOPLASM OF COLON, UNSPECIFIED PART OF COLON (HCC): ICD-10-CM

## 2024-11-01 DIAGNOSIS — G47.33 OSA (OBSTRUCTIVE SLEEP APNEA): ICD-10-CM

## 2024-11-01 DIAGNOSIS — N18.2 TYPE 2 DIABETES MELLITUS WITH STAGE 2 CHRONIC KIDNEY DISEASE, WITH LONG-TERM CURRENT USE OF INSULIN (HCC): ICD-10-CM

## 2024-11-01 DIAGNOSIS — I10 ESSENTIAL HYPERTENSION: Primary | ICD-10-CM

## 2024-11-01 DIAGNOSIS — E11.22 TYPE 2 DIABETES MELLITUS WITH STAGE 2 CHRONIC KIDNEY DISEASE, WITH LONG-TERM CURRENT USE OF INSULIN (HCC): ICD-10-CM

## 2024-11-01 DIAGNOSIS — C83.00 SMALL B-CELL LYMPHOMA, UNSPECIFIED BODY REGION (HCC): ICD-10-CM

## 2024-11-01 DIAGNOSIS — S80.11XA CONTUSION OF RIGHT LOWER EXTREMITY, INITIAL ENCOUNTER: ICD-10-CM

## 2024-11-01 DIAGNOSIS — Z79.4 TYPE 2 DIABETES MELLITUS WITH STAGE 2 CHRONIC KIDNEY DISEASE, WITH LONG-TERM CURRENT USE OF INSULIN (HCC): ICD-10-CM

## 2024-11-01 DIAGNOSIS — C83.33 DIFFUSE LARGE B-CELL LYMPHOMA OF INTRA-ABDOMINAL LYMPH NODES (HCC): ICD-10-CM

## 2024-11-01 PROCEDURE — 73590 X-RAY EXAM OF LOWER LEG: CPT

## 2024-11-01 PROCEDURE — 73560 X-RAY EXAM OF KNEE 1 OR 2: CPT

## 2024-11-01 ASSESSMENT — ENCOUNTER SYMPTOMS
EYES NEGATIVE: 1
ALLERGIC/IMMUNOLOGIC NEGATIVE: 1
GASTROINTESTINAL NEGATIVE: 1
RESPIRATORY NEGATIVE: 1
BACK PAIN: 1

## 2024-11-01 NOTE — PROGRESS NOTES
Subjective:      Patient ID: Serg Garcia is a 79 y.o. male.    Leg Injury  This is a new problem. The current episode started in the past 7 days. The problem occurs constantly. The problem has been gradually improving. Associated symptoms include arthralgias. He has tried rest, sleep, walking and relaxation for the symptoms. The treatment provided moderate relief.       Review of Systems   Constitutional: Negative.    HENT: Negative.     Eyes: Negative.    Respiratory: Negative.     Cardiovascular: Negative.    Gastrointestinal: Negative.    Endocrine: Negative.    Musculoskeletal:  Positive for arthralgias, back pain and gait problem.   Skin: Negative.    Allergic/Immunologic: Negative.    Hematological: Negative.    Psychiatric/Behavioral: Negative.     Past family and social history unremarkable.  .iag      Objective:   Physical Exam  Vitals and nursing note reviewed.   Constitutional:       Appearance: He is well-developed. He is obese.      Comments: Sleep   HENT:      Head: Normocephalic and atraumatic.      Right Ear: External ear normal.      Left Ear: External ear normal.      Nose: Nose normal.   Eyes:      Conjunctiva/sclera: Conjunctivae normal.      Pupils: Pupils are equal, round, and reactive to light.   Cardiovascular:      Rate and Rhythm: Normal rate and regular rhythm.      Heart sounds: Normal heart sounds.      Comments: Diabetes  Hypertension  Hyperlipidemia  Pulmonary:      Effort: Pulmonary effort is normal.      Breath sounds: Normal breath sounds.   Abdominal:      General: Bowel sounds are normal.      Palpations: Abdomen is soft.      Comments: Colon   Genitourinary:     Comments: CKD 3-4  Musculoskeletal:         General: Normal range of motion.      Cervical back: Normal range of motion and neck supple.      Comments: Posttraumatic bruising and contusion medial aspect of right upper leg/knee that appears to show signs of improvement  He continued to retain functionality of his right

## 2024-11-05 SDOH — HEALTH STABILITY: PHYSICAL HEALTH: ON AVERAGE, HOW MANY DAYS PER WEEK DO YOU ENGAGE IN MODERATE TO STRENUOUS EXERCISE (LIKE A BRISK WALK)?: 6 DAYS

## 2024-11-05 SDOH — HEALTH STABILITY: PHYSICAL HEALTH: ON AVERAGE, HOW MANY MINUTES DO YOU ENGAGE IN EXERCISE AT THIS LEVEL?: 90 MIN

## 2024-11-07 ENCOUNTER — OFFICE VISIT (OUTPATIENT)
Dept: FAMILY MEDICINE CLINIC | Age: 79
End: 2024-11-07

## 2024-11-07 VITALS
SYSTOLIC BLOOD PRESSURE: 172 MMHG | OXYGEN SATURATION: 97 % | BODY MASS INDEX: 37.39 KG/M2 | HEIGHT: 67 IN | TEMPERATURE: 97.9 F | WEIGHT: 238.2 LBS | HEART RATE: 63 BPM | DIASTOLIC BLOOD PRESSURE: 58 MMHG | RESPIRATION RATE: 18 BRPM

## 2024-11-07 DIAGNOSIS — T14.8XXA MUSCLE STRAIN: ICD-10-CM

## 2024-11-07 DIAGNOSIS — I10 ESSENTIAL HYPERTENSION: ICD-10-CM

## 2024-11-07 DIAGNOSIS — Z76.89 ENCOUNTER TO ESTABLISH CARE: Primary | ICD-10-CM

## 2024-11-07 DIAGNOSIS — S80.11XA CONTUSION OF LEG, RIGHT, INITIAL ENCOUNTER: ICD-10-CM

## 2024-11-07 DIAGNOSIS — E66.812 CLASS 2 SEVERE OBESITY DUE TO EXCESS CALORIES WITH SERIOUS COMORBIDITY AND BODY MASS INDEX (BMI) OF 37.0 TO 37.9 IN ADULT: ICD-10-CM

## 2024-11-07 DIAGNOSIS — E66.01 CLASS 2 SEVERE OBESITY DUE TO EXCESS CALORIES WITH SERIOUS COMORBIDITY AND BODY MASS INDEX (BMI) OF 37.0 TO 37.9 IN ADULT: ICD-10-CM

## 2024-11-07 DIAGNOSIS — Z23 NEED FOR INFLUENZA VACCINATION: ICD-10-CM

## 2024-11-07 RX ORDER — TIZANIDINE 2 MG/1
2 TABLET ORAL 2 TIMES DAILY PRN
Qty: 30 TABLET | Refills: 0 | Status: SHIPPED | OUTPATIENT
Start: 2024-11-07

## 2024-11-07 ASSESSMENT — ENCOUNTER SYMPTOMS
GASTROINTESTINAL NEGATIVE: 1
EYES NEGATIVE: 1
ALLERGIC/IMMUNOLOGIC NEGATIVE: 1
RESPIRATORY NEGATIVE: 1

## 2024-11-07 NOTE — PROGRESS NOTES
[facility] [place]     Date of Visit:  2024  Patient Name: Serg Garcia   Patient :  1945     CHIEF COMPLAINT:     Serg Garcia is a 79 y.o. male who presents today for an general visit to be evaluated for the following condition(s):    Chief Complaint   Patient presents with    new to provider    Leg Pain     Right leg        HISTORY OF PRESENT ILLNESS:       Follows with:  Nephrology: Lonnie Alves MD  Endocrinology: Jacobo Parker MD   Ophthalmology: Areli Jackson, OD - [facility]  Urology: Jayy Myles MD  Pulmonology: [facility]    79-year-old male who was helping his friend when he sustained a fall resulting in contusion of the right leg involving medial upper leg and inner aspect of the knee.  It appears showing of progressive healing.  We will order x-rays to rule out fracture.  Patient states that pain is controlled.  He ambulates on a cane.  Fall precaution is advised apply ice pack  History of peripheral venous insufficiency with 2+ pedal edema and pressure blisters.  Patient states that he rental be uses compression stocking.  Compliance is advised, leg elevation  History of CKD 3-4 with creatinine of 2.2.  He is established with nephrology.  Avoid nephrotoxic drugs, anti-inflammatory radiocontrast  History of B-cell lymphoma.  Diabetes mellitus with A1c of 6.8.  Continue current regimen of medication as per PCP.  He denies hypoglycemia  Hemodynamically stable  Call for any concern  History of colon cancer        REVIEW OF SYSTEMS:        Review of Systems   Constitutional: Negative.    HENT: Negative.     Eyes: Negative.    Respiratory: Negative.     Cardiovascular: Negative.    Gastrointestinal: Negative.    Endocrine: Negative.    Genitourinary: Negative.    Musculoskeletal:  Positive for arthralgias and myalgias.   Skin: Negative.    Allergic/Immunologic: Negative.    Neurological:

## 2024-11-21 LAB
ESTIMATED AVERAGE GLUCOSE: NORMAL
HBA1C MFR BLD: 6.6 %

## 2024-12-09 ENCOUNTER — COMMUNITY OUTREACH (OUTPATIENT)
Dept: FAMILY MEDICINE CLINIC | Age: 79
End: 2024-12-09

## 2024-12-09 NOTE — PROGRESS NOTES
Patient's HM shows they are overdue for Hemoglobin A1C  Care Everywhere and  files searched.   updated with 11/21/24, 8/20/24, 5/1/24 and 2/2/24 A1C results.

## 2025-01-06 SDOH — HEALTH STABILITY: PHYSICAL HEALTH: ON AVERAGE, HOW MANY DAYS PER WEEK DO YOU ENGAGE IN MODERATE TO STRENUOUS EXERCISE (LIKE A BRISK WALK)?: 6 DAYS

## 2025-01-06 SDOH — HEALTH STABILITY: PHYSICAL HEALTH: ON AVERAGE, HOW MANY MINUTES DO YOU ENGAGE IN EXERCISE AT THIS LEVEL?: 90 MIN

## 2025-01-09 ENCOUNTER — OFFICE VISIT (OUTPATIENT)
Dept: PRIMARY CARE CLINIC | Age: 80
End: 2025-01-09
Payer: MEDICARE

## 2025-01-09 VITALS
DIASTOLIC BLOOD PRESSURE: 76 MMHG | BODY MASS INDEX: 35.94 KG/M2 | WEIGHT: 229 LBS | HEART RATE: 45 BPM | HEIGHT: 67 IN | SYSTOLIC BLOOD PRESSURE: 150 MMHG | OXYGEN SATURATION: 95 %

## 2025-01-09 DIAGNOSIS — E03.9 HYPOTHYROIDISM, UNSPECIFIED TYPE: ICD-10-CM

## 2025-01-09 DIAGNOSIS — Z79.4 TYPE 2 DIABETES MELLITUS WITH STAGE 4 CHRONIC KIDNEY DISEASE, WITH LONG-TERM CURRENT USE OF INSULIN (HCC): ICD-10-CM

## 2025-01-09 DIAGNOSIS — L97.919 VENOUS ULCER OF RIGHT LEG (HCC): Primary | ICD-10-CM

## 2025-01-09 DIAGNOSIS — E11.22 TYPE 2 DIABETES MELLITUS WITH STAGE 4 CHRONIC KIDNEY DISEASE, WITH LONG-TERM CURRENT USE OF INSULIN (HCC): ICD-10-CM

## 2025-01-09 DIAGNOSIS — N18.4 TYPE 2 DIABETES MELLITUS WITH STAGE 4 CHRONIC KIDNEY DISEASE, WITH LONG-TERM CURRENT USE OF INSULIN (HCC): ICD-10-CM

## 2025-01-09 DIAGNOSIS — N18.4 CKD (CHRONIC KIDNEY DISEASE) STAGE 4, GFR 15-29 ML/MIN (HCC): ICD-10-CM

## 2025-01-09 DIAGNOSIS — I83.019 VENOUS ULCER OF RIGHT LEG (HCC): Primary | ICD-10-CM

## 2025-01-09 DIAGNOSIS — E11.59 TYPE 2 DIABETES MELLITUS WITH OTHER CIRCULATORY COMPLICATIONS (HCC): ICD-10-CM

## 2025-01-09 DIAGNOSIS — R60.0 BILATERAL LOWER EXTREMITY EDEMA: ICD-10-CM

## 2025-01-09 DIAGNOSIS — D49.0 IPMN (INTRADUCTAL PAPILLARY MUCINOUS NEOPLASM): ICD-10-CM

## 2025-01-09 PROBLEM — C83.33 DIFFUSE LARGE B-CELL LYMPHOMA OF INTRA-ABDOMINAL LYMPH NODES (HCC): Status: RESOLVED | Noted: 2018-08-25 | Resolved: 2025-01-09

## 2025-01-09 PROBLEM — N18.2 STAGE 2 CHRONIC KIDNEY DISEASE: Status: RESOLVED | Noted: 2018-08-25 | Resolved: 2025-01-09

## 2025-01-09 PROCEDURE — 99214 OFFICE O/P EST MOD 30 MIN: CPT | Performed by: STUDENT IN AN ORGANIZED HEALTH CARE EDUCATION/TRAINING PROGRAM

## 2025-01-09 PROCEDURE — 1036F TOBACCO NON-USER: CPT | Performed by: STUDENT IN AN ORGANIZED HEALTH CARE EDUCATION/TRAINING PROGRAM

## 2025-01-09 PROCEDURE — 1123F ACP DISCUSS/DSCN MKR DOCD: CPT | Performed by: STUDENT IN AN ORGANIZED HEALTH CARE EDUCATION/TRAINING PROGRAM

## 2025-01-09 PROCEDURE — G8427 DOCREV CUR MEDS BY ELIG CLIN: HCPCS | Performed by: STUDENT IN AN ORGANIZED HEALTH CARE EDUCATION/TRAINING PROGRAM

## 2025-01-09 PROCEDURE — G8417 CALC BMI ABV UP PARAM F/U: HCPCS | Performed by: STUDENT IN AN ORGANIZED HEALTH CARE EDUCATION/TRAINING PROGRAM

## 2025-01-09 PROCEDURE — 3077F SYST BP >= 140 MM HG: CPT | Performed by: STUDENT IN AN ORGANIZED HEALTH CARE EDUCATION/TRAINING PROGRAM

## 2025-01-09 PROCEDURE — 3078F DIAST BP <80 MM HG: CPT | Performed by: STUDENT IN AN ORGANIZED HEALTH CARE EDUCATION/TRAINING PROGRAM

## 2025-01-09 SDOH — ECONOMIC STABILITY: FOOD INSECURITY: WITHIN THE PAST 12 MONTHS, THE FOOD YOU BOUGHT JUST DIDN'T LAST AND YOU DIDN'T HAVE MONEY TO GET MORE.: NEVER TRUE

## 2025-01-09 SDOH — ECONOMIC STABILITY: FOOD INSECURITY: WITHIN THE PAST 12 MONTHS, YOU WORRIED THAT YOUR FOOD WOULD RUN OUT BEFORE YOU GOT MONEY TO BUY MORE.: NEVER TRUE

## 2025-01-09 ASSESSMENT — PATIENT HEALTH QUESTIONNAIRE - PHQ9
SUM OF ALL RESPONSES TO PHQ QUESTIONS 1-9: 0
SUM OF ALL RESPONSES TO PHQ QUESTIONS 1-9: 0
2. FEELING DOWN, DEPRESSED OR HOPELESS: NOT AT ALL
1. LITTLE INTEREST OR PLEASURE IN DOING THINGS: NOT AT ALL
SUM OF ALL RESPONSES TO PHQ9 QUESTIONS 1 & 2: 0
SUM OF ALL RESPONSES TO PHQ QUESTIONS 1-9: 0
SUM OF ALL RESPONSES TO PHQ QUESTIONS 1-9: 0

## 2025-01-09 NOTE — ASSESSMENT & PLAN NOTE
Chronic, at goal (stable), due for surveillance imaging; will attempt to discuss with nephrology re: acceptability of contrast with MRCP.

## 2025-01-09 NOTE — PROGRESS NOTES
(NORVASC) 10 MG tablet TAKE 1 TABLET DAILY 90 tablet 3    furosemide (LASIX) 20 MG tablet TAKE 2 TABLETS ONCE DAILY 180 tablet 3    levothyroxine (SYNTHROID) 25 MCG tablet TAKE 1 TABLET DAILY 90 tablet 3    losartan (COZAAR) 50 MG tablet TAKE 1 TABLET DAILY 90 tablet 3    HUMALOG KWIKPEN 100 UNIT/ML SOPN Inject into the skin 2 times daily (before meals)      Insulin Degludec 100 UNIT/ML SOPN 40 units in the morning      vitamin D3 (CHOLECALCIFEROL) 10 MCG (400 UNIT) TABS tablet Take 1 tablet by mouth daily      alpha-galactosidase (BEANO) TABS chewable tablet Take 2 tablets by mouth 3 times daily (with meals) 60 tablet 0    BD PEN NEEDLE NATASHA U/F 32G X 4 MM MISC       acetaminophen (TYLENOL) 325 MG tablet Take 2 tablets by mouth every 6 hours as needed for Pain      diclofenac sodium (VOLTAREN) 1 % GEL Apply topically 4 times daily as needed for Pain      Respiratory Therapy Supplies MISC Cpap supplies- tubing, mask, filter 1 each 0    aspirin 81 MG tablet Take 1 tablet by mouth daily       No current facility-administered medications on file prior to visit.       No Known Allergies    Patient Active Problem List   Diagnosis    Mixed hyperlipidemia    Essential hypertension    Severe obesity    GUTIERREZ (obstructive sleep apnea)    Colon cancer (HCC)    Hay fever    Lumbar herniated disc    Epidural hematoma (HCC)    Microhematuria    Renal calculus, left    Urolithiasis    Type 2 diabetes mellitus with chronic kidney disease, with long-term current use of insulin (HCC)    Bilateral primary osteoarthritis of hip    Lymphoma in remission    Iron deficiency anemia    IPMN (intraductal papillary mucinous neoplasm)    Hypocitraturia    Hyperkalemia    Acquired hypothyroidism    Low back pain    Vitamin D deficiency    Nocturia    CKD (chronic kidney disease) stage 4, GFR 15-29 ml/min (HCC)    Diabetes mellitus (HCC)    Type 2 diabetes mellitus with hyperglycemia (HCC)    Bilateral lower extremity edema    Edema of both legs

## 2025-01-09 NOTE — ASSESSMENT & PLAN NOTE
Chronic, not at goal (unstable), given duration and non-healing nature as well as high risk for complications with diabetes, chronic leg swelling, will refer to wound care for further management.  Advised to elevate legs at rest.    Orders:    [facility]    Vascular duplex lower extremity arteries bilateral with SERAFIN; Future    Vascular duplex reflux venous insufficiency study bilateral; Future

## 2025-01-09 NOTE — ASSESSMENT & PLAN NOTE
Orders:    [facility] [place]    Vascular duplex lower extremity arteries bilateral with SERAFIN; Future

## 2025-01-22 NOTE — DISCHARGE INSTRUCTIONS
[facility] -Phone: [phone] Fax: [phone]   Visit  Discharge Instructions / Physician Orders    DATE: 1/23/2025     Home Care:      SUPPLIES ORDERED THRU:      Wound Location: right lower leg     Cleanse with: Liquid antibacterial soap and water, rinse well      Dressing Orders: compression stockings to bilateral lower legs (tubi  today in clinic)     Frequency: on in the morning and off at bedtime     Additional Orders: Increase protein to diet (meat, cheese, eggs, fish, peanut butter, nuts and beans)  ELEVATE LEGS AS MUCH AS POSSIBLE    -Keep legs elevated when sitting  -Sleep in a bed at night  -Calf muscle exercises  -Wear compression: On in morning, off at night  -Get venous and arterial studies ordered scheduled  -Follow-up with PCP    Your next appointment with Wound Care Center is as needed     (Please note your next appointment above and if you are unable to keep, kindly give a 24 hour notice. Thank you.)  If more than 15 min late we cannot guarantee you will be seen due to clinician schedule  Per Policy, Excessive cancellation will call for dismissal from program.     If you experience any of the following, please call the Wound Care Center during business hours:  [phone]     * Increase in Pain  * Temperature over 101  * Increase in drainage from your wound  * Drainage with a foul odor  * Bleeding  * Increase in swelling  * Need for compression bandage changes due to slippage, breakthrough drainage.     If you need medical attention outside of the business hours of the Wound Care Centers please contact your PCP or go to the an urgent care or emergency department     The information contained in the After Visit Summary has been reviewed with me, the patient and/or responsible adult, by my health care provider(s). I had the opportunity to ask questions regarding this information. I have elected to receive;      []After Visit Summary  [x]Comprehensive Discharge

## 2025-01-23 ENCOUNTER — HOSPITAL ENCOUNTER (OUTPATIENT)
Dept: WOUND CARE | Age: 80
Discharge: HOME | End: 2025-01-23
Payer: MEDICARE

## 2025-01-23 VITALS
WEIGHT: 229 LBS | TEMPERATURE: 100 F | SYSTOLIC BLOOD PRESSURE: 180 MMHG | HEIGHT: 67 IN | DIASTOLIC BLOOD PRESSURE: 68 MMHG | RESPIRATION RATE: 18 BRPM | BODY MASS INDEX: 35.94 KG/M2 | HEART RATE: 86 BPM

## 2025-01-23 DIAGNOSIS — I87.8 VENOUS STASIS OF LOWER EXTREMITY: Primary | ICD-10-CM

## 2025-01-23 DIAGNOSIS — R60.0 EDEMA OF BOTH LEGS: ICD-10-CM

## 2025-01-23 PROCEDURE — 99213 OFFICE O/P EST LOW 20 MIN: CPT

## 2025-01-23 PROCEDURE — 99203 OFFICE O/P NEW LOW 30 MIN: CPT

## 2025-01-23 ASSESSMENT — PAIN SCALES - GENERAL: PAINLEVEL_OUTOF10: 0

## 2025-01-23 NOTE — PROGRESS NOTES
PHYSICAL EXAM    General Appearance: alert and oriented to person, place and time, well-developed and well-nourished, in no acute distress  Skin: no open wounds noted  Head: normocephalic and atraumatic  Pulmonary: normal air movement, no respiratory distress  Cardiovascular/Circulation: BLE edema, noted BUE as well, no cyanosis, palpable peripheral pulses  Extremities: no cyanosis and no clubbing   Musculoskeletal: no joint swelling, deformity or tenderness  Neurologic: gait, coordination normal and speech normal      Assessment:     Problem List Items Addressed This Visit          Circulatory    Venous stasis of lower extremity - Primary       Other    Edema of both legs                     Plan:     -Local wound care: no wound care necessary, no open wound    -Signs of wound infection/cellulitis present: n/a    -No need to return, follow up with PCP    -Education provided/reinforced for wound healing: discussed importance of managing venous stasis    -Encouraged to schedule vascular testing ordered and follow up with PCP    -Tubigrip compression stockings provided today    -I have reviewed instructions with the patient, answering all questions to satisfaction    -Patient to call or return to clinic as needed if wound symptoms worsen or fail to improve as anticipated    -Provided with printed Discharge Instructions for wound management        Written patient dismissal instructions given to patient and signed by patient or POA.           Electronically signed by OSCAR Avery CNP on 1/23/2025 at 1:53 PM

## 2025-02-01 ENCOUNTER — HOSPITAL ENCOUNTER (OUTPATIENT)
Dept: VASCULAR LAB | Age: 80
End: 2025-02-01
Attending: STUDENT IN AN ORGANIZED HEALTH CARE EDUCATION/TRAINING PROGRAM
Payer: MEDICARE

## 2025-02-01 DIAGNOSIS — L97.919 VENOUS ULCER OF RIGHT LEG (HCC): ICD-10-CM

## 2025-02-01 DIAGNOSIS — R60.0 BILATERAL LOWER EXTREMITY EDEMA: ICD-10-CM

## 2025-02-01 DIAGNOSIS — I83.019 VENOUS ULCER OF RIGHT LEG (HCC): ICD-10-CM

## 2025-02-01 DIAGNOSIS — L97.921 NON-PRESSURE CHRONIC ULCER OF UNSPECIFIED PART OF LEFT LOWER LEG LIMITED TO BREAKDOWN OF SKIN (HCC): ICD-10-CM

## 2025-02-01 DIAGNOSIS — E11.59 TYPE 2 DIABETES MELLITUS WITH OTHER CIRCULATORY COMPLICATIONS (HCC): ICD-10-CM

## 2025-02-01 LAB
VAS LEFT ANKLE BP: 255 MMHG
VAS LEFT ARM BP: 175 MMHG
VAS LEFT ATA DIST PSV: 82 CM/S
VAS LEFT CFA PROX PSV: 90.2 CM/S
VAS LEFT CFV DIAM: 7.3 MM
VAS LEFT CFV RFX: 0.3 S
VAS LEFT DORSALIS PEDIS BP: 255 MMHG
VAS LEFT FV MID DIAM: 8.3 MM
VAS LEFT GSV BK DIST DIAM: 2.3 MM
VAS LEFT GSV BK PROX DIAM: 3 MM
VAS LEFT POP A MID PSV: 166.1 CM/S
VAS LEFT POP RFX: 0.1 S
VAS LEFT POPLITEAL VEIN DIAM: 9.4 MM
VAS LEFT PTA BP: 177 MMHG
VAS LEFT PTA DIST PSV: 107.6 CM/S
VAS LEFT SFA DIST PSV: 101.9 CM/S
VAS LEFT SFA DIST VEL RATIO: 1.69
VAS LEFT SFA MID PSV: 60.4 CM/S
VAS LEFT SFA MID VEL RATIO: 0.89
VAS LEFT SFA PROX PSV: 68.2 CM/S
VAS LEFT SFA PROX VEL RATIO: 0.76
VAS LEFT SSV PROX DIAM: 3.5 MM
VAS LEFT TBI: 0.79
VAS LEFT TOE PRESSURE: 139 MMHG
VAS RIGHT ABI: 1.19
VAS RIGHT ANKLE BP: 209 MMHG
VAS RIGHT ARM BP: 165 MMHG
VAS RIGHT ATA DIST PSV: 23.2 CM/S
VAS RIGHT CFA PROX PSV: 129.4 CM/S
VAS RIGHT CFV DIAM: 10.6 MM
VAS RIGHT DORSALIS PEDIS BP: 107 MMHG
VAS RIGHT FV MID DIAM: 9.4 MM
VAS RIGHT GSV AT KNEE DIAM: 2.6 MM
VAS RIGHT GSV BK DIST DIAM: 2.9 MM
VAS RIGHT GSV BK MID DIAM: 2.4 MM
VAS RIGHT GSV JUNC DIAM: 5.7 MM
VAS RIGHT GSV THIGH MID DIAM: 3 MM
VAS RIGHT GSV THIGH PROX DIAM: 3.8 MM
VAS RIGHT POP A MID PSV: 113.6 CM/S
VAS RIGHT POP RFX: 0.1 S
VAS RIGHT POPLITEAL VEIN DIAM: 9.5 MM
VAS RIGHT PTA BP: 209 MMHG
VAS RIGHT PTA DIST PSV: 180.7 CM/S
VAS RIGHT SFA DIST PSV: 105.7 CM/S
VAS RIGHT SFA DIST VEL RATIO: 1.1
VAS RIGHT SFA MID PSV: 95.9 CM/S
VAS RIGHT SFA MID VEL RATIO: 0.9
VAS RIGHT SFA PROX PSV: 111.6 CM/S
VAS RIGHT SFA PROX VEL RATIO: 0.9
VAS RIGHT SSV PROX DIAM: 3.6 MM
VAS RIGHT TBI: 0.77
VAS RIGHT TOE PRESSURE: 135 MMHG

## 2025-02-01 PROCEDURE — 93970 EXTREMITY STUDY: CPT | Performed by: SURGERY

## 2025-02-01 PROCEDURE — 93922 UPR/L XTREMITY ART 2 LEVELS: CPT

## 2025-02-01 PROCEDURE — 93970 EXTREMITY STUDY: CPT

## 2025-02-03 LAB
VAS LEFT ANKLE BP: 255 MMHG
VAS LEFT ARM BP: 175 MMHG
VAS LEFT ATA DIST PSV: 82 CM/S
VAS LEFT CFA PROX PSV: 90.2 CM/S
VAS LEFT DORSALIS PEDIS BP: 255 MMHG
VAS LEFT POP A MID PSV: 166.1 CM/S
VAS LEFT PTA BP: 177 MMHG
VAS LEFT PTA DIST PSV: 107.6 CM/S
VAS LEFT SFA DIST PSV: 101.9 CM/S
VAS LEFT SFA DIST VEL RATIO: 1.69
VAS LEFT SFA MID PSV: 60.4 CM/S
VAS LEFT SFA MID VEL RATIO: 0.89
VAS LEFT SFA PROX PSV: 68.2 CM/S
VAS LEFT SFA PROX VEL RATIO: 0.76
VAS LEFT TBI: 0.79
VAS LEFT TOE PRESSURE: 139 MMHG
VAS RIGHT ABI: 1.19
VAS RIGHT ANKLE BP: 209 MMHG
VAS RIGHT ARM BP: 165 MMHG
VAS RIGHT ATA DIST PSV: 23.2 CM/S
VAS RIGHT CFA PROX PSV: 129.4 CM/S
VAS RIGHT DORSALIS PEDIS BP: 107 MMHG
VAS RIGHT POP A MID PSV: 113.6 CM/S
VAS RIGHT PTA BP: 209 MMHG
VAS RIGHT PTA DIST PSV: 180.7 CM/S
VAS RIGHT SFA DIST PSV: 105.7 CM/S
VAS RIGHT SFA DIST VEL RATIO: 1.1
VAS RIGHT SFA MID PSV: 95.9 CM/S
VAS RIGHT SFA MID VEL RATIO: 0.9
VAS RIGHT SFA PROX PSV: 111.6 CM/S
VAS RIGHT SFA PROX VEL RATIO: 0.9
VAS RIGHT TBI: 0.77
VAS RIGHT TOE PRESSURE: 135 MMHG

## 2025-02-03 PROCEDURE — 93922 UPR/L XTREMITY ART 2 LEVELS: CPT | Performed by: SURGERY

## 2025-02-03 PROCEDURE — 93925 LOWER EXTREMITY STUDY: CPT | Performed by: SURGERY

## 2025-02-07 ENCOUNTER — HOSPITAL ENCOUNTER (OUTPATIENT)
Age: 80
Setting detail: SPECIMEN
Discharge: HOME | End: 2025-02-07

## 2025-02-07 DIAGNOSIS — E03.9 HYPOTHYROIDISM, UNSPECIFIED TYPE: ICD-10-CM

## 2025-02-07 LAB — T4 FREE SERPL-MCNC: 1.2 NG/DL (ref 0.9–1.7)

## 2025-02-08 LAB — TSH SERPL DL<=0.05 MIU/L-ACNC: 4.13 UIU/ML (ref 0.27–4.2)

## 2025-02-11 ENCOUNTER — OFFICE VISIT (OUTPATIENT)
Dept: PRIMARY CARE CLINIC | Age: 80
End: 2025-02-11
Payer: MEDICARE

## 2025-02-11 VITALS
SYSTOLIC BLOOD PRESSURE: 136 MMHG | HEART RATE: 51 BPM | DIASTOLIC BLOOD PRESSURE: 74 MMHG | WEIGHT: 233 LBS | OXYGEN SATURATION: 96 % | BODY MASS INDEX: 36.57 KG/M2 | HEIGHT: 67 IN

## 2025-02-11 DIAGNOSIS — E03.9 ACQUIRED HYPOTHYROIDISM: Primary | ICD-10-CM

## 2025-02-11 DIAGNOSIS — R60.0 EDEMA OF BOTH LEGS: ICD-10-CM

## 2025-02-11 DIAGNOSIS — D49.0 IPMN (INTRADUCTAL PAPILLARY MUCINOUS NEOPLASM): ICD-10-CM

## 2025-02-11 DIAGNOSIS — N18.4 CKD (CHRONIC KIDNEY DISEASE) STAGE 4, GFR 15-29 ML/MIN (HCC): ICD-10-CM

## 2025-02-11 PROBLEM — L97.919 VENOUS ULCER OF RIGHT LEG (HCC): Status: RESOLVED | Noted: 2025-01-09 | Resolved: 2025-02-11

## 2025-02-11 PROBLEM — I83.019 VENOUS ULCER OF RIGHT LEG (HCC): Status: RESOLVED | Noted: 2025-01-09 | Resolved: 2025-02-11

## 2025-02-11 PROCEDURE — 3075F SYST BP GE 130 - 139MM HG: CPT | Performed by: STUDENT IN AN ORGANIZED HEALTH CARE EDUCATION/TRAINING PROGRAM

## 2025-02-11 PROCEDURE — 1036F TOBACCO NON-USER: CPT | Performed by: STUDENT IN AN ORGANIZED HEALTH CARE EDUCATION/TRAINING PROGRAM

## 2025-02-11 PROCEDURE — 1123F ACP DISCUSS/DSCN MKR DOCD: CPT | Performed by: STUDENT IN AN ORGANIZED HEALTH CARE EDUCATION/TRAINING PROGRAM

## 2025-02-11 PROCEDURE — G8427 DOCREV CUR MEDS BY ELIG CLIN: HCPCS | Performed by: STUDENT IN AN ORGANIZED HEALTH CARE EDUCATION/TRAINING PROGRAM

## 2025-02-11 PROCEDURE — G8417 CALC BMI ABV UP PARAM F/U: HCPCS | Performed by: STUDENT IN AN ORGANIZED HEALTH CARE EDUCATION/TRAINING PROGRAM

## 2025-02-11 PROCEDURE — 3078F DIAST BP <80 MM HG: CPT | Performed by: STUDENT IN AN ORGANIZED HEALTH CARE EDUCATION/TRAINING PROGRAM

## 2025-02-11 PROCEDURE — 99214 OFFICE O/P EST MOD 30 MIN: CPT | Performed by: STUDENT IN AN ORGANIZED HEALTH CARE EDUCATION/TRAINING PROGRAM

## 2025-02-11 NOTE — ASSESSMENT & PLAN NOTE
Chronic, at goal (stable), The patient's vascular studies were normal, ruling out venous insufficiency. The ankle-brachial index indicated minor blockages in the popliteal and tibial vessels, not affecting blood flow. The color change in his legs is due to chronic swelling, likely from cardiac and renal conditions. He has significant proteinuria, which may contribute to the leg swelling. He is on amlodipine and furosemide. Compression stockings and careful diuretic management are recommended.

## 2025-02-11 NOTE — ASSESSMENT & PLAN NOTE
Chronic, at goal (stable), The patient was due for surveillance imaging of his pancreatic cyst in December 2024. The potential risks of gadolinium contrast to his kidney function were discussed. A note will be sent to his nephrologist to discuss the risk-benefit ratio of proceeding with an MRCP.  Although given stability over time and patient's age/co morbidities, I feel stopping screening would be reasonable.

## 2025-02-11 NOTE — PROGRESS NOTES
105.7 kg (233 lb)   BMI 36.49 kg/m²    Physical Exam  Constitutional:       General: He is not in acute distress.     Appearance: Normal appearance. He is obese. He is not ill-appearing.   HENT:      Head: Normocephalic and atraumatic.   Cardiovascular:      Rate and Rhythm: Normal rate and regular rhythm.      Heart sounds: No murmur heard.     No friction rub. No gallop.   Pulmonary:      Effort: Pulmonary effort is normal. No respiratory distress.      Breath sounds: No wheezing, rhonchi or rales.   Abdominal:      General: There is no distension.      Palpations: Abdomen is soft.      Tenderness: There is no abdominal tenderness.   Musculoskeletal:      Right lower leg: Edema present.      Left lower leg: Edema present.   Skin:     General: Skin is warm and dry.      Comments: Changes consistent with stasis dermatitis affecting anterior shins; previous venous ulcer has resolved.   Neurological:      General: No focal deficit present.      Mental Status: He is alert.   Psychiatric:         Mood and Affect: Mood normal.         Behavior: Behavior normal.         Thought Content: Thought content normal.         Judgment: Judgment normal.             ASSESSMENT/PLAN       Assessment & Plan  Acquired hypothyroidism   Chronic, at goal (stable), TSH normal for age; continue levothyroxine at present dosing.    Orders:    TSH; Future    T4, Free; Future    IPMN (intraductal papillary mucinous neoplasm)   Chronic, at goal (stable), The patient was due for surveillance imaging of his pancreatic cyst in December 2024. The potential risks of gadolinium contrast to his kidney function were discussed. A note will be sent to his nephrologist to discuss the risk-benefit ratio of proceeding with an MRCP.  Although given stability over time and patient's age/co morbidities, I feel stopping screening would be reasonable.         CKD (chronic kidney disease) stage 4, GFR 15-29 ml/min ([facility])   Monitored by specialist- no acute

## 2025-02-11 NOTE — ASSESSMENT & PLAN NOTE
Chronic, at goal (stable), TSH normal for age; continue levothyroxine at present dosing.    Orders:    TSH; Future    T4, Free; Future

## 2025-02-25 ENCOUNTER — HOSPITAL ENCOUNTER (OUTPATIENT)
Age: 80
Setting detail: SPECIMEN
Discharge: HOME | End: 2025-02-25

## 2025-02-25 DIAGNOSIS — N18.4 SECONDARY DIABETES MELLITUS WITH STAGE 4 CHRONIC KIDNEY DISEASE (HCC): ICD-10-CM

## 2025-02-25 DIAGNOSIS — D63.1 ANEMIA IN STAGE 4 CHRONIC KIDNEY DISEASE (HCC): ICD-10-CM

## 2025-02-25 DIAGNOSIS — N18.4 BENIGN HYPERTENSION WITH CKD (CHRONIC KIDNEY DISEASE) STAGE IV (HCC): ICD-10-CM

## 2025-02-25 DIAGNOSIS — I12.9 BENIGN HYPERTENSION WITH CKD (CHRONIC KIDNEY DISEASE) STAGE IV (HCC): ICD-10-CM

## 2025-02-25 DIAGNOSIS — N18.4 ANEMIA IN STAGE 4 CHRONIC KIDNEY DISEASE (HCC): ICD-10-CM

## 2025-02-25 DIAGNOSIS — N18.4 CKD (CHRONIC KIDNEY DISEASE) STAGE 4, GFR 15-29 ML/MIN (HCC): ICD-10-CM

## 2025-02-25 DIAGNOSIS — R80.8 OTHER PROTEINURIA: ICD-10-CM

## 2025-02-25 DIAGNOSIS — E83.39 HYPERPHOSPHATEMIA: ICD-10-CM

## 2025-02-25 DIAGNOSIS — E13.22 SECONDARY DIABETES MELLITUS WITH STAGE 4 CHRONIC KIDNEY DISEASE (HCC): ICD-10-CM

## 2025-02-25 LAB
ANION GAP SERPL CALCULATED.3IONS-SCNC: 11 MMOL/L (ref 9–16)
BACTERIA URNS QL MICRO: ABNORMAL
BASOPHILS # BLD: 0.06 K/UL (ref 0–0.2)
BASOPHILS NFR BLD: 1 % (ref 0–2)
BILIRUB UR QL STRIP: NEGATIVE
BUN SERPL-MCNC: 39 MG/DL (ref 8–23)
CALCIUM SERPL-MCNC: 8.5 MG/DL (ref 8.6–10.4)
CASTS #/AREA URNS LPF: ABNORMAL /LPF (ref 0–8)
CHLORIDE SERPL-SCNC: 112 MMOL/L (ref 98–107)
CLARITY UR: CLEAR
CO2 SERPL-SCNC: 19 MMOL/L (ref 20–31)
COLOR UR: YELLOW
CREAT SERPL-MCNC: 2.7 MG/DL (ref 0.7–1.2)
CREAT UR-MCNC: 115 MG/DL (ref 39–259)
EOSINOPHIL # BLD: 0.27 K/UL (ref 0–0.44)
EOSINOPHILS RELATIVE PERCENT: 4 % (ref 1–4)
EPI CELLS #/AREA URNS HPF: ABNORMAL /HPF (ref 0–5)
ERYTHROCYTE [DISTWIDTH] IN BLOOD BY AUTOMATED COUNT: 15.4 % (ref 11.8–14.4)
GFR, ESTIMATED: 23 ML/MIN/1.73M2
GLUCOSE SERPL-MCNC: 106 MG/DL (ref 74–99)
GLUCOSE UR STRIP-MCNC: ABNORMAL MG/DL
HCT VFR BLD AUTO: 33.7 % (ref 40.7–50.3)
HGB BLD-MCNC: 10.6 G/DL (ref 13–17)
HGB UR QL STRIP.AUTO: ABNORMAL
IMM GRANULOCYTES # BLD AUTO: 0.06 K/UL (ref 0–0.3)
IMM GRANULOCYTES NFR BLD: 1 %
KETONES UR STRIP-MCNC: NEGATIVE MG/DL
LEUKOCYTE ESTERASE UR QL STRIP: NEGATIVE
LYMPHOCYTES NFR BLD: 2.18 K/UL (ref 1.1–3.7)
LYMPHOCYTES RELATIVE PERCENT: 30 % (ref 24–43)
MAGNESIUM SERPL-MCNC: 2 MG/DL (ref 1.6–2.4)
MCH RBC QN AUTO: 30.5 PG (ref 25.2–33.5)
MCHC RBC AUTO-ENTMCNC: 31.5 G/DL (ref 28.4–34.8)
MCV RBC AUTO: 96.8 FL (ref 82.6–102.9)
MONOCYTES NFR BLD: 0.8 K/UL (ref 0.1–1.2)
MONOCYTES NFR BLD: 11 % (ref 3–12)
NEUTROPHILS NFR BLD: 53 % (ref 36–65)
NEUTS SEG NFR BLD: 3.94 K/UL (ref 1.5–8.1)
NITRITE UR QL STRIP: NEGATIVE
NRBC BLD-RTO: 0 PER 100 WBC
PH UR STRIP: 6.5 [PH] (ref 5–8)
PHOSPHATE SERPL-MCNC: 4 MG/DL (ref 2.5–4.5)
PLATELET # BLD AUTO: 187 K/UL (ref 138–453)
PMV BLD AUTO: 10.9 FL (ref 8.1–13.5)
POTASSIUM SERPL-SCNC: 4.6 MMOL/L (ref 3.7–5.3)
PROT UR STRIP-MCNC: ABNORMAL MG/DL
RBC # BLD AUTO: 3.48 M/UL (ref 4.21–5.77)
RBC # BLD: ABNORMAL 10*6/UL
RBC #/AREA URNS HPF: ABNORMAL /HPF (ref 0–4)
SODIUM SERPL-SCNC: 142 MMOL/L (ref 136–145)
SP GR UR STRIP: 1.02 (ref 1–1.03)
TOTAL PROTEIN, URINE: 589 MG/DL
URINE TOTAL PROTEIN CREATININE RATIO: 5.12 (ref 0–0.2)
UROBILINOGEN UR STRIP-ACNC: NORMAL EU/DL (ref 0–1)
WBC #/AREA URNS HPF: ABNORMAL /HPF (ref 0–5)
WBC OTHER # BLD: 7.3 K/UL (ref 3.5–11.3)

## 2025-05-01 ENCOUNTER — HOSPITAL ENCOUNTER (OUTPATIENT)
Age: 80
Setting detail: SPECIMEN
Discharge: HOME | End: 2025-05-01

## 2025-05-01 DIAGNOSIS — N18.4 SECONDARY DIABETES MELLITUS WITH STAGE 4 CHRONIC KIDNEY DISEASE (HCC): ICD-10-CM

## 2025-05-01 DIAGNOSIS — N18.4 CKD (CHRONIC KIDNEY DISEASE) STAGE 4, GFR 15-29 ML/MIN (HCC): ICD-10-CM

## 2025-05-01 DIAGNOSIS — I12.9 BENIGN HYPERTENSION WITH CKD (CHRONIC KIDNEY DISEASE) STAGE IV (HCC): ICD-10-CM

## 2025-05-01 DIAGNOSIS — E13.22 SECONDARY DIABETES MELLITUS WITH STAGE 4 CHRONIC KIDNEY DISEASE (HCC): ICD-10-CM

## 2025-05-01 DIAGNOSIS — R80.8 OTHER PROTEINURIA: ICD-10-CM

## 2025-05-01 DIAGNOSIS — N20.0 RENAL CALCULUS, LEFT: ICD-10-CM

## 2025-05-01 DIAGNOSIS — D63.1 ANEMIA IN STAGE 4 CHRONIC KIDNEY DISEASE (HCC): ICD-10-CM

## 2025-05-01 DIAGNOSIS — N18.4 ANEMIA IN STAGE 4 CHRONIC KIDNEY DISEASE (HCC): ICD-10-CM

## 2025-05-01 DIAGNOSIS — E83.51 HYPOCALCEMIA: ICD-10-CM

## 2025-05-01 DIAGNOSIS — N18.4 BENIGN HYPERTENSION WITH CKD (CHRONIC KIDNEY DISEASE) STAGE IV (HCC): ICD-10-CM

## 2025-05-01 LAB
ANION GAP SERPL CALCULATED.3IONS-SCNC: 10 MMOL/L (ref 9–16)
BUN SERPL-MCNC: 47 MG/DL (ref 8–23)
CALCIUM SERPL-MCNC: 8.7 MG/DL (ref 8.6–10.4)
CHLORIDE SERPL-SCNC: 112 MMOL/L (ref 98–107)
CO2 SERPL-SCNC: 20 MMOL/L (ref 20–31)
CREAT SERPL-MCNC: 3.1 MG/DL (ref 0.7–1.2)
GFR, ESTIMATED: 20 ML/MIN/1.73M2
GLUCOSE SERPL-MCNC: 155 MG/DL (ref 74–99)
POTASSIUM SERPL-SCNC: 5.5 MMOL/L (ref 3.7–5.3)
SODIUM SERPL-SCNC: 142 MMOL/L (ref 136–145)

## 2025-05-13 ENCOUNTER — OFFICE VISIT (OUTPATIENT)
Dept: PRIMARY CARE CLINIC | Age: 80
End: 2025-05-13
Payer: MEDICARE

## 2025-05-13 VITALS
DIASTOLIC BLOOD PRESSURE: 62 MMHG | BODY MASS INDEX: 35.63 KG/M2 | OXYGEN SATURATION: 95 % | SYSTOLIC BLOOD PRESSURE: 130 MMHG | HEART RATE: 55 BPM | WEIGHT: 227 LBS | HEIGHT: 67 IN

## 2025-05-13 DIAGNOSIS — E66.01 MORBID (SEVERE) OBESITY DUE TO EXCESS CALORIES (HCC): ICD-10-CM

## 2025-05-13 DIAGNOSIS — Z00.00 MEDICARE ANNUAL WELLNESS VISIT, SUBSEQUENT: Primary | ICD-10-CM

## 2025-05-13 DIAGNOSIS — H61.23 EXCESSIVE CERUMEN IN BOTH EAR CANALS: ICD-10-CM

## 2025-05-13 DIAGNOSIS — J30.9 ALLERGIC RHINITIS, UNSPECIFIED SEASONALITY, UNSPECIFIED TRIGGER: ICD-10-CM

## 2025-05-13 PROBLEM — H43.813 DEGENERATION OF POSTERIOR VITREOUS BODY OF BOTH EYES: Status: ACTIVE | Noted: 2025-05-13

## 2025-05-13 PROBLEM — Z96.1 PSEUDOPHAKIA: Status: ACTIVE | Noted: 2024-06-12

## 2025-05-13 PROCEDURE — 3075F SYST BP GE 130 - 139MM HG: CPT | Performed by: STUDENT IN AN ORGANIZED HEALTH CARE EDUCATION/TRAINING PROGRAM

## 2025-05-13 PROCEDURE — 1123F ACP DISCUSS/DSCN MKR DOCD: CPT | Performed by: STUDENT IN AN ORGANIZED HEALTH CARE EDUCATION/TRAINING PROGRAM

## 2025-05-13 PROCEDURE — 3078F DIAST BP <80 MM HG: CPT | Performed by: STUDENT IN AN ORGANIZED HEALTH CARE EDUCATION/TRAINING PROGRAM

## 2025-05-13 PROCEDURE — G0439 PPPS, SUBSEQ VISIT: HCPCS | Performed by: STUDENT IN AN ORGANIZED HEALTH CARE EDUCATION/TRAINING PROGRAM

## 2025-05-13 PROCEDURE — 1160F RVW MEDS BY RX/DR IN RCRD: CPT | Performed by: STUDENT IN AN ORGANIZED HEALTH CARE EDUCATION/TRAINING PROGRAM

## 2025-05-13 PROCEDURE — 1159F MED LIST DOCD IN RCRD: CPT | Performed by: STUDENT IN AN ORGANIZED HEALTH CARE EDUCATION/TRAINING PROGRAM

## 2025-05-13 RX ORDER — CARVEDILOL 12.5 MG/1
12.5 TABLET ORAL 2 TIMES DAILY
COMMUNITY
Start: 2025-04-22

## 2025-05-13 RX ORDER — FLUTICASONE PROPIONATE 50 MCG
1 SPRAY, SUSPENSION (ML) NASAL DAILY
Qty: 16 G | Refills: 2 | Status: SHIPPED | OUTPATIENT
Start: 2025-05-13

## 2025-05-13 ASSESSMENT — PATIENT HEALTH QUESTIONNAIRE - PHQ9
SUM OF ALL RESPONSES TO PHQ QUESTIONS 1-9: 0
SUM OF ALL RESPONSES TO PHQ QUESTIONS 1-9: 0
1. LITTLE INTEREST OR PLEASURE IN DOING THINGS: NOT AT ALL
2. FEELING DOWN, DEPRESSED OR HOPELESS: NOT AT ALL
SUM OF ALL RESPONSES TO PHQ QUESTIONS 1-9: 0
SUM OF ALL RESPONSES TO PHQ QUESTIONS 1-9: 0

## 2025-05-13 ASSESSMENT — LIFESTYLE VARIABLES
HOW MANY STANDARD DRINKS CONTAINING ALCOHOL DO YOU HAVE ON A TYPICAL DAY: PATIENT DOES NOT DRINK
HOW OFTEN DO YOU HAVE A DRINK CONTAINING ALCOHOL: NEVER

## 2025-05-13 NOTE — PROGRESS NOTES
Medicare Annual Wellness Visit    Serg Garcia is here for Thyroid Problem (No concerns. )    Assessment & Plan  1. Lightheadedness.  - The lightheadedness may be attributed to Jardiance, which can decrease blood volume and subsequently lower blood pressure. The initiation of carvedilol could also contribute to this symptom by reducing heart rate.  - Blood pressure is not excessively low, but heart rate is on the lower end of the spectrum.  - Advised to monitor heart rate closely.  - No issues with low blood sugar since adjusting insulin dosage.    2. Elevated potassium levels.  - Potassium levels are mildly  - A Basic Metabolic Panel will be ordered today to reassess potassium levels.    4. Blood pressure management.  - Blood pressure readings have been consistently good during recent visits.  - Advised to continue monitoring blood pressure at home.  - Typical readings at home are 120-130/60s during the day and slightly higher in the evenings.  - Heart rate has been in the 50s and 40s, which is on the low side.    5. Nasal drainage.  - Prescription for Flonase, to be administered as one spray in each nostril daily, will be provided.  - Handout detailing the correct usage of nasal sprays will also be given.  - If adverse effects such as nosebleeds occur, discontinue use and inform immediately.  - Runny nose noted, but no significant drainage down the back of the throat.    6. Medicare annual wellness visit.  - Overall health status appears satisfactory.  - Up-to-date with all necessary screenings and vaccinations.  - Weight loss of approximately 5 pounds since the last visit.  - RSV vaccine discussed as a potential future consideration for fall or winter.    Follow-up  - Follow up in 6 months.    Medicare annual wellness visit, subsequent  Allergic rhinitis, unspecified seasonality, unspecified trigger  -     fluticasone (FLONASE) 50 MCG/ACT nasal spray; 1 spray by Each Nostril route daily, Disp-16 g,

## 2025-05-16 DIAGNOSIS — H61.23 EXCESSIVE CERUMEN IN BOTH EAR CANALS: ICD-10-CM

## 2025-05-23 ENCOUNTER — HOSPITAL ENCOUNTER (OUTPATIENT)
Age: 80
Setting detail: SPECIMEN
Discharge: HOME | End: 2025-05-23

## 2025-05-23 DIAGNOSIS — N18.4 CKD (CHRONIC KIDNEY DISEASE) STAGE 4, GFR 15-29 ML/MIN (HCC): ICD-10-CM

## 2025-05-23 DIAGNOSIS — N20.0 RENAL CALCULUS, LEFT: ICD-10-CM

## 2025-05-23 DIAGNOSIS — E83.51 HYPOCALCEMIA: ICD-10-CM

## 2025-05-23 DIAGNOSIS — I12.9 BENIGN HYPERTENSION WITH CKD (CHRONIC KIDNEY DISEASE) STAGE IV (HCC): ICD-10-CM

## 2025-05-23 DIAGNOSIS — R80.8 OTHER PROTEINURIA: ICD-10-CM

## 2025-05-23 DIAGNOSIS — N18.4 SECONDARY DIABETES MELLITUS WITH STAGE 4 CHRONIC KIDNEY DISEASE (HCC): ICD-10-CM

## 2025-05-23 DIAGNOSIS — N18.4 BENIGN HYPERTENSION WITH CKD (CHRONIC KIDNEY DISEASE) STAGE IV (HCC): ICD-10-CM

## 2025-05-23 DIAGNOSIS — N18.4 ANEMIA IN STAGE 4 CHRONIC KIDNEY DISEASE (HCC): ICD-10-CM

## 2025-05-23 DIAGNOSIS — E13.22 SECONDARY DIABETES MELLITUS WITH STAGE 4 CHRONIC KIDNEY DISEASE (HCC): ICD-10-CM

## 2025-05-23 DIAGNOSIS — D63.1 ANEMIA IN STAGE 4 CHRONIC KIDNEY DISEASE (HCC): ICD-10-CM

## 2025-05-23 LAB
ANION GAP SERPL CALCULATED.3IONS-SCNC: 10 MMOL/L (ref 9–16)
BACTERIA URNS QL MICRO: ABNORMAL
BASOPHILS # BLD: 0.06 K/UL (ref 0–0.2)
BASOPHILS NFR BLD: 1 % (ref 0–2)
BILIRUB UR QL STRIP: NEGATIVE
BUN SERPL-MCNC: 36 MG/DL (ref 8–23)
CALCIUM SERPL-MCNC: 9.2 MG/DL (ref 8.6–10.4)
CASTS #/AREA URNS LPF: ABNORMAL /LPF (ref 0–8)
CHLORIDE SERPL-SCNC: 111 MMOL/L (ref 98–107)
CLARITY UR: CLEAR
CO2 SERPL-SCNC: 22 MMOL/L (ref 20–31)
COLOR UR: YELLOW
CREAT SERPL-MCNC: 2.7 MG/DL (ref 0.7–1.2)
EOSINOPHIL # BLD: 0.24 K/UL (ref 0–0.44)
EOSINOPHILS RELATIVE PERCENT: 4 % (ref 1–4)
EPI CELLS #/AREA URNS HPF: ABNORMAL /HPF (ref 0–5)
ERYTHROCYTE [DISTWIDTH] IN BLOOD BY AUTOMATED COUNT: 13.7 % (ref 11.8–14.4)
GFR, ESTIMATED: 23 ML/MIN/1.73M2
GLUCOSE SERPL-MCNC: 77 MG/DL (ref 74–99)
GLUCOSE UR STRIP-MCNC: ABNORMAL MG/DL
HCT VFR BLD AUTO: 34.9 % (ref 40.7–50.3)
HGB BLD-MCNC: 11.3 G/DL (ref 13–17)
HGB UR QL STRIP.AUTO: ABNORMAL
IMM GRANULOCYTES # BLD AUTO: 0.03 K/UL (ref 0–0.3)
IMM GRANULOCYTES NFR BLD: 1 %
KETONES UR STRIP-MCNC: NEGATIVE MG/DL
LEUKOCYTE ESTERASE UR QL STRIP: NEGATIVE
LYMPHOCYTES NFR BLD: 1.99 K/UL (ref 1.1–3.7)
LYMPHOCYTES RELATIVE PERCENT: 31 % (ref 24–43)
MAGNESIUM SERPL-MCNC: 2.2 MG/DL (ref 1.6–2.4)
MCH RBC QN AUTO: 30.8 PG (ref 25.2–33.5)
MCHC RBC AUTO-ENTMCNC: 32.4 G/DL (ref 28.4–34.8)
MCV RBC AUTO: 95.1 FL (ref 82.6–102.9)
MONOCYTES NFR BLD: 0.65 K/UL (ref 0.1–1.2)
MONOCYTES NFR BLD: 10 % (ref 3–12)
NEUTROPHILS NFR BLD: 53 % (ref 36–65)
NEUTS SEG NFR BLD: 3.47 K/UL (ref 1.5–8.1)
NITRITE UR QL STRIP: NEGATIVE
NRBC BLD-RTO: 0 PER 100 WBC
PH UR STRIP: 5.5 [PH] (ref 5–8)
PHOSPHATE SERPL-MCNC: 3.8 MG/DL (ref 2.5–4.5)
PLATELET # BLD AUTO: 154 K/UL (ref 138–453)
PMV BLD AUTO: 11.7 FL (ref 8.1–13.5)
POTASSIUM SERPL-SCNC: 4.6 MMOL/L (ref 3.7–5.3)
PROT UR STRIP-MCNC: ABNORMAL MG/DL
RBC # BLD AUTO: 3.67 M/UL (ref 4.21–5.77)
RBC #/AREA URNS HPF: ABNORMAL /HPF (ref 0–4)
SODIUM SERPL-SCNC: 143 MMOL/L (ref 136–145)
SP GR UR STRIP: 1.01 (ref 1–1.03)
UROBILINOGEN UR STRIP-ACNC: NORMAL EU/DL (ref 0–1)
WBC #/AREA URNS HPF: ABNORMAL /HPF (ref 0–5)
WBC OTHER # BLD: 6.4 K/UL (ref 3.5–11.3)

## 2025-05-24 LAB
CREAT UR-MCNC: 64.4 MG/DL (ref 39–259)
TOTAL PROTEIN, URINE: 253 MG/DL
URINE TOTAL PROTEIN CREATININE RATIO: 3.93 (ref 0–0.2)

## 2025-06-10 NOTE — FLOWSHEET NOTE
[facility] Fall Risk Assessment    Patient Name:  Woody Hoang  : 1945        Risk Factor Scale  Score   History of Falls [] Yes  [x] No 25  0 0   Secondary Diagnosis [] Yes  [x] No 15  0 0   Ambulatory Aid [] Furniture  [] Crutches/cane/walker  [x] None/bedrest/wheelchair/nurse 30  15  0 0   IV/Heparin Lock [] Yes  [x] No 20  0 0   Gait/Transferring [] Impaired  [x] Weak  [] Normal/bedrest/immobile 20  10  0 10   Mental Status [] Forgets limitations  [x] Oriented to own ability 15  0 0      Total: 10     Based on the Assessment score: check the appropriate box.     [x]  No intervention needed   Low =   Score of 0-24    []  Use standard prevention interventions Moderate =  Score of 24-44   [] Give patient handout and discuss fall prevention strategies   [] Establish goal of education for patient/family RE: fall prevention strategies    []  Use high risk prevention interventions High = Score of 45 and higher   [] Give patient handout and discuss fall prevention strategies   [] Establish goal of education for patient/family Re: fall prevention strategies   [] Discuss lifeline / other resources    Electronically signed by:   Isabel Macias PT  Date: 10/4/2021 Genetic Testing

## 2025-06-19 ENCOUNTER — OFFICE VISIT (OUTPATIENT)
Age: 80
End: 2025-06-19
Payer: MEDICARE

## 2025-06-19 VITALS — HEIGHT: 67 IN | BODY MASS INDEX: 36.41 KG/M2 | WEIGHT: 232 LBS

## 2025-06-19 DIAGNOSIS — N20.0 RENAL CALCULUS, LEFT: ICD-10-CM

## 2025-06-19 DIAGNOSIS — R35.1 NOCTURIA: ICD-10-CM

## 2025-06-19 DIAGNOSIS — R82.991 HYPOCITRATURIA: ICD-10-CM

## 2025-06-19 DIAGNOSIS — R31.29 MICROHEMATURIA: Primary | ICD-10-CM

## 2025-06-19 LAB
BILIRUBIN, POC: NORMAL
BLOOD URINE, POC: NORMAL
CLARITY, POC: CLEAR
COLOR, POC: YELLOW
GLUCOSE URINE, POC: 500 MG/DL
KETONES, POC: NORMAL
LEUKOCYTE EST, POC: NORMAL
NITRITE, POC: NORMAL
PH, POC: NORMAL
PROTEIN, POC: >=300 MG/DL
SPECIFIC GRAVITY, POC: NORMAL
UROBILINOGEN, POC: NORMAL

## 2025-06-19 PROCEDURE — G8427 DOCREV CUR MEDS BY ELIG CLIN: HCPCS | Performed by: SPECIALIST

## 2025-06-19 PROCEDURE — 99214 OFFICE O/P EST MOD 30 MIN: CPT | Performed by: SPECIALIST

## 2025-06-19 PROCEDURE — 1159F MED LIST DOCD IN RCRD: CPT | Performed by: SPECIALIST

## 2025-06-19 PROCEDURE — 81003 URINALYSIS AUTO W/O SCOPE: CPT | Performed by: SPECIALIST

## 2025-06-19 PROCEDURE — 1123F ACP DISCUSS/DSCN MKR DOCD: CPT | Performed by: SPECIALIST

## 2025-06-19 PROCEDURE — 1036F TOBACCO NON-USER: CPT | Performed by: SPECIALIST

## 2025-06-19 PROCEDURE — G8417 CALC BMI ABV UP PARAM F/U: HCPCS | Performed by: SPECIALIST

## 2025-06-19 NOTE — PROGRESS NOTES
Jayy Myles MD FACS    [facility] Urology Office Progress Note    Patient:  Serg Garcia  YOB: 1945  Date: 6/19/2025    HISTORY OF PRESENT ILLNESS:   The patient is a 79 y.o. male  Stable trace microhematuria on today's UA with negative workup in the past.  Thus, no further workup required.    His lower urinary tract symptoms (\"BPH\") are not bothersome enough to warrant medical Rx.  His frequency is due to his Lasix use.  No flank pain or gross hematuria to suggest recurrent kidney stones.   Will check a KUB before next year's OV.  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  Urgency, frequency due to Lasix 60 mg qd    Additional History: none    Procedures Today: N/A    Urinalysis today:  Results for orders placed or performed in visit on 06/19/25   POCT Urinalysis No Micro (Auto)   Result Value Ref Range    Color, UA yellow     Clarity, UA clear     Glucose, UA  mg/dL    Bilirubin, UA      Ketones, UA      Spec Grav, UA      Blood, UA POC trace-lysed     pH, UA      Protein, UA POC >=300 mg/dL    Urobilinogen, UA      Leukocytes, UA neg     Nitrite, UA neg        Last several PSA's:  Lab Results   Component Value Date    PSA 0.31 02/26/2014    PSA 0.24 10/06/2011       Last total testosterone:  No results found for: \"TESTOSTERONE\"    Last BUN and creatinine:  Lab Results   Component Value Date    BUN 36 (H) 05/23/2025     Lab

## 2025-08-07 RX ORDER — LEVOTHYROXINE SODIUM 25 UG/1
25 TABLET ORAL DAILY
Qty: 90 TABLET | Refills: 3 | Status: SHIPPED | OUTPATIENT
Start: 2025-08-07

## 2025-08-13 ENCOUNTER — OFFICE VISIT (OUTPATIENT)
Age: 80
End: 2025-08-13

## 2025-08-13 VITALS
HEIGHT: 66 IN | WEIGHT: 225 LBS | DIASTOLIC BLOOD PRESSURE: 78 MMHG | OXYGEN SATURATION: 94 % | RESPIRATION RATE: 16 BRPM | TEMPERATURE: 98 F | HEART RATE: 52 BPM | SYSTOLIC BLOOD PRESSURE: 160 MMHG | BODY MASS INDEX: 36.16 KG/M2

## 2025-08-13 DIAGNOSIS — I50.9 CHRONIC CONGESTIVE HEART FAILURE, UNSPECIFIED HEART FAILURE TYPE (HCC): ICD-10-CM

## 2025-08-13 DIAGNOSIS — H61.21 IMPACTED CERUMEN OF RIGHT EAR: ICD-10-CM

## 2025-08-13 DIAGNOSIS — T16.2XXA FOREIGN BODY OF LEFT EAR, INITIAL ENCOUNTER: Primary | ICD-10-CM

## 2025-08-14 ASSESSMENT — ENCOUNTER SYMPTOMS
WHEEZING: 0
VOMITING: 0
COUGH: 0
SORE THROAT: 0
TROUBLE SWALLOWING: 0
DIFFICULTY BREATHING: 0
ABDOMINAL PAIN: 0
CHOKING: 0

## (undated) DEVICE — CATHETER URET 5FR L70CM OPN END SGL LUMN INJ HUB FLEXIMA

## (undated) DEVICE — CATHETER URETH 16FR BLLN 5CC SIL ALLY W/ SIL HYDRGEL 2 W F

## (undated) DEVICE — PROBE LITHO L403MM DIA3.8MM US SWISS LITHOCLAST

## (undated) DEVICE — SINGLE ACTION PUMPING SYSTEM

## (undated) DEVICE — GLOVE ORANGE PI 7 1/2   MSG9075

## (undated) DEVICE — Z DISCONTINUED BY MEDLINE USE 2711682 TRAY SKIN PREP DRY W/ PREM GLV

## (undated) DEVICE — GAUZE,SPONGE,FLUFF,6"X6.75",STRL,5/TRAY: Brand: MEDLINE

## (undated) DEVICE — ADAPTER URO SCP UROLOK LL

## (undated) DEVICE — GARMENT,MEDLINE,DVT,INT,CALF,MED, GEN2: Brand: MEDLINE

## (undated) DEVICE — OCCLUSION BALLOON CATHETER: Brand: OCCLUDER

## (undated) DEVICE — KENDALL SCD EXPRESS SLEEVES, KNEE LENGTH, MEDIUM: Brand: KENDALL SCD

## (undated) DEVICE — PACK PROCEDURE SURG CYSTO SVMMC LF

## (undated) DEVICE — GUIDEWIRE URO L150CM DIA0.035IN STIFF NIT HYDRPHLC STR TIP

## (undated) DEVICE — PAD,ABDOMINAL,5"X9",ST,LF,25/BX: Brand: MEDLINE INDUSTRIES, INC.

## (undated) DEVICE — GLOVE SURG SZ 65 THK91MIL LTX FREE SYN POLYISOPRENE

## (undated) DEVICE — HIGH PRESSURE NEPHROSTOMY BALLOON CATHETER KIT: Brand: NEPHROMAX KIT

## (undated) DEVICE — SOLUTION SCRB 4OZ 4% CHG H2O AIDED FOR PREOPERATIVE SKIN

## (undated) DEVICE — STRAP,CATHETER,ELASTIC,HOOK&LOOP: Brand: MEDLINE

## (undated) DEVICE — CATCHER STONE TBNG ADPT SWISS LITHOCLAST

## (undated) DEVICE — PROBE LITHO L570MM DIA1MM PNEUMAT SWISS LITHOCLAST

## (undated) DEVICE — DRAINBAG,ANTI-REFLUX TOWER,L/F,2000ML,LL: Brand: MEDLINE

## (undated) DEVICE — DRAPE,REIN 53X77,STERILE: Brand: MEDLINE

## (undated) DEVICE — DECANTER FLD 9IN ST BG FOR ASEP TRNSF OF FLD

## (undated) DEVICE — LIEBERMAN INTRODUCER: Brand: LIEBERMAN

## (undated) DEVICE — GLOVE ORANGE PI 8   MSG9080

## (undated) DEVICE — GLOVE ORANGE PI 8 1/2   MSG9085